# Patient Record
Sex: FEMALE | Race: WHITE | Employment: OTHER | URBAN - METROPOLITAN AREA
[De-identification: names, ages, dates, MRNs, and addresses within clinical notes are randomized per-mention and may not be internally consistent; named-entity substitution may affect disease eponyms.]

---

## 2017-01-13 ENCOUNTER — OFFICE VISIT (OUTPATIENT)
Dept: SURGERY | Facility: CLINIC | Age: 82
End: 2017-01-13

## 2017-01-13 VITALS
WEIGHT: 120.63 LBS | SYSTOLIC BLOOD PRESSURE: 137 MMHG | RESPIRATION RATE: 16 BRPM | BODY MASS INDEX: 20 KG/M2 | HEART RATE: 65 BPM | TEMPERATURE: 97 F | DIASTOLIC BLOOD PRESSURE: 73 MMHG

## 2017-01-13 DIAGNOSIS — Z12.31 ENCOUNTER FOR SCREENING MAMMOGRAM FOR MALIGNANT NEOPLASM OF BREAST: ICD-10-CM

## 2017-01-13 DIAGNOSIS — Z98.82 H/O BREAST AUGMENTATION: Primary | ICD-10-CM

## 2017-01-13 PROCEDURE — 99214 OFFICE O/P EST MOD 30 MIN: CPT | Performed by: SURGERY

## 2017-01-13 NOTE — PROGRESS NOTES
Patient presents with:   Follow - Up: 1 yr f/u s/p removal of implants 3/2016    Verified and updated allergy and medication list.     LMP:  (natural menopause, age unk)    Education Record    Learner:  Patient    Disease / Diagnosis: s/p bilateral breast i

## 2017-01-17 NOTE — PROGRESS NOTES
Breast Surgery Follow-Up    History of Present Illness:   Ms. Latoya Brian is a 80year old woman who presents for follow-up of Left breast discomfort. She denies any palpable masses, nipple discharge, skin changes or axillary adenopathy.  She has a h/o benign    OTHER SURGICAL HISTORY  2006    Comment Upovzymegp-ajbwryrvmw-jolhllswpxeakh    OTHER SURGICAL HISTORY  2007    Comment Removed cholostomy    OTHER SURGICAL HISTORY  2010    Comment intestinal    OTHER SURGICAL HISTORY  1980    Comment removal of to AA BID x 2 weeks then PRN flares. Disp: 453.5 g Rfl: 1   SYMBICORT 160-4.5 MCG/ACT Inhalation Aerosol INHALE 2 PUFFS INTO THE LUNGS 2 (TWO) TIMES DAILY. Disp: 1 Inhaler Rfl: 5   CARTIA  MG Oral Capsule SR 24 Hr TAKE 1 CAPSULE BY MOUTH DAILY.  Disp: of Systems:  General:   The patient denies, fever, chills, night sweats, fatigue, generalized weakness, change in appetite or weight loss.     HEENT:    The patient denies eye irritation, cataracts, redness, glaucoma, yellowing of the eyes, change in vision seizure/epilepsy, speech problems, coordination problems, trembling/tremors, fainting/black outs, dizziness, memory problems, loss of sensation/numbness, problems walking, weakness, tingling or burning in hands/feet.  There is no history of abusive relation supraclavicular, axillary and cervical regions are free of significant lymphadenopathy. Back: There is no vertebral column tenderness. Skin: The skin appears normal. There are no suspicious appearing rashes or lesions. Extremities:  The extremities

## 2017-01-27 ENCOUNTER — OFFICE VISIT (OUTPATIENT)
Dept: INTERNAL MEDICINE CLINIC | Facility: CLINIC | Age: 82
End: 2017-01-27

## 2017-01-27 VITALS
SYSTOLIC BLOOD PRESSURE: 128 MMHG | RESPIRATION RATE: 16 BRPM | HEART RATE: 68 BPM | WEIGHT: 123 LBS | DIASTOLIC BLOOD PRESSURE: 52 MMHG | BODY MASS INDEX: 20 KG/M2

## 2017-01-27 DIAGNOSIS — R00.2 PALPITATIONS: ICD-10-CM

## 2017-01-27 DIAGNOSIS — F41.9 ANXIETY: ICD-10-CM

## 2017-01-27 DIAGNOSIS — R07.89 CHEST DISCOMFORT: Primary | ICD-10-CM

## 2017-01-27 PROCEDURE — 99213 OFFICE O/P EST LOW 20 MIN: CPT | Performed by: NURSE PRACTITIONER

## 2017-01-27 PROCEDURE — 93000 ELECTROCARDIOGRAM COMPLETE: CPT | Performed by: NURSE PRACTITIONER

## 2017-01-27 RX ORDER — ALPRAZOLAM 0.25 MG/1
0.25 TABLET ORAL NIGHTLY PRN
Qty: 10 TABLET | Refills: 0 | Status: SHIPPED | OUTPATIENT
Start: 2017-01-27 | End: 2017-02-07

## 2017-01-27 NOTE — PATIENT INSTRUCTIONS
This medication may make you drowsy and make it more likely you will fall. Use caution when taking this medication not to drive or be too active afterward. If you use all of this prescription in 4 week please notify the office.  We will need to disc

## 2017-01-27 NOTE — PROGRESS NOTES
Patient presents with:  Blood Pressure: f/up bp also complaints of palpitations      HPI:  Presents for 2 day history of palpitations with chest discomfort. Stated it is not \"really palpitations\". Has difficult time describing it.  Stated she has been wor obstructive pulmonary disease (HCC)     Small bowel obstruction (HCC)     PAF (paroxysmal atrial fibrillation) ([facility] [address]     Essential hypertension with goal blood pressure less than 130/80     Hypokalemia     Dyslipidemia     Hyponatremia     Abdominal pain, 1 tablet by mouth daily. Disp:  Rfl:        Physical Exam  /52 mmHg  Pulse 68  Resp 16  Wt 123 lb  Constitutional:  No distress. HEENT:  Normocephalic and atraumatic. Cardiovascular: Normal rate, regular rhythm. No murmur. No S3/S4, rub, gallop. please notify the office. We will need to discuss starting a daily medication to help you. All questions were answered and the patient understands the plan.

## 2017-02-07 ENCOUNTER — OFFICE VISIT (OUTPATIENT)
Dept: INTERNAL MEDICINE CLINIC | Facility: CLINIC | Age: 82
End: 2017-02-07

## 2017-02-07 ENCOUNTER — OFFICE VISIT (OUTPATIENT)
Dept: SURGERY | Facility: CLINIC | Age: 82
End: 2017-02-07

## 2017-02-07 VITALS
SYSTOLIC BLOOD PRESSURE: 120 MMHG | HEIGHT: 65 IN | WEIGHT: 120 LBS | TEMPERATURE: 98 F | BODY MASS INDEX: 19.99 KG/M2 | HEART RATE: 84 BPM | DIASTOLIC BLOOD PRESSURE: 56 MMHG

## 2017-02-07 VITALS
TEMPERATURE: 98 F | RESPIRATION RATE: 16 BRPM | DIASTOLIC BLOOD PRESSURE: 68 MMHG | WEIGHT: 120 LBS | HEART RATE: 88 BPM | HEIGHT: 65 IN | BODY MASS INDEX: 19.99 KG/M2 | SYSTOLIC BLOOD PRESSURE: 147 MMHG

## 2017-02-07 DIAGNOSIS — R10.32 LLQ PAIN: Primary | ICD-10-CM

## 2017-02-07 DIAGNOSIS — I10 HTN (HYPERTENSION), BENIGN: ICD-10-CM

## 2017-02-07 DIAGNOSIS — R10.9 ABDOMINAL PAIN, ACUTE: Primary | ICD-10-CM

## 2017-02-07 DIAGNOSIS — I48.0 PAF (PAROXYSMAL ATRIAL FIBRILLATION) (HCC): ICD-10-CM

## 2017-02-07 PROCEDURE — 99213 OFFICE O/P EST LOW 20 MIN: CPT | Performed by: SURGERY

## 2017-02-07 PROCEDURE — 99213 OFFICE O/P EST LOW 20 MIN: CPT | Performed by: NURSE PRACTITIONER

## 2017-02-07 NOTE — H&P
New Patient Visit Note       Active Problems      1. Abdominal pain, acute    2. HTN (hypertension), benign    3.  PAF (paroxysmal atrial fibrillation) [facility])        Chief Complaint   Abdominal Pain    History of Present Illness     Pt seen at the request of Xoyndnpeuq-kflnudodbo-rgumzjltdpnfut    OTHER SURGICAL HISTORY  2007    Comment Removed cholostomy    OTHER SURGICAL HISTORY  2010    Comment intestinal    OTHER SURGICAL HISTORY  1980    Comment removal of mass near thyroid    OTHER SURGICAL HISTORY  12/1 Disp: 90 capsule Rfl: 1   ALENDRONATE SODIUM 70 MG Oral Tab TAKE ONE TABLET BY MOUTH ONCE A WEEK Disp: 12 tablet Rfl: 2   aspirin 81 MG Oral Tab Take 81 mg by mouth daily. Disp:  Rfl:    SIMVASTATIN 10 MG Oral Tab TAKE 1 TABLET BY MOUTH ONCE DAILY.  Disp: 9 Pulse 88  Temp(Src) 97.8 °F (36.6 °C) (Oral)  Resp 16  Ht 65\"  Wt 120 lb  BMI 19.97 kg/m2  Physical Exam   Constitutional: She appears well-developed and well-nourished. No distress. HENT:   Head: Normocephalic. Neck: Normal range of motion.  No trache

## 2017-02-07 NOTE — PROGRESS NOTES
Terry Wilkerson is a 80year old female. Patient presents with:  Abdominal Pain: all day Sunday during the day and a couple of times yesterday. She had diarrhea and vomiting on Sunday night       HPI:   Presents for eval of abd pain.   Juan night with c Rfl:    HYDROCHLOROTHIAZIDE 12.5 MG Oral Cap TAKE 1 CAPSULE BY MOUTH EVERY MORNING. Disp: 90 capsule Rfl: 1   ALENDRONATE SODIUM 70 MG Oral Tab TAKE ONE TABLET BY MOUTH ONCE A WEEK Disp: 12 tablet Rfl: 2   aspirin 81 MG Oral Tab Take 81 mg by mouth daily. [Other] [OTHER] Sister      x2   • stroke syndrome [Other] [OTHER]       family hx   • suicide completion [Other] [OTHER] Father         Allergies    No Known Allergies          REVIEW OF SYSTEMS:   GENERAL HEALTH: as above.    RESPIRATORY: denies shortness

## 2017-02-10 ENCOUNTER — OFFICE VISIT (OUTPATIENT)
Dept: SURGERY | Facility: CLINIC | Age: 82
End: 2017-02-10

## 2017-02-10 VITALS
DIASTOLIC BLOOD PRESSURE: 66 MMHG | BODY MASS INDEX: 19.99 KG/M2 | HEIGHT: 65 IN | SYSTOLIC BLOOD PRESSURE: 109 MMHG | WEIGHT: 120 LBS | HEART RATE: 70 BPM | RESPIRATION RATE: 16 BRPM

## 2017-02-10 DIAGNOSIS — R10.9 ABDOMINAL PAIN, ACUTE: Primary | ICD-10-CM

## 2017-02-10 PROCEDURE — 99212 OFFICE O/P EST SF 10 MIN: CPT | Performed by: SURGERY

## 2017-02-10 NOTE — PROGRESS NOTES
Follow Up Visit Note       Active Problems      1. Abdominal pain, acute          Chief Complaint   Follow - Up    History of Present Illness    Feels good. No pain. Eating ok. Normal BMs. Allergies  [place] is allergic to no known allergies.     Past Comment hole in intestine    OTHER SURGICAL HISTORY Bilateral 3/16/2016    Comment Bilateral capsulectomy and removal of silicone implants    BREAST SURGERY Bilateral 3/2016    Comment implant removal       The family history and social history have bee acetaminophen (TYLENOL) 325 MG Oral Tab Take 650 mg by mouth every 6 (six) hours as needed for Pain or Fever. Disp:  Rfl:    Cholecalciferol (VITAMIN D) 1000 UNITS Oral Cap Take 1 tablet by mouth daily.    Disp:  Rfl:    CALCIUM + D OR Take 1 tablet by mo Referrals   None    Follow Up  No Follow-up on file.     Aj Dawson MD

## 2017-02-13 RX ORDER — DILTIAZEM HYDROCHLORIDE 300 MG/1
CAPSULE, EXTENDED RELEASE ORAL
Qty: 90 CAPSULE | Refills: 2 | Status: SHIPPED | OUTPATIENT
Start: 2017-02-13 | End: 2017-12-11

## 2017-03-06 ENCOUNTER — OFFICE VISIT (OUTPATIENT)
Dept: INTERNAL MEDICINE CLINIC | Facility: CLINIC | Age: 82
End: 2017-03-06

## 2017-03-06 VITALS
HEIGHT: 65 IN | WEIGHT: 121.63 LBS | HEART RATE: 68 BPM | SYSTOLIC BLOOD PRESSURE: 124 MMHG | TEMPERATURE: 98 F | RESPIRATION RATE: 16 BRPM | BODY MASS INDEX: 20.26 KG/M2 | DIASTOLIC BLOOD PRESSURE: 62 MMHG

## 2017-03-06 DIAGNOSIS — I48.0 PAF (PAROXYSMAL ATRIAL FIBRILLATION) (HCC): ICD-10-CM

## 2017-03-06 DIAGNOSIS — E78.00 PURE HYPERCHOLESTEROLEMIA: ICD-10-CM

## 2017-03-06 DIAGNOSIS — M19.012 PRIMARY OSTEOARTHRITIS OF LEFT SHOULDER: ICD-10-CM

## 2017-03-06 DIAGNOSIS — M85.80 OSTEOPENIA DETERMINED BY X-RAY: ICD-10-CM

## 2017-03-06 DIAGNOSIS — Z00.00 WELLNESS EXAMINATION: ICD-10-CM

## 2017-03-06 DIAGNOSIS — Z23 NEED FOR PNEUMOCOCCAL VACCINATION: Primary | ICD-10-CM

## 2017-03-06 DIAGNOSIS — J44.9 CHRONIC OBSTRUCTIVE PULMONARY DISEASE, UNSPECIFIED COPD TYPE (HCC): ICD-10-CM

## 2017-03-06 DIAGNOSIS — I10 ESSENTIAL HYPERTENSION: ICD-10-CM

## 2017-03-06 DIAGNOSIS — Z87.440 HISTORY OF RECURRENT UTI (URINARY TRACT INFECTION): ICD-10-CM

## 2017-03-06 PROCEDURE — G0439 PPPS, SUBSEQ VISIT: HCPCS | Performed by: INTERNAL MEDICINE

## 2017-03-06 PROCEDURE — G0009 ADMIN PNEUMOCOCCAL VACCINE: HCPCS | Performed by: INTERNAL MEDICINE

## 2017-03-06 PROCEDURE — 90732 PPSV23 VACC 2 YRS+ SUBQ/IM: CPT | Performed by: INTERNAL MEDICINE

## 2017-03-06 NOTE — PROGRESS NOTES
Morena Faria is a 80year old female who presents for a Medicare Annual Wellness visit.   OA shoulders- s/p injections recently and doing well  COPD - no acute issues, seeing Dr. Alvaro Rendon, not smoking now  HTN/ P a fib- compliant with medications, no Prescriptions:  CARTIA  MG Oral Capsule SR 24 Hr TAKE 1 CAPSULE BY MOUTH DAILY. Disp: 90 capsule Rfl: 2   umeclidinium-vilanterol (ANORO ELLIPTA) 62.5-25 MCG/INH Inhalation Aerosol Powder, Breath Activated Inhale 1 puff into the lungs daily.  Disp:  R AST 20 10/20/2016   AST 23 06/22/2016       Lab Results  Component Value Date   ALT 22 11/17/2016   ALT 24 10/20/2016   ALT 25 06/22/2016       Lab Results  Component Value Date   TSH 0.967 09/13/2016   TSH 0.233* 06/22/2016   TSH 0.438 09/29/2015 any tripping hazards?: 0-No    Are you on multiple medications?: 1-Yes    Does pain affect your day to day activities?: 0-No     Have you had any memory issues?: 0-No    Fall/Risk Scorin    Scoring Interpretation: 0 - 3 No Risk     Depression Screening flowsheet data found. Glaucoma Screening      Ophthalmology Visit Annually yes    Bone Density Screening      Dexascan Every two years Last Dexa Scan:   XR DEXA BONE DENSITOMETRY (CPT=77080) 03/07/2016    No flowsheet data found.     Pap and Pelvic data found. Creat/alb ratio  Annually      LDL  Annually LDL CHOLESTEROL (mg/dL)   Date Value   11/17/2016 45     LDL CHOLESTROL (mg/dL)   Date Value   03/20/2014 54    No flowsheet data found. Dilated Eye exam  Annually No flowsheet data found.   No • Lumbago    • Acquired cyst of kidney    • Dizziness and giddiness    • Unspecified essential hypertension    • Other and unspecified hyperlipidemia    • COPD (chronic obstructive pulmonary disease) (HCC)    • Pneumonia, organism unspecified    • Catara Quit date: 01/01/1969    Smokeless Status: Never Used                        Alcohol Use: No            : yes , grown kids, stays active        REVIEW OF SYSTEMS:   GENERAL: feels well otherwise  SKIN: fragile skin  EYES: denies blurred vision or do B01.60/R42)    Wellness examination- she had flu shot from [facility] on cscope, check mammogram in June, continue healthy diet    Essential hypertension - controlled.  CPM  Pure hypercholesterolemia- on statin, check lipids in 1-2 months  -     Lipid Panel [E

## 2017-03-08 ENCOUNTER — LAB ENCOUNTER (OUTPATIENT)
Dept: LAB | Age: 82
End: 2017-03-08
Attending: INTERNAL MEDICINE
Payer: MEDICARE

## 2017-03-08 DIAGNOSIS — I48.0 PAF (PAROXYSMAL ATRIAL FIBRILLATION) (HCC): ICD-10-CM

## 2017-03-08 DIAGNOSIS — I10 ESSENTIAL HYPERTENSION: ICD-10-CM

## 2017-03-08 DIAGNOSIS — E78.00 PURE HYPERCHOLESTEROLEMIA: ICD-10-CM

## 2017-03-08 DIAGNOSIS — M85.80 OSTEOPENIA DETERMINED BY X-RAY: ICD-10-CM

## 2017-03-08 LAB
25-HYDROXYVITAMIN D (TOTAL): 35.4 NG/ML (ref 30–100)
ALBUMIN SERPL-MCNC: 4.2 G/DL (ref 3.5–4.8)
ALP LIVER SERPL-CCNC: 79 U/L (ref 55–142)
ALT SERPL-CCNC: 19 U/L (ref 14–54)
AST SERPL-CCNC: 10 U/L (ref 15–41)
BASOPHILS # BLD AUTO: 0.01 X10(3) UL (ref 0–0.1)
BASOPHILS NFR BLD AUTO: 0.1 %
BILIRUB SERPL-MCNC: 0.9 MG/DL (ref 0.1–2)
BUN BLD-MCNC: 26 MG/DL (ref 8–20)
CALCIUM BLD-MCNC: 10 MG/DL (ref 8.3–10.3)
CHLORIDE: 106 MMOL/L (ref 101–111)
CHOLEST SMN-MCNC: 183 MG/DL (ref ?–200)
CO2: 32 MMOL/L (ref 22–32)
CREAT BLD-MCNC: 0.98 MG/DL (ref 0.55–1.02)
EOSINOPHIL # BLD AUTO: 0.19 X10(3) UL (ref 0–0.3)
EOSINOPHIL NFR BLD AUTO: 2.6 %
ERYTHROCYTE [DISTWIDTH] IN BLOOD BY AUTOMATED COUNT: 12.8 % (ref 11.5–16)
GLUCOSE BLD-MCNC: 96 MG/DL (ref 70–99)
HCT VFR BLD AUTO: 44 % (ref 34–50)
HDLC SERPL-MCNC: 112 MG/DL (ref 45–?)
HDLC SERPL: 1.63 {RATIO} (ref ?–4.44)
HGB BLD-MCNC: 15.3 G/DL (ref 12–16)
IMMATURE GRANULOCYTE COUNT: 0.04 X10(3) UL (ref 0–1)
IMMATURE GRANULOCYTE RATIO %: 0.6 %
LDLC SERPL CALC-MCNC: 57 MG/DL (ref ?–130)
LYMPHOCYTES # BLD AUTO: 1.03 X10(3) UL (ref 0.9–4)
LYMPHOCYTES NFR BLD AUTO: 14.3 %
M PROTEIN MFR SERPL ELPH: 7.2 G/DL (ref 6.1–8.3)
MCH RBC QN AUTO: 32.5 PG (ref 27–33.2)
MCHC RBC AUTO-ENTMCNC: 34.8 G/DL (ref 31–37)
MCV RBC AUTO: 93.4 FL (ref 81–100)
MONOCYTES # BLD AUTO: 0.64 X10(3) UL (ref 0.1–0.6)
MONOCYTES NFR BLD AUTO: 8.9 %
NEUTROPHIL ABS PRELIM: 5.28 X10 (3) UL (ref 1.3–6.7)
NEUTROPHILS # BLD AUTO: 5.28 X10(3) UL (ref 1.3–6.7)
NEUTROPHILS NFR BLD AUTO: 73.5 %
NONHDLC SERPL-MCNC: 71 MG/DL (ref ?–130)
PLATELET # BLD AUTO: 338 10(3)UL (ref 150–450)
POTASSIUM SERPL-SCNC: 4.9 MMOL/L (ref 3.6–5.1)
RBC # BLD AUTO: 4.71 X10(6)UL (ref 3.8–5.1)
RED CELL DISTRIBUTION WIDTH-SD: 43.8 FL (ref 35.1–46.3)
SODIUM SERPL-SCNC: 142 MMOL/L (ref 136–144)
TRIGLYCERIDES: 70 MG/DL (ref ?–150)
TSI SER-ACNC: 0.57 MIU/ML (ref 0.35–5.5)
VLDL: 14 MG/DL (ref 5–40)
WBC # BLD AUTO: 7.2 X10(3) UL (ref 4–13)

## 2017-03-08 PROCEDURE — 36415 COLL VENOUS BLD VENIPUNCTURE: CPT

## 2017-03-08 PROCEDURE — 85025 COMPLETE CBC W/AUTO DIFF WBC: CPT

## 2017-03-08 PROCEDURE — 80053 COMPREHEN METABOLIC PANEL: CPT

## 2017-03-08 PROCEDURE — 80061 LIPID PANEL: CPT

## 2017-03-08 PROCEDURE — 82306 VITAMIN D 25 HYDROXY: CPT

## 2017-03-08 PROCEDURE — 84443 ASSAY THYROID STIM HORMONE: CPT

## 2017-03-09 ENCOUNTER — TELEPHONE (OUTPATIENT)
Dept: INTERNAL MEDICINE CLINIC | Facility: CLINIC | Age: 82
End: 2017-03-09

## 2017-03-09 NOTE — TELEPHONE ENCOUNTER
LMTCB.  Regarding sx- if any CP, SOB, dizziness will need to go to ER for further eval. Otherwise will F/U in AM.

## 2017-03-09 NOTE — TELEPHONE ENCOUNTER
Patient was seen by TB on Monday. Patient states she mentioned briefly that the area around the heart she had kind of a twing/pain. No EKG was done at that time. Patient doesn't know what to do, whether she should come in again? ?  Please call

## 2017-03-10 NOTE — TELEPHONE ENCOUNTER
2nd attempt-LM for pt at [phone] (H)vm to call back, reiterated she will need to go to the ER with any CP, SOB, dizziness.

## 2017-03-14 ENCOUNTER — TELEPHONE (OUTPATIENT)
Dept: INTERNAL MEDICINE CLINIC | Facility: CLINIC | Age: 82
End: 2017-03-14

## 2017-03-14 NOTE — TELEPHONE ENCOUNTER
Patient came in with  to see SD, handed her a note indicating she is aware she needs to schedule an appt to be seen in the office for her symptoms and will call for an appt. SD and TN aware.

## 2017-03-14 NOTE — TELEPHONE ENCOUNTER
Patient came in with  to see SD, handed her a note indicating she is aware she needs to schedule an appt to be seen in the office for her symptoms and will call for an appt.  SD and TN aware

## 2017-03-20 ENCOUNTER — OFFICE VISIT (OUTPATIENT)
Dept: NEUROLOGY | Facility: CLINIC | Age: 82
End: 2017-03-20

## 2017-03-20 VITALS
BODY MASS INDEX: 21.17 KG/M2 | DIASTOLIC BLOOD PRESSURE: 60 MMHG | RESPIRATION RATE: 16 BRPM | HEIGHT: 64 IN | SYSTOLIC BLOOD PRESSURE: 140 MMHG | HEART RATE: 78 BPM | WEIGHT: 124 LBS

## 2017-03-20 DIAGNOSIS — G31.84 MILD COGNITIVE IMPAIRMENT: ICD-10-CM

## 2017-03-20 DIAGNOSIS — R41.3 MEMORY DEFICIT: Primary | ICD-10-CM

## 2017-03-20 PROBLEM — R35.0 URINARY FREQUENCY: Status: ACTIVE | Noted: 2017-03-20

## 2017-03-20 PROBLEM — R25.1 TREMOR OF BOTH HANDS: Status: ACTIVE | Noted: 2017-03-20

## 2017-03-20 PROCEDURE — 99204 OFFICE O/P NEW MOD 45 MIN: CPT | Performed by: OTHER

## 2017-03-20 RX ORDER — UMECLIDINIUM 62.5 UG/1
AEROSOL, POWDER ORAL
Refills: 6 | Status: ON HOLD | COMMUNITY
Start: 2017-02-13 | End: 2017-06-10

## 2017-03-20 RX ORDER — MELATONIN
325
COMMUNITY
End: 2019-11-19 | Stop reason: ALTCHOICE

## 2017-03-20 RX ORDER — DONEPEZIL HYDROCHLORIDE 5 MG/1
5 TABLET, FILM COATED ORAL NIGHTLY
Qty: 30 TABLET | Refills: 11 | Status: SHIPPED | OUTPATIENT
Start: 2017-03-20 | End: 2018-01-09

## 2017-03-20 NOTE — PATIENT INSTRUCTIONS
Refill policies:    • Allow 2 business days for refills; controlled substances may take longer.   • Contact your pharmacy at least 5 days prior to running out of medication and have them send an electronic request or submit request through the “request re your physician has recommended that you have a procedure or additional testing performed. [facility]) will contact your insurance carrier to obtain pre-certification or prior authorization.     Unfortunately, TYLER has seen an increas select Option #1 to schedule your test.  Location:  [facility]    [email or web address]  ([address]) [address] [facility], [address] [facility]

## 2017-03-20 NOTE — PROGRESS NOTES
[place] [address]   Neurology; INITIAL CLINIC VISIT  3/20/2017, 3:00 PM     Daniel Hernandez Patient Status:  No patient class for patient encounter    7/3/1934 MRN [id number]   Location ED [facility], [facility] Pqdqvjitkc-[id number]    OTHER SURGICAL HISTORY  2007    Comment Removed cholostomy    OTHER SURGICAL HISTORY  2010    Comment intestinal    OTHER SURGICAL HISTORY  1980    Comment removal of mass near thyroid    OTHER SURGICAL HISTORY  12/1 daily. Disp:  Rfl:    SIMVASTATIN 10 MG Oral Tab TAKE 1 TABLET BY MOUTH ONCE DAILY. Disp: 90 tablet Rfl: 1   CRANBERRY OR Take 1 tablet by mouth daily. Disp:  Rfl:    Vitamin C (VITAMIN C) 500 MG/5ML Oral Syrup Take 500 mg by mouth daily.  Disp:  Rfl: Examination:  Mental status: he is awake, alert, oriented to time, name and place, Mentally appropriate  for age;  Language and speech: language is intact in expression and comprehension, no dysarthria, voice is normal in volume, follow commends well;  Mem head, EEG to rule out other mimic condition,   May start Aricept 5 mg qhs, if work up is normal,   Action tremor, , reassured her she has no PD, just watch for it,     Call her with test    Return in about 1 year (around 3/20/2018).     We discussed in dept

## 2017-03-23 NOTE — TELEPHONE ENCOUNTER
[phone] (H)  S/w pt, she said she has been feeling fine, no sx of dizziness or SOB. Gets occasional twinge at site of mastectomy, s/w Dr. Eliot Taylor, and he said that is all right.   Pt is aware that if she develops any sudden sx, she would go to ER, and

## 2017-03-24 ENCOUNTER — APPOINTMENT (OUTPATIENT)
Dept: LAB | Facility: HOSPITAL | Age: 82
End: 2017-03-24
Attending: Other
Payer: MEDICARE

## 2017-03-24 ENCOUNTER — HOSPITAL ENCOUNTER (OUTPATIENT)
Dept: CT IMAGING | Facility: HOSPITAL | Age: 82
Discharge: HOME | End: 2017-03-24
Attending: Other
Payer: MEDICARE

## 2017-03-24 DIAGNOSIS — R41.3 MEMORY DEFICIT: ICD-10-CM

## 2017-03-24 PROCEDURE — 70470 CT HEAD/BRAIN W/O & W/DYE: CPT

## 2017-03-28 ENCOUNTER — APPOINTMENT (OUTPATIENT)
Dept: LAB | Age: 82
End: 2017-03-28
Attending: Other
Payer: MEDICARE

## 2017-03-28 ENCOUNTER — TELEPHONE (OUTPATIENT)
Dept: NEUROLOGY | Facility: CLINIC | Age: 82
End: 2017-03-28

## 2017-03-28 DIAGNOSIS — R41.3 MEMORY DEFICIT: ICD-10-CM

## 2017-03-28 PROCEDURE — 84207 ASSAY OF VITAMIN B-6: CPT

## 2017-03-28 PROCEDURE — 86038 ANTINUCLEAR ANTIBODIES: CPT

## 2017-03-28 PROCEDURE — 85652 RBC SED RATE AUTOMATED: CPT

## 2017-03-28 PROCEDURE — 84446 ASSAY OF VITAMIN E: CPT

## 2017-03-28 PROCEDURE — 82607 VITAMIN B-12: CPT

## 2017-03-28 PROCEDURE — 86140 C-REACTIVE PROTEIN: CPT

## 2017-03-28 PROCEDURE — 36415 COLL VENOUS BLD VENIPUNCTURE: CPT

## 2017-03-28 NOTE — TELEPHONE ENCOUNTER
Relayed test results to patient. Patient verbalized understanding and has no questions or concerns at this time.

## 2017-03-31 ENCOUNTER — TELEPHONE (OUTPATIENT)
Dept: NEUROLOGY | Facility: CLINIC | Age: 82
End: 2017-03-31

## 2017-03-31 RX ORDER — SIMVASTATIN 10 MG
TABLET ORAL
Qty: 90 TABLET | Refills: 1 | Status: SHIPPED | OUTPATIENT
Start: 2017-03-31 | End: 2017-07-18

## 2017-04-04 ENCOUNTER — TELEPHONE (OUTPATIENT)
Dept: NEUROLOGY | Facility: CLINIC | Age: 82
End: 2017-04-04

## 2017-04-14 ENCOUNTER — HOSPITAL ENCOUNTER (OUTPATIENT)
Dept: GENERAL RADIOLOGY | Age: 82
Discharge: HOME | End: 2017-04-14
Attending: NURSE PRACTITIONER
Payer: MEDICARE

## 2017-04-14 ENCOUNTER — OFFICE VISIT (OUTPATIENT)
Dept: INTERNAL MEDICINE CLINIC | Facility: CLINIC | Age: 82
End: 2017-04-14

## 2017-04-14 VITALS
WEIGHT: 123 LBS | HEART RATE: 60 BPM | DIASTOLIC BLOOD PRESSURE: 48 MMHG | HEIGHT: 64 IN | BODY MASS INDEX: 21 KG/M2 | TEMPERATURE: 98 F | SYSTOLIC BLOOD PRESSURE: 100 MMHG | RESPIRATION RATE: 16 BRPM

## 2017-04-14 DIAGNOSIS — R07.89 OTHER CHEST PAIN: ICD-10-CM
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
## 2017-04-14 PROBLEM — J44.9 COPD (CHRONIC OBSTRUCTIVE PULMONARY DISEASE) (HCC): Status: ACTIVE | Noted: 2017-04-14

## 2017-04-14 PROCEDURE — 71101 X-RAY EXAM UNILAT RIBS/CHEST: CPT

## 2017-04-14 PROCEDURE — 99213 OFFICE O/P EST LOW 20 MIN: CPT | Performed by: NURSE PRACTITIONER

## 2017-04-14 NOTE — PROGRESS NOTES
Sonny Butler is a 80year old female.   Patient presents with:  Fall (musculoskeletal, neurologic): fell on the steps yesterday/ landed on her back/ was a carpeted step/ did not go to the ER/ hurts to breathe in/ right hip hurts       HPI:   Presents fo Powder, Breath Activated Inhale 1 puff into the lungs daily. Disp: 3 each Rfl: 3   SIMVASTATIN 10 MG Oral Tab TAKE 1 TABLET BY MOUTH ONCE DAILY.  Disp: 90 tablet Rfl: 1   INCRUSE ELLIPTA 62.5 MCG/INH Inhalation Aerosol Powder, Breath Activated  Disp:  Rfl: Alcohol Use: No              Family History   Problem Relation Age of Onset   • Psychiatric Father    • Other[Other] [OTHER] Mother      Cerebral hemorrhage   • Other[Other] [OTHER] Son      asthma  2 sons   • alzheimer's disease [Other] Teresa Dys

## 2017-04-19 ENCOUNTER — NURSE ONLY (OUTPATIENT)
Dept: NEUROLOGY | Facility: CLINIC | Age: 82
End: 2017-04-19

## 2017-04-19 DIAGNOSIS — R41.3 MEMORY DEFICIT: ICD-10-CM

## 2017-04-19 PROCEDURE — 95819 EEG AWAKE AND ASLEEP: CPT | Performed by: OTHER

## 2017-04-19 NOTE — PROCEDURES
Electroencephalography      Reason for the test:  MEMORY LOSS  HX:  83 Y/O FEMALE, RUGHT HANDED, PRESENT TO TYLER FOR EVALUATION

## 2017-04-20 ENCOUNTER — TELEPHONE (OUTPATIENT)
Dept: SURGERY | Facility: CLINIC | Age: 82
End: 2017-04-20

## 2017-05-24 ENCOUNTER — OFFICE VISIT (OUTPATIENT)
Dept: INTERNAL MEDICINE CLINIC | Facility: CLINIC | Age: 82
End: 2017-05-24

## 2017-05-24 VITALS
SYSTOLIC BLOOD PRESSURE: 132 MMHG | BODY MASS INDEX: 21.35 KG/M2 | OXYGEN SATURATION: 97 % | HEART RATE: 78 BPM | DIASTOLIC BLOOD PRESSURE: 80 MMHG | TEMPERATURE: 99 F | WEIGHT: 123.5 LBS | HEIGHT: 63.75 IN | RESPIRATION RATE: 20 BRPM

## 2017-05-24 DIAGNOSIS — M62.9 MUSCULOSKELETAL DISORDER INVOLVING UPPER TRAPEZIUS MUSCLE: Primary | ICD-10-CM

## 2017-05-24 PROCEDURE — 99213 OFFICE O/P EST LOW 20 MIN: CPT | Performed by: NURSE PRACTITIONER

## 2017-05-24 RX ORDER — METHYLPREDNISOLONE 4 MG/1
TABLET ORAL
Qty: 1 KIT | Refills: 0 | Status: SHIPPED | OUTPATIENT
Start: 2017-05-24 | End: 2017-05-26

## 2017-05-24 NOTE — PROGRESS NOTES
Linda Deandre is a 80year old female. Patient presents with:  Back Pain: sx for 2 weeks       HPI:   Presents with pain right scapula. Does not recall injury but she has been helping her  who is becoming more disabled. Point tenderness.   No n Inhale 1 puff into the lungs daily. Disp: 3 each Rfl: 3   SIMVASTATIN 10 MG Oral Tab TAKE 1 TABLET BY MOUTH ONCE DAILY.  Disp: 90 tablet Rfl: 1   INCRUSE ELLIPTA 62.5 MCG/INH Inhalation Aerosol Powder, Breath Activated  Disp:  Rfl: 6   ferrous sulfate 325 ( No              Family History   Problem Relation Age of Onset   • Psychiatric Father    • Other[Other] [OTHER] Mother      Cerebral hemorrhage   • Other[Other] [OTHER] Son      asthma  2 sons   • alzheimer's disease [Other] [OTHER] Sister      x2   • stro

## 2017-05-26 RX ORDER — METHYLPREDNISOLONE 4 MG/1
TABLET ORAL
Qty: 1 KIT | Refills: 0 | Status: SHIPPED | OUTPATIENT
Start: 2017-05-26 | End: 2017-06-07 | Stop reason: ALTCHOICE

## 2017-06-05 ENCOUNTER — TELEPHONE (OUTPATIENT)
Dept: INTERNAL MEDICINE CLINIC | Facility: CLINIC | Age: 82
End: 2017-06-05

## 2017-06-05 DIAGNOSIS — M54.2 NECK PAIN: ICD-10-CM

## 2017-06-05 DIAGNOSIS — M54.5 LOW BACK PAIN, UNSPECIFIED BACK PAIN LATERALITY, UNSPECIFIED CHRONICITY, WITH SCIATICA PRESENCE UNSPECIFIED: ICD-10-CM

## 2017-06-05 DIAGNOSIS — M54.6 ACUTE MIDLINE THORACIC BACK PAIN: Primary | ICD-10-CM

## 2017-06-06 ENCOUNTER — TELEPHONE (OUTPATIENT)
Dept: INTERNAL MEDICINE CLINIC | Facility: CLINIC | Age: 82
End: 2017-06-06

## 2017-06-06 ENCOUNTER — HOSPITAL ENCOUNTER (OUTPATIENT)
Dept: GENERAL RADIOLOGY | Age: 82
Discharge: HOME | End: 2017-06-06
Attending: NURSE PRACTITIONER
Payer: MEDICARE

## 2017-06-06 ENCOUNTER — APPOINTMENT (OUTPATIENT)
Dept: GENERAL RADIOLOGY | Facility: HOSPITAL | Age: 82
End: 2017-06-06
Attending: NURSE PRACTITIONER
Payer: MEDICARE

## 2017-06-06 DIAGNOSIS — M54.6 ACUTE MIDLINE THORACIC BACK PAIN: ICD-10-CM

## 2017-06-06 DIAGNOSIS — M54.5 LOW BACK PAIN, UNSPECIFIED BACK PAIN LATERALITY, UNSPECIFIED CHRONICITY, WITH SCIATICA PRESENCE UNSPECIFIED: ICD-10-CM

## 2017-06-06 DIAGNOSIS — M54.2 NECK PAIN: ICD-10-CM

## 2017-06-06 PROCEDURE — 72110 X-RAY EXAM L-2 SPINE 4/>VWS: CPT | Performed by: NURSE PRACTITIONER

## 2017-06-06 PROCEDURE — 72072 X-RAY EXAM THORAC SPINE 3VWS: CPT | Performed by: NURSE PRACTITIONER

## 2017-06-06 NOTE — TELEPHONE ENCOUNTER
Awaiting xray results from today. Pt did not mention unberable pain when she called for x-ray orders. Per LOV notes pt was to RTC if unsolved. Please advise how to proceed? Ov for evaluation?       LOV 5/24/17- Pt c/o pain : Musculoskeletal disorder involvi

## 2017-06-06 NOTE — TELEPHONE ENCOUNTER
Pt states that she constantly helps her  up for sitting and laying position. Pt c/o mid and lower back pain for 2 weeks. Pain does not radiate anywhere. Pt has been taking ibuprofen (2 tab 3x a day- Pt does not know the strength) with no relief.    P

## 2017-06-06 NOTE — TELEPHONE ENCOUNTER
.Called pt to advise info per SD. Pt verbalized understanding, agreed with POC - scheduled to see SD 6/7/17. Pt had no further questions.

## 2017-06-06 NOTE — TELEPHONE ENCOUNTER
Pt was notified to call CS for appt. Pt verbalized understanding, agreed with POC and had no further questions.

## 2017-06-06 NOTE — TELEPHONE ENCOUNTER
Maximino Cash at [facility] states that Pt denies cervical pain and states that she has thoracic and lumbar.  Ok per SD to change  Orders were changed as requested

## 2017-06-06 NOTE — TELEPHONE ENCOUNTER
Her xrays are unremarkable for acute findings   Seem to be disproportionate to the pain she is having. She needs to be further evaluated. Please triage for acute findings. Needs 30 min ov.

## 2017-06-06 NOTE — TELEPHONE ENCOUNTER
Pt's son came in the office and is requesting for us to call the pt back at her home number [phone].  Please advise

## 2017-06-07 ENCOUNTER — OFFICE VISIT (OUTPATIENT)
Dept: INTERNAL MEDICINE CLINIC | Facility: CLINIC | Age: 82
End: 2017-06-07

## 2017-06-07 ENCOUNTER — TELEPHONE (OUTPATIENT)
Dept: INTERNAL MEDICINE CLINIC | Facility: CLINIC | Age: 82
End: 2017-06-07

## 2017-06-07 VITALS
TEMPERATURE: 98 F | HEIGHT: 63 IN | HEART RATE: 72 BPM | DIASTOLIC BLOOD PRESSURE: 56 MMHG | SYSTOLIC BLOOD PRESSURE: 124 MMHG | WEIGHT: 121 LBS | BODY MASS INDEX: 21.44 KG/M2

## 2017-06-07 DIAGNOSIS — T14.8XXA MUSCLE STRAIN: Primary | ICD-10-CM

## 2017-06-07 DIAGNOSIS — M54.6 ACUTE RIGHT-SIDED THORACIC BACK PAIN: ICD-10-CM

## 2017-06-07 PROCEDURE — 99213 OFFICE O/P EST LOW 20 MIN: CPT | Performed by: NURSE PRACTITIONER

## 2017-06-07 RX ORDER — TRAMADOL HYDROCHLORIDE 50 MG/1
50 TABLET ORAL EVERY 6 HOURS PRN
Qty: 30 TABLET | Refills: 0 | Status: SHIPPED | OUTPATIENT
Start: 2017-06-07 | End: 2017-06-19

## 2017-06-07 RX ORDER — TRAMADOL HYDROCHLORIDE 50 MG/1
50 TABLET ORAL EVERY 6 HOURS PRN
Qty: 30 TABLET | Refills: 0 | OUTPATIENT
Start: 2017-06-07 | End: 2017-06-07

## 2017-06-07 RX ORDER — PREDNISONE 20 MG/1
TABLET ORAL
Qty: 15 TABLET | Refills: 0 | Status: SHIPPED | OUTPATIENT
Start: 2017-06-07 | End: 2017-06-29 | Stop reason: ALTCHOICE

## 2017-06-07 NOTE — PROGRESS NOTES
Curry Castro is a 80year old female. Patient presents with:  Low Back Pain: mid to low back pain for a couple of weeks       HPI:   Presents with back pain. She was here a week or so with upper back pain. Treated with medrol dose pack.   Resolved fo MCG/ACT Inhalation Aero Soln Inhale 2 puffs into the lungs every 6 (six) hours as needed for Wheezing or Shortness of Breath.  Disp: 1 Inhaler Rfl: 11   umeclidinium-vilanterol (ANORO ELLIPTA) 62.5-25 MCG/INH Inhalation Aerosol Powder, Breath Activated [facility] disease) ([facility]    • Pneumonia, organism unspecified(486)    • Cataract       Social History:    Smoking Status: Former Smoker                   Packs/Day: 0.50  Years: 13        Quit date: 01/01/1969    Smokeless Status: Never Used                        A days.           Imaging & Consults:  None    No Follow-up on file. There are no Patient Instructions on file for this visit.

## 2017-06-08 NOTE — TELEPHONE ENCOUNTER
Call received for refill of tramadol for back pain.  Script will be faxed over to patient's pharmacy, discussed with SD.

## 2017-06-10 ENCOUNTER — HOSPITAL ENCOUNTER (INPATIENT)
Facility: HOSPITAL | Age: 82
LOS: 1 days | Discharge: HOME | DRG: 390 | End: 2017-06-11
Admitting: HOSPITALIST
Payer: MEDICARE

## 2017-06-10 ENCOUNTER — APPOINTMENT (OUTPATIENT)
Dept: CT IMAGING | Facility: HOSPITAL | Age: 82
DRG: 390 | End: 2017-06-10
Payer: MEDICARE

## 2017-06-10 DIAGNOSIS — K56.609 SMALL BOWEL OBSTRUCTION (HCC): Primary | ICD-10-CM

## 2017-06-10 PROCEDURE — 99223 1ST HOSP IP/OBS HIGH 75: CPT | Performed by: SURGERY

## 2017-06-10 PROCEDURE — 74177 CT ABD & PELVIS W/CONTRAST: CPT

## 2017-06-10 PROCEDURE — 0D9670Z DRAINAGE OF STOMACH WITH DRAINAGE DEVICE, VIA NATURAL OR ARTIFICIAL OPENING: ICD-10-PCS

## 2017-06-10 PROCEDURE — 99223 1ST HOSP IP/OBS HIGH 75: CPT | Performed by: INTERNAL MEDICINE

## 2017-06-10 RX ORDER — ONDANSETRON 2 MG/ML
4 INJECTION INTRAMUSCULAR; INTRAVENOUS EVERY 6 HOURS PRN
Status: DISCONTINUED | OUTPATIENT
Start: 2017-06-10 | End: 2017-06-11

## 2017-06-10 RX ORDER — LABETALOL HYDROCHLORIDE 5 MG/ML
10 INJECTION, SOLUTION INTRAVENOUS EVERY 4 HOURS PRN
Status: DISCONTINUED | OUTPATIENT
Start: 2017-06-10 | End: 2017-06-11

## 2017-06-10 RX ORDER — GARLIC EXTRACT 500 MG
1 CAPSULE ORAL DAILY
COMMUNITY
End: 2022-01-01

## 2017-06-10 RX ORDER — HYDROMORPHONE HYDROCHLORIDE 1 MG/ML
0.5 INJECTION, SOLUTION INTRAMUSCULAR; INTRAVENOUS; SUBCUTANEOUS ONCE
Status: COMPLETED | OUTPATIENT
Start: 2017-06-10 | End: 2017-06-10

## 2017-06-10 RX ORDER — ENOXAPARIN SODIUM 100 MG/ML
40 INJECTION SUBCUTANEOUS DAILY
Status: DISCONTINUED | OUTPATIENT
Start: 2017-06-10 | End: 2017-06-11

## 2017-06-10 RX ORDER — HYDROMORPHONE HYDROCHLORIDE 1 MG/ML
0.5 INJECTION, SOLUTION INTRAMUSCULAR; INTRAVENOUS; SUBCUTANEOUS EVERY 30 MIN PRN
Status: CANCELLED | OUTPATIENT
Start: 2017-06-10 | End: 2017-06-10

## 2017-06-10 RX ORDER — SODIUM CHLORIDE 9 MG/ML
INJECTION, SOLUTION INTRAVENOUS CONTINUOUS
Status: CANCELLED | OUTPATIENT
Start: 2017-06-10 | End: 2017-06-10

## 2017-06-10 RX ORDER — HYDROMORPHONE HYDROCHLORIDE 1 MG/ML
0.2 INJECTION, SOLUTION INTRAMUSCULAR; INTRAVENOUS; SUBCUTANEOUS EVERY 2 HOUR PRN
Status: DISCONTINUED | OUTPATIENT
Start: 2017-06-10 | End: 2017-06-10

## 2017-06-10 RX ORDER — SODIUM CHLORIDE 9 MG/ML
INJECTION, SOLUTION INTRAVENOUS CONTINUOUS
Status: DISCONTINUED | OUTPATIENT
Start: 2017-06-10 | End: 2017-06-10

## 2017-06-10 RX ORDER — METOCLOPRAMIDE HYDROCHLORIDE 5 MG/ML
10 INJECTION INTRAMUSCULAR; INTRAVENOUS EVERY 8 HOURS PRN
Status: DISCONTINUED | OUTPATIENT
Start: 2017-06-10 | End: 2017-06-11

## 2017-06-10 RX ORDER — MORPHINE SULFATE 2 MG/ML
INJECTION, SOLUTION INTRAMUSCULAR; INTRAVENOUS
Status: DISPENSED
Start: 2017-06-10 | End: 2017-06-10

## 2017-06-10 RX ORDER — ONDANSETRON 2 MG/ML
4 INJECTION INTRAMUSCULAR; INTRAVENOUS EVERY 4 HOURS PRN
Status: CANCELLED | OUTPATIENT
Start: 2017-06-10

## 2017-06-10 RX ORDER — ONDANSETRON 2 MG/ML
4 INJECTION INTRAMUSCULAR; INTRAVENOUS ONCE
Status: COMPLETED | OUTPATIENT
Start: 2017-06-10 | End: 2017-06-10

## 2017-06-10 RX ORDER — MORPHINE SULFATE 2 MG/ML
2 INJECTION, SOLUTION INTRAMUSCULAR; INTRAVENOUS EVERY 4 HOURS PRN
Status: DISCONTINUED | OUTPATIENT
Start: 2017-06-10 | End: 2017-06-11

## 2017-06-10 RX ORDER — DEXTROSE AND SODIUM CHLORIDE 5; .9 G/100ML; G/100ML
INJECTION, SOLUTION INTRAVENOUS CONTINUOUS
Status: DISCONTINUED | OUTPATIENT
Start: 2017-06-10 | End: 2017-06-11

## 2017-06-10 RX ORDER — HYDROMORPHONE HYDROCHLORIDE 1 MG/ML
0.8 INJECTION, SOLUTION INTRAMUSCULAR; INTRAVENOUS; SUBCUTANEOUS EVERY 2 HOUR PRN
Status: DISCONTINUED | OUTPATIENT
Start: 2017-06-10 | End: 2017-06-10

## 2017-06-10 RX ORDER — HYDROMORPHONE HYDROCHLORIDE 1 MG/ML
0.4 INJECTION, SOLUTION INTRAMUSCULAR; INTRAVENOUS; SUBCUTANEOUS EVERY 2 HOUR PRN
Status: DISCONTINUED | OUTPATIENT
Start: 2017-06-10 | End: 2017-06-10

## 2017-06-10 NOTE — PLAN OF CARE
Patient c/o moderate-severe pain on right lower quadrant of abdomen. Patient's abdomen soft, non-distended, tender, bowel sounds hypoactive.  NGT draining moderate amount of bilious draiange

## 2017-06-10 NOTE — ED PROVIDER NOTES
Patient Seen in: [facility] Emergency Department    History   Patient presents with:  Abdomen/Flank Pain (GI/)    Stated Complaint: abdominal pain    HPI    Patient is an 44-year-old presented to the ER with complaint that they have developed abdo Comment Right breast lump- benign    OTHER SURGICAL HISTORY  2006    Comment Qywomzavnt-wrmhokvike-gswbspktkvvlwj    OTHER SURGICAL HISTORY  2007    Comment Removed cholostomy    OTHER SURGICAL HISTORY  2010    Comment intestinal    OTHER SURGICAL HISTORY tablet by mouth daily. Vitamin C (VITAMIN C) 500 MG/5ML Oral Syrup,  Take 500 mg by mouth daily. Cholecalciferol (VITAMIN D) 1000 UNITS Oral Cap,  Take 1 tablet by mouth daily. CALCIUM + D OR,  Take 1 tablet by mouth daily.    VITAMIN B COMPLEX OR Speaking in full sentences. Heart: Regular rate and rhythm     Abdomen: Soft, softly distended, quiet bowel sounds, left lower quadrant tenderness, mild to moderate. No mass palpable. No other focal abdominal tenderness throughout.     Back: No costove up to 3.8 cm in diameter. There is an abrupt transition to completely decompressed distal small bowel in the anterior pelvis deep to a prior surgical site. Findings are consistent with distal small bowel obstruction, possibly related to adhesion.  Decompres

## 2017-06-10 NOTE — CONSULTS
[facility]  Report of Consultation    Evasharan uGthrieconstanza Patient Status:  Inpatient    7/3/1934 MRN [id number]   [facility] 4NW-A Attending Gill Mott [facility] Day # 0 PCP Tiana Medina MD     Reason for Consultation:  [place] giddiness    • Unspecified essential hypertension    • Other and unspecified hyperlipidemia    • COPD (chronic obstructive pulmonary disease) (HCC)    • Pneumonia, organism unspecified(486)    • Cataract    • High blood pressure    • Hearing impairment suicide completion [Other] [OTHER] Father       reports that she quit smoking about 48 years ago. She has never used smokeless tobacco. She reports that she does not drink alcohol or use illicit drugs.     Allergies:    No Known Allergies          Medicatio disturbance  Psychiatric:  Negative for inappropriate interaction and psychiatric symptoms  Respiratory:  Negative for cough, dyspnea and wheezing    Physical Exam:  Blood pressure 154/67, pulse 69, temperature 98.1 °F (36.7 °C), temperature source Oral, r reduction techniques were used.  Dose information is    transmitted to the ACR ([facility]) [address]) which includes the Dose Index Registry.      PATIENT STATED HISTORY:(As transcribed by Technologist)  Gene 6:23        Approved by: Alea Wellington MD          Impression:  Patient Active Problem List:     Spinal stenosis, lumbar region, without neurogenic claudication     Bursitis, hip     HTN (hypertension)     Pure hypercholesterolemia     History of recu cicatrix her NG tube is draining gastric contents plan is continue NG tube decompression assess output and lower GI activity with regard to nasogastric management

## 2017-06-10 NOTE — ED INITIAL ASSESSMENT (HPI)
Pt reports abdominal pain since last night. History of diverticulitis and SOB. Denies nausea or vomiting. Reports small BM today. No BM yesterday. No meds PTA.

## 2017-06-10 NOTE — PROGRESS NOTES
NURSING ADMISSION NOTE      Patient admitted via Cart  Oriented to room. Safety precautions initiated. Bed in low position. Call light in reach. Pateint c/o moderate pain on right lower quadrant of abdomen.   Patient has NGT attached to low intermit

## 2017-06-10 NOTE — H&P
[facility] HOSPITAL[facility]  History and Physical     Avery Manuel Patient Status:  Inpatient    7/3/1934 MRN [id number]   [facility] 4NW-A Attending Pinky Dense [address] Day # 0 PCP Joya Higgins MD     Chief Complaint: Abd pain Comment intestinal    OTHER SURGICAL HISTORY  1980    Comment removal of mass near thyroid    OTHER SURGICAL HISTORY  12/14/12    Comment Dr. Jarvis leblanc Jun[address] 1 SITE RIGHT Right 2006    Comment benign.     OTHER Bilatera tablet (5 mg total) by mouth nightly. Disp: 30 tablet Rfl: 11   CARTIA  MG Oral Capsule SR 24 Hr TAKE 1 CAPSULE BY MOUTH DAILY. Disp: 90 capsule Rfl: 2   HYDROCHLOROTHIAZIDE 12.5 MG Oral Cap TAKE 1 CAPSULE BY MOUTH EVERY MORNING.  Disp: 90 capsule Rfl CNII-XII grossly intact. Musculoskeletal: Moves all extremities. Extremities: No edema or cyanosis. Integument: No rashes or lesions. Psychiatric: Appropriate mood and affect.       Diagnostic Data:      Labs:  Recent Labs   Lab  06/10/17   0317   WBC

## 2017-06-11 ENCOUNTER — APPOINTMENT (OUTPATIENT)
Dept: GENERAL RADIOLOGY | Facility: HOSPITAL | Age: 82
DRG: 390 | End: 2017-06-11
Attending: INTERNAL MEDICINE
Payer: MEDICARE

## 2017-06-11 VITALS
DIASTOLIC BLOOD PRESSURE: 54 MMHG | HEIGHT: 65 IN | HEART RATE: 70 BPM | RESPIRATION RATE: 18 BRPM | BODY MASS INDEX: 21.22 KG/M2 | OXYGEN SATURATION: 99 % | SYSTOLIC BLOOD PRESSURE: 142 MMHG | WEIGHT: 127.38 LBS | TEMPERATURE: 98 F

## 2017-06-11 PROCEDURE — 74020 XR ABDOMEN, OBSTRUCTIVE SERIES (CPT=74020): CPT | Performed by: INTERNAL MEDICINE

## 2017-06-11 PROCEDURE — 99232 SBSQ HOSP IP/OBS MODERATE 35: CPT | Performed by: SURGERY

## 2017-06-11 PROCEDURE — 99239 HOSP IP/OBS DSCHRG MGMT >30: CPT | Performed by: INTERNAL MEDICINE

## 2017-06-11 NOTE — PROGRESS NOTES
[facility]  Progress Note     Rigoberto Feliz Patient Status:  Inpatient    7/3/1934 MRN [id number]   [facility] 4NW-A Attending Anuj Constantino MD   Hosp Day # 1 PCP Shoa Salazar MD     Chief Complaint: SBO    S: Patient with BM, per protocol  3. Leukocytosis - reactive. 4. PAF  1. Tele monitor    2. Hold home med  3. IV BB PRN  5. Essential HTN- BB IV PRN while NPO  6. HL- hold oral med  7.  Back pain, IV pain med, once home she can resume steroid pack      Quality:  · DVT Prophyl

## 2017-06-11 NOTE — PROGRESS NOTES
[facility]  Progress Note    Linda Deandre Patient Status:  Inpatient    7/3/1934 MRN [id number]   [facility] 4NW-A Attending Gamaliel Laughlin MD   Hosp Day # 1 PCP Eamon Cantu MD     Subjective:  No new complaints. No pain.  Awake History of colon resection, for diverticulitis, 2010, with colostomy     Chronic pain following surgery or procedure     Capsular contracture of breast implant     Iron deficiency anemia     Chronic obstructive pulmonary disease ([facility] [address]     Small bowel obst

## 2017-06-11 NOTE — PLAN OF CARE
A&Ox4, VSS. NGT to LIS, green output. NGT clamped per orders at 1220, patient tolerated full liquids well- no complaints of N/V/D. Spoke with Dr. Gwendolyn Oswald, received OK to DC this afternoon. NGT removed, patient tolerated removal well.   Patient denies any abdo

## 2017-06-11 NOTE — PLAN OF CARE
NURSING DISCHARGE NOTE    Discharged Home via Wheelchair. Accompanied by Family member  Belongings Taken by patient/family. AVS and education provided to patient with family at the bedside.  Discharge plan of care along with follow up needs discusse

## 2017-06-11 NOTE — DISCHARGE SUMMARY
[facility] [place] HOSPITAL[facility]  DISCHARGE SUMMARY     Daniel Hernandez Patient Status:  Inpatient    7/3/1934 MRN [id number]   [facility] 4NW-A Attending Ni Turcios MD   Hosp Day # 1 PCP Trini Edwards MD     Date of Admission: 6/10/2017  Date of D Medications      CONTINUE taking these medications       Instructions Prescription details    Acidophilus/Pectin Caps   Commonly known as:  PROBIOTIC        Take 1 capsule by mouth daily.     Refills:  0       Albuterol Sulfate  (90 Base) MCG/ACT Aer Quantity:  30 tablet   Refills:  0       umeclidinium-vilanterol 62.5-25 MCG/INH Aepb   Commonly known as:  ANORO ELLIPTA        Inhale 1 puff into the lungs daily.     Quantity:  3 each   Refills:  3       VITAMIN B COMPLEX OR        Take 1 tablet by mouth

## 2017-06-13 ENCOUNTER — PATIENT OUTREACH (OUTPATIENT)
Dept: CASE MANAGEMENT | Age: 82
End: 2017-06-13

## 2017-06-13 DIAGNOSIS — Z02.9 ENCOUNTERS FOR ADMINISTRATIVE PURPOSE: Primary | ICD-10-CM

## 2017-06-14 ENCOUNTER — TELEPHONE (OUTPATIENT)
Dept: INTERNAL MEDICINE CLINIC | Facility: CLINIC | Age: 82
End: 2017-06-14

## 2017-06-14 NOTE — PROGRESS NOTES
Initial Post Discharge Follow Up   Discharge Date: 6/11/17  Contact Date: 6/13/2017    Consent Verification:  Assessment Completed With: Patient  HIPAA Verified?   Yes    Discharge Dx:  SBO    General:   • How have you been since your discharge from the (ANORO ELLIPTA) 62.5-25 MCG/INH Inhalation Aerosol Powder, Breath Activated Inhale 1 puff into the lungs daily. Disp: 3 each Rfl: 3   SIMVASTATIN 10 MG Oral Tab TAKE 1 TABLET BY MOUTH ONCE DAILY.  Disp: 90 tablet Rfl: 1   ferrous sulfate 325 (65 FE) MG Oral Yunior Redmond TCMCVSURGERY  Pneumonia: . TCMPNEUMONIATEMP  Ortho/Spine:  TCMORTHOSPINETEMP  Re-admit:  . TCMREADMITTEMP      Needs post D/C:   Now that you are home, are there any needs or concerns you need addressed before your next visit with your PCP?  (HARIKA, meds, di this time. Have you made all of your follow up appointments? no --pt refusing to schedule HFU appt with PCP at this time. Is there any reason as to why you cannot make your appointments?    No     NCM Reviewed upcoming Specialist Appt with justice

## 2017-06-14 NOTE — TELEPHONE ENCOUNTER
Spoke to pt for TCM today. Pt does not have HFU appt scheduled at this time as her  is currently admitted. TCM/HFU appt recommended by 6/18/17 as pt is a high risk for readmission. Please advise.      TRIAGE: Please f/u with pt and try to get her

## 2017-06-19 ENCOUNTER — OFFICE VISIT (OUTPATIENT)
Dept: INTERNAL MEDICINE CLINIC | Facility: CLINIC | Age: 82
End: 2017-06-19

## 2017-06-19 VITALS
WEIGHT: 120 LBS | RESPIRATION RATE: 16 BRPM | BODY MASS INDEX: 20 KG/M2 | HEART RATE: 76 BPM | SYSTOLIC BLOOD PRESSURE: 140 MMHG | DIASTOLIC BLOOD PRESSURE: 62 MMHG | TEMPERATURE: 98 F

## 2017-06-19 DIAGNOSIS — K56.609 SBO (SMALL BOWEL OBSTRUCTION) (HCC): ICD-10-CM

## 2017-06-19 DIAGNOSIS — M54.6 ACUTE BILATERAL THORACIC BACK PAIN: Primary | ICD-10-CM

## 2017-06-19 PROCEDURE — 99213 OFFICE O/P EST LOW 20 MIN: CPT | Performed by: NURSE PRACTITIONER

## 2017-06-19 RX ORDER — TRAMADOL HYDROCHLORIDE 50 MG/1
50 TABLET ORAL EVERY 6 HOURS PRN
Qty: 30 TABLET | Refills: 0 | Status: ON HOLD | OUTPATIENT
Start: 2017-06-19 | End: 2017-07-11

## 2017-06-19 NOTE — PROGRESS NOTES
Patient presents with:  Back Pain      HPI:  Presents with continued complaints of thoracic back pain. Has been taking prednisone and tramadol as ordered with little to no relief. Stated pain is midline without radiation.  Is becoming overwhelmed with the p Degenerative arthritis of cervical spine     Hx of small bowel obstruction     Disorders of bursae and tendons in shoulder region, unspecified     Atrial fibrillation ([facility] [address]     History of colon resection, for diverticulitis, 2010, with colostomy     Chroni tablet Rfl: 11   CARTIA  MG Oral Capsule SR 24 Hr TAKE 1 CAPSULE BY MOUTH DAILY. Disp: 90 capsule Rfl: 2   HYDROCHLOROTHIAZIDE 12.5 MG Oral Cap TAKE 1 CAPSULE BY MOUTH EVERY MORNING.  Disp: 90 capsule Rfl: 1   ALENDRONATE SODIUM 70 MG Oral Tab TAKE ON steroid injection may be best course of action but will await input from neuro-spine team.     Sbo (small bowel obstruction) (hcc)- Resolved at time of this visit. Exam limited due to patient unwillingness to discuss and her pain level.      No orders of th

## 2017-06-19 NOTE — TELEPHONE ENCOUNTER
Pt calling back, however she is refusing to schedule hospital follow up from 6/10/17 admission for SBO. Pt has been evaluated for back pain by SD in the beginning of the month and XR reveals degenerative changes.  She was put on a medrol dose pack with no r

## 2017-06-20 ENCOUNTER — OFFICE VISIT (OUTPATIENT)
Dept: SURGERY | Facility: CLINIC | Age: 82
End: 2017-06-20

## 2017-06-20 VITALS — HEART RATE: 88 BPM | DIASTOLIC BLOOD PRESSURE: 60 MMHG | RESPIRATION RATE: 19 BRPM | SYSTOLIC BLOOD PRESSURE: 140 MMHG

## 2017-06-20 DIAGNOSIS — M80.00XA AGE-RELATED OSTEOPOROSIS WITH CURRENT PATHOLOGICAL FRACTURE, INITIAL ENCOUNTER: ICD-10-CM

## 2017-06-20 DIAGNOSIS — M51.37 DDD (DEGENERATIVE DISC DISEASE), LUMBOSACRAL: ICD-10-CM

## 2017-06-20 DIAGNOSIS — S22.000A THORACIC COMPRESSION FRACTURE, CLOSED, INITIAL ENCOUNTER (HCC): Primary | ICD-10-CM

## 2017-06-20 PROCEDURE — 99204 OFFICE O/P NEW MOD 45 MIN: CPT | Performed by: NURSE PRACTITIONER

## 2017-06-20 RX ORDER — METHOCARBAMOL 500 MG/1
500 TABLET, FILM COATED ORAL 3 TIMES DAILY
Qty: 60 TABLET | Refills: 0 | Status: SHIPPED | OUTPATIENT
Start: 2017-06-20 | End: 2017-06-21

## 2017-06-20 NOTE — H&P
[address]  7/3/1934  * No surgery found *    Patient presents with:  Low Back Pain: NP referred by SWETA AT [facility] for back pain         HPI:   [facility] (lymphatic) cyst    • Increased urinary frequency    • Vertigo    • Diverticulitis of colon (without mention of hemorrhage)(562.11)    • Lumbago    • Acquired cyst of kidney    • Dizziness and giddiness    • Unspecified essential hypertension    • Other an mouth every 6 (six) hours as needed for Pain. Disp: 30 tablet Rfl: 0   Acidophilus/Pectin Oral Cap Take 1 capsule by mouth daily. Disp:  Rfl:    predniSONE 20 MG Oral Tab 3 tab x 2 days then 2 tab x 3 days then 1 tab x 3 days.  Disp: 15 tablet Rfl: 0   umec family hx   • suicide completion [Other] [OTHER] Father        Smoking Status: Former Smoker                   Packs/Day: 0.50  Years: 13        Quit date: 01/01/1969    Smokeless Status: Never Used                        Alcohol Use: No              Denie taper dose of steroids so do not recommend repeating this. 3. Imaging: MRI thoracic and lumbar spine. Due to claustrophobia and severe pain she will need anesthesia  4. Will await imaging.   Consider kyphoplasty versus TLSO brace        Imaging and anato

## 2017-06-20 NOTE — PATIENT INSTRUCTIONS
Refill policies:    • Allow 2-3 business days for refills; controlled substances may take longer.   • Contact your pharmacy at least 5 days prior to running out of medication and have them send an electronic request or submit request through the [facility] have a procedure or additional testing performed. [facility]) will contact your insurance carrier to obtain pre-certification or prior authorization.     Unfortunately, TYLER has seen an increase in denial of payment even though the p

## 2017-06-20 NOTE — PROGRESS NOTES
Location of Pain: upper to lower back, right hip radiating to front    Date Pain Began:    3 weeks       Work Related:   No        Receiving Work Comp/Disability:   No    Numeric Rating Scale:  Pain at Present:  10

## 2017-06-21 ENCOUNTER — TELEPHONE (OUTPATIENT)
Dept: SURGERY | Facility: CLINIC | Age: 82
End: 2017-06-21

## 2017-06-21 RX ORDER — SODIUM CHLORIDE, SODIUM LACTATE, POTASSIUM CHLORIDE, CALCIUM CHLORIDE 600; 310; 30; 20 MG/100ML; MG/100ML; MG/100ML; MG/100ML
INJECTION, SOLUTION INTRAVENOUS CONTINUOUS
Status: CANCELLED | OUTPATIENT
Start: 2017-06-21

## 2017-06-21 RX ORDER — TIZANIDINE 2 MG/1
2 TABLET ORAL EVERY 8 HOURS PRN
Qty: 60 TABLET | Refills: 0 | Status: ON HOLD | OUTPATIENT
Start: 2017-06-21 | End: 2017-07-11

## 2017-06-21 NOTE — TELEPHONE ENCOUNTER
Spoke with [facility] at patient's pharmacy who stated patient's insurance is not covering methocarbamol. They are requesting a prior authorization be started or alternative medications be prescribed such as Meloxicam or Tizanidine.  Will relay above information

## 2017-06-22 ENCOUNTER — OFFICE VISIT (OUTPATIENT)
Dept: SURGERY | Facility: CLINIC | Age: 82
End: 2017-06-22

## 2017-06-22 ENCOUNTER — TELEPHONE (OUTPATIENT)
Dept: SURGERY | Facility: CLINIC | Age: 82
End: 2017-06-22

## 2017-06-22 VITALS
RESPIRATION RATE: 16 BRPM | HEIGHT: 65 IN | SYSTOLIC BLOOD PRESSURE: 150 MMHG | DIASTOLIC BLOOD PRESSURE: 74 MMHG | HEART RATE: 76 BPM | BODY MASS INDEX: 19.16 KG/M2 | WEIGHT: 115 LBS

## 2017-06-22 DIAGNOSIS — Z87.81 HISTORY OF COMPRESSION FRACTURE OF SPINE: ICD-10-CM

## 2017-06-22 DIAGNOSIS — R52 SEVERE PAIN: ICD-10-CM

## 2017-06-22 DIAGNOSIS — S22.000S THORACIC COMPRESSION FRACTURE, SEQUELA: Primary | ICD-10-CM

## 2017-06-22 DIAGNOSIS — M54.14 THORACIC RADICULOPATHY DUE TO TRAUMA: ICD-10-CM

## 2017-06-22 PROCEDURE — 99213 OFFICE O/P EST LOW 20 MIN: CPT | Performed by: NURSE PRACTITIONER

## 2017-06-22 RX ORDER — BUPRENORPHINE 5 UG/H
1 PATCH TRANSDERMAL
Qty: 4 PATCH | Refills: 0 | OUTPATIENT
Start: 2017-06-22 | End: 2017-06-29

## 2017-06-22 NOTE — TELEPHONE ENCOUNTER
Spoke with radiology-Marlyn who stated patient needs clearance to receive anesthesia. Informed Marlny that patient just saw her PCP on 6/19/17. Addi Rodriguez will make a note to use that H&P for upcoming MRI's. No further questions.

## 2017-06-22 NOTE — TELEPHONE ENCOUNTER
Spoke to pt who states she is in extreme pain and is wanting imaging sooner, informed her the imaging she is scheduled for needs special preperation since she needs anesthesia.    Pt states she is unable to get in and out of bed, pt states she is took 50 mg

## 2017-06-22 NOTE — PROGRESS NOTES
Neurosurgery Cervical  Follow up      Eva White PCP:  Tiana Medina MD    7/3/1934 MRN [id number]         Patient presents with:   Follow - Up: back pain    REASON FOR VISIT: severe pain    HISTORY OF PRESENT Rodrigue Vera is a 80 ye needed xrays. Pain is \"grabbing\" and worse with movement. She is having difficulty w/ ADLs. Pain is keeping her up at night. States she cannotmove in bed or get into and out of bed or chair. Denies arm or leg pain. Denies weakness.  She has lost weight ov patient option to be admitted to the hospital for pain management until we can determine the source of her pain. Based on her presentation she may have compression fracture versus herniated thoracic disc.   Advised her that she can start the [facility] patch

## 2017-06-22 NOTE — PATIENT INSTRUCTIONS
Refill policies:    • Allow 2-3 business days for refills; controlled substances may take longer.   • Contact your pharmacy at least 5 days prior to running out of medication and have them send an electronic request or submit request through the [facility] have a procedure or additional testing performed. [facility]) will contact your insurance carrier to obtain pre-certification or prior authorization.     Unfortunately, TYLER has seen an increase in denial of payment even though the p

## 2017-06-23 ENCOUNTER — TELEPHONE (OUTPATIENT)
Dept: SURGERY | Facility: CLINIC | Age: 82
End: 2017-06-23

## 2017-06-23 NOTE — TELEPHONE ENCOUNTER
Spoke with patient via phone regarding her pain control reports that she is \"fine\". States she has not gotten the "[employer] patch. Reviewed our records and it looks like it was called into her pharmacy yesterday.   She will call her pharmacy to see if the

## 2017-06-24 ENCOUNTER — APPOINTMENT (OUTPATIENT)
Dept: LAB | Facility: HOSPITAL | Age: 82
End: 2017-06-24
Payer: MEDICARE

## 2017-06-24 DIAGNOSIS — S22.000A THORACIC COMPRESSION FRACTURE (HCC): ICD-10-CM

## 2017-06-24 PROCEDURE — 93005 ELECTROCARDIOGRAM TRACING: CPT

## 2017-06-24 PROCEDURE — 93010 ELECTROCARDIOGRAM REPORT: CPT | Performed by: INTERNAL MEDICINE

## 2017-06-26 ENCOUNTER — ANESTHESIA EVENT (OUTPATIENT)
Dept: MRI IMAGING | Facility: HOSPITAL | Age: 82
End: 2017-06-26
Payer: MEDICARE

## 2017-06-27 ENCOUNTER — TELEPHONE (OUTPATIENT)
Dept: SURGERY | Facility: CLINIC | Age: 82
End: 2017-06-27

## 2017-06-27 NOTE — OR PREOP
Chart reviewed by , anesthesia for abnormal EKG. Order received to obtain medical clearance. I faxed to and spoke with   Margie Rendon in [facility] office regarding need for medical clearance. I faxed FYI to Elissa Bentley. Fax confirmations received.

## 2017-06-27 NOTE — OR PREOP
Yahaira Ramos RN from [facility] office called informing me that PCP is unable to provide medical clearance as required by anesthesia before MRI tomorrow. Yahaira Ramos states patient can be seen in office 7-3-17 for clearance.  I informed Candice Bar, in [facility]

## 2017-06-27 NOTE — TELEPHONE ENCOUNTER
No further clearance needed for MRI tomorrow. Patient's daughter was called and informed. No further questions at this time.

## 2017-06-27 NOTE — TELEPHONE ENCOUNTER
Spoke with [facility] PAT who stated they are waiting to hear back from the anesthesiologist regarding PCP visit 6/19/17. As soon as they get an update they will call our office back. Spoke with patient's daughter and informed her of the above information.  Yash

## 2017-06-27 NOTE — TELEPHONE ENCOUNTER
Spoke with [facility] PAT who stated patient needs appointment with PCP for clearance for MRI with anesthesia scheduled for tomorrow.  PCP office told [facility] PAT that they could hold an appointment on Monday 7/3/17 at 1:45 pm. Patient would need to r/s MRI's until af

## 2017-06-28 ENCOUNTER — HOSPITAL ENCOUNTER (OUTPATIENT)
Dept: MRI IMAGING | Facility: HOSPITAL | Age: 82
Discharge: HOME | End: 2017-06-28
Attending: NURSE PRACTITIONER
Payer: MEDICARE

## 2017-06-28 ENCOUNTER — TELEPHONE (OUTPATIENT)
Dept: SURGERY | Facility: CLINIC | Age: 82
End: 2017-06-28

## 2017-06-28 ENCOUNTER — APPOINTMENT (OUTPATIENT)
Dept: LAB | Facility: HOSPITAL | Age: 82
End: 2017-06-28
Attending: NURSE PRACTITIONER
Payer: MEDICARE

## 2017-06-28 ENCOUNTER — ANESTHESIA (OUTPATIENT)
Dept: MRI IMAGING | Facility: HOSPITAL | Age: 82
End: 2017-06-28
Payer: MEDICARE

## 2017-06-28 VITALS
DIASTOLIC BLOOD PRESSURE: 71 MMHG | TEMPERATURE: 98 F | HEART RATE: 79 BPM | RESPIRATION RATE: 16 BRPM | BODY MASS INDEX: 19.16 KG/M2 | OXYGEN SATURATION: 99 % | WEIGHT: 115 LBS | HEIGHT: 65 IN | SYSTOLIC BLOOD PRESSURE: 151 MMHG

## 2017-06-28 VITALS
HEART RATE: 76 BPM | RESPIRATION RATE: 20 BRPM | OXYGEN SATURATION: 98 % | TEMPERATURE: 98 F | DIASTOLIC BLOOD PRESSURE: 61 MMHG | SYSTOLIC BLOOD PRESSURE: 137 MMHG

## 2017-06-28 DIAGNOSIS — S22.000A THORACIC COMPRESSION FRACTURE, CLOSED, INITIAL ENCOUNTER (HCC): ICD-10-CM

## 2017-06-28 DIAGNOSIS — M80.00XA AGE-RELATED OSTEOPOROSIS WITH CURRENT PATHOLOGICAL FRACTURE, INITIAL ENCOUNTER: ICD-10-CM

## 2017-06-28 DIAGNOSIS — S22.000A THORACIC COMPRESSION FRACTURE (HCC): Primary | ICD-10-CM

## 2017-06-28 PROCEDURE — 72148 MRI LUMBAR SPINE W/O DYE: CPT | Performed by: NURSE PRACTITIONER

## 2017-06-28 PROCEDURE — 72146 MRI CHEST SPINE W/O DYE: CPT | Performed by: NURSE PRACTITIONER

## 2017-06-28 RX ORDER — ONDANSETRON 2 MG/ML
4 INJECTION INTRAMUSCULAR; INTRAVENOUS AS NEEDED
Status: ACTIVE | OUTPATIENT
Start: 2017-06-28 | End: 2017-06-28

## 2017-06-28 RX ORDER — SODIUM CHLORIDE, SODIUM LACTATE, POTASSIUM CHLORIDE, CALCIUM CHLORIDE 600; 310; 30; 20 MG/100ML; MG/100ML; MG/100ML; MG/100ML
INJECTION, SOLUTION INTRAVENOUS CONTINUOUS
Status: DISCONTINUED | OUTPATIENT
Start: 2017-06-28 | End: 2017-07-02

## 2017-06-28 RX ORDER — ACETAMINOPHEN 500 MG
1000 TABLET ORAL ONCE AS NEEDED
Status: ACTIVE | OUTPATIENT
Start: 2017-06-28 | End: 2017-06-28

## 2017-06-28 RX ORDER — NALOXONE HYDROCHLORIDE 0.4 MG/ML
80 INJECTION, SOLUTION INTRAMUSCULAR; INTRAVENOUS; SUBCUTANEOUS AS NEEDED
Status: ACTIVE | OUTPATIENT
Start: 2017-06-28 | End: 2017-06-28

## 2017-06-28 NOTE — ANESTHESIA POSTPROCEDURE EVALUATION
[id number] [facility] Patient Status:  Outpatient   Age/Gender 80year old female MRN [id number]   [facility] MRI Attending WAYNE Oakes   Hosp Day # 0 PCP Cadence Jay MD       Anesthesia Post-op Note    * No procedur

## 2017-06-28 NOTE — TELEPHONE ENCOUNTER
Patient returning Tru's call. Informed her that she is currently in clinic seeing patients, not sure what APN needed to discuss with her about her MRI results. Will have APN call her back.

## 2017-06-28 NOTE — TELEPHONE ENCOUNTER
MICHELL TCB at both numbers listed. Advised her that was calling weight I was calling regarding her MRI lumbar and thoracic spine which was done today.

## 2017-06-28 NOTE — ANESTHESIA PREPROCEDURE EVALUATION
PRE-OP EVALUATION    Patient Name: Zana Whitlock    Pre-op Diagnosis: * No surgery found *    * No surgery found *    * Surgery not found *    Pre-op vitals reviewed. Body mass index is 19.14 kg/m². Current medications reviewed.   [facility] SBOs and abdominal operations                               Cardiovascular      ECG reviewed.   Exercise tolerance: poor           (+) hypertension   (+) hyperlipidemia               (+) dysrhythmias and atrial fibrillation                  Endo/Other  Comm Smokeless tobacco: Never Used    Alcohol use Yes  0.6 oz/week    1 Glasses of wine per week            Drug use: No     Available pre-op labs reviewed.     Lab Results  Component Value Date   WBC 15.8 (H) 06/10/2017   RBC 4.18 06/10/2017   HGB 12.9 06/10/20

## 2017-06-29 ENCOUNTER — TELEPHONE (OUTPATIENT)
Dept: SURGERY | Facility: CLINIC | Age: 82
End: 2017-06-29

## 2017-06-29 ENCOUNTER — OFFICE VISIT (OUTPATIENT)
Dept: SURGERY | Facility: CLINIC | Age: 82
End: 2017-06-29

## 2017-06-29 VITALS
SYSTOLIC BLOOD PRESSURE: 148 MMHG | RESPIRATION RATE: 18 BRPM | HEART RATE: 84 BPM | WEIGHT: 117.5 LBS | BODY MASS INDEX: 20.06 KG/M2 | DIASTOLIC BLOOD PRESSURE: 62 MMHG | HEIGHT: 64 IN

## 2017-06-29 DIAGNOSIS — S22.000G THORACIC COMPRESSION FRACTURE, WITH DELAYED HEALING, SUBSEQUENT ENCOUNTER: Primary | ICD-10-CM

## 2017-06-29 DIAGNOSIS — M54.14 THORACIC RADICULOPATHY DUE TO TRAUMA: ICD-10-CM

## 2017-06-29 DIAGNOSIS — R52 SEVERE PAIN: ICD-10-CM

## 2017-06-29 PROCEDURE — 99213 OFFICE O/P EST LOW 20 MIN: CPT | Performed by: NURSE PRACTITIONER

## 2017-06-29 RX ORDER — TRAMADOL HYDROCHLORIDE 50 MG/1
50 TABLET ORAL EVERY 6 HOURS PRN
Qty: 60 TABLET | Refills: 0 | OUTPATIENT
Start: 2017-06-29 | End: 2017-07-10

## 2017-06-29 NOTE — PROGRESS NOTES
Neurosurgery Cervical  Follow up      Neda Barker PCP:  Lb Lord MD    7/3/1934 MRN [id number]         No chief complaint on file.     REASON FOR VISIT: severe pain    HISTORY OF PRESENT ILLNESS:Sara Silva is a 80year oldfemale here f diverticulitis, COPD, OA referred by PCP for c/o thoracic pain. Pain started 3 weeks ago after she slipped on steps and fell onto her right thoracic spine. Developed pain \"across and high\".  She was prescribed prednisone taper dose by her PCP and this debby fracture of T9 with approximately 50% height loss. Mild acute/subacute compression fracture of T10 with 33% height loss. No significant retropulsion is seen.  Chronic moderate compression fracture of T3.  2. Moderate degenerative changes in the thoracic spi

## 2017-06-29 NOTE — TELEPHONE ENCOUNTER
Returning patients call,   Pt wanting to know if [facility] is the closest location to [facility], informed her it was the closest location to [place] and provided names of other towns. Nothing further.

## 2017-06-29 NOTE — PATIENT INSTRUCTIONS
Kyphoplasty    Kyphoplasty is a procedure that can help relieve the pain of vertebral compression fracture.  (This is a collapse of bone in your spine most commonly caused by osteoporosis.) It does this by strengthening your spine (vertebrae) with special · New pain, weakness, tingling, or numbness in your legs  · New or unrelieved back pain   Date Last Reviewed: 7/1/2016  © 7752-1435 [address], [address] [place] [address]. All rights reserved.  This information is not intended · Don’t drive HIK 8 BSJD after surgery. And never drive while taking opioid pain medicine. · Ask your healthcare provider when you can begin lifting objects again. Ask him or her about any weight limits for lifting.   Follow-up  Make a follow-up appointmen

## 2017-07-05 NOTE — TELEPHONE ENCOUNTER
Spoke to daughter Karen Spangler who stated her mother can not be fitted for TLSO brace with  until next week, she would like to see if a different company can possibly schedule pt for sooner fitting.   Informed daughter [facility] offi

## 2017-07-07 ENCOUNTER — TELEPHONE (OUTPATIENT)
Dept: SURGERY | Facility: CLINIC | Age: 82
End: 2017-07-07

## 2017-07-07 NOTE — TELEPHONE ENCOUNTER
Per daughter, Paulette Eliz, she wants to know the what procedure was discussed at patient's last visit. Informed her that Kyphoplasty was discussed. Discussed procedure in detail with daughter. They are scheduled for fitting of TLSO brace next week.  Due to her mo

## 2017-07-07 NOTE — TELEPHONE ENCOUNTER
Spoke with  and was told it will take up to 2 weeks to receive custom TLSO. Called [facility] and was told it takes 1-2 days for TLSO.      Daughter Rosario Simpson was called, [phone], given information and told to go promptly to [facility] with address g

## 2017-07-10 ENCOUNTER — TELEPHONE (OUTPATIENT)
Dept: SURGERY | Facility: CLINIC | Age: 82
End: 2017-07-10

## 2017-07-10 DIAGNOSIS — M54.14 THORACIC RADICULOPATHY DUE TO TRAUMA: ICD-10-CM

## 2017-07-10 DIAGNOSIS — Z79.01 LONG TERM CURRENT USE OF ANTICOAGULANT: ICD-10-CM

## 2017-07-10 DIAGNOSIS — S22.000G THORACIC COMPRESSION FRACTURE, WITH DELAYED HEALING, SUBSEQUENT ENCOUNTER: Primary | ICD-10-CM

## 2017-07-10 NOTE — TELEPHONE ENCOUNTER
Patient states she would like to schedule surgery ASAP, states the last time she was seen she was told he could do surgery in 1 day. I informed her that first available date is 7/25/17. She states she cannot wait that long, she is having too much pain.  I a

## 2017-07-10 NOTE — TELEPHONE ENCOUNTER
Informed patient that her procedure cannot be done in the surgery suites, she can be done in the Interventional suites around 3:00pm tomorrow. Patient would like to proceed.     Instructions given to patient, NPO, hold ASA, arrive at hospital at 1:00pm for

## 2017-07-11 ENCOUNTER — ANESTHESIA EVENT (OUTPATIENT)
Dept: INTERVENTIONAL RADIOLOGY/VASCULAR | Facility: HOSPITAL | Age: 82
End: 2017-07-11

## 2017-07-11 ENCOUNTER — HOSPITAL ENCOUNTER (OUTPATIENT)
Dept: INTERVENTIONAL RADIOLOGY/VASCULAR | Facility: HOSPITAL | Age: 82
Discharge: HOME | End: 2017-07-11
Attending: NEUROLOGICAL SURGERY | Admitting: NEUROLOGICAL SURGERY
Payer: MEDICARE

## 2017-07-11 ENCOUNTER — ANESTHESIA (OUTPATIENT)
Dept: INTERVENTIONAL RADIOLOGY/VASCULAR | Facility: HOSPITAL | Age: 82
End: 2017-07-11

## 2017-07-11 VITALS
TEMPERATURE: 99 F | DIASTOLIC BLOOD PRESSURE: 101 MMHG | RESPIRATION RATE: 23 BRPM | HEIGHT: 64 IN | BODY MASS INDEX: 19.63 KG/M2 | WEIGHT: 115 LBS | SYSTOLIC BLOOD PRESSURE: 145 MMHG | OXYGEN SATURATION: 91 % | HEART RATE: 100 BPM

## 2017-07-11 DIAGNOSIS — S22.000A COMPRESSION FRACTURE OF BODY OF THORACIC VERTEBRA (HCC): ICD-10-CM

## 2017-07-11 LAB
APTT PPP: 33.1 SECONDS (ref 25–34)
BUN BLD-MCNC: 12 MG/DL (ref 8–20)
CALCIUM BLD-MCNC: 9.4 MG/DL (ref 8.3–10.3)
CHLORIDE: 102 MMOL/L (ref 101–111)
CO2: 29 MMOL/L (ref 22–32)
CREAT BLD-MCNC: 0.8 MG/DL (ref 0.55–1.02)
ERYTHROCYTE [DISTWIDTH] IN BLOOD BY AUTOMATED COUNT: 12.4 % (ref 11.5–16)
GLUCOSE BLD-MCNC: 102 MG/DL (ref 70–99)
HCT VFR BLD AUTO: 37.2 % (ref 34–50)
HGB BLD-MCNC: 12.8 G/DL (ref 12–16)
MCH RBC QN AUTO: 29.2 PG (ref 27–33.2)
MCHC RBC AUTO-ENTMCNC: 34.4 G/DL (ref 31–37)
MCV RBC AUTO: 84.9 FL (ref 81–100)
PLATELET # BLD AUTO: 404 10(3)UL (ref 150–450)
POTASSIUM SERPL-SCNC: 3.2 MMOL/L (ref 3.6–5.1)
RBC # BLD AUTO: 4.38 X10(6)UL (ref 3.8–5.1)
RED CELL DISTRIBUTION WIDTH-SD: 38.5 FL (ref 35.1–46.3)
SODIUM SERPL-SCNC: 140 MMOL/L (ref 136–144)
WBC # BLD AUTO: 5.5 X10(3) UL (ref 4–13)

## 2017-07-11 PROCEDURE — 88342 IMHCHEM/IMCYTCHM 1ST ANTB: CPT | Performed by: NEUROLOGICAL SURGERY

## 2017-07-11 PROCEDURE — 85027 COMPLETE CBC AUTOMATED: CPT | Performed by: NEUROLOGICAL SURGERY

## 2017-07-11 PROCEDURE — 88341 IMHCHEM/IMCYTCHM EA ADD ANTB: CPT | Performed by: NEUROLOGICAL SURGERY

## 2017-07-11 PROCEDURE — 0PB43ZX EXCISION OF THORACIC VERTEBRA, PERCUTANEOUS APPROACH, DIAGNOSTIC: ICD-10-PCS | Performed by: NEUROLOGICAL SURGERY

## 2017-07-11 PROCEDURE — 22515 PERQ VERTEBRAL AUGMENTATION: CPT

## 2017-07-11 PROCEDURE — 85730 THROMBOPLASTIN TIME PARTIAL: CPT | Performed by: NEUROLOGICAL SURGERY

## 2017-07-11 PROCEDURE — 22513 PERQ VERTEBRAL AUGMENTATION: CPT

## 2017-07-11 PROCEDURE — 88305 TISSUE EXAM BY PATHOLOGIST: CPT | Performed by: NEUROLOGICAL SURGERY

## 2017-07-11 PROCEDURE — 0PS43ZZ REPOSITION THORACIC VERTEBRA, PERCUTANEOUS APPROACH: ICD-10-PCS | Performed by: NEUROLOGICAL SURGERY

## 2017-07-11 PROCEDURE — 36415 COLL VENOUS BLD VENIPUNCTURE: CPT

## 2017-07-11 PROCEDURE — 80048 BASIC METABOLIC PNL TOTAL CA: CPT | Performed by: NEUROLOGICAL SURGERY

## 2017-07-11 PROCEDURE — 88313 SPECIAL STAINS GROUP 2: CPT | Performed by: NEUROLOGICAL SURGERY

## 2017-07-11 PROCEDURE — 88311 DECALCIFY TISSUE: CPT | Performed by: NEUROLOGICAL SURGERY

## 2017-07-11 PROCEDURE — 0PU43JZ SUPPLEMENT THORACIC VERTEBRA WITH SYNTHETIC SUBSTITUTE, PERCUTANEOUS APPROACH: ICD-10-PCS | Performed by: NEUROLOGICAL SURGERY

## 2017-07-11 RX ORDER — ONDANSETRON 2 MG/ML
4 INJECTION INTRAMUSCULAR; INTRAVENOUS AS NEEDED
Status: DISCONTINUED | OUTPATIENT
Start: 2017-07-11 | End: 2017-07-11

## 2017-07-11 RX ORDER — METOCLOPRAMIDE HYDROCHLORIDE 5 MG/ML
10 INJECTION INTRAMUSCULAR; INTRAVENOUS AS NEEDED
Status: DISCONTINUED | OUTPATIENT
Start: 2017-07-11 | End: 2017-07-11

## 2017-07-11 RX ORDER — SODIUM CHLORIDE 9 MG/ML
INJECTION, SOLUTION INTRAVENOUS CONTINUOUS
Status: DISCONTINUED | OUTPATIENT
Start: 2017-07-11 | End: 2017-07-11

## 2017-07-11 RX ORDER — HYDROCODONE BITARTRATE AND ACETAMINOPHEN 5; 325 MG/1; MG/1
2 TABLET ORAL AS NEEDED
Status: DISCONTINUED | OUTPATIENT
Start: 2017-07-11 | End: 2017-07-11

## 2017-07-11 RX ORDER — HYDROMORPHONE HYDROCHLORIDE 1 MG/ML
0.4 INJECTION, SOLUTION INTRAMUSCULAR; INTRAVENOUS; SUBCUTANEOUS EVERY 5 MIN PRN
Status: DISCONTINUED | OUTPATIENT
Start: 2017-07-11 | End: 2017-07-11

## 2017-07-11 RX ORDER — NALOXONE HYDROCHLORIDE 0.4 MG/ML
80 INJECTION, SOLUTION INTRAMUSCULAR; INTRAVENOUS; SUBCUTANEOUS AS NEEDED
Status: DISCONTINUED | OUTPATIENT
Start: 2017-07-11 | End: 2017-07-11

## 2017-07-11 RX ORDER — LIDOCAINE HYDROCHLORIDE 10 MG/ML
INJECTION, SOLUTION INFILTRATION; PERINEURAL
Status: COMPLETED
Start: 2017-07-11 | End: 2017-07-11

## 2017-07-11 RX ORDER — ORPHENADRINE CITRATE 100 MG/1
100 TABLET, EXTENDED RELEASE ORAL 2 TIMES DAILY
Qty: 30 TABLET | Refills: 0 | Status: SHIPPED | OUTPATIENT
Start: 2017-07-11 | End: 2017-09-18 | Stop reason: ALTCHOICE

## 2017-07-11 RX ORDER — HYDROCODONE BITARTRATE AND ACETAMINOPHEN 5; 325 MG/1; MG/1
1 TABLET ORAL AS NEEDED
Status: DISCONTINUED | OUTPATIENT
Start: 2017-07-11 | End: 2017-07-11

## 2017-07-11 RX ORDER — TRAMADOL HYDROCHLORIDE 50 MG/1
50 TABLET ORAL EVERY 6 HOURS PRN
Qty: 30 TABLET | Refills: 0 | Status: ON HOLD | OUTPATIENT
Start: 2017-07-11 | End: 2017-09-02

## 2017-07-11 RX ORDER — DEXAMETHASONE SODIUM PHOSPHATE 4 MG/ML
4 VIAL (ML) INJECTION AS NEEDED
Status: DISCONTINUED | OUTPATIENT
Start: 2017-07-11 | End: 2017-07-11

## 2017-07-11 RX ORDER — ACETAMINOPHEN 500 MG
1000 TABLET ORAL ONCE AS NEEDED
Status: DISCONTINUED | OUTPATIENT
Start: 2017-07-11 | End: 2017-07-11

## 2017-07-11 RX ORDER — SODIUM CHLORIDE, SODIUM LACTATE, POTASSIUM CHLORIDE, CALCIUM CHLORIDE 600; 310; 30; 20 MG/100ML; MG/100ML; MG/100ML; MG/100ML
INJECTION, SOLUTION INTRAVENOUS CONTINUOUS
Status: DISCONTINUED | OUTPATIENT
Start: 2017-07-11 | End: 2017-07-11

## 2017-07-11 NOTE — INTERVAL H&P NOTE
Pre-op Diagnosis: * No pre-op diagnosis entered *    The above referenced H&P was reviewed by Ivonne Reagan. Hugo Calix on 7/11/2017, the patient was examined and no significant changes have occurred in the patient's condition since the H&P was performed.   I di

## 2017-07-11 NOTE — H&P (VIEW-ONLY)
[address]  7/3/1934  * No surgery found *    Patient presents with:  Low Back Pain: NP referred by SWETA AT [facility] for back pain         HPI:   [facility] (lymphatic) cyst    • Increased urinary frequency    • Vertigo    • Diverticulitis of colon (without mention of hemorrhage)(562.11)    • Lumbago    • Acquired cyst of kidney    • Dizziness and giddiness    • Unspecified essential hypertension    • Other an mouth every 6 (six) hours as needed for Pain. Disp: 30 tablet Rfl: 0   Acidophilus/Pectin Oral Cap Take 1 capsule by mouth daily. Disp:  Rfl:    predniSONE 20 MG Oral Tab 3 tab x 2 days then 2 tab x 3 days then 1 tab x 3 days.  Disp: 15 tablet Rfl: 0   umec family hx   • suicide completion [Other] [OTHER] Father        Smoking Status: Former Smoker                   Packs/Day: 0.50  Years: 13        Quit date: 01/01/1969    Smokeless Status: Never Used                        Alcohol Use: No              Denie taper dose of steroids so do not recommend repeating this. 3. Imaging: MRI thoracic and lumbar spine. Due to claustrophobia and severe pain she will need anesthesia  4. Will await imaging.   Consider kyphoplasty versus TLSO brace        Imaging and anato

## 2017-07-11 NOTE — ANESTHESIA PREPROCEDURE EVALUATION
PRE-OP EVALUATION    Patient Name: Curry Castro    Pre-op Diagnosis: * No pre-op diagnosis entered *    * No procedures listed *    * No surgeons found in log *    Pre-op vitals reviewed.   Temp: 98.7 °F (37.1 °C)  Pulse: 79  Resp: 18  BP: 136/67  SpO2: Evaluation    Patient summary reviewed. Anesthetic Complications  (-) history of anesthetic complications         GI/Hepatic/Renal  Comment: Hx of SBO with bowel resection and ostomy                               Cardiovascular      ECG reviewed. Packs/day  For 15.00 Years     Quit date: 1/1/1969    Smokeless tobacco: Never Used    Alcohol use Yes  0.6 oz/week    1 Glasses of wine per week            Drug use: No     Available pre-op labs reviewed.     Lab Results  Component Value Date   WBC 5.5 07/

## 2017-07-11 NOTE — ANESTHESIA POSTPROCEDURE EVALUATION
[id number] [facility] Patient Status:  Outpatient in a Bed   Age/Gender 80year old female MRN [id number]   Location [address] [facility] Attending Mario Leach DO   Hosp Day # 0 PCP Aric Lee MD       Anesthesia Pos

## 2017-07-11 NOTE — BRIEF OP NOTE
Pre-Operative Diagnosis: T9 and T10 compression fracture     Post-Operative Diagnosis: T9 and T10 compression fracture     Procedure Performed:   T9 and T10 kyphoplasty and T9 &T10 biopsy    * No surgeons found in log *    Assistant(s):  none     Surgic

## 2017-07-12 ENCOUNTER — TELEPHONE (OUTPATIENT)
Dept: SURGERY | Facility: CLINIC | Age: 82
End: 2017-07-12

## 2017-07-12 NOTE — TELEPHONE ENCOUNTER
MyChart refill request    LOV: 6/19/17- with JV   Last labs: hospital encounter 7/11/17   Last rx: 12/28/16 - 90 capsules with 1 refill

## 2017-07-12 NOTE — TELEPHONE ENCOUNTER
Spoke with pharmacist who stated patient's insurance is either requiring a prior authorization for Orphenadrine or patient be prescribed alternative medication like Meloxicam or Tizanidine.  Will relay information to provider to see what he would like to do

## 2017-07-12 NOTE — TELEPHONE ENCOUNTER
From: Alvina Drummond 61: 7/12/2017 11:31 AM CDT  Subject: Medication Renewal Request    [address] would like a refill of the following medications:  HYDROCHLOROTHIAZIDE 12.5 MG Oral Cap WAYNE Steward]    Preferred pharmacy: [facility]

## 2017-07-12 NOTE — PROGRESS NOTES
Ready for discharge. Up to bathroom and ambulates in fernandez. Minimal pain. Bandaids dry intact to back. Home care instructions reviewed with son and pt. Discharged per wheelchair to care of son. Yunior Redmond

## 2017-07-13 RX ORDER — HYDROCHLOROTHIAZIDE 12.5 MG/1
12.5 CAPSULE, GELATIN COATED ORAL EVERY MORNING
Qty: 90 CAPSULE | Refills: 1 | Status: ON HOLD
Start: 2017-07-13 | End: 2017-09-03

## 2017-07-14 ENCOUNTER — TELEPHONE (OUTPATIENT)
Dept: SURGERY | Facility: CLINIC | Age: 82
End: 2017-07-14

## 2017-07-14 NOTE — TELEPHONE ENCOUNTER
Spoke with patient who stated she was still having pain. Was doing ok the day after the kyphoplasty, but has been having pain the past couple days. Pain is worse while walking 8-9/10. Pain while laying down is 3/10. Patient states pain is \"annoying. \" P

## 2017-07-15 ENCOUNTER — APPOINTMENT (OUTPATIENT)
Dept: GENERAL RADIOLOGY | Facility: HOSPITAL | Age: 82
End: 2017-07-15
Attending: EMERGENCY MEDICINE
Payer: MEDICARE

## 2017-07-15 ENCOUNTER — APPOINTMENT (OUTPATIENT)
Dept: MRI IMAGING | Facility: HOSPITAL | Age: 82
End: 2017-07-15
Attending: EMERGENCY MEDICINE
Payer: MEDICARE

## 2017-07-15 ENCOUNTER — HOSPITAL ENCOUNTER (OUTPATIENT)
Facility: HOSPITAL | Age: 82
Setting detail: OBSERVATION
Discharge: HOME | End: 2017-07-16
Attending: EMERGENCY MEDICINE | Admitting: INTERNAL MEDICINE
Payer: MEDICARE

## 2017-07-15 DIAGNOSIS — R53.1 WEAKNESS GENERALIZED: Primary | ICD-10-CM

## 2017-07-15 DIAGNOSIS — R19.7 DIARRHEA, UNSPECIFIED TYPE: ICD-10-CM

## 2017-07-15 DIAGNOSIS — E87.6 HYPOKALEMIA: ICD-10-CM

## 2017-07-15 DIAGNOSIS — J44.9 CHRONIC OBSTRUCTIVE PULMONARY DISEASE, UNSPECIFIED COPD TYPE (HCC): ICD-10-CM

## 2017-07-15 DIAGNOSIS — M79.606 LEG PAIN, DIFFUSE, UNSPECIFIED LATERALITY: ICD-10-CM

## 2017-07-15 DIAGNOSIS — D75.839 THROMBOCYTOSIS: ICD-10-CM

## 2017-07-15 PROBLEM — N30.00 ACUTE CYSTITIS WITHOUT HEMATURIA: Status: ACTIVE | Noted: 2017-07-15

## 2017-07-15 LAB
ALBUMIN SERPL-MCNC: 3 G/DL (ref 3.5–4.8)
ALP LIVER SERPL-CCNC: 111 U/L (ref 55–142)
ALT SERPL-CCNC: 25 U/L (ref 14–54)
AST SERPL-CCNC: 27 U/L (ref 15–41)
BASOPHILS # BLD AUTO: 0.04 X10(3) UL (ref 0–0.1)
BASOPHILS NFR BLD AUTO: 0.6 %
BILIRUB SERPL-MCNC: 0.8 MG/DL (ref 0.1–2)
BILIRUB UR QL STRIP.AUTO: NEGATIVE
BUN BLD-MCNC: 9 MG/DL (ref 8–20)
CALCIUM BLD-MCNC: 8.7 MG/DL (ref 8.3–10.3)
CHLORIDE: 104 MMOL/L (ref 101–111)
CO2: 25 MMOL/L (ref 22–32)
COLOR UR AUTO: YELLOW
CREAT BLD-MCNC: 0.63 MG/DL (ref 0.55–1.02)
EOSINOPHIL # BLD AUTO: 0.12 X10(3) UL (ref 0–0.3)
EOSINOPHIL NFR BLD AUTO: 1.8 %
ERYTHROCYTE [DISTWIDTH] IN BLOOD BY AUTOMATED COUNT: 12.6 % (ref 11.5–16)
GLUCOSE BLD-MCNC: 109 MG/DL (ref 70–99)
GLUCOSE UR STRIP.AUTO-MCNC: NEGATIVE MG/DL
HCT VFR BLD AUTO: 36.3 % (ref 34–50)
HGB BLD-MCNC: 12.5 G/DL (ref 12–16)
HYALINE CASTS #/AREA URNS AUTO: PRESENT /LPF
IMMATURE GRANULOCYTE COUNT: 0.01 X10(3) UL (ref 0–1)
IMMATURE GRANULOCYTE RATIO %: 0.1 %
KETONES UR STRIP.AUTO-MCNC: 20 MG/DL
LYMPHOCYTES # BLD AUTO: 0.88 X10(3) UL (ref 0.9–4)
LYMPHOCYTES NFR BLD AUTO: 13 %
M PROTEIN MFR SERPL ELPH: 6.3 G/DL (ref 6.1–8.3)
MCH RBC QN AUTO: 29.2 PG (ref 27–33.2)
MCHC RBC AUTO-ENTMCNC: 34.4 G/DL (ref 31–37)
MCV RBC AUTO: 84.8 FL (ref 81–100)
MONOCYTES # BLD AUTO: 0.56 X10(3) UL (ref 0.1–0.6)
MONOCYTES NFR BLD AUTO: 8.3 %
NEUTROPHIL ABS PRELIM: 5.14 X10 (3) UL (ref 1.3–6.7)
NEUTROPHILS # BLD AUTO: 5.14 X10(3) UL (ref 1.3–6.7)
NEUTROPHILS NFR BLD AUTO: 76.2 %
NITRITE UR QL STRIP.AUTO: NEGATIVE
PH UR STRIP.AUTO: 6 [PH] (ref 4.5–8)
PLATELET # BLD AUTO: 464 10(3)UL (ref 150–450)
POTASSIUM SERPL-SCNC: 3.3 MMOL/L (ref 3.6–5.1)
PROT UR STRIP.AUTO-MCNC: NEGATIVE MG/DL
RBC # BLD AUTO: 4.28 X10(6)UL (ref 3.8–5.1)
RBC UR QL AUTO: NEGATIVE
RED CELL DISTRIBUTION WIDTH-SD: 37.8 FL (ref 35.1–46.3)
SED RATE-ML: 28 MM/HR (ref 0–25)
SODIUM SERPL-SCNC: 139 MMOL/L (ref 136–144)
SP GR UR STRIP.AUTO: 1.02 (ref 1–1.03)
TROPONIN: <0.046 NG/ML (ref ?–0.05)
UROBILINOGEN UR STRIP.AUTO-MCNC: 2 MG/DL
WBC # BLD AUTO: 6.8 X10(3) UL (ref 4–13)
WBC #/AREA URNS AUTO: >50 /HPF

## 2017-07-15 PROCEDURE — 99220 INITIAL OBSERVATION CARE,LEVL III: CPT | Performed by: INTERNAL MEDICINE

## 2017-07-15 PROCEDURE — 80053 COMPREHEN METABOLIC PANEL: CPT | Performed by: EMERGENCY MEDICINE

## 2017-07-15 PROCEDURE — 72157 MRI CHEST SPINE W/O & W/DYE: CPT | Performed by: EMERGENCY MEDICINE

## 2017-07-15 PROCEDURE — 84484 ASSAY OF TROPONIN QUANT: CPT | Performed by: EMERGENCY MEDICINE

## 2017-07-15 PROCEDURE — 81001 URINALYSIS AUTO W/SCOPE: CPT | Performed by: EMERGENCY MEDICINE

## 2017-07-15 PROCEDURE — 87086 URINE CULTURE/COLONY COUNT: CPT | Performed by: EMERGENCY MEDICINE

## 2017-07-15 PROCEDURE — 85025 COMPLETE CBC W/AUTO DIFF WBC: CPT | Performed by: EMERGENCY MEDICINE

## 2017-07-15 PROCEDURE — 87186 SC STD MICRODIL/AGAR DIL: CPT | Performed by: EMERGENCY MEDICINE

## 2017-07-15 PROCEDURE — 87077 CULTURE AEROBIC IDENTIFY: CPT | Performed by: EMERGENCY MEDICINE

## 2017-07-15 PROCEDURE — 71010 XR CHEST AP PORTABLE  (CPT=71010): CPT | Performed by: EMERGENCY MEDICINE

## 2017-07-15 PROCEDURE — 72158 MRI LUMBAR SPINE W/O & W/DYE: CPT | Performed by: EMERGENCY MEDICINE

## 2017-07-15 RX ORDER — HYDROCHLOROTHIAZIDE 12.5 MG/1
12.5 CAPSULE, GELATIN COATED ORAL EVERY MORNING
Status: DISCONTINUED | OUTPATIENT
Start: 2017-07-16 | End: 2017-07-16

## 2017-07-15 RX ORDER — DEXTROSE, SODIUM CHLORIDE, AND POTASSIUM CHLORIDE 5; .45; .15 G/100ML; G/100ML; G/100ML
INJECTION INTRAVENOUS CONTINUOUS
Status: DISCONTINUED | OUTPATIENT
Start: 2017-07-15 | End: 2017-07-16

## 2017-07-15 RX ORDER — SODIUM CHLORIDE 9 MG/ML
INJECTION, SOLUTION INTRAVENOUS ONCE
Status: COMPLETED | OUTPATIENT
Start: 2017-07-15 | End: 2017-07-15

## 2017-07-15 RX ORDER — SODIUM CHLORIDE 9 MG/ML
INJECTION, SOLUTION INTRAVENOUS CONTINUOUS
Status: ACTIVE | OUTPATIENT
Start: 2017-07-15 | End: 2017-07-15

## 2017-07-15 RX ORDER — MELATONIN
325
Status: DISCONTINUED | OUTPATIENT
Start: 2017-07-16 | End: 2017-07-16

## 2017-07-15 RX ORDER — HYDROMORPHONE HYDROCHLORIDE 1 MG/ML
0.5 INJECTION, SOLUTION INTRAMUSCULAR; INTRAVENOUS; SUBCUTANEOUS EVERY 30 MIN PRN
Status: ACTIVE | OUTPATIENT
Start: 2017-07-15 | End: 2017-07-15

## 2017-07-15 RX ORDER — ONDANSETRON 2 MG/ML
4 INJECTION INTRAMUSCULAR; INTRAVENOUS EVERY 4 HOURS PRN
Status: DISCONTINUED | OUTPATIENT
Start: 2017-07-15 | End: 2017-07-15

## 2017-07-15 RX ORDER — ATORVASTATIN CALCIUM 10 MG/1
10 TABLET, FILM COATED ORAL NIGHTLY
Status: DISCONTINUED | OUTPATIENT
Start: 2017-07-15 | End: 2017-07-16

## 2017-07-15 RX ORDER — DILTIAZEM HYDROCHLORIDE 300 MG/1
300 CAPSULE, COATED, EXTENDED RELEASE ORAL DAILY
Status: DISCONTINUED | OUTPATIENT
Start: 2017-07-16 | End: 2017-07-16

## 2017-07-15 RX ORDER — TRAMADOL HYDROCHLORIDE 50 MG/1
50 TABLET ORAL EVERY 6 HOURS PRN
Status: DISCONTINUED | OUTPATIENT
Start: 2017-07-15 | End: 2017-07-16

## 2017-07-15 RX ORDER — GABAPENTIN 300 MG/1
300 CAPSULE ORAL
Status: DISCONTINUED | OUTPATIENT
Start: 2017-07-15 | End: 2017-07-16

## 2017-07-15 RX ORDER — ACETAMINOPHEN 325 MG/1
650 TABLET ORAL EVERY 6 HOURS PRN
Status: DISCONTINUED | OUTPATIENT
Start: 2017-07-15 | End: 2017-07-16

## 2017-07-15 RX ORDER — HEPARIN SODIUM 5000 [USP'U]/ML
5000 INJECTION, SOLUTION INTRAVENOUS; SUBCUTANEOUS EVERY 12 HOURS SCHEDULED
Status: DISCONTINUED | OUTPATIENT
Start: 2017-07-15 | End: 2017-07-16

## 2017-07-15 RX ORDER — LORAZEPAM 2 MG/ML
1 INJECTION INTRAMUSCULAR
Status: DISCONTINUED | OUTPATIENT
Start: 2017-07-15 | End: 2017-07-16

## 2017-07-15 RX ORDER — ASPIRIN 81 MG/1
81 TABLET ORAL DAILY
Status: DISCONTINUED | OUTPATIENT
Start: 2017-07-16 | End: 2017-07-16

## 2017-07-15 RX ORDER — DONEPEZIL HYDROCHLORIDE 10 MG/1
5 TABLET, FILM COATED ORAL NIGHTLY
Status: DISCONTINUED | OUTPATIENT
Start: 2017-07-15 | End: 2017-07-16

## 2017-07-15 RX ORDER — POTASSIUM CHLORIDE 750 MG/1
10 TABLET, EXTENDED RELEASE ORAL ONCE
Status: COMPLETED | OUTPATIENT
Start: 2017-07-15 | End: 2017-07-15

## 2017-07-15 RX ORDER — HYDROMORPHONE HYDROCHLORIDE 1 MG/ML
0.5 INJECTION, SOLUTION INTRAMUSCULAR; INTRAVENOUS; SUBCUTANEOUS EVERY 30 MIN PRN
Status: DISCONTINUED | OUTPATIENT
Start: 2017-07-15 | End: 2017-07-16

## 2017-07-15 RX ORDER — ONDANSETRON 2 MG/ML
4 INJECTION INTRAMUSCULAR; INTRAVENOUS EVERY 6 HOURS PRN
Status: DISCONTINUED | OUTPATIENT
Start: 2017-07-15 | End: 2017-07-16

## 2017-07-15 NOTE — ED PROVIDER NOTES
No evidence of spinal abscess or new compression or acute abnormality to explain any worsening symptoms per MRI. Please see radiology report.   Patient be admitted as per disposition

## 2017-07-15 NOTE — ED INITIAL ASSESSMENT (HPI)
Pt had back procedure done 5 days ago. Pt now c/o nausea and diarrhea prior to procedure. Pt c/o bilateral leg ache. Pt denies injury.

## 2017-07-15 NOTE — ED PROVIDER NOTES
Patient Seen in: [facility] Emergency Department    History   Patient presents with:  Fatigue (constitutional, neurologic)    Stated Complaint: weakness, leg pain     HPI    Patient has a remarkable recent medical history of kyphoplasty of the thora Right      Comment: benign.   No date: OTHER Bilateral      Comment: bilateral breast implants  1949: OTHER SURGICAL HISTORY      Comment: Cyst in vagina  1974: OTHER SURGICAL HISTORY      Comment: Breast implants  2006: OTHER SURGICAL HISTORY      Comment: tablet by mouth daily. Vitamin C (VITAMIN C) 500 MG/5ML Oral Syrup,  Take 500 mg by mouth daily. Cholecalciferol (VITAMIN D) 1000 UNITS Oral Cap,  Take 1 tablet by mouth daily. CALCIUM + D OR,  Take 1 tablet by mouth daily.    VITAMIN B COMPLEX OR and lips are dry  Back: Remarkable kyphosis noted. There are healing puncture wounds noted without extraordinary surrounding erythema and no point midline tenderness or overlying erythema noted. Lungs: Diminished to auscultation bilaterally.   No rhonchi W/SHIGATOXIN     EKG    Rate, intervals and axes as noted on EKG Report.   Rate: 75 bpm  Rhythm: Sinus Rhythm  Reading: Sinus rhythm with PACs and no extraordinary change from previous of last month           ================================================

## 2017-07-15 NOTE — H&P
DILCIA HOSPITALIST                                                               History & Physical         Michael Joseph Patient Status:  Emergency    7/3/1934 MRN [id number]   Location [address] [facility] Attending No att. providers RESECTION      Comment: small bowel resection  3/2016: BREAST SURGERY Bilateral      Comment: implant removal  1993, 1998: CHOLECYSTECTOMY      Comment: x2  No date: COLECTOMY  2004, 5/8/2012: COLONOSCOPY      Comment: x2, in 2012 w/ forceps biopsy  No estela Home Medications:    (Not in a hospital admission)    Review of Systems:  A comprehensive 14 point review of systems was completed. Pertinent positives and negatives noted in the the HPI.     Physical Exam:     Vital signs: Blood pressure (!) 174/68, p INDICATIONS:  The patient reports increasing pain and heaviness to both lower extremities since kyphoplasty 5 days ago. TECHNIQUE:  The thoracic and lumbar spine were imaged.   A comprehensive examination was performed utilizing a variety of imaging p changes. No complicating factor is defined. There is no epidural or paraspinal fluid collection. The canal appears patent. 2. Stable chronic T3 compression deformity. No new compression fracture in the thoracic spine.  Stable alignment and positioning noti positive, follow culture,  empiric IV Rocephin. PT OT  10.  Possible UTI, UA positive, follow culture, IV Rocephin for now      Quality:  · DVT Prophylaxis: SCD  · CODE status: Full  · Barajas: no    Plan of care discussed with patient and patient's family a

## 2017-07-16 VITALS
RESPIRATION RATE: 18 BRPM | SYSTOLIC BLOOD PRESSURE: 146 MMHG | OXYGEN SATURATION: 99 % | WEIGHT: 113 LBS | BODY MASS INDEX: 19.29 KG/M2 | HEART RATE: 82 BPM | DIASTOLIC BLOOD PRESSURE: 69 MMHG | HEIGHT: 64 IN | TEMPERATURE: 98 F

## 2017-07-16 LAB
BASOPHILS # BLD AUTO: 0.05 X10(3) UL (ref 0–0.1)
BASOPHILS NFR BLD AUTO: 0.9 %
BUN BLD-MCNC: 6 MG/DL (ref 8–20)
CALCIUM BLD-MCNC: 8.9 MG/DL (ref 8.3–10.3)
CHLORIDE: 106 MMOL/L (ref 101–111)
CO2: 26 MMOL/L (ref 22–32)
CREAT BLD-MCNC: 0.54 MG/DL (ref 0.55–1.02)
EOSINOPHIL # BLD AUTO: 0.28 X10(3) UL (ref 0–0.3)
EOSINOPHIL NFR BLD AUTO: 4.8 %
ERYTHROCYTE [DISTWIDTH] IN BLOOD BY AUTOMATED COUNT: 12.8 % (ref 11.5–16)
GLUCOSE BLD-MCNC: 98 MG/DL (ref 70–99)
HCT VFR BLD AUTO: 34.1 % (ref 34–50)
HGB BLD-MCNC: 11.4 G/DL (ref 12–16)
IMMATURE GRANULOCYTE COUNT: 0.04 X10(3) UL (ref 0–1)
IMMATURE GRANULOCYTE RATIO %: 0.7 %
LYMPHOCYTES # BLD AUTO: 1.14 X10(3) UL (ref 0.9–4)
LYMPHOCYTES NFR BLD AUTO: 19.6 %
MCH RBC QN AUTO: 29.2 PG (ref 27–33.2)
MCHC RBC AUTO-ENTMCNC: 33.4 G/DL (ref 31–37)
MCV RBC AUTO: 87.2 FL (ref 81–100)
MONOCYTES # BLD AUTO: 0.66 X10(3) UL (ref 0.1–0.6)
MONOCYTES NFR BLD AUTO: 11.3 %
NEUTROPHIL ABS PRELIM: 3.66 X10 (3) UL (ref 1.3–6.7)
NEUTROPHILS # BLD AUTO: 3.66 X10(3) UL (ref 1.3–6.7)
NEUTROPHILS NFR BLD AUTO: 62.7 %
PLATELET # BLD AUTO: 408 10(3)UL (ref 150–450)
POTASSIUM SERPL-SCNC: 3 MMOL/L (ref 3.6–5.1)
POTASSIUM SERPL-SCNC: 3.6 MMOL/L (ref 3.6–5.1)
RBC # BLD AUTO: 3.91 X10(6)UL (ref 3.8–5.1)
RED CELL DISTRIBUTION WIDTH-SD: 40.9 FL (ref 35.1–46.3)
SODIUM SERPL-SCNC: 140 MMOL/L (ref 136–144)
WBC # BLD AUTO: 5.8 X10(3) UL (ref 4–13)

## 2017-07-16 PROCEDURE — 84132 ASSAY OF SERUM POTASSIUM: CPT | Performed by: INTERNAL MEDICINE

## 2017-07-16 PROCEDURE — 36415 COLL VENOUS BLD VENIPUNCTURE: CPT | Performed by: INTERNAL MEDICINE

## 2017-07-16 PROCEDURE — 99217 OBSERVATION CARE DISCHARGE: CPT | Performed by: INTERNAL MEDICINE

## 2017-07-16 RX ORDER — CIPROFLOXACIN 250 MG/1
250 TABLET, FILM COATED ORAL 2 TIMES DAILY
Qty: 6 TABLET | Refills: 0 | Status: SHIPPED | OUTPATIENT
Start: 2017-07-16 | End: 2017-07-19

## 2017-07-16 RX ORDER — POTASSIUM CHLORIDE 20 MEQ/1
40 TABLET, EXTENDED RELEASE ORAL EVERY 4 HOURS
Status: DISCONTINUED | OUTPATIENT
Start: 2017-07-16 | End: 2017-07-16

## 2017-07-16 RX ORDER — GABAPENTIN 300 MG/1
300 CAPSULE ORAL 2 TIMES DAILY
Qty: 60 CAPSULE | Refills: 0 | Status: SHIPPED | OUTPATIENT
Start: 2017-07-16 | End: 2017-09-08

## 2017-07-16 NOTE — CM/SW NOTE
Order for home health care received. Home care liaison to assess patient for home healthcare.  sw to continue to follow    [address] [facility]

## 2017-07-16 NOTE — PROGRESS NOTES
07/16/17 1249   Clinical Encounter Type   Visited With Patient   Routine Visit Introduction   Continue Visiting No   Mu-ism Encounters   Spiritual Requests During Visit / Hospitalization Julieta6 Lauren Farah

## 2017-07-16 NOTE — CONSULTS
[id number] Madalyn Lee Neurosurgery Consult    Jennifer Chelsi Patient Status:  Observation    7/3/1934 MRN [id number]   [facility] 3NE-A Attending Dwayne Tejeda MD   Hosp Day # 0 PCP Michelle Garza MD     REASON FOR CONSULTATION:  Recent K date:  BOWEL RESECTION      Comment: small bowel resection  3/2016: BREAST SURGERY Bilateral      Comment: implant removal  1993, 1998: CHOLECYSTECTOMY      Comment: x2  No date: COLECTOMY  2004, 5/8/2012: COLONOSCOPY      Comment: x2, in 2012 w/ forceps bi mg total) by mouth every morning. Disp: 90 capsule Rfl: 1    TraMADol HCl 50 MG Oral Tab Take 1 tablet (50 mg total) by mouth every 6 (six) hours as needed for Pain.  Disp: 30 tablet Rfl: 0    Orphenadrine Citrate  MG Oral Tablet 12 Hr Take 100 mg by mg 300 mg Oral [email or web address]   aspirin EC tab 81 mg 81 mg Oral Daily   DilTIAZem HCl ER Coated Beads (CARDIZEM CD) 24 hr cap 300 mg 300 mg Oral Daily   Donepezil HCl (ARICEPT) tab 5 mg 5 mg Oral Nightly   ferrous sulfate EC tab 325 mg 325 mg Oral Daily wit Finger-to-nose coordination is intact. Reflexes intact. Gait deferred.        DIAGNOSTIC DATA:   Lab Results  Component Value Date   WBC 5.8 07/16/2017   HGB 11.4 07/16/2017   HCT 34.1 07/16/2017   .0 07/16/2017   CREATSERUM 0.54 07/16/2017   BUN 6 Soheila Aguilar [facility]  Neurological Surgery    Co-Director  [facility]  [address]

## 2017-07-16 NOTE — OCCUPATIONAL THERAPY NOTE
OCCUPATIONAL THERAPY EVALUATION - INPATIENT     Room Number: 8330/8271-Y  Evaluation Date: 7/16/2017  Type of Evaluation: Initial  Presenting Problem:  (generalized weakness)    Physician Order: IP Consult to Occupational Therapy  Reason for Therapy: ADL/I Comment: x2, in 2012 w/ forceps biopsy  No date: COLOSTOMY      Comment: and reversal  2006: PAULINO NEEDLE LOCALIZATION W/ SPECIMEN 1 SITE RIG* Right      Comment: benign.   No date: OTHER Bilateral      Comment: bilateral breast implants  1949: OTHER SURGICAL Restriction: None                PAIN ASSESSMENT             COGNITION  Overall Cognitive Status:  WFL - within functional limits    VISION  Current Vision: wears glasses all the time      Communication: WFL    Behavioral/Emotional/Social: Pleasant and macy EOB (Ind with elevated HOB), sit>stand (Ind), walking in the hallway (CGA with scissoring and unsteady gait using RW, please see PT eval for more details), donning/doffing socks (ind sitting in the chair). Patient End of Session: Up in chair;Needs met; Ca

## 2017-07-16 NOTE — PROGRESS NOTES
NURSING DISCHARGE NOTE    Discharged Home via Wheelchair. Accompanied by Support staff  Belongings Taken by patient/family. DC education/ instructions provided to pt including f/u appts. All questions answered. Pt verbalized understanding.

## 2017-07-16 NOTE — PHYSICAL THERAPY NOTE
PHYSICAL THERAPY EVALUATION - INPATIENT     Room Number: 7158/3530-B  Evaluation Date: 7/16/2017  Type of Evaluation: Initial  Physician Order: PT Eval and Treat    Presenting Problem: Generalize weakness  Reason for Therapy: Mobility Dysfunction and D date: COLOSTOMY      Comment: and reversal  2006: PAULINO NEEDLE LOCALIZATION W/ SPECIMEN 1 SITE RIG* Right      Comment: benign.   No date: OTHER Bilateral      Comment: bilateral breast implants  1949: OTHER SURGICAL HISTORY      Comment: Cyst in vagina  1974 COGNITION  · Overall Cognitive Status:  WFL - within functional limits  · Safety Judgement:  decreased awareness of need for safety  · Awareness of Errors:  decreased awareness of errors   · Awareness of Deficits:  decreased awareness of deficits    RA Device: Rolling walker  Pattern: Scissoring;Comment (slightly ataxic with tendency for LOB to the L; pulling to L)  Stoop/Curb Assistance: Not tested       Skilled Therapy Provided: Arrived with pt supine in bed HOB elevated. RN approved session.  Pt agreea Currently the patients condition is unstable therefore making this evaluation moderate complexity. The AM-PAC ‘6-Clicks’ Inpatient Basic Mobility Short Form was completed and this patient is demonstrating a  29 % degree of impairment in mobility.  Research

## 2017-07-16 NOTE — PLAN OF CARE
ANXIETY    • Will report anxiety at manageable levels Progressing        COPING    • Pt/Family able to verbalize concerns and demonstrate effective coping strategies Progressing        DISCHARGE PLANNING    • Discharge to home or other facility with approp

## 2017-07-16 NOTE — DISCHARGE SUMMARY
[facility]  Discharge Summary    Wei Jara Patient Status:  Observation    7/3/1934 MRN [id number]   [facility] 3NE-A Attending Brad Flores MD   Hosp Day # 0 PCP León Barahona MD     Date of Admission: 7/15/2017    Date of D bit of phlegm.  No high fever or shaking chills. Brief Synopsis: Seen by neurosurgery Dr Julianne Linda for Dr Ramos Horton and started on neurontin.   Per NS, Henrik Espinoza is a(n) 80year old female, with a PMH of COPD and hypotension, who underwent kypho pending at time of d/c. No diarrhea in hospital, stool studies pending at time of discharge.     Please follow up on these with regular PCP Aric Lee MD in office    Discharge Medications:        Discharge Medications      START taking these medicatio Tabs  Commonly known as:  ZOCOR      TAKE 1 TABLET BY MOUTH ONCE DAILY. Quantity:  90 tablet  Refills:  1     TraMADol HCl 50 MG Tabs  Commonly known as:  ULTRAM      Take 1 tablet (50 mg total) by mouth every 6 (six) hours as needed for Pain.    Quantity bilaterally.  No wheezes. No rhonchi. Cardiovascular: S1, S2.  Regular rate and rhythm.  No murmurs. Equal pulses   Abdomen: Soft, nontender, nondistended.  Positive bowel sounds. No rebound tenderness  Neurologic: No focal neurological deficits.   Musculo

## 2017-07-16 NOTE — PROGRESS NOTES
NURSING ADMISSION NOTE      Patient admitted via Cart  Oriented to room. Safety precautions initiated. Bed in low position. Call light in reach. Admission navigator completed. Admission orders received. Daughter and son at bedside.  Resting in bed

## 2017-07-16 NOTE — PLAN OF CARE
A/Ox4. Calm and cooperative. On RA. No tele. C/o mild to moderate back pain and diane leg pain. S/P kyphoplasty 7/11 by Dr. Jeanne Salgado. MRI spine (-). Awaiting neurosurg eval.  PT/OT to eval.  IV Rocephin for +UTI  Electrolyte protocol. K 3.0, being replaced.

## 2017-07-17 ENCOUNTER — TELEPHONE (OUTPATIENT)
Dept: INTERNAL MEDICINE CLINIC | Facility: CLINIC | Age: 82
End: 2017-07-17

## 2017-07-17 LAB
ATRIAL RATE: 75 BPM
P AXIS: 68 DEGREES
P-R INTERVAL: 156 MS
Q-T INTERVAL: 392 MS
QRS DURATION: 76 MS
QTC CALCULATION (BEZET): 437 MS
R AXIS: 51 DEGREES
T AXIS: 56 DEGREES
VENTRICULAR RATE: 75 BPM

## 2017-07-18 ENCOUNTER — PATIENT OUTREACH (OUTPATIENT)
Dept: CASE MANAGEMENT | Age: 82
End: 2017-07-18

## 2017-07-18 DIAGNOSIS — Z02.9 ENCOUNTERS FOR ADMINISTRATIVE PURPOSE: ICD-10-CM

## 2017-07-18 RX ORDER — SIMVASTATIN 10 MG
10 TABLET ORAL
Qty: 90 TABLET | Refills: 1 | Status: SHIPPED
Start: 2017-07-18 | End: 2018-01-31

## 2017-07-18 NOTE — TELEPHONE ENCOUNTER
LFV:6/19/17 with JV  Future Appt: 7/21/17  Last Rx:3/31/17 for 90 days w/1  Last Labs:7/16/16 bmp with hypokalemia, repeat stable  Lipid 3/8/17 wnl  Per protocol to provider due to recent abnormal labs

## 2017-07-18 NOTE — TELEPHONE ENCOUNTER
From: Alvina Drummond 61: 7/18/2017 9:01 AM CDT  Subject: Medication Renewal Request    [address] would like a refill of the following medications:  SIMVASTATIN 10 MG Oral Tab WAYNE Chase]    Preferred pharmacy: [place] [id number]

## 2017-07-19 RX ORDER — DONEPEZIL HYDROCHLORIDE 5 MG/1
5 TABLET, FILM COATED ORAL NIGHTLY
Qty: 30 TABLET | Refills: 11 | OUTPATIENT
Start: 2017-07-19

## 2017-07-19 NOTE — TELEPHONE ENCOUNTER
From: Alvina Drummond 61: 7/18/2017 9:01 AM CDT  Subject: Medication Renewal Request    [address] would like a refill of the following medications:  Donepezil HCl 5 MG Oral Tab Castillo Dwyer MD]    Preferred pharmacy: [address], [facility]

## 2017-07-21 ENCOUNTER — OFFICE VISIT (OUTPATIENT)
Dept: INTERNAL MEDICINE CLINIC | Facility: CLINIC | Age: 82
End: 2017-07-21

## 2017-07-21 ENCOUNTER — LAB ENCOUNTER (OUTPATIENT)
Dept: LAB | Age: 82
End: 2017-07-21
Attending: INTERNAL MEDICINE
Payer: MEDICARE

## 2017-07-21 VITALS
WEIGHT: 114 LBS | DIASTOLIC BLOOD PRESSURE: 60 MMHG | RESPIRATION RATE: 16 BRPM | HEIGHT: 64 IN | BODY MASS INDEX: 19.46 KG/M2 | SYSTOLIC BLOOD PRESSURE: 122 MMHG | TEMPERATURE: 98 F | HEART RATE: 92 BPM

## 2017-07-21 DIAGNOSIS — I10 ESSENTIAL HYPERTENSION: ICD-10-CM

## 2017-07-21 DIAGNOSIS — Z87.440 HISTORY OF UTI: ICD-10-CM

## 2017-07-21 DIAGNOSIS — R29.6 FALLS FREQUENTLY: Primary | ICD-10-CM

## 2017-07-21 DIAGNOSIS — E87.6 HYPOKALEMIA: ICD-10-CM

## 2017-07-21 DIAGNOSIS — R53.1 GENERALIZED WEAKNESS: ICD-10-CM

## 2017-07-21 DIAGNOSIS — Z98.890 STATUS POST KYPHOPLASTY: ICD-10-CM

## 2017-07-21 DIAGNOSIS — M25.512 ACUTE PAIN OF LEFT SHOULDER: ICD-10-CM

## 2017-07-21 LAB
APPEARANCE: CLEAR
BASOPHILS # BLD AUTO: 0.05 X10(3) UL (ref 0–0.1)
BASOPHILS NFR BLD AUTO: 0.7 %
BUN BLD-MCNC: 11 MG/DL (ref 8–20)
CALCIUM BLD-MCNC: 10.7 MG/DL (ref 8.3–10.3)
CHLORIDE: 99 MMOL/L (ref 101–111)
CO2: 30 MMOL/L (ref 22–32)
CREAT BLD-MCNC: 0.89 MG/DL (ref 0.55–1.02)
EOSINOPHIL # BLD AUTO: 0.25 X10(3) UL (ref 0–0.3)
EOSINOPHIL NFR BLD AUTO: 3.5 %
ERYTHROCYTE [DISTWIDTH] IN BLOOD BY AUTOMATED COUNT: 13.2 % (ref 11.5–16)
GLUCOSE (URINE DIPSTICK): 0 MG/DL
GLUCOSE BLD-MCNC: 95 MG/DL (ref 70–99)
HCT VFR BLD AUTO: 39.9 % (ref 34–50)
HGB BLD-MCNC: 12.7 G/DL (ref 12–16)
IMMATURE GRANULOCYTE COUNT: 0.02 X10(3) UL (ref 0–1)
IMMATURE GRANULOCYTE RATIO %: 0.3 %
LYMPHOCYTES # BLD AUTO: 0.83 X10(3) UL (ref 0.9–4)
LYMPHOCYTES NFR BLD AUTO: 11.8 %
MCH RBC QN AUTO: 28.5 PG (ref 27–33.2)
MCHC RBC AUTO-ENTMCNC: 31.8 G/DL (ref 31–37)
MCV RBC AUTO: 89.7 FL (ref 81–100)
MONOCYTES # BLD AUTO: 0.68 X10(3) UL (ref 0.1–0.6)
MONOCYTES NFR BLD AUTO: 9.6 %
MULTISTIX LOT#: ABNORMAL NUMERIC
NEUTROPHIL ABS PRELIM: 5.23 X10 (3) UL (ref 1.3–6.7)
NEUTROPHILS # BLD AUTO: 5.23 X10(3) UL (ref 1.3–6.7)
NEUTROPHILS NFR BLD AUTO: 74.1 %
PH, URINE: 6 (ref 4.5–8)
PLATELET # BLD AUTO: 459 10(3)UL (ref 150–450)
POTASSIUM SERPL-SCNC: 4.6 MMOL/L (ref 3.6–5.1)
RBC # BLD AUTO: 4.45 X10(6)UL (ref 3.8–5.1)
RED CELL DISTRIBUTION WIDTH-SD: 42.6 FL (ref 35.1–46.3)
SODIUM SERPL-SCNC: 137 MMOL/L (ref 136–144)
SPECIFIC GRAVITY: 1.02 (ref 1–1.03)
URINE-COLOR: YELLOW
UROBILINOGEN,SEMI-QN: 2 MG/DL (ref 0–1.9)
WBC # BLD AUTO: 7.1 X10(3) UL (ref 4–13)

## 2017-07-21 PROCEDURE — 99495 TRANSJ CARE MGMT MOD F2F 14D: CPT | Performed by: INTERNAL MEDICINE

## 2017-07-21 PROCEDURE — 85025 COMPLETE CBC W/AUTO DIFF WBC: CPT

## 2017-07-21 PROCEDURE — 81003 URINALYSIS AUTO W/O SCOPE: CPT | Performed by: INTERNAL MEDICINE

## 2017-07-21 PROCEDURE — 80048 BASIC METABOLIC PNL TOTAL CA: CPT

## 2017-07-21 RX ORDER — TIZANIDINE 2 MG/1
2 TABLET ORAL 3 TIMES DAILY PRN
Qty: 60 TABLET | Refills: 0 | Status: ON HOLD | OUTPATIENT
Start: 2017-07-21 | End: 2017-09-28

## 2017-07-21 NOTE — PROGRESS NOTES
HPI:    Ilene Hauser is a 80year old female here today for hospital follow up.    She was discharged from Inpatient hospital, [facility] to Home   Admission Date: 7/15/17   Discharge Date: 7/16/17  Hospital Discharge Diagnosis: back pain with [facility] and manageable with tramadol and gabapentin. Patient fell at home after hospitalization without LOC. She fell on her left side (does not recall details) and now left shoulder hurting too. She has home RN/PT/OT set up.  She is worried about her potassium b daily.     CALCIUM + D OR Take 1 tablet by mouth daily. VITAMIN B COMPLEX OR Take 1 tablet by mouth daily. MULTIVITAMIN OR Take 1 tablet by mouth daily. TiZANidine HCl 2 MG Oral Tab Take 1 tablet (2 mg total) by mouth 3 (three) times daily as needed. never used smokeless tobacco. She reports that she drinks about 0.6 oz of alcohol per week . She reports that she does not use drugs.      ROS:   GENERAL: no fevers  SKIN: denies any unusual skin lesions  EYES: denies blurred vision or double vision  HEENT: of Rocephin given while pt in hospital  -     CBC WITH DIFFERENTIAL WITH PLATELET;  Future  -     URINALYSIS, AUTO, W/O SCOPE    Acute pain of left shoulder s/p fall at home post hospitalization- ice, salonpas patches otc, tramadol prn, check xray if not im

## 2017-07-21 NOTE — TELEPHONE ENCOUNTER
See TE 7/14/17. Already addressed by Dolly Bumpers, PA. Spoke with patient who stated she is doing better and does not think she needs any additional tizanidine. Informed patient there is a refill at her pharmacy if she does end up needing it.  Patient un

## 2017-07-23 ENCOUNTER — PATIENT MESSAGE (OUTPATIENT)
Dept: INTERNAL MEDICINE CLINIC | Facility: CLINIC | Age: 82
End: 2017-07-23

## 2017-07-24 ENCOUNTER — TELEPHONE (OUTPATIENT)
Dept: INTERNAL MEDICINE CLINIC | Facility: CLINIC | Age: 82
End: 2017-07-24

## 2017-07-24 ENCOUNTER — HOSPITAL ENCOUNTER (OUTPATIENT)
Dept: GENERAL RADIOLOGY | Age: 82
Discharge: HOME | End: 2017-07-24
Attending: INTERNAL MEDICINE
Payer: MEDICARE

## 2017-07-24 ENCOUNTER — TELEPHONE (OUTPATIENT)
Dept: SURGERY | Facility: CLINIC | Age: 82
End: 2017-07-24

## 2017-07-24 DIAGNOSIS — R29.6 FALLS FREQUENTLY: ICD-10-CM

## 2017-07-24 DIAGNOSIS — M25.512 ACUTE PAIN OF LEFT SHOULDER: ICD-10-CM

## 2017-07-24 PROCEDURE — 73000 X-RAY EXAM OF COLLAR BONE: CPT | Performed by: INTERNAL MEDICINE

## 2017-07-24 PROCEDURE — 73030 X-RAY EXAM OF SHOULDER: CPT | Performed by: INTERNAL MEDICINE

## 2017-07-24 NOTE — TELEPHONE ENCOUNTER
Patient notified we would need to see her in order to order the appropriate xrays, offered appts but pt declined. Pt states she is not sure if she can get anyone to take her to appt, offered pt UC locations.   Pt states she will have to call us back with d

## 2017-07-24 NOTE — TELEPHONE ENCOUNTER
From: Milagro Khan  To: Meryle Barrs, MD  Sent: 7/23/2017 8:29 PM CDT  Subject: Non-Urgent Medical Question    I was just wonder if you put in an order to have my shoulder x-rayed? If you could let me know, I would appreciate it.      Thank you so muc

## 2017-07-24 NOTE — TELEPHONE ENCOUNTER
Patient s/p kyphoplasty from 7/11/17, patient reported today of  falling about 1 week ago to [facility] nurse. According to [facility] KODI DURAN, patient is not complaining of any increased back pain, rates her pain 2/10 on pain scale.   Patient did contact PCP office with nasra

## 2017-07-24 NOTE — TELEPHONE ENCOUNTER
Margarette Reynolds from [place] on [address] called stating the patient is there now for shoulder XRAY and she is c/o pain also in the L clavicle area towards the sternum as well-please put order in chart for xray clavicle -Yu's number is [phone]

## 2017-07-24 NOTE — PROGRESS NOTES
Multiple attempts to reach pt and messages left with no return call. Pt went in for HFU appt with PCP on 7/21/17. Encounter closing.

## 2017-07-25 RX ORDER — ALENDRONATE SODIUM 70 MG/1
70 TABLET ORAL WEEKLY
Qty: 12 TABLET | Refills: 3 | Status: SHIPPED
Start: 2017-07-25 | End: 2018-06-08

## 2017-07-25 NOTE — TELEPHONE ENCOUNTER
From: Alvina Drummond 61: 7/23/2017 8:51 PM CDT  Subject: Medication Renewal Request    [address] would like a refill of the following medications:  ALENDRONATE SODIUM 70 MG Oral Tab WAYNE Schultz]    Preferred pharmacy: [place] [id number]

## 2017-07-26 ENCOUNTER — TELEPHONE (OUTPATIENT)
Dept: SURGERY | Facility: CLINIC | Age: 82
End: 2017-07-26

## 2017-07-26 ENCOUNTER — OFFICE VISIT (OUTPATIENT)
Dept: SURGERY | Facility: CLINIC | Age: 82
End: 2017-07-26

## 2017-07-26 ENCOUNTER — PATIENT MESSAGE (OUTPATIENT)
Dept: INTERNAL MEDICINE CLINIC | Facility: CLINIC | Age: 82
End: 2017-07-26

## 2017-07-26 VITALS
HEIGHT: 64 IN | DIASTOLIC BLOOD PRESSURE: 50 MMHG | HEART RATE: 72 BPM | BODY MASS INDEX: 19.46 KG/M2 | RESPIRATION RATE: 16 BRPM | SYSTOLIC BLOOD PRESSURE: 106 MMHG | WEIGHT: 114 LBS

## 2017-07-26 DIAGNOSIS — Z98.890 POST-OPERATIVE STATE: Primary | ICD-10-CM

## 2017-07-26 DIAGNOSIS — D75.839 THROMBOCYTOSIS: Primary | ICD-10-CM

## 2017-07-26 DIAGNOSIS — G89.29 CHRONIC MIDLINE THORACIC BACK PAIN: ICD-10-CM

## 2017-07-26 DIAGNOSIS — M54.50 ACUTE MIDLINE LOW BACK PAIN WITHOUT SCIATICA: ICD-10-CM

## 2017-07-26 DIAGNOSIS — M54.6 CHRONIC MIDLINE THORACIC BACK PAIN: ICD-10-CM

## 2017-07-26 DIAGNOSIS — E83.52 SERUM CALCIUM ELEVATED: ICD-10-CM

## 2017-07-26 PROCEDURE — 99024 POSTOP FOLLOW-UP VISIT: CPT | Performed by: NEUROLOGICAL SURGERY

## 2017-07-26 NOTE — TELEPHONE ENCOUNTER
From: Cindy Taveras  To: Chandra Best MD  Sent: 7/26/2017 9:43 AM CDT  Subject: Test Results Question    Good Morning! I am checking to see if you got the results of my neck and collar bone x-rays. Thank you.     [place]

## 2017-07-26 NOTE — PATIENT INSTRUCTIONS
Refill policies:    • Allow 2-3 business days for refills; controlled substances may take longer.   • Contact your pharmacy at least 5 days prior to running out of medication and have them send an electronic request or submit request through the [facility] have a procedure or additional testing performed. [facility]) will contact your insurance carrier to obtain pre-certification or prior authorization.     Unfortunately, [facility] has seen an increase in denial of payment even though the p

## 2017-07-26 NOTE — PROGRESS NOTES
[facility]   Outpatient Neurological Surgery Follow Up    Neda Barker  : 7/3/1934  2017  PCP: Lb Lord MD  Referring Provider: No ref.  provider found    REASON FOR VISIT:Patient presents with:  Post-Op: 17 Gayatri King augmentation changes. No complicating factor is defined. There is no epidural or paraspinal fluid collection. The canal appears patent. 2. Stable chronic T3 compression deformity. No new compression fracture in the thoracic spine.  Stable alignment and pos

## 2017-07-26 NOTE — PROGRESS NOTES
Post op kyphoplasty, continues to have back pain, fells that the pain is no different from before procedure. A lot of pain when walking and standing.

## 2017-07-31 NOTE — OPERATIVE REPORT
[facility]  Operative report    [facility]   CSN [id number] MRN [id number]   Admission Date 7/11/2017 Operation Date 7/11/2017   Attending Physician No att. providers found Operating Physician Roya Ocasio., DO     Pre-Operative D was brought to the interventional radiology suite and placed on pillows on the higher table in a prone position. The patient's back was prepped and draped in a sterile fashion. A formal timeout was performed.     I then used a radiopaque wire to identify in a similar fashion as noted above. A biopsy was also achieved at this level from both sides. Bone cement was instilled within the thoracic 10 vertebral body under direct fluoroscopic guidance.   The stylets were placed within the Jamshidi sheath and twi

## 2017-08-08 ENCOUNTER — TELEPHONE (OUTPATIENT)
Dept: SURGERY | Facility: CLINIC | Age: 82
End: 2017-08-08

## 2017-08-08 ENCOUNTER — PRIOR ORIGINAL RECORDS (OUTPATIENT)
Dept: OTHER | Age: 82
End: 2017-08-08

## 2017-08-08 ENCOUNTER — HOSPITAL ENCOUNTER (OUTPATIENT)
Dept: GENERAL RADIOLOGY | Age: 82
Discharge: HOME | End: 2017-08-08
Attending: NEUROLOGICAL SURGERY
Payer: MEDICARE

## 2017-08-08 DIAGNOSIS — M54.50 LOW BACK PAIN: ICD-10-CM

## 2017-08-08 PROCEDURE — 72100 X-RAY EXAM L-S SPINE 2/3 VWS: CPT | Performed by: NEUROLOGICAL SURGERY

## 2017-08-08 NOTE — TELEPHONE ENCOUNTER
Will send message to [facility] to have new order for MRI placed with anesthesia if appropriate as patient could not tolerate MRI.

## 2017-08-09 ENCOUNTER — TELEPHONE (OUTPATIENT)
Dept: INTERNAL MEDICINE CLINIC | Facility: CLINIC | Age: 82
End: 2017-08-09

## 2017-08-09 NOTE — TELEPHONE ENCOUNTER
Left a detailed VM for [facility] informing ok for early d/c froom PT per TB- advised to call clinic if any concerns or changes.

## 2017-08-10 ENCOUNTER — MYAURORA ACCOUNT LINK (OUTPATIENT)
Dept: OTHER | Age: 82
End: 2017-08-10

## 2017-08-10 ENCOUNTER — PRIOR ORIGINAL RECORDS (OUTPATIENT)
Dept: OTHER | Age: 82
End: 2017-08-10

## 2017-08-10 ENCOUNTER — PATIENT MESSAGE (OUTPATIENT)
Dept: SURGERY | Facility: CLINIC | Age: 82
End: 2017-08-10

## 2017-08-10 NOTE — TELEPHONE ENCOUNTER
Spoke to patient informed her request has been sent to Dr. Marley Yeh, waiting for him to place order and will call her back.  Pt acknowledged and was thankfull

## 2017-08-11 ENCOUNTER — TELEPHONE (OUTPATIENT)
Dept: SURGERY | Facility: CLINIC | Age: 82
End: 2017-08-11

## 2017-08-11 RX ORDER — DIAZEPAM 10 MG/1
5 TABLET ORAL AS NEEDED
Qty: 2 TABLET | Refills: 0 | Status: SHIPPED
Start: 2017-08-11 | End: 2017-10-04

## 2017-08-11 NOTE — TELEPHONE ENCOUNTER
From: Ilene Hauser  To: Maribel Patrick DO  Sent: 8/10/2017 11:54 PM CDT  Subject: Non-Urgent Medical Question    I am supposed to have an MRI. I have a hard time with this type of test and I was told that something would be prescribed for this.  Coul

## 2017-08-11 NOTE — TELEPHONE ENCOUNTER
Informed patient that we do have her scheduled for 8/21/17 but I don't see her scheduled for her MRI. She will get MRI scheduled. I asked her if she wanted IV sedation or if she wanted oral sedation.  She states she would like to have oral sedation, did not

## 2017-08-14 ENCOUNTER — TELEPHONE (OUTPATIENT)
Dept: SURGERY | Facility: CLINIC | Age: 82
End: 2017-08-14

## 2017-08-14 NOTE — TELEPHONE ENCOUNTER
Informed Central scheduling that I spoke with patient on 8/11/2017 and she stated at that time she wanted oral sedation. Valium RX was called into her pharmacy for that reason.  Patient voiced that she did not want the IV sedation again and having to wait t

## 2017-08-16 ENCOUNTER — HOSPITAL ENCOUNTER (OUTPATIENT)
Dept: MAMMOGRAPHY | Age: 82
Discharge: HOME | End: 2017-08-16
Attending: SURGERY
Payer: MEDICARE

## 2017-08-16 DIAGNOSIS — Z12.31 ENCOUNTER FOR SCREENING MAMMOGRAM FOR MALIGNANT NEOPLASM OF BREAST: ICD-10-CM

## 2017-08-16 PROCEDURE — 77067 SCR MAMMO BI INCL CAD: CPT | Performed by: SURGERY

## 2017-08-18 ENCOUNTER — APPOINTMENT (OUTPATIENT)
Dept: CT IMAGING | Facility: HOSPITAL | Age: 82
DRG: 336 | End: 2017-08-18
Attending: EMERGENCY MEDICINE
Payer: MEDICARE

## 2017-08-18 ENCOUNTER — HOSPITAL ENCOUNTER (INPATIENT)
Facility: HOSPITAL | Age: 82
LOS: 16 days | Discharge: HOME HEALTH | DRG: 336 | End: 2017-09-03
Attending: EMERGENCY MEDICINE | Admitting: INTERNAL MEDICINE
Payer: MEDICARE

## 2017-08-18 DIAGNOSIS — K56.609 SBO (SMALL BOWEL OBSTRUCTION) (HCC): Primary | ICD-10-CM

## 2017-08-18 LAB
ALBUMIN SERPL-MCNC: 3.7 G/DL (ref 3.5–4.8)
ALP LIVER SERPL-CCNC: 97 U/L (ref 55–142)
ALT SERPL-CCNC: 26 U/L (ref 14–54)
AST SERPL-CCNC: 25 U/L (ref 15–41)
ATRIAL RATE: 67 BPM
BASOPHILS # BLD AUTO: 0.02 X10(3) UL (ref 0–0.1)
BASOPHILS NFR BLD AUTO: 0.3 %
BILIRUB SERPL-MCNC: 0.5 MG/DL (ref 0.1–2)
BUN BLD-MCNC: 21 MG/DL (ref 8–20)
CALCIUM BLD-MCNC: 9.4 MG/DL (ref 8.3–10.3)
CHLORIDE: 108 MMOL/L (ref 101–111)
CO2: 26 MMOL/L (ref 22–32)
CREAT BLD-MCNC: 0.74 MG/DL (ref 0.55–1.02)
EOSINOPHIL # BLD AUTO: 0.18 X10(3) UL (ref 0–0.3)
EOSINOPHIL NFR BLD AUTO: 2.6 %
ERYTHROCYTE [DISTWIDTH] IN BLOOD BY AUTOMATED COUNT: 15 % (ref 11.5–16)
GLUCOSE BLD-MCNC: 124 MG/DL (ref 70–99)
HCT VFR BLD AUTO: 36 % (ref 34–50)
HGB BLD-MCNC: 12.1 G/DL (ref 12–16)
IMMATURE GRANULOCYTE COUNT: 0.01 X10(3) UL (ref 0–1)
IMMATURE GRANULOCYTE RATIO %: 0.1 %
LIPASE: 248 U/L (ref 73–393)
LYMPHOCYTES # BLD AUTO: 0.86 X10(3) UL (ref 0.9–4)
LYMPHOCYTES NFR BLD AUTO: 12.4 %
M PROTEIN MFR SERPL ELPH: 6.7 G/DL (ref 6.1–8.3)
MCH RBC QN AUTO: 29.7 PG (ref 27–33.2)
MCHC RBC AUTO-ENTMCNC: 33.6 G/DL (ref 31–37)
MCV RBC AUTO: 88.2 FL (ref 81–100)
MONOCYTES # BLD AUTO: 0.45 X10(3) UL (ref 0.1–0.6)
MONOCYTES NFR BLD AUTO: 6.5 %
NEUTROPHIL ABS PRELIM: 5.44 X10 (3) UL (ref 1.3–6.7)
NEUTROPHILS # BLD AUTO: 5.44 X10(3) UL (ref 1.3–6.7)
NEUTROPHILS NFR BLD AUTO: 78.1 %
P AXIS: 84 DEGREES
P-R INTERVAL: 172 MS
PLATELET # BLD AUTO: 272 10(3)UL (ref 150–450)
POTASSIUM SERPL-SCNC: 3.4 MMOL/L (ref 3.6–5.1)
Q-T INTERVAL: 420 MS
QRS DURATION: 80 MS
QTC CALCULATION (BEZET): 443 MS
R AXIS: 50 DEGREES
RBC # BLD AUTO: 4.08 X10(6)UL (ref 3.8–5.1)
RED CELL DISTRIBUTION WIDTH-SD: 48.7 FL (ref 35.1–46.3)
SODIUM SERPL-SCNC: 141 MMOL/L (ref 136–144)
T AXIS: 51 DEGREES
VENTRICULAR RATE: 67 BPM
WBC # BLD AUTO: 7 X10(3) UL (ref 4–13)

## 2017-08-18 PROCEDURE — 74176 CT ABD & PELVIS W/O CONTRAST: CPT | Performed by: EMERGENCY MEDICINE

## 2017-08-18 PROCEDURE — 99223 1ST HOSP IP/OBS HIGH 75: CPT | Performed by: INTERNAL MEDICINE

## 2017-08-18 PROCEDURE — 99223 1ST HOSP IP/OBS HIGH 75: CPT | Performed by: SURGERY

## 2017-08-18 RX ORDER — MORPHINE SULFATE 4 MG/ML
4 INJECTION, SOLUTION INTRAMUSCULAR; INTRAVENOUS EVERY 2 HOUR PRN
Status: DISCONTINUED | OUTPATIENT
Start: 2017-08-18 | End: 2017-08-20

## 2017-08-18 RX ORDER — HYDROMORPHONE HYDROCHLORIDE 1 MG/ML
0.5 INJECTION, SOLUTION INTRAMUSCULAR; INTRAVENOUS; SUBCUTANEOUS EVERY 30 MIN PRN
Status: DISPENSED | OUTPATIENT
Start: 2017-08-18 | End: 2017-08-18

## 2017-08-18 RX ORDER — ONDANSETRON 2 MG/ML
4 INJECTION INTRAMUSCULAR; INTRAVENOUS EVERY 4 HOURS PRN
Status: DISCONTINUED | OUTPATIENT
Start: 2017-08-18 | End: 2017-08-20

## 2017-08-18 RX ORDER — ONDANSETRON 2 MG/ML
4 INJECTION INTRAMUSCULAR; INTRAVENOUS ONCE
Status: COMPLETED | OUTPATIENT
Start: 2017-08-18 | End: 2017-08-18

## 2017-08-18 RX ORDER — POTASSIUM CHLORIDE 29.8 MG/ML
40 INJECTION INTRAVENOUS ONCE
Status: DISCONTINUED | OUTPATIENT
Start: 2017-08-18 | End: 2017-08-18

## 2017-08-18 RX ORDER — ONDANSETRON 2 MG/ML
4 INJECTION INTRAMUSCULAR; INTRAVENOUS EVERY 6 HOURS PRN
Status: DISCONTINUED | OUTPATIENT
Start: 2017-08-18 | End: 2017-08-20

## 2017-08-18 RX ORDER — MORPHINE SULFATE 4 MG/ML
2 INJECTION, SOLUTION INTRAMUSCULAR; INTRAVENOUS EVERY 2 HOUR PRN
Status: DISCONTINUED | OUTPATIENT
Start: 2017-08-18 | End: 2017-08-20

## 2017-08-18 RX ORDER — DILTIAZEM HYDROCHLORIDE 5 MG/ML
5 INJECTION INTRAVENOUS EVERY 6 HOURS PRN
Status: DISCONTINUED | OUTPATIENT
Start: 2017-08-18 | End: 2017-08-21

## 2017-08-18 RX ORDER — MORPHINE SULFATE 4 MG/ML
1 INJECTION, SOLUTION INTRAMUSCULAR; INTRAVENOUS EVERY 2 HOUR PRN
Status: DISCONTINUED | OUTPATIENT
Start: 2017-08-18 | End: 2017-08-20

## 2017-08-18 RX ORDER — SODIUM CHLORIDE 9 MG/ML
INJECTION, SOLUTION INTRAVENOUS ONCE
Status: COMPLETED | OUTPATIENT
Start: 2017-08-18 | End: 2017-08-18

## 2017-08-18 RX ORDER — MORPHINE SULFATE 4 MG/ML
2 INJECTION, SOLUTION INTRAMUSCULAR; INTRAVENOUS ONCE
Status: COMPLETED | OUTPATIENT
Start: 2017-08-18 | End: 2017-08-18

## 2017-08-18 RX ORDER — SODIUM CHLORIDE 9 MG/ML
INJECTION, SOLUTION INTRAVENOUS CONTINUOUS
Status: DISCONTINUED | OUTPATIENT
Start: 2017-08-18 | End: 2017-08-20

## 2017-08-18 RX ORDER — ENOXAPARIN SODIUM 100 MG/ML
40 INJECTION SUBCUTANEOUS DAILY
Status: DISCONTINUED | OUTPATIENT
Start: 2017-08-18 | End: 2017-08-20

## 2017-08-18 NOTE — CM/SW NOTE
08/18/17 1600   CM/SW Referral Data   Referral Source Family; Physician;Social Work (self-referral)   Reason for Referral Discharge planning   Informant Patient   Pertinent Medical Hx   Primary Care Physician Name [facility]   Social History   Domestic/P

## 2017-08-18 NOTE — ED PROVIDER NOTES
Patient Seen in: [facility] Emergency Department    History   Patient presents with:  Abdomen/Flank Pain (GI/)    Stated Complaint: ABD PAIN    HPI    60-year-old female, history of previous bowel obstructions, she status post bowel resection, her Dpzvwfybmd-nnkrldxspu-tgnwljjalvizlc  2007: OTHER SURGICAL HISTORY      Comment: Removed cholostomy  2010: OTHER SURGICAL HISTORY      Comment: intestinal  1980: OTHER SURGICAL HISTORY      Comment: removal of mass near thyroid  12/14/12: OTHER SURGICAL HI C) 500 MG/5ML Oral Syrup,  Take 500 mg by mouth daily. Cholecalciferol (VITAMIN D) 1000 UNITS Oral Cap,  Take 1 tablet by mouth daily. CALCIUM + D OR,  Take 1 tablet by mouth daily. VITAMIN B COMPLEX OR,  Take 1 tablet by mouth daily.    Malia Resendiz Normal rate and regular rhythm. Pulmonary/Chest: Effort normal and breath sounds normal. No stridor. Abdominal: Soft, not distended. Scant if any bowel sounds. Musculoskeletal: Exhibits no edema or tenderness.    Neurological: Pt is alert and oriente admitted to the [facility]. Discussed with Dr. Francesca Dumont, will see the patient. Management medically stable. N.p.o., IVF, Zofran as needed. Morphine as needed.     Disposition and Plan     Clinical Impression:  SBO (small bowel obstruction) ([address]

## 2017-08-18 NOTE — PLAN OF CARE
Patient AOX4. Pain 6/10, denies nausea. POC discussed, all questions and concerns addressed. All safety measures in place. Will continue to monitor.

## 2017-08-18 NOTE — CONSULTS
[facility]  Report of Consultation    Davidhermelinda Salvadorl Patient Status:  Emergency    7/3/1934 MRN [id number]   Location [address] [facility] Attending Gianna Gutiérrez MD   Hosp Day # 0 PCP Katalina Kwok MD     Reason for Consultation: blood cell count. The patient recently underwent a two-level kyphoplasty for compression fracture on July 11, 2017 at [facility] with Dr. Yvonne Akbar. She was admitted from July 15 through the 16th for postoperative pain.   Prior to this the patient was BREAST SURGERY Bilateral      Comment: implant removal  1993, 1998: CHOLECYSTECTOMY      Comment: x2  No date: COLECTOMY  2004, 5/8/2012: COLONOSCOPY      Comment: x2, in 2012 w/ forceps biopsy  No date: COLOSTOMY      Comment: and reversal  2006: PAULINO NEED medications for this encounter. Review of Systems:    Allergic/Immuno:  Review of patient's allergies indicates no known allergies.   Cardiovascular:  Negative for cool extremity and irregular heartbeat/palpitations  Constitutional:  Negative for decrea hernia. No rebound or guarding. No signs of peritonitis. No ascites. Extremities:  No lower extremity edema noted. Without clubbing or cyanosis. Skin: Normal texture and turgor.       Laboratory Data:  Recent Labs   Lab  08/18/17   0544   RBC  4 changes and scattered air-fluid levels in the left upper quadrant and left mid abdomen concerning for enteritis and/or partial small bowel obstruction. There are   decompressed small bowel loops in the right side of the pelvis and midabdomen.  Postsurgical Essential hypertension with goal blood pressure less than 130/80     Hypokalemia     Dyslipidemia     Hyponatremia     Abdominal pain, acute     SBO (small bowel obstruction) (HCC)     HTN (hypertension), benign     Memory deficit     Mild cognitive impair understanding of this but is hopeful to avoid any surgery during this admission  6. GI and DVT prophylaxis  7.  Dr. Brian Chavarria to follow    Time spent on counseling/coordination of care:  1 Hour    Total time spent with patient:  1 Hour 30 Minutes    Areatha Heimlich deteriorates then surgical intervention may be required. ·   · The patient was provided ample opportunity to ask questions. · All of the patient's questions were answered in detail. · The patient voiced understanding of the care plan.

## 2017-08-18 NOTE — ED INITIAL ASSESSMENT (HPI)
PT WITH CO ABD PAIN THAT STARTED AT 2100. NAUSEA ON ARRIVAL TO ED.  PT GOT 26MCG OF FENTANYL ENROUTE

## 2017-08-18 NOTE — H&P
[facility]                                                               History & Physical         Chantell Cm Patient Status:  Inpatient    7/3/1934 MRN [id number]   [facility] 3NW-A Attending Valdo Tomlinson MD   [address] [facility] Day # 0 Comment: Cyst in vagina  1974: OTHER SURGICAL HISTORY      Comment: Breast implants  2006: OTHER SURGICAL HISTORY      Comment: Right breast lump- benign  2006: OTHER SURGICAL HISTORY      Comment: Yhsjpwpebo-fbrhdjsmst-hxbspnfjkhbzhx  2007: OTHER SURGIC needed. Disp: 60 tablet Rfl: 0 Unknown   simvastatin 10 MG Oral Tab Take 1 tablet (10 mg total) by mouth once daily.  Disp: 90 tablet Rfl: 1 8/17/2017 at Unknown time   hydrochlorothiazide 12.5 MG Oral Cap Take 1 capsule (12.5 mg total) by mouth every morni mouth 2 (two) times daily. Disp: 60 capsule Rfl: 0 Unknown at Unknown time       Review of Systems:  A comprehensive 14 point review of systems was completed. Pertinent positives and negatives noted in the the HPI.     Physical Exam:     Vital signs: Blood techniques were used. Dose information is transmitted to the [facility]) NRDR ([address] [place] [address]) which includes the Dose Index Registry.      PATIENT STATED HISTORY: (As transcribed by Technologist)  Patient complai colon.     Stable mild prominence of the biliary tree post cholecystectomy        Dictated by: Elias Cruz MD on 8/18/2017 at 8:33     ASSESSMENT / PLAN:     1.  Partial small bowel obstruction–conservative management with n.p.o., IV fluids, IV pain medi

## 2017-08-18 NOTE — PLAN OF CARE
GASTROINTESTINAL - ADULT    • Minimal or absence of nausea and vomiting Progressing    • Maintains or returns to baseline bowel function Progressing        METABOLIC/FLUID AND ELECTROLYTES - ADULT    • Glucose maintained within prescribed range Progressing

## 2017-08-19 LAB
BASOPHILS # BLD AUTO: 0.01 X10(3) UL (ref 0–0.1)
BASOPHILS NFR BLD AUTO: 0.2 %
BUN BLD-MCNC: 13 MG/DL (ref 8–20)
CALCIUM BLD-MCNC: 9.1 MG/DL (ref 8.3–10.3)
CHLORIDE: 110 MMOL/L (ref 101–111)
CO2: 24 MMOL/L (ref 22–32)
CREAT BLD-MCNC: 0.53 MG/DL (ref 0.55–1.02)
EOSINOPHIL # BLD AUTO: 0.15 X10(3) UL (ref 0–0.3)
EOSINOPHIL NFR BLD AUTO: 3.1 %
ERYTHROCYTE [DISTWIDTH] IN BLOOD BY AUTOMATED COUNT: 15.2 % (ref 11.5–16)
GLUCOSE BLD-MCNC: 101 MG/DL (ref 70–99)
HAV IGM SER QL: 1.8 MG/DL (ref 1.7–3)
HCT VFR BLD AUTO: 35.2 % (ref 34–50)
HGB BLD-MCNC: 11.3 G/DL (ref 12–16)
IMMATURE GRANULOCYTE COUNT: 0.02 X10(3) UL (ref 0–1)
IMMATURE GRANULOCYTE RATIO %: 0.4 %
LYMPHOCYTES # BLD AUTO: 0.7 X10(3) UL (ref 0.9–4)
LYMPHOCYTES NFR BLD AUTO: 14.3 %
MCH RBC QN AUTO: 29.6 PG (ref 27–33.2)
MCHC RBC AUTO-ENTMCNC: 32.1 G/DL (ref 31–37)
MCV RBC AUTO: 92.1 FL (ref 81–100)
MONOCYTES # BLD AUTO: 0.38 X10(3) UL (ref 0.1–0.6)
MONOCYTES NFR BLD AUTO: 7.8 %
NEUTROPHIL ABS PRELIM: 3.63 X10 (3) UL (ref 1.3–6.7)
NEUTROPHILS # BLD AUTO: 3.63 X10(3) UL (ref 1.3–6.7)
NEUTROPHILS NFR BLD AUTO: 74.2 %
PLATELET # BLD AUTO: 218 10(3)UL (ref 150–450)
POTASSIUM SERPL-SCNC: 3.8 MMOL/L (ref 3.6–5.1)
POTASSIUM SERPL-SCNC: 3.8 MMOL/L (ref 3.6–5.1)
RBC # BLD AUTO: 3.82 X10(6)UL (ref 3.8–5.1)
RED CELL DISTRIBUTION WIDTH-SD: 51.3 FL (ref 35.1–46.3)
SODIUM SERPL-SCNC: 140 MMOL/L (ref 136–144)
WBC # BLD AUTO: 4.9 X10(3) UL (ref 4–13)

## 2017-08-19 PROCEDURE — 99232 SBSQ HOSP IP/OBS MODERATE 35: CPT | Performed by: INTERNAL MEDICINE

## 2017-08-19 PROCEDURE — 99232 SBSQ HOSP IP/OBS MODERATE 35: CPT | Performed by: SURGERY

## 2017-08-19 RX ORDER — POTASSIUM CHLORIDE 14.9 MG/ML
20 INJECTION INTRAVENOUS ONCE
Status: COMPLETED | OUTPATIENT
Start: 2017-08-19 | End: 2017-08-19

## 2017-08-19 NOTE — PROGRESS NOTES
[facility]  Progress Note    Zhane Byrnes Patient Status:  Inpatient    7/3/1934 MRN [id number]   [facility] 3NW-A Attending Nelson Yi MD   Hosp Day # 1 PCP Amanda Kaur MD     CC:  Abdominal pain    SUBJECTIVE:Abdominal debby 08/19/2017    08/19/2017   CA 9.1 08/19/2017   MG 1.8 08/19/2017       Imaging:  Imaging reviewed in Epic.     Meds:     Current Facility-Administered Medications:  potassium chloride IVPB premix 20 mEq 20 mEq Intravenous Once   ondansetron HCl (ZOFR expected to be discharge to: home      Questions/concerns and Plan of care were discussed with patient           Joao Paredes MD  8/19/2017  9:22 AM

## 2017-08-19 NOTE — PROGRESS NOTES
[facility]  Progress Note    Adrian Anderson Patient Status:  Inpatient    7/3/1934 MRN [id number]   [facility] 3NW-A Attending Kathleen Sofia MD   Hosp Day # 1 PCP Aric Lee MD     Subjective:   The patient feels comfortable with MG 1.8 08/19/2017       Assessment/Plan:  Patient Active Problem List:     Spinal stenosis, lumbar region, without neurogenic claudication     Bursitis, hip     HTN (hypertension)     Pure hypercholesterolemia     History of recurrent UTI (urinary tract patient voiced understanding of the care plan. Iris Davis MD [facility] General Surgery  8/19/2017  10:56 AM

## 2017-08-20 ENCOUNTER — SURGERY (OUTPATIENT)
Age: 82
End: 2017-08-20

## 2017-08-20 ENCOUNTER — ANESTHESIA EVENT (OUTPATIENT)
Dept: SURGERY | Facility: HOSPITAL | Age: 82
End: 2017-08-20

## 2017-08-20 ENCOUNTER — ANESTHESIA (OUTPATIENT)
Dept: SURGERY | Facility: HOSPITAL | Age: 82
End: 2017-08-20

## 2017-08-20 LAB
HAV IGM SER QL: 1.8 MG/DL (ref 1.7–3)
POTASSIUM SERPL-SCNC: 3.7 MMOL/L (ref 3.6–5.1)

## 2017-08-20 PROCEDURE — 3E0M05Z INTRODUCTION OF ADHESION BARRIER INTO PERITONEAL CAVITY, OPEN APPROACH: ICD-10-PCS | Performed by: SURGERY

## 2017-08-20 PROCEDURE — 0DNW0ZZ RELEASE PERITONEUM, OPEN APPROACH: ICD-10-PCS | Performed by: SURGERY

## 2017-08-20 PROCEDURE — 99232 SBSQ HOSP IP/OBS MODERATE 35: CPT | Performed by: INTERNAL MEDICINE

## 2017-08-20 PROCEDURE — 99232 SBSQ HOSP IP/OBS MODERATE 35: CPT | Performed by: SURGERY

## 2017-08-20 DEVICE — SEPRAFILM ADHESION BARRIER (MEMBRANE) IS A STERILE, BIORESORBABLE, TRANSLUCENT ADHESION BARRIER COMPOSED OF TWO ANIONIC POLYSACCHARIDES, SODIUM HYALURONATE (HA) AND CARBOXYMETHYLCELLULOSE (CMC).
Type: IMPLANTABLE DEVICE | Site: ABDOMEN | Status: FUNCTIONAL
Brand: SEPRAFILM

## 2017-08-20 RX ORDER — HYDROCODONE BITARTRATE AND ACETAMINOPHEN 5; 325 MG/1; MG/1
1 TABLET ORAL AS NEEDED
Status: DISCONTINUED | OUTPATIENT
Start: 2017-08-20 | End: 2017-08-20 | Stop reason: HOSPADM

## 2017-08-20 RX ORDER — HYDROMORPHONE HYDROCHLORIDE 1 MG/ML
0.4 INJECTION, SOLUTION INTRAMUSCULAR; INTRAVENOUS; SUBCUTANEOUS EVERY 30 MIN PRN
Status: DISCONTINUED | OUTPATIENT
Start: 2017-08-20 | End: 2017-09-03

## 2017-08-20 RX ORDER — OXYCODONE HYDROCHLORIDE AND ACETAMINOPHEN 5; 325 MG/1; MG/1
1 TABLET ORAL EVERY 4 HOURS PRN
Status: DISCONTINUED | OUTPATIENT
Start: 2017-08-20 | End: 2017-09-03

## 2017-08-20 RX ORDER — DIPHENHYDRAMINE HYDROCHLORIDE 50 MG/ML
12.5 INJECTION INTRAMUSCULAR; INTRAVENOUS AS NEEDED
Status: DISCONTINUED | OUTPATIENT
Start: 2017-08-20 | End: 2017-08-20 | Stop reason: HOSPADM

## 2017-08-20 RX ORDER — ACETAMINOPHEN 10 MG/ML
INJECTION, SOLUTION INTRAVENOUS
Status: DISCONTINUED | OUTPATIENT
Start: 2017-08-20 | End: 2017-08-20 | Stop reason: HOSPADM

## 2017-08-20 RX ORDER — HEPARIN SODIUM 5000 [USP'U]/ML
5000 INJECTION, SOLUTION INTRAVENOUS; SUBCUTANEOUS EVERY 12 HOURS SCHEDULED
Status: DISCONTINUED | OUTPATIENT
Start: 2017-08-21 | End: 2017-09-02

## 2017-08-20 RX ORDER — POTASSIUM CHLORIDE 14.9 MG/ML
20 INJECTION INTRAVENOUS ONCE
Status: COMPLETED | OUTPATIENT
Start: 2017-08-20 | End: 2017-08-20

## 2017-08-20 RX ORDER — HYDROCODONE BITARTRATE AND ACETAMINOPHEN 5; 325 MG/1; MG/1
2 TABLET ORAL AS NEEDED
Status: DISCONTINUED | OUTPATIENT
Start: 2017-08-20 | End: 2017-08-20 | Stop reason: HOSPADM

## 2017-08-20 RX ORDER — ONDANSETRON 2 MG/ML
4 INJECTION INTRAMUSCULAR; INTRAVENOUS EVERY 6 HOURS PRN
Status: DISCONTINUED | OUTPATIENT
Start: 2017-08-20 | End: 2017-09-03

## 2017-08-20 RX ORDER — ONDANSETRON 2 MG/ML
4 INJECTION INTRAMUSCULAR; INTRAVENOUS EVERY 6 HOURS PRN
Status: DISCONTINUED | OUTPATIENT
Start: 2017-08-20 | End: 2017-08-20

## 2017-08-20 RX ORDER — KETOROLAC TROMETHAMINE 30 MG/ML
30 INJECTION, SOLUTION INTRAMUSCULAR; INTRAVENOUS EVERY 6 HOURS PRN
Status: DISCONTINUED | OUTPATIENT
Start: 2017-08-20 | End: 2017-08-22

## 2017-08-20 RX ORDER — OXYCODONE HYDROCHLORIDE AND ACETAMINOPHEN 5; 325 MG/1; MG/1
2 TABLET ORAL EVERY 4 HOURS PRN
Status: DISCONTINUED | OUTPATIENT
Start: 2017-08-20 | End: 2017-09-03

## 2017-08-20 RX ORDER — HYDROMORPHONE HYDROCHLORIDE 1 MG/ML
INJECTION, SOLUTION INTRAMUSCULAR; INTRAVENOUS; SUBCUTANEOUS
Status: COMPLETED
Start: 2017-08-20 | End: 2017-08-20

## 2017-08-20 RX ORDER — MIDAZOLAM HYDROCHLORIDE 1 MG/ML
1 INJECTION INTRAMUSCULAR; INTRAVENOUS EVERY 5 MIN PRN
Status: DISCONTINUED | OUTPATIENT
Start: 2017-08-20 | End: 2017-08-20 | Stop reason: HOSPADM

## 2017-08-20 RX ORDER — MAGNESIUM OXIDE 400 MG (241.3 MG MAGNESIUM) TABLET
400 TABLET ONCE
Status: DISCONTINUED | OUTPATIENT
Start: 2017-08-20 | End: 2017-08-20

## 2017-08-20 RX ORDER — ONDANSETRON 2 MG/ML
4 INJECTION INTRAMUSCULAR; INTRAVENOUS AS NEEDED
Status: DISCONTINUED | OUTPATIENT
Start: 2017-08-20 | End: 2017-08-20 | Stop reason: HOSPADM

## 2017-08-20 RX ORDER — NALOXONE HYDROCHLORIDE 0.4 MG/ML
0.08 INJECTION, SOLUTION INTRAMUSCULAR; INTRAVENOUS; SUBCUTANEOUS
Status: DISCONTINUED | OUTPATIENT
Start: 2017-08-20 | End: 2017-09-03

## 2017-08-20 RX ORDER — DIPHENHYDRAMINE HYDROCHLORIDE 50 MG/ML
12.5 INJECTION INTRAMUSCULAR; INTRAVENOUS EVERY 4 HOURS PRN
Status: DISCONTINUED | OUTPATIENT
Start: 2017-08-20 | End: 2017-09-03

## 2017-08-20 RX ORDER — CEFOXITIN 2 G/1
INJECTION, POWDER, FOR SOLUTION INTRAVENOUS
Status: DISCONTINUED | OUTPATIENT
Start: 2017-08-20 | End: 2017-08-20 | Stop reason: HOSPADM

## 2017-08-20 RX ORDER — ZOLPIDEM TARTRATE 5 MG/1
5 TABLET ORAL NIGHTLY PRN
Status: DISCONTINUED | OUTPATIENT
Start: 2017-08-20 | End: 2017-09-03

## 2017-08-20 RX ORDER — SODIUM CHLORIDE, SODIUM LACTATE, POTASSIUM CHLORIDE, CALCIUM CHLORIDE 600; 310; 30; 20 MG/100ML; MG/100ML; MG/100ML; MG/100ML
INJECTION, SOLUTION INTRAVENOUS CONTINUOUS
Status: DISCONTINUED | OUTPATIENT
Start: 2017-08-20 | End: 2017-08-20

## 2017-08-20 RX ORDER — NALOXONE HYDROCHLORIDE 0.4 MG/ML
80 INJECTION, SOLUTION INTRAMUSCULAR; INTRAVENOUS; SUBCUTANEOUS AS NEEDED
Status: DISCONTINUED | OUTPATIENT
Start: 2017-08-20 | End: 2017-08-20 | Stop reason: HOSPADM

## 2017-08-20 RX ORDER — NALBUPHINE HCL 10 MG/ML
2.5 AMPUL (ML) INJECTION EVERY 4 HOURS PRN
Status: DISCONTINUED | OUTPATIENT
Start: 2017-08-20 | End: 2017-09-03

## 2017-08-20 RX ORDER — MEPERIDINE HYDROCHLORIDE 25 MG/ML
12.5 INJECTION INTRAMUSCULAR; INTRAVENOUS; SUBCUTANEOUS AS NEEDED
Status: DISCONTINUED | OUTPATIENT
Start: 2017-08-20 | End: 2017-08-20 | Stop reason: HOSPADM

## 2017-08-20 RX ORDER — HYDROMORPHONE HYDROCHLORIDE 1 MG/ML
0.4 INJECTION, SOLUTION INTRAMUSCULAR; INTRAVENOUS; SUBCUTANEOUS EVERY 5 MIN PRN
Status: DISCONTINUED | OUTPATIENT
Start: 2017-08-20 | End: 2017-08-20 | Stop reason: HOSPADM

## 2017-08-20 RX ORDER — SODIUM CHLORIDE 9 MG/ML
INJECTION, SOLUTION INTRAVENOUS CONTINUOUS
Status: DISCONTINUED | OUTPATIENT
Start: 2017-08-20 | End: 2017-08-26

## 2017-08-20 RX ORDER — HEPARIN SODIUM 5000 [USP'U]/ML
5000 INJECTION, SOLUTION INTRAVENOUS; SUBCUTANEOUS ONCE
Status: CANCELLED | OUTPATIENT
Start: 2017-08-20 | End: 2017-08-20

## 2017-08-20 RX ORDER — POTASSIUM CHLORIDE 20 MEQ/1
40 TABLET, EXTENDED RELEASE ORAL ONCE
Status: DISCONTINUED | OUTPATIENT
Start: 2017-08-20 | End: 2017-08-20

## 2017-08-20 NOTE — PROGRESS NOTES
08/19/17 0723   Clinical Encounter Type   Visited With Patient   Routine Visit Introduction   Continue Visiting No   Patient's Supportive Strategies/Resources  provided spiritual support. Patient express gratitude for health.    Mosque Encount

## 2017-08-20 NOTE — ANESTHESIA PREPROCEDURE EVALUATION
PRE-OP EVALUATION    Patient Name: Avery Jackson    Pre-op Diagnosis: Small bowel obstruction ([address] [K56.69]    Procedure(s):  Exploratory laparotomy, possible bowel resection    Surgeon(s) and Role:     * Katty Davis MD - Primary    Pre-op vital total) by mouth as needed (please take 1 dose the night before MRI and another 45 minutes before scan). Disp: 2 tablet Rfl: 0   Alendronate Sodium 70 MG Oral Tab Take 1 tablet (70 mg total) by mouth once a week.  Disp: 12 tablet Rfl: 3   TiZANidine HCl 2 MG Evaluation    Patient summary reviewed. Anesthetic Complications  (-) history of anesthetic complications         GI/Hepatic/Renal    Negative GI/hepatic/renal ROS. Cardiovascular    Negative cardiovascular ROS.   ECG reviewed and removal of silicone               implants  No date: PART REMOVAL COLON W END COLOSTOMY  1947: TONSILLECTOMY  1966: TUBAL LIGATION     Smoking status: Former Smoker  0.50 Packs/day  For 15.00 Years     Quit date: 1/1/1969    Smokeless tobacco: Never Us

## 2017-08-20 NOTE — PLAN OF CARE
PAIN - ADULT    • Verbalizes/displays adequate comfort level or patient's stated pain goal Progressing          GASTROINTESTINAL - ADULT    • Minimal or absence of nausea and vomiting Progressing        VSS,afebrile.   Remains NPO with NGT; high output note

## 2017-08-20 NOTE — ANESTHESIA POSTPROCEDURE EVALUATION
[id number] [facility] Patient Status:  Inpatient   Age/Gender 80year old female MRN [id number]   [facility] SURGERY Attending Parveen Storey MD   [address] Day # 2 PCP Bob Cortez MD       Anesthesia Post-op Note    Procedure(s):

## 2017-08-20 NOTE — PROGRESS NOTES
[facility]  Progress Note    Daniel Hernandez Patient Status:  Inpatient    7/3/1934 MRN [id number]   [facility] 3NW-A Attending Edinson Crow MD   Hosp Day # 2 PCP Trini Edwards MD     Subjective:  Patient has a small hard bowel mov Cervicalgia     Degenerative arthritis of cervical spine     Hx of small bowel obstruction     Disorders of bursae and tendons in shoulder region, unspecified     Atrial fibrillation ([address]     History of colon resection, for diverticulitis, 2010, with colo explained to the patient.   The risks explained include, but are not limited to, bleeding, infection, recurrence, pain, wound complications, missed lesions, incorrect diagnosis, injury to adjacent structures, and the need for further therapeutic, diagnostic

## 2017-08-20 NOTE — PROGRESS NOTES
[facility]  Progress Note    Moo Fisher Patient Status:  Inpatient    7/3/1934 MRN [id number]   [facility] 3NW-A Attending Sharmin Jang MD   Hosp Day # 2 PCP Fabien Thibodeaux MD     CC:  Abdominal pain    SUBJECTIVE:Abdominal debby 2 g 2 g Intravenous Once   ondansetron HCl (ZOFRAN) injection 4 mg 4 mg Intravenous Q4H PRN   0.9%  NaCl infusion  Intravenous Continuous   Enoxaparin Sodium (LOVENOX) 40 MG/0.4ML injection 40 mg 40 mg Subcutaneous Daily   morphINE sulfate (PF) 4 MG/ML inj

## 2017-08-20 NOTE — BRIEF OP NOTE
Pre-Operative Diagnosis: Small bowel obstruction (HCC) [K56.69]     Post-Operative Diagnosis: Small bowel obstruction , Extensive Intra- Abdominal Adhesions     Procedure Performed:   Procedure(s):  Exploratory Laparotomy, Extensive Lysis of Adhesions,

## 2017-08-21 LAB
ALBUMIN SERPL-MCNC: 2.8 G/DL (ref 3.5–4.8)
ATRIAL RATE: 85 BPM
BUN BLD-MCNC: 20 MG/DL (ref 8–20)
CALCIUM BLD-MCNC: 7.7 MG/DL (ref 8.3–10.3)
CHLORIDE: 110 MMOL/L (ref 101–111)
CO2: 18 MMOL/L (ref 22–32)
CREAT BLD-MCNC: 1.09 MG/DL (ref 0.55–1.02)
ERYTHROCYTE [DISTWIDTH] IN BLOOD BY AUTOMATED COUNT: 14.8 % (ref 11.5–16)
GLUCOSE BLD-MCNC: 132 MG/DL (ref 70–99)
HAV IGM SER QL: 1.7 MG/DL (ref 1.7–3)
HCT VFR BLD AUTO: 33.3 % (ref 34–50)
HGB BLD-MCNC: 10.6 G/DL (ref 12–16)
MCH RBC QN AUTO: 29.7 PG (ref 27–33.2)
MCHC RBC AUTO-ENTMCNC: 31.8 G/DL (ref 31–37)
MCV RBC AUTO: 93.3 FL (ref 81–100)
P AXIS: 56 DEGREES
P-R INTERVAL: 148 MS
PHOSPHATE SERPL-MCNC: 4.3 MG/DL (ref 2.5–4.9)
PLATELET # BLD AUTO: 260 10(3)UL (ref 150–450)
POTASSIUM SERPL-SCNC: 5.1 MMOL/L (ref 3.6–5.1)
Q-T INTERVAL: 384 MS
QRS DURATION: 80 MS
QTC CALCULATION (BEZET): 456 MS
R AXIS: 40 DEGREES
RBC # BLD AUTO: 3.57 X10(6)UL (ref 3.8–5.1)
RED CELL DISTRIBUTION WIDTH-SD: 51.4 FL (ref 35.1–46.3)
SODIUM SERPL-SCNC: 141 MMOL/L (ref 136–144)
T AXIS: 53 DEGREES
VENTRICULAR RATE: 85 BPM
WBC # BLD AUTO: 9.4 X10(3) UL (ref 4–13)

## 2017-08-21 PROCEDURE — 99232 SBSQ HOSP IP/OBS MODERATE 35: CPT | Performed by: INTERNAL MEDICINE

## 2017-08-21 RX ORDER — DILTIAZEM HYDROCHLORIDE 5 MG/ML
5 INJECTION INTRAVENOUS
Status: DISCONTINUED | OUTPATIENT
Start: 2017-08-21 | End: 2017-09-03

## 2017-08-21 RX ORDER — DILTIAZEM HYDROCHLORIDE 5 MG/ML
10 INJECTION INTRAVENOUS EVERY 2 HOUR PRN
Status: DISCONTINUED | OUTPATIENT
Start: 2017-08-21 | End: 2017-09-03

## 2017-08-21 NOTE — CM/SW NOTE
MSW updated by [facility] that patient is current with [facility].

## 2017-08-21 NOTE — PROGRESS NOTES
[facility]  Progress Note    Beni Painter Patient Status:  Inpatient    7/3/1934 MRN [id number]   [facility] 3NW-A Attending Bradly Taylor MD   Hosp Day # 3 PCP Ita Blancas MD     CC:  Abdominal pain    SUBJECTIVE: s/p Explorat 08/21/2017    08/21/2017   CO2 18.0 08/21/2017    08/21/2017   CA 7.7 08/21/2017   MG 1.7 08/21/2017   PHOS 4.3 08/21/2017         Meds:     Current Facility-Administered Medications:  0.9%  NaCl infusion  Intravenous Continuous   Zolpidem Tar more than 110 after pain control. Sees [facility] dr Del Austin as OP. replace electrolytes prn  3. Hyperlipidemia-we will hold statin while n.p.o.  4. COPD-continue home inhalers  5.  Chronic back pain with previous history of kyphoplasty for vertebral compression frac

## 2017-08-21 NOTE — OPERATIVE REPORT
[facility]    PATIENT'S NAME: Kelly Guerra   ATTENDING PHYSICIAN: Arden Dutton M.D. OPERATING PHYSICIAN: David Grewal M.D.    PATIENT ACCOUNT#:   [de-identified]    LOCATION:  PACU [facility] PACU 1 [facility]  MEDICAL RECORD #:   [id number]       DATE OF BIRTH: patient was admitted and treated initially with bowel rest; however, she has now not passed any flatus or had significant return of bowel function.   She is complaining of significant recurrent discomfort in the left upper quadrant, and there is tenderness pelvis, requiring topical hemostasis, which was placed. Two sheets of Seprafilm were divided into multiple pieces and spread throughout the abdomen at the completion of the operation.      PROCEDURE:  The patient was brought to the operating room, and afte multiple times by hand to assure that there was no serosal injury or enterotomy creation. None were identified. The large intestine and sigmoid colon were especially examined due to the significant adhesions especially within the sigmoid colon.   No evide condition. She tolerated the procedure without apparent complication. Needle, sponge, and instrument counts were correct at the end of the procedure, and procedural findings were discussed with the patient's family immediately upon conclusion of surgery.

## 2017-08-21 NOTE — HISTORICAL OFFICE NOTE
GLENFARIDA MORALES  : 1934  ACCOUNT:  [id number]  [id number]  PCP: Dr. Sophie Gardiner     TODAY'S DATE: 08/10/2017  DICTATED BY:  WAYNE Obrien ]    CHIEF COMPLAINT: [L/Sided Breast Discomfort.]    HPI:    [On 08/10/2017, liang Greco 80 ye SMOKING: Former tobacco use. used to smoke but quit and 40 years ago. CAFFEINE: occasional. ALCOHOL: drinks socially. EXERCISE: no regular exercise. DIET: no special diet. MARITAL STATUS: . EDUCATION: high school graduate.      ALLERGIES: Morphine Garcia fibrillation  6. Tachycardia  7. Ventricular premature depolarization    PLAN:  [  1. Echocardiogram.   2. Notify office of any change or worsening in symptoms.    3. Mammogram.  4. Blood work in November followed by office visit with Dr. Rocio Fonseca. ]    [facility] exertional dyspnea or angina. She had a normal nuclear stress test in 2014. RISK FACTORS:  CAD - Hypertension  CAD Equivalent - Carotid Artery Disease    REVIEW OF SYSTEMS:  CONS: no fever, chills, or recent weight changes.  EYES: denies significant v lifts and thrills or rub. AUSC:  regular rhythm, normal S1, S2 without S3; no pathologic murmurs. CAROTIDS: carotid pulses normal. ABD AORTA: abdominal aorta not enlarged. FEM: femoral pulses intact. PEDAL: pedal pulses intact. EXT: no peripheral edema.

## 2017-08-21 NOTE — CONSULTS
[facility] Pharmacy Note:  Pain Consult    Paris Santamaria is a 80year old female started on Dilaudid PCA by Dr. Popeye Anne. Pharmacy was consulted to review medication profile and to discontinue previously ordered narcotics and sedatives.     Medication profil

## 2017-08-21 NOTE — HOME CARE LIAISON
PTNT IS CURRENT WITH [facility]. . WILL NEED A FANTA ORDER ON OR BEFORE D/C    THANKS  Andi Guerin

## 2017-08-21 NOTE — CONSULTS
[facility]  Cardiology Consultation    Zoe Apple Patient Status:  Inpatient    7/3/1934 MRN [id number]   [facility] 3NW-A Attending Milvia Baldwin MD   Hosp Day # 3 PCP Lord Mela MD     Reason for Consultation:  SVT vs PAF LOCALIZATION W/ SPECIMEN 1 SITE RIG* Right      Comment: benign.   No date: OTHER Bilateral      Comment: bilateral breast implants  07/11/2017: OTHER      Comment: Kyphoplasty, Dr. Andrew Olivares: OTHER SURGICAL HISTORY      Comment: Cyst in vagina  1974: OTH Nightly PRN  •  ondansetron HCl (ZOFRAN) injection 4 mg, 4 mg, Intravenous, Q6H PRN  •  phenol (CHLORASEPTIC) 1.4 % oral liquid spray 1 spray, 1 spray, Mouth/Throat, PRN  •  CEPACOL (CEPACOL (SUGAR FREE)) 1 lozenge, 1 lozenge, Buccal, PRN  •  ketorolac tro distress. HEENT: Normocephalic, anicteric sclera, neck supple.  + NG tube. Neck: No JVD, carotids 2+, no bruits. Cardiac: Regular rate and rhythm. S1, S2 normal. No murmur, pericardial rub, S3.  Lungs: Clear without wheezes, rales, rhonchi or dullness. pulmonary systolic pressure. 6.  Compared to previous study from November 11, 2013 on side by side comparison, grossly no significant changes.    d                     Sarah Kim MD 06/30/2015 2:46 PM     Impression:  · Small bowel obstruction - s/p ex-lap

## 2017-08-21 NOTE — PROGRESS NOTES
[facility]  Progress Note    Christie Pica Patient Status:  Inpatient    7/3/1934 MRN [id number]   [facility] 3NW-A Attending Parveen Storey MD   Hosp Day # 3 PCP Bob Cortez MD     Subjective:  Doing well.  Describes \"soreness\" recurrent UTI (urinary tract infection)     Osteoarthritis of shoulder     Osteopenia     Cervicalgia     Degenerative arthritis of cervical spine     Hx of small bowel obstruction     Disorders of bursae and tendons in shoulder region, unspecified     Atr AM    Addendum:    I have examined the patient agree with the above assessment and plan. I explained the surgical findings and procedure to the patient, her , and her son. All questions were answered.     Patient is doing well postop day #1 followi

## 2017-08-22 LAB — HAV IGM SER QL: 2.6 MG/DL (ref 1.7–3)

## 2017-08-22 PROCEDURE — 99232 SBSQ HOSP IP/OBS MODERATE 35: CPT | Performed by: INTERNAL MEDICINE

## 2017-08-22 RX ORDER — KETOROLAC TROMETHAMINE 15 MG/ML
15 INJECTION, SOLUTION INTRAMUSCULAR; INTRAVENOUS EVERY 6 HOURS PRN
Status: ACTIVE | OUTPATIENT
Start: 2017-08-22 | End: 2017-08-22

## 2017-08-22 RX ORDER — HYDRALAZINE HYDROCHLORIDE 20 MG/ML
10 INJECTION INTRAMUSCULAR; INTRAVENOUS EVERY 6 HOURS PRN
Status: DISCONTINUED | OUTPATIENT
Start: 2017-08-22 | End: 2017-09-03

## 2017-08-22 NOTE — PROGRESS NOTES
[facility]  Progress Note    Pete Kraus Patient Status:  Inpatient    7/3/1934 MRN [id number]   [facility] 3NW-A Attending Barbi Ramesh MD   Hosp Day # 4 PCP Fe Vernon MD     CC:  Abdominal pain    SUBJECTIVE:   C/o expect HCl (CARDIZEM) injection 5 mg 5 mg Intravenous Q1H PRN   DilTIAZem HCl (CARDIZEM) injection 10 mg 10 mg Intravenous Q2H PRN   0.9%  NaCl infusion  Intravenous Continuous   Zolpidem Tartrate (AMBIEN) tab 5 mg 5 mg Oral Nightly PRN   ondansetron HCl (ZOFRAN) home inhalers  5. Chronic back pain with previous history of kyphoplasty for vertebral compression fracture last month-kyphoplasty T9 and T10 kyphoplasty and T9 &T10 biopsydone on 7.11.2017 by Dr Evita Bains  6. Dementia–continue Aricept  7. Osteoporosis  8.  Hyp

## 2017-08-22 NOTE — PROGRESS NOTES
[facility] Pharmacy Note:  Age Based Dose Adjustment    Caitlibertad Rivera has been prescribed ketorolac (TORADOL) 30 mg IV every 6 hours prn. Patient is >71 years old therefore the dose has been adjusted to 15 mg IV every 6 hours prn.       Thank you,  Radha Max

## 2017-08-22 NOTE — PLAN OF CARE
GASTROINTESTINAL - ADULT    • Minimal or absence of nausea and vomiting Progressing    • Maintains or returns to baseline bowel function Progressing        METABOLIC/FLUID AND ELECTROLYTES - ADULT    • Glucose maintained within prescribed range Progressing See orders. Will implement. Will continue to monitor. 4839: Bolus started. Will continue to monitor.

## 2017-08-22 NOTE — PROGRESS NOTES
[facility]  Progress Note    Henrik Espinoza Patient Status:  Inpatient    7/3/1934 MRN [id number]   [facility] 3NW-A Attending Cruz Jeffries MD   Hosp Day # 4 PCP Katalina Kwok MD     Subjective:  Pt sitting up in chair, reports ab diverticulitis, 2010, with colostomy     Chronic pain following surgery or procedure     Capsular contracture of breast implant     Iron deficiency anemia     Chronic obstructive pulmonary disease ([facility] [address]     Small bowel obstruction (HCC)     PAF (paroxysmal adhesiolysis. Bowel function slow to return. Continue bowel rest, n.p.o. status with sips of ice water and ice chips. Increase activity, ambulate, GI and DVT prophylaxis. Arnav Davis MD FACS  [facility]

## 2017-08-22 NOTE — PROGRESS NOTES
PATIENT HAS IV FLUIDS INFUSING, ON ROOM AIR, DILAUDID PCA IN USE, ON TELE RUNNING SR AT REST/ST WITH ACTIVITY, CARDIZEM PRN FOR TACHYCARDIA, NG TO LIS, UNABLE TO PASS FLATUS, GILES IN PLACE DUE TO LOW URINE OUTPUT-HAS SLIGHTLY INCREASED SINCE RECEIVING A 1

## 2017-08-22 NOTE — PROGRESS NOTES
[facility]  Cardiology Progress Note    Brianna Wiggins Patient Status:  Inpatient    7/3/1934 MRN [id number]   [facility] 3NW-A Attending Quin Thao MD   Hosp Day # 4 PCP Faizan Sellers MD       Subjective: No chest pain, dyspnea results for input(s): TROP in the last 72 hours.     Allergies:   No Known Allergies    Medications:    Current Facility-Administered Medications:  DilTIAZem HCl (CARDIZEM) injection 5 mg 5 mg Intravenous Q1H PRN   DilTIAZem HCl (CARDIZEM) injection 10 mg 1 HCTZ once able to take orals and BP tolerates     Sarah Kim MD  8/22/2017  10:27 AM

## 2017-08-23 LAB
BASOPHILS # BLD AUTO: 0.01 X10(3) UL (ref 0–0.1)
BASOPHILS NFR BLD AUTO: 0.1 %
BUN BLD-MCNC: 16 MG/DL (ref 8–20)
CALCIUM BLD-MCNC: 9.2 MG/DL (ref 8.3–10.3)
CHLORIDE: 113 MMOL/L (ref 101–111)
CO2: 17 MMOL/L (ref 22–32)
CREAT BLD-MCNC: 0.6 MG/DL (ref 0.55–1.02)
EOSINOPHIL # BLD AUTO: 0.03 X10(3) UL (ref 0–0.3)
EOSINOPHIL NFR BLD AUTO: 0.3 %
ERYTHROCYTE [DISTWIDTH] IN BLOOD BY AUTOMATED COUNT: 15.5 % (ref 11.5–16)
GLUCOSE BLD-MCNC: 89 MG/DL (ref 70–99)
HAV IGM SER QL: 2.1 MG/DL (ref 1.7–3)
HCT VFR BLD AUTO: 29.9 % (ref 34–50)
HGB BLD-MCNC: 9.5 G/DL (ref 12–16)
IMMATURE GRANULOCYTE COUNT: 0.03 X10(3) UL (ref 0–1)
IMMATURE GRANULOCYTE RATIO %: 0.3 %
LYMPHOCYTES # BLD AUTO: 0.82 X10(3) UL (ref 0.9–4)
LYMPHOCYTES NFR BLD AUTO: 8.1 %
MCH RBC QN AUTO: 29.7 PG (ref 27–33.2)
MCHC RBC AUTO-ENTMCNC: 31.8 G/DL (ref 31–37)
MCV RBC AUTO: 93.4 FL (ref 81–100)
MONOCYTES # BLD AUTO: 0.54 X10(3) UL (ref 0.1–0.6)
MONOCYTES NFR BLD AUTO: 5.3 %
NEUTROPHIL ABS PRELIM: 8.72 X10 (3) UL (ref 1.3–6.7)
NEUTROPHILS # BLD AUTO: 8.72 X10(3) UL (ref 1.3–6.7)
NEUTROPHILS NFR BLD AUTO: 85.9 %
PLATELET # BLD AUTO: 300 10(3)UL (ref 150–450)
POTASSIUM SERPL-SCNC: 3.7 MMOL/L (ref 3.6–5.1)
RBC # BLD AUTO: 3.2 X10(6)UL (ref 3.8–5.1)
RED CELL DISTRIBUTION WIDTH-SD: 53.5 FL (ref 35.1–46.3)
SODIUM SERPL-SCNC: 144 MMOL/L (ref 136–144)
WBC # BLD AUTO: 10.2 X10(3) UL (ref 4–13)

## 2017-08-23 PROCEDURE — 99232 SBSQ HOSP IP/OBS MODERATE 35: CPT | Performed by: INTERNAL MEDICINE

## 2017-08-23 RX ORDER — POTASSIUM CHLORIDE 14.9 MG/ML
20 INJECTION INTRAVENOUS ONCE
Status: COMPLETED | OUTPATIENT
Start: 2017-08-23 | End: 2017-08-23

## 2017-08-23 RX ORDER — LORAZEPAM 2 MG/ML
0.25 INJECTION INTRAMUSCULAR EVERY 6 HOURS PRN
Status: DISCONTINUED | OUTPATIENT
Start: 2017-08-23 | End: 2017-09-03

## 2017-08-23 NOTE — PROGRESS NOTES
[facility]  Progress Note    Nigel Maldonado Patient Status:  Inpatient    7/3/1934 MRN [id number]   [facility] 3NW-A Attending Billy Valles MD   Hosp Day # 5 PCP Cadence Jay MD     CC:  Abdominal pain    SUBJECTIVE:   C/o expect injection 10 mg 10 mg Intravenous Q6H PRN   DilTIAZem HCl (CARDIZEM) injection 5 mg 5 mg Intravenous Q1H PRN   DilTIAZem HCl (CARDIZEM) injection 10 mg 10 mg Intravenous Q2H PRN   0.9%  NaCl infusion  Intravenous Continuous   Zolpidem Tartrate (AMBIEN) tab pain with previous history of kyphoplasty for vertebral compression fracture last month-kyphoplasty T9 and T10 kyphoplasty and T9 &T10 biopsydone on 7.11.2017 by Dr Carina Tapia  6. Dementia–continue Aricept  7. Osteoporosis  8.  Hypokalemia, mild hypomagnesemia-

## 2017-08-23 NOTE — PROGRESS NOTES
[facility]  Cardiology Progress Note    Latoya Brian Patient Status:  Inpatient    7/3/1934 MRN [id number]   [facility] 3NW-A Attending Paris Moore MD   Hosp Day # 5 PCP Krystyna Vallecillo MD       Subjective: No chest pain, dyspnea Facility-Administered Medications:  hydrALAzine HCl (APRESOLINE) injection 10 mg 10 mg Intravenous Q6H PRN   DilTIAZem HCl (CARDIZEM) injection 5 mg 5 mg Intravenous Q1H PRN   DilTIAZem HCl (CARDIZEM) injection 10 mg 10 mg Intravenous Q2H PRN   0.9%  NaCl

## 2017-08-23 NOTE — DIETARY NOTE
[address] ASSESSMENT    Pt is at moderate nutrition risk. Pt does not meet malnutrition criteria.     NUTRITION DIAGNOSIS/PROBLEM:    Inadequate oral intake related to inability to consume sufficient PO as evidenced by NPO status MELIN  Pager [phone]

## 2017-08-23 NOTE — PROGRESS NOTES
[facility]  Progress Note    Latoya Brian Patient Status:  Inpatient    7/3/1934 MRN [id number]   [facility] 3NW-A Attending Paris Moore MD   Hosp Day # 5 PCP Krystyna Vallecillo MD     Subjective:  Pt sitting up in chair with multip Degenerative arthritis of cervical spine     Hx of small bowel obstruction     Disorders of bursae and tendons in shoulder region, unspecified     Atrial fibrillation ([facility] [address]     History of colon resection, for diverticulitis, 2010, with colostomy     Chroni options. Judene Kayser, PA-C  8/23/2017  11:50 AM    ADDENDUM:    Pt seen. I agree wiht the above assessment and plan.

## 2017-08-24 ENCOUNTER — APPOINTMENT (OUTPATIENT)
Dept: GENERAL RADIOLOGY | Facility: HOSPITAL | Age: 82
DRG: 336 | End: 2017-08-24
Attending: HOSPITALIST
Payer: MEDICARE

## 2017-08-24 LAB
BASOPHILS # BLD AUTO: 0.02 X10(3) UL (ref 0–0.1)
BASOPHILS NFR BLD AUTO: 0.2 %
BUN BLD-MCNC: 14 MG/DL (ref 8–20)
CALCIUM BLD-MCNC: 9 MG/DL (ref 8.3–10.3)
CHLORIDE: 112 MMOL/L (ref 101–111)
CO2: 18 MMOL/L (ref 22–32)
CREAT BLD-MCNC: 0.45 MG/DL (ref 0.55–1.02)
EOSINOPHIL # BLD AUTO: 0.05 X10(3) UL (ref 0–0.3)
EOSINOPHIL NFR BLD AUTO: 0.6 %
ERYTHROCYTE [DISTWIDTH] IN BLOOD BY AUTOMATED COUNT: 15.5 % (ref 11.5–16)
GLUCOSE BLD-MCNC: 90 MG/DL (ref 70–99)
HAV IGM SER QL: 1.8 MG/DL (ref 1.7–3)
HCT VFR BLD AUTO: 28.9 % (ref 34–50)
HGB BLD-MCNC: 9.4 G/DL (ref 12–16)
IMMATURE GRANULOCYTE COUNT: 0.03 X10(3) UL (ref 0–1)
IMMATURE GRANULOCYTE RATIO %: 0.4 %
LYMPHOCYTES # BLD AUTO: 0.48 X10(3) UL (ref 0.9–4)
LYMPHOCYTES NFR BLD AUTO: 5.8 %
MCH RBC QN AUTO: 29.7 PG (ref 27–33.2)
MCHC RBC AUTO-ENTMCNC: 32.5 G/DL (ref 31–37)
MCV RBC AUTO: 91.5 FL (ref 81–100)
MONOCYTES # BLD AUTO: 0.46 X10(3) UL (ref 0.1–0.6)
MONOCYTES NFR BLD AUTO: 5.6 %
NEUTROPHIL ABS PRELIM: 7.17 X10 (3) UL (ref 1.3–6.7)
NEUTROPHILS # BLD AUTO: 7.17 X10(3) UL (ref 1.3–6.7)
NEUTROPHILS NFR BLD AUTO: 87.4 %
PLATELET # BLD AUTO: 275 10(3)UL (ref 150–450)
POTASSIUM SERPL-SCNC: 3.5 MMOL/L (ref 3.6–5.1)
POTASSIUM SERPL-SCNC: 3.5 MMOL/L (ref 3.6–5.1)
RBC # BLD AUTO: 3.16 X10(6)UL (ref 3.8–5.1)
RED CELL DISTRIBUTION WIDTH-SD: 51.8 FL (ref 35.1–46.3)
SODIUM SERPL-SCNC: 143 MMOL/L (ref 136–144)
WBC # BLD AUTO: 8.2 X10(3) UL (ref 4–13)

## 2017-08-24 PROCEDURE — 99232 SBSQ HOSP IP/OBS MODERATE 35: CPT | Performed by: INTERNAL MEDICINE

## 2017-08-24 PROCEDURE — 71010 XR CHEST AP PORTABLE  (CPT=71010): CPT | Performed by: HOSPITALIST

## 2017-08-24 RX ORDER — BISACODYL 10 MG
10 SUPPOSITORY, RECTAL RECTAL ONCE
Status: COMPLETED | OUTPATIENT
Start: 2017-08-24 | End: 2017-08-24

## 2017-08-24 RX ORDER — FUROSEMIDE 10 MG/ML
20 INJECTION INTRAMUSCULAR; INTRAVENOUS ONCE
Status: COMPLETED | OUTPATIENT
Start: 2017-08-24 | End: 2017-08-24

## 2017-08-24 RX ORDER — BISACODYL 10 MG
10 SUPPOSITORY, RECTAL RECTAL
Status: DISCONTINUED | OUTPATIENT
Start: 2017-08-24 | End: 2017-08-24

## 2017-08-24 NOTE — PROGRESS NOTES
2005: Patient noted to have adequate urine output. Dr. Elena Cline paged regarding discontinuing castañeda. Will await return call. Will continue to monitor. 2007: Spoke with Dr. Elena Cline, [facility] to discontinue castañeda. See orders.  Will implement

## 2017-08-24 NOTE — PROGRESS NOTES
[facility]  Progress Note    Latoya Brian Patient Status:  Inpatient    7/3/1934 MRN [id number]   [facility] 3NW-A Attending Paris Moore MD   Hosp Day # 6 PCP Krystyna Vallecillo MD     CC:  Abdominal pain    SUBJECTIVE:   C/o expect 143 08/24/2017   K 3.5 08/24/2017   K 3.5 08/24/2017    08/24/2017   CO2 18.0 08/24/2017   GLU 90 08/24/2017   CA 9.0 08/24/2017   MG 1.8 08/24/2017         Meds:     Current Facility-Administered Medications:  LORazepam (ATIVAN) injection 0.25 mg 0. medication for now as patient n.p.o. Will give IV Cardizem as needed systolic blood pressure more than 155 after pain control on pulse more than 110 after pain control. Sees [facility] dr Felecia Williamson as OP. replace electrolytes prn  3.  Hyperlipidemia-we will hold stati

## 2017-08-24 NOTE — PLAN OF CARE
GASTROINTESTINAL - ADULT    • Minimal or absence of nausea and vomiting Progressing    • Maintains or returns to baseline bowel function Progressing        METABOLIC/FLUID AND ELECTROLYTES - ADULT    • Glucose maintained within prescribed range Progressing orders. Will continue to monitor. 7609: SVT for 5 seconds, heart rate elevated to 170's. Medication not given per parameters. Patient asymptomatic. Will continue to monitor.

## 2017-08-24 NOTE — PROGRESS NOTES
[facility]  Progress Note    Henrik Espinoza Patient Status:  Inpatient    7/3/1934 MRN [id number]   [facility] 3NW-A Attending Cruz Jeffries MD   Hosp Day # 6 PCP Katalina Kwok MD     Subjective:  Patient states pain is improved, p intake/output data recorded.     Assessment  Patient Active Problem List:     Spinal stenosis, lumbar region, without neurogenic claudication     Bursitis, hip     HTN (hypertension)     Pure hypercholesterolemia     History of recurrent UTI (urinary tract primary/cardiology - appreciate assistance   5. Cont current pain management  6. Ambulate, DVT prophylaxis  7. Benadryl prn itching    My total face time with this patient was 24 minutes.   Greater than half of our visit was spent in counseling the patient

## 2017-08-24 NOTE — PROGRESS NOTES
[facility]  Cardiology Progress Note    Latoya Brian Patient Status:  Inpatient    7/3/1934 MRN [id number]   [facility] 3NW-A Attending Paris Moore MD   [facility] Day # 6 PCP Krystyna Vallecillo MD       Subjective: No chest pain, dyspnea results for input(s): ALT, AST, ALB, AMYLASE, LIPASE, LDH in the last 72 hours. Invalid input(s): ALPHOS, TBIL, DBIL, TPROT    No results for input(s): TROP in the last 72 hours.     Allergies:   No Known Allergies    Medications:    Current Facility-Adm carotid artery stenosis  · Chronic obstructive pulmonary disease     Recommendations:  -tele monitoring  -IV diltiazem if HR persists in SVT or PAF  -resume aspirin when OK by surgical service  -resume statin once able to take orals  -IV hydralazine for el

## 2017-08-25 LAB
ALBUMIN SERPL-MCNC: 2.7 G/DL (ref 3.5–4.8)
ALP LIVER SERPL-CCNC: 68 U/L (ref 55–142)
ALT SERPL-CCNC: 20 U/L (ref 14–54)
AST SERPL-CCNC: 19 U/L (ref 15–41)
BILIRUB SERPL-MCNC: 1.2 MG/DL (ref 0.1–2)
BUN BLD-MCNC: 13 MG/DL (ref 8–20)
CALCIUM BLD-MCNC: 9.4 MG/DL (ref 8.3–10.3)
CHLORIDE: 120 MMOL/L (ref 101–111)
CO2: 13 MMOL/L (ref 22–32)
CREAT BLD-MCNC: 0.39 MG/DL (ref 0.55–1.02)
GLUCOSE BLD-MCNC: 90 MG/DL (ref 70–99)
GLUCOSE BLD-MCNC: 92 MG/DL (ref 65–99)
GLUCOSE BLD-MCNC: 96 MG/DL (ref 65–99)
HAV IGM SER QL: 2.1 MG/DL (ref 1.7–3)
M PROTEIN MFR SERPL ELPH: 5.3 G/DL (ref 6.1–8.3)
PHOSPHATE SERPL-MCNC: 2.9 MG/DL (ref 2.5–4.9)
POTASSIUM SERPL-SCNC: 3.8 MMOL/L (ref 3.6–5.1)
POTASSIUM SERPL-SCNC: 3.9 MMOL/L (ref 3.6–5.1)
SODIUM SERPL-SCNC: 147 MMOL/L (ref 136–144)

## 2017-08-25 PROCEDURE — 3E0436Z INTRODUCTION OF NUTRITIONAL SUBSTANCE INTO CENTRAL VEIN, PERCUTANEOUS APPROACH: ICD-10-PCS | Performed by: SURGERY

## 2017-08-25 PROCEDURE — 99232 SBSQ HOSP IP/OBS MODERATE 35: CPT | Performed by: HOSPITALIST

## 2017-08-25 PROCEDURE — 02HV33Z INSERTION OF INFUSION DEVICE INTO SUPERIOR VENA CAVA, PERCUTANEOUS APPROACH: ICD-10-PCS | Performed by: SURGERY

## 2017-08-25 RX ORDER — SODIUM CHLORIDE 0.9 % (FLUSH) 0.9 %
10 SYRINGE (ML) INJECTION AS NEEDED
Status: DISCONTINUED | OUTPATIENT
Start: 2017-08-25 | End: 2017-09-03

## 2017-08-25 RX ORDER — METOCLOPRAMIDE 10 MG/1
10 TABLET ORAL EVERY 6 HOURS PRN
Status: DISCONTINUED | OUTPATIENT
Start: 2017-08-25 | End: 2017-09-03

## 2017-08-25 RX ORDER — POTASSIUM CHLORIDE 14.9 MG/ML
20 INJECTION INTRAVENOUS ONCE
Status: COMPLETED | OUTPATIENT
Start: 2017-08-25 | End: 2017-08-25

## 2017-08-25 RX ORDER — METOCLOPRAMIDE HYDROCHLORIDE 5 MG/ML
10 INJECTION INTRAMUSCULAR; INTRAVENOUS EVERY 6 HOURS PRN
Status: DISCONTINUED | OUTPATIENT
Start: 2017-08-25 | End: 2017-09-03

## 2017-08-25 NOTE — DIETARY NOTE
Parenteral Nutrition Short  Consult for TPN, PICC to be placed today, recommend goal TPN to provide; 1440 NPC, 1700 Total kcals, 930 Dextrose Kcals, 65 grams protein and 510 lipid kcals (30% kcal from fat).  Day 1 TPN ordered at reduce rate: 40 grams protei

## 2017-08-25 NOTE — PROGRESS NOTES
[facility] [address] [facility]  Progress Note    Neeta Roles Patient Status:  Inpatient    7/3/1934 MRN [id number]   [facility] 3NW-A Attending Ebonie Castellanos MD   Hosp Day # 7 PCP Meta Opitz, MD 08/25/2017   CO2 13.0 08/25/2017   GLU 90 08/25/2017   CA 9.4 08/25/2017   ALB 2.7 08/25/2017   ALKPHO 68 08/25/2017   BILT 1.2 08/25/2017   TP 5.3 08/25/2017   AST 19 08/25/2017   ALT 20 08/25/2017   MG 2.1 08/25/2017   PHOS 2.9 08/25/2017       Medicatio (hypertension)     Pure hypercholesterolemia     History of recurrent UTI (urinary tract infection)     Osteoarthritis of shoulder     Osteopenia     Cervicalgia     Degenerative arthritis of cervical spine     Hx of small bowel obstruction     Disorders o

## 2017-08-25 NOTE — PROGRESS NOTES
[facility]  Progress Note    Edward End Patient Status:  Inpatient    7/3/1934 MRN [id number]   [facility] 3NW-A Attending Theresa Mcginnis MD   Hosp Day # 7 PCP Josselin Singletary MD     Subjective:  Patient complaining of nausea this fibrillation ([facility] [address]     History of colon resection, for diverticulitis, 2010, with colostomy     Chronic pain following surgery or procedure     Capsular contracture of breast implant     Iron deficiency anemia     Chronic obstructive pulmonary disease ([facility] [address] assessment and plan. I have I have edited the above to reflect my evaluation, opinion, and physical exam findings. The patient states she is comfortable with adequate pain control.   The patient had a small bowel movement and has passed only a very smal

## 2017-08-25 NOTE — PLAN OF CARE
GASTROINTESTINAL - ADULT    • Minimal or absence of nausea and vomiting Not Progressing    • Maintains or returns to baseline bowel function Not Progressing          Patient has not passed gas or had a BM today. NG TO LO. +gum & hard candy.  Reglan or Zof

## 2017-08-26 LAB
ALBUMIN SERPL-MCNC: 2.3 G/DL (ref 3.5–4.8)
ALP LIVER SERPL-CCNC: 59 U/L (ref 55–142)
ALT SERPL-CCNC: 17 U/L (ref 14–54)
AST SERPL-CCNC: 13 U/L (ref 15–41)
BILIRUB SERPL-MCNC: 0.7 MG/DL (ref 0.1–2)
BUN BLD-MCNC: 11 MG/DL (ref 8–20)
CALCIUM BLD-MCNC: 9.4 MG/DL (ref 8.3–10.3)
CHLORIDE: 117 MMOL/L (ref 101–111)
CO2: 24 MMOL/L (ref 22–32)
CREAT BLD-MCNC: 0.37 MG/DL (ref 0.55–1.02)
GLUCOSE BLD-MCNC: 116 MG/DL (ref 65–99)
GLUCOSE BLD-MCNC: 118 MG/DL (ref 65–99)
GLUCOSE BLD-MCNC: 126 MG/DL (ref 65–99)
GLUCOSE BLD-MCNC: 128 MG/DL (ref 70–99)
GLUCOSE BLD-MCNC: 135 MG/DL (ref 65–99)
GLUCOSE BLD-MCNC: 137 MG/DL (ref 65–99)
HAV IGM SER QL: 1.9 MG/DL (ref 1.7–3)
M PROTEIN MFR SERPL ELPH: 5 G/DL (ref 6.1–8.3)
PHOSPHATE SERPL-MCNC: 2.9 MG/DL (ref 2.5–4.9)
POTASSIUM SERPL-SCNC: 3.6 MMOL/L (ref 3.6–5.1)
POTASSIUM SERPL-SCNC: 3.6 MMOL/L (ref 3.6–5.1)
SODIUM SERPL-SCNC: 148 MMOL/L (ref 136–144)
TRIGLYCERIDES: 92 MG/DL (ref ?–150)

## 2017-08-26 PROCEDURE — 99232 SBSQ HOSP IP/OBS MODERATE 35: CPT | Performed by: HOSPITALIST

## 2017-08-26 RX ORDER — SODIUM CHLORIDE 9 MG/ML
INJECTION, SOLUTION INTRAVENOUS CONTINUOUS
Status: DISCONTINUED | OUTPATIENT
Start: 2017-08-26 | End: 2017-08-29

## 2017-08-26 RX ORDER — POTASSIUM CHLORIDE 29.8 MG/ML
40 INJECTION INTRAVENOUS ONCE
Status: COMPLETED | OUTPATIENT
Start: 2017-08-26 | End: 2017-08-26

## 2017-08-26 NOTE — PROGRESS NOTES
[facility]  Progress Note    Megan Valles Patient Status:  Inpatient    7/3/1934 MRN [id number]   [facility] 3NW-A Attending Henrique Taylor MD   Hosp Day # 8 PCP John Thomas MD     Subjective: The patient is comfortable.   The stenosis, lumbar region, without neurogenic claudication     Bursitis, hip     HTN (hypertension)     Pure hypercholesterolemia     History of recurrent UTI (urinary tract infection)     Osteoarthritis of shoulder     Osteopenia     Cervicalgia     Degener detail. · The patient voiced understanding of the care plan. Paul Gunter.  MD Susan FACS  [facility]  8/26/2017  11:11 AM

## 2017-08-26 NOTE — DIETARY NOTE
Parenteral Nutrition Short  Day 2 TPN ordered at: 55 grams protein, 700 dextrose kcal, and 400 lipid kcal to provide 75% of est needs in 1800 ml.  Recommend goal TPN to provide; 1440 NPC, 1700 Total kcals, 930 Dextrose Kcals, 65 grams protein and 510 lipid

## 2017-08-26 NOTE — PROGRESS NOTES
[facility]  Progress Note    Zoe Apple Patient Status:  Inpatient    7/3/1934 MRN [id number]   [facility] 3NW-A Attending Milvia Baldwin MD   Hosp Day # 8 PCP Lord Mela MD     CC:  Abdominal pain    SUBJECTIVE:  No new comp 08/26/2017   PGLU 135 08/26/2017         Meds:     Current Facility-Administered Medications:  potassium chloride IVPB premix 40 mEq 40 mEq Intravenous Once   adult 3 in 1 tpn  Intravenous Continuous TPN   0.9%  NaCl infusion  Intravenous Continuous   Meto 1. Small bowel obstruction–s/p Exploratory Laparotomy, Extensive Lysis of Adhesions by surgery dr Minnie Sy 8/20/2017 ; n.p.o., TPN, IV pain medications as needed, IV Zofran as needed nausea vomiting, surgery on consult, NG tube to LIS, ok to chew gum

## 2017-08-27 LAB
ALBUMIN SERPL-MCNC: 2.2 G/DL (ref 3.5–4.8)
ALP LIVER SERPL-CCNC: 58 U/L (ref 55–142)
ALT SERPL-CCNC: 15 U/L (ref 14–54)
AST SERPL-CCNC: 10 U/L (ref 15–41)
BILIRUB SERPL-MCNC: 0.6 MG/DL (ref 0.1–2)
BUN BLD-MCNC: 11 MG/DL (ref 8–20)
CALCIUM BLD-MCNC: 9 MG/DL (ref 8.3–10.3)
CHLORIDE: 109 MMOL/L (ref 101–111)
CO2: 26 MMOL/L (ref 22–32)
CREAT BLD-MCNC: 0.4 MG/DL (ref 0.55–1.02)
GLUCOSE BLD-MCNC: 116 MG/DL (ref 70–99)
GLUCOSE BLD-MCNC: 120 MG/DL (ref 65–99)
GLUCOSE BLD-MCNC: 126 MG/DL (ref 65–99)
GLUCOSE BLD-MCNC: 127 MG/DL (ref 65–99)
GLUCOSE BLD-MCNC: 129 MG/DL (ref 65–99)
HAV IGM SER QL: 2.1 MG/DL (ref 1.7–3)
M PROTEIN MFR SERPL ELPH: 4.8 G/DL (ref 6.1–8.3)
PHOSPHATE SERPL-MCNC: 3.5 MG/DL (ref 2.5–4.9)
POTASSIUM SERPL-SCNC: 3.5 MMOL/L (ref 3.6–5.1)
SODIUM SERPL-SCNC: 141 MMOL/L (ref 136–144)

## 2017-08-27 PROCEDURE — 99232 SBSQ HOSP IP/OBS MODERATE 35: CPT | Performed by: HOSPITALIST

## 2017-08-27 RX ORDER — DONEPEZIL HYDROCHLORIDE 5 MG/1
5 TABLET, FILM COATED ORAL NIGHTLY
Status: DISCONTINUED | OUTPATIENT
Start: 2017-08-27 | End: 2017-09-03

## 2017-08-27 NOTE — PLAN OF CARE
GASTROINTESTINAL - ADULT    • Minimal or absence of nausea and vomiting Progressing    • Maintains or returns to baseline bowel function Progressing          METABOLIC/FLUID AND ELECTROLYTES - ADULT    • Glucose maintained within prescribed range Progressi

## 2017-08-27 NOTE — DIETARY NOTE
Parenteral Nutrition Short  TPN ordered at goal-- to provide; 1440 NPC, 1700 Total kcals, 930 Dextrose Kcals, 65 grams protein and 510 lipid kcals (30% kcal from fat). RD to follow.   Linnea Rivers RD, LDN  Pager [phone]

## 2017-08-27 NOTE — PROGRESS NOTES
[facility]  Progress Note    Michael Joseph Patient Status:  Inpatient    7/3/1934 MRN [id number]   [facility] 3NW-A Attending Kenzie Escobar MD   [facility] Day # 9 PCP Darrel Guerra MD     Subjective:  Feels good. Mild pain.   No nause unspecified     Atrial fibrillation ([facility] [address]     History of colon resection, for diverticulitis, 2010, with colostomy     Chronic pain following surgery or procedure     Capsular contracture of breast implant     Iron deficiency anemia     Chronic obstructive

## 2017-08-27 NOTE — PROGRESS NOTES
PATIENT HAS TPN INFUSING THROUGH PICC LINE, ON ROOM AIR, DILAUDID PCA STOPPED FOR TIME BEING AND ATTEMPTING TO TRANSITION TO ORAL PERCOCET, ADVANCED TO A FULL LIQUID DIET AND IS TOLERATING WELL, ON TELE RUNNING SR/ST WITH OCCASIONAL BURSTS OF SVT, CARDIZEM

## 2017-08-27 NOTE — PROGRESS NOTES
[facility]  Progress Note    Ilene Hauser Patient Status:  Inpatient    7/3/1934 MRN [id number]   [facility] 3NW-A Attending Gregoria Maher MD   [facility] Day # 5 PCP Nai Amado MD     CC:  Abdominal pain    SUBJECTIVE:  Tolerating 08/27/2017         Meds:     Current Facility-Administered Medications:  adult 3 in 1 tpn  Intravenous Continuous TPN   adult 3 in 1 tpn  Intravenous Continuous TPN   0.9%  NaCl infusion  Intravenous Continuous   Metoclopramide HCl (REGLAN) tab 10 mg 10 mg Adhesions by surgery dr Kat Part 8/20/2017 ; expected post op ileus  1. Tolerating full liquid diet  2. TPN, IV  2. Hyperlipidemia  3. Essential hypertension  4. COPD-continue home inhalers  5.  Chronic back pain with previous history of kyphoplasty for alexys

## 2017-08-28 LAB
ALBUMIN SERPL-MCNC: 2.1 G/DL (ref 3.5–4.8)
ALP LIVER SERPL-CCNC: 60 U/L (ref 55–142)
ALT SERPL-CCNC: 16 U/L (ref 14–54)
AST SERPL-CCNC: 10 U/L (ref 15–41)
BILIRUB SERPL-MCNC: 0.5 MG/DL (ref 0.1–2)
BUN BLD-MCNC: 16 MG/DL (ref 8–20)
CALCIUM BLD-MCNC: 8.4 MG/DL (ref 8.3–10.3)
CHLORIDE: 107 MMOL/L (ref 101–111)
CO2: 28 MMOL/L (ref 22–32)
CREAT BLD-MCNC: 0.55 MG/DL (ref 0.55–1.02)
GLUCOSE BLD-MCNC: 115 MG/DL (ref 65–99)
GLUCOSE BLD-MCNC: 125 MG/DL (ref 70–99)
GLUCOSE BLD-MCNC: 132 MG/DL (ref 65–99)
GLUCOSE BLD-MCNC: 137 MG/DL (ref 65–99)
GLUCOSE BLD-MCNC: 142 MG/DL (ref 65–99)
GLUCOSE BLD-MCNC: 144 MG/DL (ref 65–99)
HAV IGM SER QL: 2.5 MG/DL (ref 1.7–3)
M PROTEIN MFR SERPL ELPH: 4.6 G/DL (ref 6.1–8.3)
PHOSPHATE SERPL-MCNC: 4.4 MG/DL (ref 2.5–4.9)
POTASSIUM SERPL-SCNC: 4.3 MMOL/L (ref 3.6–5.1)
SODIUM SERPL-SCNC: 139 MMOL/L (ref 136–144)

## 2017-08-28 PROCEDURE — 99232 SBSQ HOSP IP/OBS MODERATE 35: CPT | Performed by: HOSPITALIST

## 2017-08-28 NOTE — PROGRESS NOTES
[facility]  Progress Note    Milagro Khan Patient Status:  Inpatient    7/3/1934 MRN [id number]   [facility] 3NW-A Attending Astrid Pompa MD   Hosp Day # 10 PCP Megan Hernandez MD     Subjective:  Pt resting in bed, continues to Degenerative arthritis of cervical spine     Hx of small bowel obstruction     Disorders of bursae and tendons in shoulder region, unspecified     Atrial fibrillation ([facility] [address]     History of colon resection, for diverticulitis, 2010, with colostomy     Chron than from her bowel obstructions. The patient has diminished amount of flatus. No other concerns now. On exam the patient is comfortable in no acute distress. The abdomen is soft with mild distention. Minimal incisional tenderness.   No rebound guard

## 2017-08-28 NOTE — PLAN OF CARE
GASTROINTESTINAL - ADULT    • Minimal or absence of nausea and vomiting Not Progressing    • Maintains or returns to baseline bowel function Not Progressing          METABOLIC/FLUID AND ELECTROLYTES - ADULT    • Glucose maintained within prescribed range P

## 2017-08-28 NOTE — PROGRESS NOTES
[facility]  Progress Note    Daniel Hernandez Patient Status:  Inpatient    7/3/1934 MRN [id number]   [facility] 3NW-A Attending Edinson Crow MD   Hosp Day # 10 PCP Trini Edwards MD     CC:  Abdominal pain    SUBJECTIVE:  Diet downg 4.4 08/28/2017   PGLU 137 08/28/2017         Meds:     Current Facility-Administered Medications:  adult 3 in 1 tpn  Intravenous Continuous TPN   diltiazem (CARDIZEM CD) 24 hr cap 300 mg 300 mg Oral Daily   Donepezil HCl (ARICEPT) tab 5 mg 5 mg Oral Nightl PLAN:     1. Small bowel obstruction–s/p Exploratory Laparotomy, Extensive Lysis of Adhesions by surgery dr Kaylin Welsh 8/20/2017 ; expected post op ileus  1. Diet per Sx  2. TPN  2. Hyperlipidemia  3. Essential hypertension  4.  COPD-continue home inhalers  5

## 2017-08-28 NOTE — PROGRESS NOTES
Patient reporting nausea and abdominal cramping after full liquid dinner tonight. No vomiting. IV zofran given for nausea. Patient first believed it may be gas pains. Patient reported less abdominal pain when walking in the halls.  IV dilaudid pca restarted

## 2017-08-28 NOTE — PROGRESS NOTES
[facility]  Cardiology Progress Note    Wei Stringer Patient Status:  Inpatient    7/3/1934 MRN [id number]   [facility] 3NW-A Attending Brad Flores MD   Hosp Day # 10 PCP León Barahona MD       Subjective: No chest pain, dyspne 141  139   K  3.6  3.6  3.5*  4.3   CL  117*  109  107   CO2  24.0  26.0  28.0   BUN  11  11  16   CREATSERUM  0.37*  0.40*  0.55   CA  9.4  9.0  8.4   MG  1.9  2.1  2.5   PHOS  2.9  3.5  4.4   GLU  128*  116*  125*       Recent Labs   Lab  08/26/17   051 Intravenous Q4H PRN   Heparin Sodium (Porcine) 5000 UNIT/ML injection 5,000 Units 5,000 Units Subcutaneous 2 times per day   oxyCODONE-acetaminophen (PERCOCET) 5-325 MG per tab 1 tablet 1 tablet Oral Q4H PRN   Or      oxyCODONE-acetaminophen (PERCOCET) 5-3

## 2017-08-28 NOTE — DIETARY NOTE
[address]    Pt is at moderate nutrition risk. Pt does not meet malnutrition criteria.     NUTRITION DIAGNOSIS/PROBLEM:    Inadequate oral intake related to inability to consume sufficient PO as evidenced by NPO status PRESCRIPTION: Per IBW  Calories: 6606-4749 calories/day (28-32 calories per kg)  Protein: 54-70 grams protein/day (1.0-1.3 grams protein per kg)  Fluid: ~1 ml/kcal or per MD discretion    MONITOR AND EVALUATE/NUTRITION GOALS:    1. PO intake to meet at [place] [address]

## 2017-08-29 LAB
ALBUMIN SERPL-MCNC: 2.2 G/DL (ref 3.5–4.8)
ALP LIVER SERPL-CCNC: 68 U/L (ref 55–142)
ALT SERPL-CCNC: 20 U/L (ref 14–54)
AST SERPL-CCNC: 11 U/L (ref 15–41)
BILIRUB SERPL-MCNC: 0.6 MG/DL (ref 0.1–2)
BUN BLD-MCNC: 17 MG/DL (ref 8–20)
CALCIUM BLD-MCNC: 8 MG/DL (ref 8.3–10.3)
CHLORIDE: 104 MMOL/L (ref 101–111)
CO2: 28 MMOL/L (ref 22–32)
CREAT BLD-MCNC: 0.54 MG/DL (ref 0.55–1.02)
GLUCOSE BLD-MCNC: 104 MG/DL (ref 70–99)
GLUCOSE BLD-MCNC: 120 MG/DL (ref 65–99)
GLUCOSE BLD-MCNC: 132 MG/DL (ref 65–99)
GLUCOSE BLD-MCNC: 146 MG/DL (ref 65–99)
HAV IGM SER QL: 2.3 MG/DL (ref 1.7–3)
M PROTEIN MFR SERPL ELPH: 4.8 G/DL (ref 6.1–8.3)
PHOSPHATE SERPL-MCNC: 2.8 MG/DL (ref 2.5–4.9)
POTASSIUM SERPL-SCNC: 4.6 MMOL/L (ref 3.6–5.1)
SODIUM SERPL-SCNC: 135 MMOL/L (ref 136–144)

## 2017-08-29 PROCEDURE — 99232 SBSQ HOSP IP/OBS MODERATE 35: CPT | Performed by: HOSPITALIST

## 2017-08-29 RX ORDER — KETOROLAC TROMETHAMINE 15 MG/ML
15 INJECTION, SOLUTION INTRAMUSCULAR; INTRAVENOUS EVERY 6 HOURS PRN
Status: DISPENSED | OUTPATIENT
Start: 2017-08-29 | End: 2017-08-31

## 2017-08-29 NOTE — PROGRESS NOTES
[facility]  Cardiology Progress Note    Subjective:  No chest pain or shortness of breath. Feels ok. Multiple family members at bedside. Tells me her PCA pump is going to be stopped. Reviewed need for frequent ambulation.     Objective:  /64 MD

## 2017-08-29 NOTE — PLAN OF CARE
PT A/O, 97% ON RA, SR ON TELE, REFUSING SCDS, VOIDING IN BATHROOM, INCISION TO ABDOMEN NASIR, D/I, USED PCA ONCE IN LAST 8 HOURS, C/O OF NAUSEA, GIVEN REGLAN/ZOFRAN, STATES SHE PASSED LITTLE FLATUS THIS AM. ACCUCHECKS, BLOOD DRAWN FROM PICC LINE.   Jamee Rivas

## 2017-08-29 NOTE — DIETARY NOTE
Parenteral Nutrition Short  TPN continues at goal-- will provide; 1440 NPC, 1700 Total kcals, 930 Dextrose Kcals, 65 grams protein and 510 lipid kcals (30% kcal from fat). RD to follow.   Jm Lr RD, LDN  Pager [phone]

## 2017-08-29 NOTE — PROGRESS NOTES
[facility]  Progress Note    Megan Valles Patient Status:  Inpatient    7/3/1934 MRN [id number]   [facility] 3NW-A Attending Henrique Taylor MD   [facility] Day # 11 PCP John Thomas MD     Subjective:  Events reviewed.     Patient with hypercholesterolemia     History of recurrent UTI (urinary tract infection)     Osteoarthritis of shoulder     Osteopenia     Cervicalgia     Degenerative arthritis of cervical spine     Hx of small bowel obstruction     Disorders of bursae and tendons in General Surgery  8/29/2017  8:44 AM

## 2017-08-29 NOTE — PROGRESS NOTES
[facility]  Progress Note    Megan Valles Patient Status:  Inpatient    7/3/1934 MRN [id number]   [facility] 3NW-A Attending Norma March MD   [address] Day # 6 PCP John Thomas MD     CC:  Abdominal pain    SUBJECTIVE: passing gas Medications:  Ketorolac Tromethamine (TORADOL) injection 15 mg 15 mg Intravenous Q6H PRN   adult 3 in 1 tpn  Intravenous Continuous TPN   diltiazem (CARDIZEM CD) 24 hr cap 300 mg 300 mg Oral Daily   Donepezil HCl (ARICEPT) tab 5 mg 5 mg Oral Nightly   0.9% 1. Small bowel obstruction–s/p Exploratory Laparotomy, Extensive Lysis of Adhesions by surgery dr Essie Branch 8/20/2017 ; expected post op ileus  1. Diet per Sx- being slowly advanced  2. TPN  2. Hyperlipidemia  3. Essential hypertension  4.  COPD-continue

## 2017-08-29 NOTE — PLAN OF CARE
Minimal or absence of nausea and vomiting Progressing      Electrolytes maintained within normal limits Progressing      Verbalizes/displays adequate comfort level or patient's stated pain goal Progressing      Incision(s), wounds(s) or drain site(s) heali

## 2017-08-30 ENCOUNTER — APPOINTMENT (OUTPATIENT)
Dept: GENERAL RADIOLOGY | Facility: HOSPITAL | Age: 82
DRG: 336 | End: 2017-08-30
Attending: PHYSICIAN ASSISTANT
Payer: MEDICARE

## 2017-08-30 LAB
ALBUMIN SERPL-MCNC: 2.7 G/DL (ref 3.5–4.8)
ALP LIVER SERPL-CCNC: 86 U/L (ref 55–142)
ALT SERPL-CCNC: 21 U/L (ref 14–54)
AST SERPL-CCNC: 16 U/L (ref 15–41)
BILIRUB SERPL-MCNC: 0.7 MG/DL (ref 0.1–2)
BUN BLD-MCNC: 18 MG/DL (ref 8–20)
CALCIUM BLD-MCNC: 8.8 MG/DL (ref 8.3–10.3)
CHLORIDE: 103 MMOL/L (ref 101–111)
CO2: 27 MMOL/L (ref 22–32)
CREAT BLD-MCNC: 0.65 MG/DL (ref 0.55–1.02)
GLUCOSE BLD-MCNC: 106 MG/DL (ref 70–99)
GLUCOSE BLD-MCNC: 109 MG/DL (ref 65–99)
GLUCOSE BLD-MCNC: 118 MG/DL (ref 65–99)
GLUCOSE BLD-MCNC: 120 MG/DL (ref 65–99)
GLUCOSE BLD-MCNC: 141 MG/DL (ref 65–99)
HAV IGM SER QL: 2.6 MG/DL (ref 1.7–3)
M PROTEIN MFR SERPL ELPH: 5.7 G/DL (ref 6.1–8.3)
PHOSPHATE SERPL-MCNC: 2.7 MG/DL (ref 2.5–4.9)
POTASSIUM SERPL-SCNC: 4.2 MMOL/L (ref 3.6–5.1)
SODIUM SERPL-SCNC: 137 MMOL/L (ref 136–144)

## 2017-08-30 PROCEDURE — 02HV33Z INSERTION OF INFUSION DEVICE INTO SUPERIOR VENA CAVA, PERCUTANEOUS APPROACH: ICD-10-PCS | Performed by: SURGERY

## 2017-08-30 PROCEDURE — 99232 SBSQ HOSP IP/OBS MODERATE 35: CPT | Performed by: HOSPITALIST

## 2017-08-30 PROCEDURE — 71010 XR CHEST AP PORTABLE  (CPT=71010): CPT | Performed by: PHYSICIAN ASSISTANT

## 2017-08-30 RX ORDER — DEXTROSE MONOHYDRATE 100 MG/ML
INJECTION, SOLUTION INTRAVENOUS CONTINUOUS
Status: DISCONTINUED | OUTPATIENT
Start: 2017-08-30 | End: 2017-09-02

## 2017-08-30 RX ORDER — PROCHLORPERAZINE MALEATE 10 MG
5 TABLET ORAL EVERY 6 HOURS PRN
Status: DISCONTINUED | OUTPATIENT
Start: 2017-08-30 | End: 2017-08-30

## 2017-08-30 NOTE — CM/SW NOTE
MSW spoke to patient in room, she declines need to look into [facility] at this time. She still is planning to return home with [facility] at d/c.

## 2017-08-30 NOTE — PLAN OF CARE
Minimal or absence of nausea and vomiting Progressing      Maintains or returns to baseline bowel function Progressing      Verbalizes/displays adequate comfort level or patient's stated pain goal Progressing      Incision(s), wounds(s) or drain site(s) he

## 2017-08-30 NOTE — PROGRESS NOTES
[facility]  Progress Note     Sonny Butler Patient Status:  Inpatient    7/3/1934 MRN [id number]   [facility] 3NW-A Attending Linda Plaza MD   Hosp Day # 15 PCP Jennifer Zhang MD     Chief Complaint: nausea vomiting    S: Patie times per day   • Heparin Sodium (Porcine)  5,000 Units Subcutaneous 2 times per day   • umeclidinium-vilanterol  1 puff Inhalation Daily       ASSESSMENT / PLAN:     1.  Small bowel obstruction–s/p Exploratory Laparotomy, Extensive Lysis of Adhesions by celestin

## 2017-08-30 NOTE — PROGRESS NOTES
[address] Cardiology Progress Note    Curry Castro Patient Status:  Inpatient    7/3/1934 MRN [id number]   [facility] 3NW-A Attending Santino Cat MD   [facility] Day # 15 PCP Margaret Nichole MD     Subjective:  She is not non-tender, BS+. Extremities:   No LE edema. 2+ peripheral pulses. Neuro:   Alert and oriented x 3; moves all extremities equally   Skin:   Pink, warm and dry. Assessment:  1.  Small bowel obstruction - s/p ex-lap, extensive lysis of adhesions 8/20/

## 2017-08-30 NOTE — PLAN OF CARE
PAIN - ADULT    • Verbalizes/displays adequate comfort level or patient's stated pain goal Progressing          GASTROINTESTINAL - ADULT    • Minimal or absence of nausea and vomiting Progressing        VSS,afebrile. Pt restless & tearful this evening.  Re

## 2017-08-30 NOTE — PROGRESS NOTES
[facility]  Progress Note    Jennifer Gagnon Patient Status:  Inpatient    7/3/1934 MRN [id number]   [facility] 3NW-A Attending Sinan Evans MD   [facility] Day # 15 PCP Caesar Nguyen MD     Subjective:  Pt removed PICC line overnight, stenosis, lumbar region, without neurogenic claudication     Bursitis, hip     HTN (hypertension)     Pure hypercholesterolemia     History of recurrent UTI (urinary tract infection)     Osteoarthritis of shoulder     Osteopenia     Cervicalgia     [facility]  8/30/2017  8:59 AM        ADDENDUM:    Events noted. I agree with the above assessment and plan. Pt with recurrent emesis. NGT to LIS today  Resume TPN after PICC replaced  SBFT today. Otherwise, plan as above.

## 2017-08-30 NOTE — PLAN OF CARE
SAFETY ADULT - FALL    • Free from fall injury Not Progressing          NEUROLOGICAL - ADULT    • Achieves stable or improved neurological status Not Progressing        Pt confused and undressed at bedside. R. Arm PICC line pulled out & TPN stopped.  No ac

## 2017-08-30 NOTE — DIETARY NOTE
Parenteral Nutrition Short  TPN continues at goal-- will provide; 1440 NPC, 1700 Total kcals, 930 Dextrose Kcals, 65 grams protein and 510 lipid kcals (30% kcal from fat). RD to follow.   Linnea Rivers RD, LDN  Pager [phone]

## 2017-08-31 ENCOUNTER — APPOINTMENT (OUTPATIENT)
Dept: GENERAL RADIOLOGY | Facility: HOSPITAL | Age: 82
DRG: 336 | End: 2017-08-31
Attending: PHYSICIAN ASSISTANT
Payer: MEDICARE

## 2017-08-31 ENCOUNTER — PRIOR ORIGINAL RECORDS (OUTPATIENT)
Dept: OTHER | Age: 82
End: 2017-08-31

## 2017-08-31 LAB
ALBUMIN SERPL-MCNC: 2.4 G/DL (ref 3.5–4.8)
ALP LIVER SERPL-CCNC: 84 U/L (ref 55–142)
ALT SERPL-CCNC: 20 U/L (ref 14–54)
AST SERPL-CCNC: 17 U/L (ref 15–41)
BILIRUB SERPL-MCNC: 0.5 MG/DL (ref 0.1–2)
BUN BLD-MCNC: 10 MG/DL (ref 8–20)
CALCIUM BLD-MCNC: 8.5 MG/DL (ref 8.3–10.3)
CHLORIDE: 105 MMOL/L (ref 101–111)
CO2: 27 MMOL/L (ref 22–32)
CREAT BLD-MCNC: 0.58 MG/DL (ref 0.55–1.02)
GLUCOSE BLD-MCNC: 113 MG/DL (ref 70–99)
GLUCOSE BLD-MCNC: 128 MG/DL (ref 65–99)
GLUCOSE BLD-MCNC: 129 MG/DL (ref 65–99)
GLUCOSE BLD-MCNC: 135 MG/DL (ref 65–99)
GLUCOSE BLD-MCNC: 136 MG/DL (ref 65–99)
HAV IGM SER QL: 2.4 MG/DL (ref 1.7–3)
M PROTEIN MFR SERPL ELPH: 5.3 G/DL (ref 6.1–8.3)
PHOSPHATE SERPL-MCNC: 3.2 MG/DL (ref 2.5–4.9)
POTASSIUM SERPL-SCNC: 4 MMOL/L (ref 3.6–5.1)
SODIUM SERPL-SCNC: 139 MMOL/L (ref 136–144)

## 2017-08-31 PROCEDURE — 99232 SBSQ HOSP IP/OBS MODERATE 35: CPT | Performed by: HOSPITALIST

## 2017-08-31 PROCEDURE — 74250 X-RAY XM SM INT 1CNTRST STD: CPT | Performed by: PHYSICIAN ASSISTANT

## 2017-08-31 RX ORDER — KETOROLAC TROMETHAMINE 15 MG/ML
15 INJECTION, SOLUTION INTRAMUSCULAR; INTRAVENOUS EVERY 6 HOURS PRN
Status: DISPENSED | OUTPATIENT
Start: 2017-08-31 | End: 2017-09-02

## 2017-08-31 NOTE — PROGRESS NOTES
Pt currently off the floor for small bowel follow through imaging. Plan for today is SBFT via NGT. After test complete continue NGT to LIS. Continue TPN via PICC. Reglan/Compazine prn nausea. Further recommendations once SBFT completed.      Tapan Stevens

## 2017-08-31 NOTE — PROGRESS NOTES
RN assumed care of pt at 1430. Pt is resting in bed with visitors at the bedside. Pt is alert and orient x3. Abdomen is soft, non-distended. Midline incision noted with staples. NG remains clamped at this time. Pt denies any nausea at this time.  Pt denies

## 2017-08-31 NOTE — PROGRESS NOTES
[facility]  Progress Note    Corby Boast Patient Status:  Inpatient    7/3/1934 MRN [id number]   [facility] 3NW-A Attending Irais Leyva MD   [facility] Day # 15 PCP Minh Leonardo MD     Subjective:  Patient impatient with care, ret shoulder     Osteopenia     Cervicalgia     Degenerative arthritis of cervical spine     Hx of small bowel obstruction     Disorders of bursae and tendons in shoulder region, unspecified     Atrial fibrillation ([facility] [address]     History of colon resection, for div FINDINGS:  Initial abdominal radiograph demonstrates nasogastric tube tip in the stomach, midline skin staples and right upper quadrant surgical clips. Contrast reached the colon within 60 minutes.  The caliber, distribution and mucosal pattern of the clear liquid diet. · Antiemetics as needed. · Increase activity and ambulate. · Continue TPN.  · GI and DVT prophylaxis. ·   · The patient was provided ample opportunity to ask questions. · All of the patient's questions were answered in detail.   · Th

## 2017-08-31 NOTE — PLAN OF CARE
GASTROINTESTINAL - ADULT    • Minimal or absence of nausea and vomiting Progressing        PAIN - ADULT    • Verbalizes/displays adequate comfort level or patient's stated pain goal Progressing        SKIN/TISSUE INTEGRITY - ADULT    • Incision(s), wounds(

## 2017-08-31 NOTE — PROGRESS NOTES
[address] [facility] Cardiology Progress Note    [facility] Murtaza Patient Status:  Inpatient    7/3/1934 MRN [id number]   [facility] 3NW-A Attending Lowell Cunningham MD   [facility] Day # 15 PCP Nai Amado MD     Subjective:  She denies Non-distended, soft, non-tender, BS+. Extremities:   No LE edema. 2+ peripheral pulses. Neuro:   Alert and oriented x 3; moves all extremities equally   Skin:   Pink, warm and dry. Assessment:  1.  Small bowel obstruction - s/p ex-lap, extensive pranav

## 2017-08-31 NOTE — PROGRESS NOTES
[facility]  Progress Note     Wei Stringer Patient Status:  Inpatient    7/3/1934 MRN [id number]   [facility] 3NW-A Attending Dina Rodriguez MD   [address] Day # 15 PCP León Barahona MD     Chief Complaint: abd pain    S: Patient feel 5,000 Units Subcutaneous 2 times per day   • umeclidinium-vilanterol  1 puff Inhalation Daily       ASSESSMENT / PLAN:     1. Small bowel obstruction–s/p Exploratory Laparotomy, Extensive Lysis of Adhesions by surgery dr Candie Nevarez 8/20/2017   2.  expected po

## 2017-08-31 NOTE — PROGRESS NOTES
Patient insistent on jello. Requesting this RN to johnny SIMMONS.  Paged Edmar RODRIGUEZ and ok to clamp NG tube, patient may have jello. NG tube clamped.   Patient instructed to notify this RN if she develops nausea, increased pain or abd distension

## 2017-08-31 NOTE — PROGRESS NOTES
[address] Cardiology Progress Note    Linda Deandre Patient Status:  Inpatient    7/3/1934 MRN [id number]   [facility] 3NW-A Attending Maryellen Gomez MD   [facility] Day # 15 PCP Eamon Cantu MD     Subjective:  She is not Non-distended, soft, non-tender, BS+. Extremities:   No LE edema. 2+ peripheral pulses. Neuro:   Alert and oriented x 3; moves all extremities equally   Skin:   Pink, warm and dry. Assessment:  1.  Small bowel obstruction - s/p ex-lap, extensive pranav

## 2017-08-31 NOTE — DIETARY NOTE
Nutrition Short Note-TPN    TPN to be initiated tonight ordered. TPN continues at goal-- will provide; 1440 NPC, 1700 Total kcals, 930 Dextrose Kcals, 65 grams protein and 510 lipid kcals (30% kcal from fat). Electrolytes adjusted based on am labs.  All dis

## 2017-09-01 LAB
ALBUMIN SERPL-MCNC: 2.1 G/DL (ref 3.5–4.8)
ALP LIVER SERPL-CCNC: 73 U/L (ref 55–142)
ALT SERPL-CCNC: 15 U/L (ref 14–54)
AST SERPL-CCNC: 9 U/L (ref 15–41)
BILIRUB SERPL-MCNC: 0.4 MG/DL (ref 0.1–2)
BUN BLD-MCNC: 14 MG/DL (ref 8–20)
CALCIUM BLD-MCNC: 7.6 MG/DL (ref 8.3–10.3)
CHLORIDE: 105 MMOL/L (ref 101–111)
CO2: 26 MMOL/L (ref 22–32)
CREAT BLD-MCNC: 0.59 MG/DL (ref 0.55–1.02)
ERYTHROCYTE [DISTWIDTH] IN BLOOD BY AUTOMATED COUNT: 15.9 % (ref 11.5–16)
GLUCOSE BLD-MCNC: 106 MG/DL (ref 70–99)
GLUCOSE BLD-MCNC: 112 MG/DL (ref 65–99)
GLUCOSE BLD-MCNC: 116 MG/DL (ref 65–99)
GLUCOSE BLD-MCNC: 119 MG/DL (ref 65–99)
GLUCOSE BLD-MCNC: 124 MG/DL (ref 65–99)
HAV IGM SER QL: 2.6 MG/DL (ref 1.7–3)
HCT VFR BLD AUTO: 26.3 % (ref 34–50)
HGB BLD-MCNC: 8.6 G/DL (ref 12–16)
M PROTEIN MFR SERPL ELPH: 4.7 G/DL (ref 6.1–8.3)
MCH RBC QN AUTO: 30 PG (ref 27–33.2)
MCHC RBC AUTO-ENTMCNC: 32.7 G/DL (ref 31–37)
MCV RBC AUTO: 91.6 FL (ref 81–100)
PHOSPHATE SERPL-MCNC: 3.8 MG/DL (ref 2.5–4.9)
PLATELET # BLD AUTO: 324 10(3)UL (ref 150–450)
POTASSIUM SERPL-SCNC: 4.1 MMOL/L (ref 3.6–5.1)
RBC # BLD AUTO: 2.87 X10(6)UL (ref 3.8–5.1)
RED CELL DISTRIBUTION WIDTH-SD: 52.9 FL (ref 35.1–46.3)
SODIUM SERPL-SCNC: 138 MMOL/L (ref 136–144)
TRIGLYCERIDES: 61 MG/DL (ref ?–150)
WBC # BLD AUTO: 8.1 X10(3) UL (ref 4–13)

## 2017-09-01 PROCEDURE — 99232 SBSQ HOSP IP/OBS MODERATE 35: CPT | Performed by: HOSPITALIST

## 2017-09-01 NOTE — DIETARY NOTE
Nutrition Short Note-TPN     TPN for tonight ordered. TPN continues at goal-- will provide; 1440 NPC, 1700 Total kcals, 930 Dextrose Kcals, 65 grams protein and 510 lipid kcals (30% kcal from fat). Electrolytes adjusted based on am labs.  All discussed with

## 2017-09-01 NOTE — PROGRESS NOTES
[facility] Cardiology Progress Note    Subjective:  Had cramping and nausea last evening, but has been good this am, NG is currently clamped.      Objective:  /62 (BP Location: Left arm)   Pulse 97   Temp 97.8 °F (36.6 °C) (Oral)   Resp 18   Ht 162.6 cm

## 2017-09-01 NOTE — PROGRESS NOTES
NG residual was checked at this time and was 0 mL. Patient was asymptomatic, denies any pain or nausea and no distention noted or reported to writer. NG d/c'd at this time per order. Patient tolerated well. Will continue to monitor.  To begin clear liquids

## 2017-09-01 NOTE — PROGRESS NOTES
[facility]  Progress Note     Briannaadriana Wiggins Patient Status:  Inpatient    7/3/1934 MRN [id number]   [facility] 3NW-A Attending Cici Stanford MD   [address] Day # 15 PCP Faizan Sellers MD     Chief Complaint: abd pain    S: Patient Reymundo Diazble 5,000 Units Subcutaneous 2 times per day   • umeclidinium-vilanterol  1 puff Inhalation Daily       ASSESSMENT / PLAN:     1. Small bowel obstruction–s/p Exploratory Laparotomy, Extensive Lysis of Adhesions by surgery dr Sebastian Cortes 8/20/2017   2.  expected po

## 2017-09-01 NOTE — DIETARY NOTE
[facility]     NUTRITION FOLLOW UP ASSESSMENT     Pt is at moderate nutrition risk.  Pt does not meet malnutrition criteria.     NUTRITION DIAGNOSIS/PROBLEM:     Inadequate oral intake related to inability to consume sufficient PO as evidenced by brandy 21.99     2.  Fluid Accumulation:      NUTRITION PRESCRIPTION: Per IBW  Calories: 0075-9625 calories/day (28-32 calories per kg)  Protein: 54-70 grams protein/day (1.0-1.3 grams protein per kg)  Fluid: ~1 ml/kcal or per MD discretion     MONITOR AND EVALUAT

## 2017-09-01 NOTE — PROGRESS NOTES
Every other abdominal staple removed per order. Steri strips applied. No active bleeding noted, no drainage noted. Midline incision remains pink. Patient tolerated well. Will continue to monitor.

## 2017-09-01 NOTE — PROGRESS NOTES
[facility]  Progress Note    [facility] Patient Status:  Inpatient    7/3/1934 MRN [id number]   [facility] 3NW-A Attending Marcelino Boston MD   [facility] Day # 15 PCP Drake Staton MD     Subjective:  Patient in somewhat better spiri recurrent UTI (urinary tract infection)     Osteoarthritis of shoulder     Osteopenia     Cervicalgia     Degenerative arthritis of cervical spine     Hx of small bowel obstruction     Disorders of bursae and tendons in shoulder region, unspecified     Atr improve following exploratory laparotomy and extensive adhesiolysis. Delayed return of bowel function now improving. Small bowel follow-through yesterday within normal limits. Patient feels thirsty and has an appetite. The patient has passed flatus.

## 2017-09-02 ENCOUNTER — PRIOR ORIGINAL RECORDS (OUTPATIENT)
Dept: OTHER | Age: 82
End: 2017-09-02

## 2017-09-02 LAB
GLUCOSE BLD-MCNC: 109 MG/DL (ref 65–99)
GLUCOSE BLD-MCNC: 111 MG/DL (ref 65–99)
GLUCOSE BLD-MCNC: 114 MG/DL (ref 65–99)
GLUCOSE BLD-MCNC: 118 MG/DL (ref 65–99)
HAV IGM SER QL: 2.4 MG/DL (ref 1.7–3)
PHOSPHATE SERPL-MCNC: 3.5 MG/DL (ref 2.5–4.9)

## 2017-09-02 PROCEDURE — 99232 SBSQ HOSP IP/OBS MODERATE 35: CPT | Performed by: HOSPITALIST

## 2017-09-02 RX ORDER — DOCUSATE SODIUM 100 MG/1
100 CAPSULE, LIQUID FILLED ORAL 3 TIMES DAILY
Qty: 90 CAPSULE | Refills: 0 | Status: SHIPPED | OUTPATIENT
Start: 2017-09-02 | End: 2017-10-10

## 2017-09-02 RX ORDER — CALCIUM CARBONATE 200(500)MG
1000 TABLET,CHEWABLE ORAL 3 TIMES DAILY PRN
Status: DISCONTINUED | OUTPATIENT
Start: 2017-09-02 | End: 2017-09-03

## 2017-09-02 RX ORDER — HEPARIN SODIUM 5000 [USP'U]/ML
5000 INJECTION, SOLUTION INTRAVENOUS; SUBCUTANEOUS EVERY 8 HOURS SCHEDULED
Status: DISCONTINUED | OUTPATIENT
Start: 2017-09-02 | End: 2017-09-03

## 2017-09-02 RX ORDER — TRAMADOL HYDROCHLORIDE 50 MG/1
50 TABLET ORAL EVERY 6 HOURS PRN
Qty: 30 TABLET | Refills: 0 | Status: ON HOLD | OUTPATIENT
Start: 2017-09-02 | End: 2017-09-28

## 2017-09-02 NOTE — DIETARY NOTE
Dietitian Short Note    RD spoke with MICHAEL Alford regarding TPN today. Diet has been advanced to Low Fiber and plan is to not reorder TPN today.  RD available PRN if plan changes and will follow up with full note per protocol    Newton Sanchez RD, LDN,

## 2017-09-02 NOTE — PROGRESS NOTES
[facility]  Progress Note     Neda Barker Patient Status:  Inpatient    7/3/1934 MRN [id number]   [facility] 3NW-A Attending Marisol Meyer MD   [address] Day # 13 PCP Lb Lord MD     Chief Complaint: abd pain    S: Patient f surgery  3. Wean TPN  3. PAF/PSVT  1. Cont. cardizem  4. dementia    Plan of care: as above. Advance diet.  Dc maryann    Quality:  · DVT Prophylaxis: heparin  · CODE status: full  · [place]: no  · Central line: no    Estimated date of discharge: TBD  Discharge i

## 2017-09-02 NOTE — PROGRESS NOTES
[facility]  Progress Note    Christie Pica Patient Status:  Inpatient    7/3/1934 MRN [id number]   [facility] 3NW-A Attending Tammy Stanton MD   Hosp Day # 13 PCP Bob Cortez MD     Subjective:  Patient in much better spirits t colon resection, for diverticulitis, 2010, with colostomy     Chronic pain following surgery or procedure     Capsular contracture of breast implant     Iron deficiency anemia     Chronic obstructive pulmonary disease ([address]     Small bowel obstruction ([address] abdomen is soft, nontender nondistended without rebound guarding or rigidity. The surgical incision is clean, dry, intact, and without drainage or evidence of infection.     Assessment and plan:    Postoperative day #12 status post exploratory laparotomy a

## 2017-09-03 VITALS
WEIGHT: 116.63 LBS | TEMPERATURE: 98 F | DIASTOLIC BLOOD PRESSURE: 58 MMHG | RESPIRATION RATE: 18 BRPM | BODY MASS INDEX: 19.91 KG/M2 | HEART RATE: 86 BPM | SYSTOLIC BLOOD PRESSURE: 140 MMHG | OXYGEN SATURATION: 95 % | HEIGHT: 64 IN

## 2017-09-03 LAB
GLUCOSE BLD-MCNC: 114 MG/DL (ref 65–99)
GLUCOSE BLD-MCNC: 86 MG/DL (ref 65–99)
GLUCOSE BLD-MCNC: 93 MG/DL (ref 65–99)

## 2017-09-03 PROCEDURE — 99239 HOSP IP/OBS DSCHRG MGMT >30: CPT | Performed by: HOSPITALIST

## 2017-09-03 NOTE — PROGRESS NOTES
· [facility] Cardiology Progress Note     Subjective:  Feels good - ready for discharge    Objective:  140/58  Afebrile  SR PACS  I/O incomplete    No labs    Cardiac:S1 S2 regular  Lungs: clear  Abdomen:soft  Extremities:no edema  Skin:warm/dry    Asses

## 2017-09-03 NOTE — PROGRESS NOTES
[facility]  Progress Note    Eva White Patient Status:  Inpatient    7/3/1934 MRN [id number]   [facility] 3NW-A Attending Tutu Hoover MD   [facility] Day # 12 PCP Tiana Medina MD     Subjective:  Pt feels very well, ready for DC

## 2017-09-03 NOTE — PLAN OF CARE
GASTROINTESTINAL - ADULT    • Minimal or absence of nausea and vomiting Adequate for Discharge    • Maintains or returns to baseline bowel function Adequate for Discharge        METABOLIC/FLUID AND ELECTROLYTES - ADULT    • Glucose maintained within prescr

## 2017-09-05 ENCOUNTER — PATIENT OUTREACH (OUTPATIENT)
Dept: CASE MANAGEMENT | Age: 82
End: 2017-09-05

## 2017-09-05 NOTE — DISCHARGE SUMMARY
[facility]ITAL[facility]  DISCHARGE SUMMARY     [facility] Patient Status:  Inpatient    7/3/1934 MRN [id number]   [facility] 3NW-A Attending No att. providers found   Hosp Day # 12 PCP Drake Staton MD     Date of Admission: 2017  Da to follow-up with her primary care doctor and general surgery.     Procedures during hospitalization:   • Exploratory laparotomy and lysis of adhesions    Consultants:  • General surgery  • Cardiology      Discharge Medication List:     Discharge Medication (two) times daily. Quantity:  30 tablet  Refills:  0     simvastatin 10 MG Tabs  Commonly known as:  ZOCOR      Take 1 tablet (10 mg total) by mouth once daily.    Quantity:  90 tablet  Refills:  1     TiZANidine HCl 2 MG Tabs  Commonly known as:  Sonia Raya [facility] [address]  [phone]      see Dr. Meryle Murders or a Nurse Practitioner in his office. Will need followup on blood pressures      Vital signs:       Physical Exam:    General: No acute distress.    Respiratory: Clear to auscultation bilatera

## 2017-09-07 ENCOUNTER — TELEPHONE (OUTPATIENT)
Dept: INTERNAL MEDICINE CLINIC | Facility: CLINIC | Age: 82
End: 2017-09-07

## 2017-09-07 NOTE — TELEPHONE ENCOUNTER
S/w Elva at [facility]. Advised home health orders should come from surgery. She is thankful.

## 2017-09-07 NOTE — TELEPHONE ENCOUNTER
Patient was d/c from hospital on Sunday and next week will do re-certification. They will watch the surgical site and pain management. She will also be doing physical therapy.

## 2017-09-11 RX ORDER — GABAPENTIN 300 MG/1
300 CAPSULE ORAL 2 TIMES DAILY
Qty: 60 CAPSULE | Refills: 0 | Status: SHIPPED
Start: 2017-09-11 | End: 2017-10-10

## 2017-09-11 NOTE — TELEPHONE ENCOUNTER
From: Alvina Drummond 61: 9/8/2017 9:35 PM CDT  Subject: Medication Renewal Request    [address] would like a refill of the following medications:  gabapentin 300 MG Oral Cap [Paola Ceja APN]    Preferred pharmacy: [id number]

## 2017-09-11 NOTE — TELEPHONE ENCOUNTER
Medication: gabape tin--last ordered by Soha Nance; [facility] patient (seen for memory deficit); last seen by Dr. Trudi Stanley for Kyphoplasty 7/11/17  Has requested Gabapentin via [facility]      Date of last refill: 07/16/17  [place]

## 2017-09-12 ENCOUNTER — TELEPHONE (OUTPATIENT)
Dept: INTERNAL MEDICINE CLINIC | Facility: CLINIC | Age: 82
End: 2017-09-12

## 2017-09-12 NOTE — TELEPHONE ENCOUNTER
Pt is having back pain calling to see if its ok to address the back pain with home health PT services.  Please advise

## 2017-09-13 NOTE — TELEPHONE ENCOUNTER
S/w Luda Liao, PT with [facility]. Gave verbal for PT for lower back pain.

## 2017-09-14 ENCOUNTER — OFFICE VISIT (OUTPATIENT)
Dept: SURGERY | Facility: CLINIC | Age: 82
End: 2017-09-14

## 2017-09-14 ENCOUNTER — TELEPHONE (OUTPATIENT)
Dept: SURGERY | Facility: CLINIC | Age: 82
End: 2017-09-14

## 2017-09-14 VITALS — HEIGHT: 64 IN | BODY MASS INDEX: 17.93 KG/M2 | WEIGHT: 105 LBS | TEMPERATURE: 99 F

## 2017-09-14 DIAGNOSIS — K56.609 SMALL BOWEL OBSTRUCTION (HCC): Primary | ICD-10-CM

## 2017-09-14 DIAGNOSIS — Z90.49 HISTORY OF COLON RESECTION: ICD-10-CM

## 2017-09-14 PROCEDURE — 99024 POSTOP FOLLOW-UP VISIT: CPT | Performed by: PHYSICIAN ASSISTANT

## 2017-09-14 NOTE — PROGRESS NOTES
Post Operative Visit Note       Active Problems  1. Small bowel obstruction ([address]    2.  History of colon resection, for diverticulitis, 2010, with colostomy         Chief Complaint   Patient presents with:  Post-Op: SBO s/p ex lap and adhesion lysis on 8/2 hypertension    • Vertigo      Past Surgical History:  No date: BOWEL RESECTION      Comment: small bowel resection  3/2016: BREAST SURGERY Bilateral      Comment: implant removal  1993, 1998: CHOLECYSTECTOMY      Comment: x2  No date: COLECTOMY  2004, 5/8 Years of education:                 Number of children:               Social History Main Topics    Smoking status: Former Smoker                                                                Packs/day: 0.50      Years: 15.00          Quit d HCl 5 MG Oral Tab Take 1 tablet (5 mg total) by mouth nightly. Disp: 30 tablet Rfl: 11   CARTIA  MG Oral Capsule SR 24 Hr TAKE 1 CAPSULE BY MOUTH DAILY. Disp: 90 capsule Rfl: 2   aspirin 81 MG Oral Tab Take 81 mg by mouth daily.  Disp:  Rfl:    Harris Torres Conjunctivae and EOM are normal. No scleral icterus. Neck: Normal range of motion. Neck supple. Pulmonary/Chest: Effort normal and breath sounds normal. No respiratory distress. Abdominal: Soft.  Bowel sounds are normal. She exhibits no distension and will work to increase her oral intake. The patient continues to work with home health care and home therapy. It is essential that the patient continue with her home physical therapy in order to preserve and increase her strength.     At this time I am n

## 2017-09-15 ENCOUNTER — PRIOR ORIGINAL RECORDS (OUTPATIENT)
Dept: OTHER | Age: 82
End: 2017-09-15

## 2017-09-15 ENCOUNTER — TELEPHONE (OUTPATIENT)
Dept: INTERNAL MEDICINE CLINIC | Facility: CLINIC | Age: 82
End: 2017-09-15

## 2017-09-15 NOTE — TELEPHONE ENCOUNTER
Pt has reached goals with  PT-will d/c w/transition to outpatient-need verbal approval-will do home program then look into out pt therapy later.

## 2017-09-18 ENCOUNTER — OFFICE VISIT (OUTPATIENT)
Dept: SURGERY | Facility: CLINIC | Age: 82
End: 2017-09-18

## 2017-09-18 VITALS
BODY MASS INDEX: 18.44 KG/M2 | HEIGHT: 64 IN | WEIGHT: 108 LBS | DIASTOLIC BLOOD PRESSURE: 58 MMHG | SYSTOLIC BLOOD PRESSURE: 120 MMHG | HEART RATE: 80 BPM

## 2017-09-18 DIAGNOSIS — G89.29 CHRONIC MIDLINE THORACIC BACK PAIN: ICD-10-CM

## 2017-09-18 DIAGNOSIS — M54.6 CHRONIC MIDLINE THORACIC BACK PAIN: ICD-10-CM

## 2017-09-18 DIAGNOSIS — R51.9 NEW ONSET OF HEADACHES: ICD-10-CM

## 2017-09-18 DIAGNOSIS — Z98.890 S/P KYPHOPLASTY: ICD-10-CM

## 2017-09-18 DIAGNOSIS — G31.84 MILD COGNITIVE IMPAIRMENT: ICD-10-CM

## 2017-09-18 DIAGNOSIS — Z98.890 POST-OPERATIVE STATE: Primary | ICD-10-CM

## 2017-09-18 PROCEDURE — 99024 POSTOP FOLLOW-UP VISIT: CPT | Performed by: PHYSICIAN ASSISTANT

## 2017-09-18 NOTE — PATIENT INSTRUCTIONS
Refill policies:    • Allow 2-3 business days for refills; controlled substances may take longer.   • Contact your pharmacy at least 5 days prior to running out of medication and have them send an electronic request or submit request through the [facility] have a procedure or additional testing performed. [facility]) will contact your insurance carrier to obtain pre-certification or prior authorization.     Unfortunately, [facility] has seen an increase in denial of payment even though the p

## 2017-09-18 NOTE — PROGRESS NOTES
Pt states she has some pain if she stands to long and will have to go sit. Pt. Had physical therapy and that has to some degree helped. When she is up and walking rates pain at a 5, but after a while. Sitting pain is not bad.  Uses a piece of foam rubber to

## 2017-09-18 NOTE — PROGRESS NOTES
Neurosurgery   Lumbar Follow up      [address] is a 80year old female with a recent history of a bowel resection (8/20/17). Patient is s/p T9 and T10 kyphoplasty and T9 and T10 biopsy (7/11/17, with Dr. Yvonne Akbar).  Presents f prescribed.    3. Imaging:     - CT brain wo contrast    - Evaluate new onset headaches, mild cognitive impairment and s/p fall from prior to Kyphoplasty procedure.   - CT thoracic spine wo contrast    - Evaluate patient's continued back pain    - If CT tho

## 2017-09-19 ENCOUNTER — HOSPITAL ENCOUNTER (OUTPATIENT)
Age: 82
Discharge: HOME | End: 2017-09-19
Payer: MEDICARE

## 2017-09-19 VITALS
HEART RATE: 72 BPM | WEIGHT: 108 LBS | DIASTOLIC BLOOD PRESSURE: 66 MMHG | BODY MASS INDEX: 18.44 KG/M2 | SYSTOLIC BLOOD PRESSURE: 144 MMHG | OXYGEN SATURATION: 100 % | RESPIRATION RATE: 16 BRPM | TEMPERATURE: 98 F | HEIGHT: 64 IN

## 2017-09-19 DIAGNOSIS — S81.811A LACERATION OF RIGHT LOWER LEG, INITIAL ENCOUNTER: Primary | ICD-10-CM

## 2017-09-19 PROCEDURE — 99213 OFFICE O/P EST LOW 20 MIN: CPT

## 2017-09-19 PROCEDURE — 12002 RPR S/N/AX/GEN/TRNK2.6-7.5CM: CPT

## 2017-09-19 NOTE — ED PROVIDER NOTES
Patient Seen in: [facility]    History   Patient presents with:  Laceration    Stated Complaint: laceration right leg    HPI    81 yo female here with c/o a laceration to the RLL which occurred yesterday after she caught it on a Comment: Hepdbaakfe-jyijvvvmbf-hkgstnheqgctyw  2007: OTHER SURGICAL HISTORY      Comment: Removed cholostomy  2010: OTHER SURGICAL HISTORY      Comment: intestinal  1980: OTHER SURGICAL HISTORY      Comment: removal of mass near thyroid  12/14/12: Blayne Ortiz O2 Device: None (Room air)    Current:/66   Pulse 72   Temp 97.8 °F (36.6 °C) (Temporal)   Resp 16   Ht 162.6 cm (5' 4\")   Wt 49 kg   SpO2 100%   BMI 18.54 kg/m²         Physical Exam   Constitutional: She is oriented to person, place, and time.  She I discussed the plan of care with the patient, who expresses understanding. All questions and concerns are addressed to the patients satisfaction prior to discharge today.          Disposition and Plan     Clinical Impression:  Laceration of right lower leg

## 2017-09-21 ENCOUNTER — HOSPITAL ENCOUNTER (OUTPATIENT)
Dept: CT IMAGING | Age: 82
Discharge: HOME | End: 2017-09-21
Attending: PHYSICIAN ASSISTANT
Payer: MEDICARE

## 2017-09-21 ENCOUNTER — HOSPITAL ENCOUNTER (OUTPATIENT)
Dept: CT IMAGING | Age: 82
End: 2017-09-21
Attending: PHYSICIAN ASSISTANT
Payer: MEDICARE

## 2017-09-21 DIAGNOSIS — R51.9 NEW ONSET OF HEADACHES: ICD-10-CM

## 2017-09-21 DIAGNOSIS — G89.29 CHRONIC MIDLINE THORACIC BACK PAIN: ICD-10-CM

## 2017-09-21 DIAGNOSIS — M54.6 CHRONIC MIDLINE THORACIC BACK PAIN: ICD-10-CM

## 2017-09-21 PROCEDURE — 70450 CT HEAD/BRAIN W/O DYE: CPT | Performed by: PHYSICIAN ASSISTANT

## 2017-09-21 PROCEDURE — 72128 CT CHEST SPINE W/O DYE: CPT | Performed by: PHYSICIAN ASSISTANT

## 2017-09-22 ENCOUNTER — PRIOR ORIGINAL RECORDS (OUTPATIENT)
Dept: OTHER | Age: 82
End: 2017-09-22

## 2017-09-22 ENCOUNTER — MYAURORA ACCOUNT LINK (OUTPATIENT)
Dept: OTHER | Age: 82
End: 2017-09-22

## 2017-09-26 ENCOUNTER — OFFICE VISIT (OUTPATIENT)
Dept: INTERNAL MEDICINE CLINIC | Facility: CLINIC | Age: 82
End: 2017-09-26

## 2017-09-26 VITALS
DIASTOLIC BLOOD PRESSURE: 60 MMHG | WEIGHT: 114 LBS | HEIGHT: 64 IN | HEART RATE: 80 BPM | SYSTOLIC BLOOD PRESSURE: 124 MMHG | RESPIRATION RATE: 16 BRPM | BODY MASS INDEX: 19.46 KG/M2 | TEMPERATURE: 98 F

## 2017-09-26 DIAGNOSIS — S81.811A LACERATION OF RIGHT LOWER LEG, INITIAL ENCOUNTER: ICD-10-CM

## 2017-09-26 DIAGNOSIS — L98.9 SKIN LESION OF LEFT ARM: ICD-10-CM

## 2017-09-26 DIAGNOSIS — I10 ESSENTIAL HYPERTENSION: ICD-10-CM

## 2017-09-26 DIAGNOSIS — G89.18 POST-OP PAIN: ICD-10-CM

## 2017-09-26 DIAGNOSIS — Z23 NEED FOR INFLUENZA VACCINATION: ICD-10-CM

## 2017-09-26 DIAGNOSIS — Z87.19 HX SBO: Primary | ICD-10-CM

## 2017-09-26 PROCEDURE — 99214 OFFICE O/P EST MOD 30 MIN: CPT | Performed by: INTERNAL MEDICINE

## 2017-09-26 PROCEDURE — G0008 ADMIN INFLUENZA VIRUS VAC: HCPCS | Performed by: INTERNAL MEDICINE

## 2017-09-26 PROCEDURE — 90653 IIV ADJUVANT VACCINE IM: CPT | Performed by: INTERNAL MEDICINE

## 2017-09-26 PROCEDURE — 90714 TD VACC NO PRESV 7 YRS+ IM: CPT | Performed by: INTERNAL MEDICINE

## 2017-09-26 PROCEDURE — 90472 IMMUNIZATION ADMIN EACH ADD: CPT | Performed by: INTERNAL MEDICINE

## 2017-09-26 NOTE — PROGRESS NOTES
Zoe Apple is a 80year old female. Patient presents with:  Hospital F/U      HPI:     Patient here for f/u-  Admitted 8/18/17 to 9/3/17 for SBO, s/p ex lap and adhesion lysis on 8/20.  She feels well except local pain close to the umbilicus, apparen Diarrhea     Leg pain, diffuse     Chronic obstructive pulmonary disease, unspecified COPD type (HCC)     Thrombocytosis (HCC)     Acute cystitis without hematuria      Current Outpatient Prescriptions:  mupirocin 2 % External Ointment Apply 1 Application (VITAMIN D) 1000 UNITS Oral Cap Take 1 tablet by mouth daily. Disp:  Rfl:    CALCIUM + D OR Take 1 tablet by mouth daily. Disp:  Rfl:    VITAMIN B COMPLEX OR Take 1 tablet by mouth daily. Disp:  Rfl:    MULTIVITAMIN OR Take 1 tablet by mouth daily.  Disp: GENERAL: well developed, well nourished,in no apparent distress  SKIN: 7 cm laceration right lower leg, 10 of 15 sutures removed today, top part of laceration with scabs that are dry and stitches embedded in the scab- soaked the area but no improvement w

## 2017-09-28 ENCOUNTER — HOSPITAL ENCOUNTER (INPATIENT)
Facility: HOSPITAL | Age: 82
LOS: 2 days | Discharge: HOME | DRG: 389 | End: 2017-09-30
Attending: EMERGENCY MEDICINE | Admitting: SURGERY
Payer: MEDICARE

## 2017-09-28 ENCOUNTER — APPOINTMENT (OUTPATIENT)
Dept: CT IMAGING | Facility: HOSPITAL | Age: 82
DRG: 389 | End: 2017-09-28
Attending: EMERGENCY MEDICINE
Payer: MEDICARE

## 2017-09-28 DIAGNOSIS — K56.609 SBO (SMALL BOWEL OBSTRUCTION) (HCC): Primary | ICD-10-CM

## 2017-09-28 PROCEDURE — 99223 1ST HOSP IP/OBS HIGH 75: CPT | Performed by: HOSPITALIST

## 2017-09-28 PROCEDURE — 0D9670Z DRAINAGE OF STOMACH WITH DRAINAGE DEVICE, VIA NATURAL OR ARTIFICIAL OPENING: ICD-10-PCS | Performed by: EMERGENCY MEDICINE

## 2017-09-28 PROCEDURE — 74177 CT ABD & PELVIS W/CONTRAST: CPT | Performed by: EMERGENCY MEDICINE

## 2017-09-28 PROCEDURE — 99024 POSTOP FOLLOW-UP VISIT: CPT | Performed by: SURGERY

## 2017-09-28 RX ORDER — DOCUSATE SODIUM 100 MG/1
100 CAPSULE, LIQUID FILLED ORAL 2 TIMES DAILY
Status: DISCONTINUED | OUTPATIENT
Start: 2017-09-28 | End: 2017-09-30

## 2017-09-28 RX ORDER — ONDANSETRON 2 MG/ML
4 INJECTION INTRAMUSCULAR; INTRAVENOUS EVERY 6 HOURS PRN
Status: DISCONTINUED | OUTPATIENT
Start: 2017-09-28 | End: 2017-09-30

## 2017-09-28 RX ORDER — HYDROMORPHONE HYDROCHLORIDE 1 MG/ML
0.4 INJECTION, SOLUTION INTRAMUSCULAR; INTRAVENOUS; SUBCUTANEOUS EVERY 2 HOUR PRN
Status: DISCONTINUED | OUTPATIENT
Start: 2017-09-28 | End: 2017-09-30

## 2017-09-28 RX ORDER — POLYETHYLENE GLYCOL 3350 17 G/17G
17 POWDER, FOR SOLUTION ORAL DAILY PRN
Status: DISCONTINUED | OUTPATIENT
Start: 2017-09-28 | End: 2017-09-30

## 2017-09-28 RX ORDER — ASPIRIN 81 MG/1
81 TABLET, CHEWABLE ORAL DAILY
Status: DISCONTINUED | OUTPATIENT
Start: 2017-09-28 | End: 2017-09-30

## 2017-09-28 RX ORDER — DONEPEZIL HYDROCHLORIDE 5 MG/1
5 TABLET, FILM COATED ORAL NIGHTLY
Status: DISCONTINUED | OUTPATIENT
Start: 2017-09-28 | End: 2017-09-30

## 2017-09-28 RX ORDER — HYDROCODONE BITARTRATE AND ACETAMINOPHEN 5; 325 MG/1; MG/1
1 TABLET ORAL EVERY 4 HOURS PRN
Status: DISCONTINUED | OUTPATIENT
Start: 2017-09-28 | End: 2017-09-30

## 2017-09-28 RX ORDER — ONDANSETRON 2 MG/ML
4 INJECTION INTRAMUSCULAR; INTRAVENOUS ONCE
Status: COMPLETED | OUTPATIENT
Start: 2017-09-28 | End: 2017-09-28

## 2017-09-28 RX ORDER — SODIUM PHOSPHATE, DIBASIC AND SODIUM PHOSPHATE, MONOBASIC 7; 19 G/133ML; G/133ML
1 ENEMA RECTAL ONCE AS NEEDED
Status: DISCONTINUED | OUTPATIENT
Start: 2017-09-28 | End: 2017-09-30

## 2017-09-28 RX ORDER — HYDROMORPHONE HYDROCHLORIDE 1 MG/ML
0.8 INJECTION, SOLUTION INTRAMUSCULAR; INTRAVENOUS; SUBCUTANEOUS EVERY 2 HOUR PRN
Status: DISCONTINUED | OUTPATIENT
Start: 2017-09-28 | End: 2017-09-30

## 2017-09-28 RX ORDER — SODIUM CHLORIDE 9 MG/ML
125 INJECTION, SOLUTION INTRAVENOUS CONTINUOUS
Status: DISCONTINUED | OUTPATIENT
Start: 2017-09-28 | End: 2017-09-30

## 2017-09-28 RX ORDER — BISACODYL 10 MG
10 SUPPOSITORY, RECTAL RECTAL
Status: DISCONTINUED | OUTPATIENT
Start: 2017-09-28 | End: 2017-09-30

## 2017-09-28 RX ORDER — HYDROMORPHONE HYDROCHLORIDE 1 MG/ML
0.2 INJECTION, SOLUTION INTRAMUSCULAR; INTRAVENOUS; SUBCUTANEOUS EVERY 2 HOUR PRN
Status: DISCONTINUED | OUTPATIENT
Start: 2017-09-28 | End: 2017-09-30

## 2017-09-28 RX ORDER — HEPARIN SODIUM 5000 [USP'U]/ML
5000 INJECTION, SOLUTION INTRAVENOUS; SUBCUTANEOUS EVERY 8 HOURS SCHEDULED
Status: DISCONTINUED | OUTPATIENT
Start: 2017-09-28 | End: 2017-09-30

## 2017-09-28 RX ORDER — ACETAMINOPHEN 325 MG/1
650 TABLET ORAL EVERY 4 HOURS PRN
Status: DISCONTINUED | OUTPATIENT
Start: 2017-09-28 | End: 2017-09-30

## 2017-09-28 RX ORDER — TRAZODONE HYDROCHLORIDE 50 MG/1
50 TABLET ORAL NIGHTLY PRN
Status: DISCONTINUED | OUTPATIENT
Start: 2017-09-28 | End: 2017-09-30

## 2017-09-28 RX ORDER — PRAVASTATIN SODIUM 20 MG
20 TABLET ORAL NIGHTLY
Status: DISCONTINUED | OUTPATIENT
Start: 2017-09-28 | End: 2017-09-30

## 2017-09-28 RX ORDER — SODIUM CHLORIDE 9 MG/ML
INJECTION, SOLUTION INTRAVENOUS CONTINUOUS
Status: DISCONTINUED | OUTPATIENT
Start: 2017-09-28 | End: 2017-09-30

## 2017-09-28 RX ORDER — DILTIAZEM HYDROCHLORIDE 300 MG/1
300 CAPSULE, COATED, EXTENDED RELEASE ORAL
Status: DISCONTINUED | OUTPATIENT
Start: 2017-09-28 | End: 2017-09-30

## 2017-09-28 RX ORDER — MORPHINE SULFATE 4 MG/ML
4 INJECTION, SOLUTION INTRAMUSCULAR; INTRAVENOUS EVERY 30 MIN PRN
Status: DISCONTINUED | OUTPATIENT
Start: 2017-09-28 | End: 2017-09-30

## 2017-09-28 RX ORDER — HYDROCODONE BITARTRATE AND ACETAMINOPHEN 5; 325 MG/1; MG/1
2 TABLET ORAL EVERY 4 HOURS PRN
Status: DISCONTINUED | OUTPATIENT
Start: 2017-09-28 | End: 2017-09-30

## 2017-09-28 RX ORDER — GABAPENTIN 300 MG/1
300 CAPSULE ORAL 2 TIMES DAILY
Status: DISCONTINUED | OUTPATIENT
Start: 2017-09-28 | End: 2017-09-30

## 2017-09-28 NOTE — PLAN OF CARE
A&Ox4, denies any pain. Abdomen tender. NPO. Positive bowel sounds, passing flatus. NG to LIS- green/brown output. NG clamped at ~1315, output 150cc. No complaints of nausea/vomiting this shift. C.Diff negative. Contact iso discontinued. Patient updated.

## 2017-09-28 NOTE — CONSULTS
Jennifer Gagnon is a 80year old female  Patient presents with:  Abdomen/Flank Pain (GI/)      REFERRED BY    Patient presents with abdominal pain in the right side of the abdomen onset this past evening.   Patient awoke with abdominal pain throughout th Comment: Kyphoplasty, Dr. Barajas Gess: OTHER SURGICAL HISTORY      Comment: Cyst in vagina  1974: OTHER SURGICAL HISTORY      Comment: Breast implants  2006: OTHER SURGICAL HISTORY      Comment: Right breast lump- benign  2006: OTHER SURGICAL HISTORY 325 mg by mouth daily with breakfast. Disp:  Rfl:    Donepezil HCl 5 MG Oral Tab Take 1 tablet (5 mg total) by mouth nightly. Disp: 30 tablet Rfl: 11   CARTIA  MG Oral Capsule SR 24 Hr TAKE 1 CAPSULE BY MOUTH DAILY.  Disp: 90 capsule Rfl: 2   aspirin extremities  HEMATOLOGY: denies hx anemia; denies bruising or excessive bleeding  Endocrine: no weight gain or loss no hot or cold intolerance    EXAM     Blood pressure 139/61, pulse 80, temperature 97.6 °F (36.4 °C), temperature source Oral, resp.  rate 1 SURGERY  ADMIT TO INPATIENT  INCENTIVE SPIROMETRY RT  NPO  ENTERIC/CONTACT PLUS ISOLATION  STRAIGHT CATH

## 2017-09-28 NOTE — H&P
[facility]  History and Physical     Nigel Maldonado Patient Status:  Emergency    7/3/1934 MRN [id number]   Location [address] [facility] Attending Oscar Peters MD   Hosp Day # 0 PCP Cadence Jay MD     Chief Complaint: a SPECIMEN 1 SITE RIG* Right      Comment: benign.   No date: OTHER Bilateral      Comment: bilateral breast implants  07/11/2017: OTHER      Comment: Kyphoplasty, Dr. Shae Martínez: OTHER SURGICAL HISTORY      Comment: Cyst in vagina  1974: OTHER SURGICAL HIST capsule (300 mg total) by mouth 2 (two) times daily. Disp: 60 capsule Rfl: 0   TraMADol HCl 50 MG Oral Tab Take 1 tablet (50 mg total) by mouth every 6 (six) hours as needed for Pain.  Disp: 30 tablet Rfl: 0   docusate sodium (COLACE) 100 MG Oral Cap Take 1 completed. Pertinent positives and negatives noted in the HPI.     Physical Exam:    /55   Pulse 71   Temp 97.9 °F (36.6 °C) (Oral)   Resp 18   Ht 5' 4\" (1.626 m)   Wt 106 lb (48.1 kg)   SpO2 99%   BMI 18.19 kg/m²   General: Thin elderly woman, al

## 2017-09-28 NOTE — ED PROVIDER NOTES
Patient Seen in: [facility] Emergency Department    History   Patient presents with:  Abdomen/Flank Pain (GI/)    Stated Complaint: arrived via EMS for c/o abd pain with n/v    HPI    Right-sided abdominal pain started night sharp constant feel a Comment: Afvkohrwgq-uuqxlhwrps-pdpyveeuztrxqs  2007: OTHER SURGICAL HISTORY      Comment: Removed cholostomy  2010: OTHER SURGICAL HISTORY      Comment: intestinal  1980: OTHER SURGICAL HISTORY      Comment: removal of mass near thyroid  12/14/12: Anil Rios Current:/55   Pulse 71   Temp 97.9 °F (36.6 °C) (Oral)   Resp 18   Ht 162.6 cm (5' 4\")   Wt 48.1 kg   SpO2 99%   BMI 18.19 kg/m²         Physical Exam   Constitutional: She is oriented to person, place, and time.  She appears well-developed and well- CBC W/ DIFFERENTIAL[[id number]]          Abnormal            Final result                 Please view results for these tests on the individual orders.    URINALYSIS WITH CULTURE REFLEX   LACTIC ACID, PLASMA   RAINBOW DRAW BLUE   RAINBOW DRAW LAVENDER   RAIN IV established. Patient given IV fluids and pain control. CT scan results are discussed with general surgery. Patient had NG tube ordered to be placed in the emergency department.   Case discussed with hospitalist.  Patient be admitted for further workup

## 2017-09-28 NOTE — ED INITIAL ASSESSMENT (HPI)
Arrived via EMS for c/o abd pain, onset tonoc with nausea, no vomiting. Also reports diarrhea. Pt denies blood in stools. She denies urinary sxs. States abd surgery a month ago for bowel obstruction.  Pt given Fentanyl 25 mcg IV and Zofran per EMS with some

## 2017-09-28 NOTE — PROGRESS NOTES
NURSING ADMISSION NOTE      Patient admitted via Cart  Oriented to room. Safety precautions initiated. Bed in low position. Call light in reach. Pt A&O times 4. From home with daughter. VSS and afebrile.   Arrived from ED with NG in right nostri

## 2017-09-29 ENCOUNTER — TELEPHONE (OUTPATIENT)
Dept: INTERNAL MEDICINE CLINIC | Facility: CLINIC | Age: 82
End: 2017-09-29

## 2017-09-29 LAB
ALBUMIN: 2.1 G/DL
ALBUMIN: 2.4 G/DL
ALKALINE PHOSPHATATE(ALK PHOS): 73 IU/L
ALKALINE PHOSPHATATE(ALK PHOS): 84 IU/L
BILIRUBIN TOTAL: 0.4 MG/DL
BILIRUBIN TOTAL: 0.5 MG/DL
BUN: 10 MG/DL
BUN: 14 MG/DL
CALCIUM: 7.6 MG/DL
CALCIUM: 8.5 MG/DL
CHLORIDE: 105 MEQ/L
CHLORIDE: 105 MEQ/L
CREATININE, SERUM: 0.58 MG/DL
CREATININE, SERUM: 0.59 MG/DL
GLUCOSE: 106 MG/DL
GLUCOSE: 113 MG/DL
HEMATOCRIT: 26.3 %
HEMOGLOBIN: 8.6 G/DL
MAGNESIUM: 2.4 MG/DL
PLATELETS: 324 K/UL
POTASSIUM, SERUM: 4 MEQ/L
POTASSIUM, SERUM: 4.1 MEQ/L
PROTEIN, TOTAL: 4.7 G/DL
PROTEIN, TOTAL: 5.3 G/DL
RED BLOOD COUNT: 2.87 X 10-6/U
SGOT (AST): 17 IU/L
SGOT (AST): 9 IU/L
SGPT (ALT): 15 IU/L
SGPT (ALT): 20 IU/L
SODIUM: 138 MEQ/L
SODIUM: 139 MEQ/L
TRIGLYCERIDES: 61 MG/DL
WHITE BLOOD COUNT: 8.1 X 10-3/U

## 2017-09-29 PROCEDURE — 99232 SBSQ HOSP IP/OBS MODERATE 35: CPT | Performed by: HOSPITALIST

## 2017-09-29 NOTE — PROGRESS NOTES
Alert,oriented. NGT intact and clamped. Patient complains of throat pain. Chloraseptic spray ordered,Dressing to right shin changed this shift.

## 2017-09-29 NOTE — PROGRESS NOTES
[facility]  Progress Note    Adrian Anderson Patient Status:  Inpatient    7/3/1934 MRN [id number]   [facility] 4NW-A Attending Beverley Desai, DO   Hosp Day # 1 PCP Aric Lee MD     Subjective: The patient feels improved today. MG 2.0 09/29/2017       Assessment/Plan:  Patient Active Problem List:     Spinal stenosis, lumbar region, without neurogenic claudication     Bursitis, hip     HTN (hypertension)     Pure hypercholesterolemia     History of recurrent UTI (urinary tract volume of clear liquids as tolerated. · The patient was provided ample opportunity to ask questions. · All of the patient's questions were answered in detail. · The patient voiced understanding of the care plan. Shereen Davis MD FACS  [facility]

## 2017-09-29 NOTE — PROGRESS NOTES
[facility]  Progress Note     Curry Castro Patient Status:  Inpatient    7/3/1934 MRN [id number]   [facility] 4NW-A Attending Gema Moore,    Hosp Day # 1 PCP Margaret Nichole MD     Chief Complaint: SBO    S: Patient seen an Oral Nightly   • gabapentin  300 mg Oral BID   • Pravastatin Sodium  20 mg Oral Nightly   • umeclidinium-vilanterol  1 puff Inhalation Daily   • Heparin Sodium (Porcine)  5,000 Units Subcutaneous Q8H [address]   • docusate sodium  100 mg Oral BID   • bacitracin

## 2017-09-29 NOTE — PLAN OF CARE
Tolerating sips of clears, no complaints of pain/bloating/nausea/vomiting. NGT removed 1500. Patient updated on plan to stay on clears for the rest of the day, continue to monitor. Tolerating clear liquid diet, no complaints of pain or N/V/D.    Rian Chapa

## 2017-09-30 VITALS
OXYGEN SATURATION: 99 % | TEMPERATURE: 98 F | SYSTOLIC BLOOD PRESSURE: 153 MMHG | HEART RATE: 73 BPM | HEIGHT: 64 IN | DIASTOLIC BLOOD PRESSURE: 60 MMHG | RESPIRATION RATE: 18 BRPM | BODY MASS INDEX: 18.1 KG/M2 | WEIGHT: 106 LBS

## 2017-09-30 PROCEDURE — 99239 HOSP IP/OBS DSCHRG MGMT >30: CPT | Performed by: HOSPITALIST

## 2017-09-30 NOTE — PROGRESS NOTES
[facility]  Progress Note    [facility] Patient Status:  Inpatient    7/3/1934 MRN [id number]   [facility] 4NW-A Attending Stacey Avila, DO   Hosp Day # 2 PCP Drake Staton MD     Subjective:   The patient feels much better tod fibrillation ([facility] [address]     History of colon resection, for diverticulitis, 2010, with colostomy     Chronic pain following surgery or procedure     Capsular contracture of breast implant     Iron deficiency anemia     Chronic obstructive pulmonary disease ([facility] [address]

## 2017-09-30 NOTE — PLAN OF CARE
GASTROINTESTINAL - ADULT    • Minimal or absence of nausea and vomiting Progressing        PAIN - ADULT    • Verbalizes/displays adequate comfort level or patient's stated pain goal Progressing        SAFETY ADULT - FALL    • Free from fall injury Progress

## 2017-09-30 NOTE — PROGRESS NOTES
Dr Giancarlo Turner NOTIFIED AND UPDATED , Amy Zhou , DISCHARGE SUMMARY COMPLETED AND DISCUSSED , DISCHARGED FOR HOME PER WHEELCHAIR

## 2017-09-30 NOTE — PROGRESS NOTES
[facility]  Progress Note     Katina Kwong Patient Status:  Inpatient    7/3/1934 MRN [id number]   [facility] 4NW-A Attending Salena More, DO   Hosp Day # 2 PCP Clelia Soulier, MD     Chief Complaint: SBO    S: Patient seen an Oral Daily   • Donepezil HCl  5 mg Oral Nightly   • gabapentin  300 mg Oral BID   • Pravastatin Sodium  20 mg Oral Nightly   • umeclidinium-vilanterol  1 puff Inhalation Daily   • Heparin Sodium (Porcine)  5,000 Units Subcutaneous Q8H [address]   • docusate sodi

## 2017-09-30 NOTE — PLAN OF CARE
GASTROINTESTINAL - ADULT    • Minimal or absence of nausea and vomiting Progressing    • Maintains adequate nutritional intake (undernourished) Progressing        PAIN - ADULT    • Verbalizes/displays adequate comfort level or patient's stated pain goal Pr

## 2017-10-02 ENCOUNTER — TELEPHONE (OUTPATIENT)
Dept: INTERNAL MEDICINE CLINIC | Facility: CLINIC | Age: 82
End: 2017-10-02

## 2017-10-02 ENCOUNTER — PATIENT OUTREACH (OUTPATIENT)
Dept: CASE MANAGEMENT | Age: 82
End: 2017-10-02

## 2017-10-02 ENCOUNTER — TELEPHONE (OUTPATIENT)
Dept: CASE MANAGEMENT | Age: 82
End: 2017-10-02

## 2017-10-02 DIAGNOSIS — Z02.9 ENCOUNTERS FOR ADMINISTRATIVE PURPOSE: ICD-10-CM

## 2017-10-02 NOTE — PROGRESS NOTES
Initial Post Discharge Follow Up   Discharge Date: 9/30/17  Contact Date: 10/2/2017    Consent Verification:  Assessment Completed With: Patient  HIPAA Verified?   Yes    Discharge Dx:   Small Bowel Obstruction     General:   • How have you been since yo Disp:  Rfl:    umeclidinium-vilanterol (ANORO ELLIPTA) 62.5-25 MCG/INH Inhalation Aerosol Powder, Breath Activated Inhale 1 puff into the lungs daily.  Disp: 3 each Rfl: 3   ferrous sulfate 325 (65 FE) MG Oral Tab EC Take 325 mg by mouth daily with breakfas stated her post op appt has been made and pt scheduled her HFU with PCP's office. Pt is seeing Emily Love on 10.9. 17. Pt requested a morning appt. TE sent to PCP's office regarding [place].  Pt is aware to keep her scheduled appointments and confirms she has no LEVEL GROWTH HORMONE HDL INHIBIN-A (DIMER) LDL MELANOCYTE STIM.  HORMONE, BETA NMR LIPOPROFILE TEST PANCREASTATIN PANCREATIC POLYPEPTIDE PHOSPHOLIPIDS, SERUM PROINSULIN TRIGLYCEROIDS VITAMIN A (RETINOL), SER/MARC VITAMIN C LEVEL VITAMIN E, SERUM OR PLASMA VI

## 2017-10-02 NOTE — TELEPHONE ENCOUNTER
Pt calling to set up HFU. Was released 9- for blockage in intestine, Pt was in a couple of days.  Pt feeling OK

## 2017-10-02 NOTE — TELEPHONE ENCOUNTER
Called pt for TCM. Pt scheduled her HFU with Stanton Gaines on 10.9. 17. Attempted to schedule her appointment sooner however pt requests conflicted with MD schedules. Please contact pt to see if HFU can be rescheduled to a sooner date if needed. Thank you.

## 2017-10-05 ENCOUNTER — MYAURORA ACCOUNT LINK (OUTPATIENT)
Dept: OTHER | Age: 82
End: 2017-10-05

## 2017-10-05 ENCOUNTER — HOSPITAL ENCOUNTER (OUTPATIENT)
Dept: CV DIAGNOSTICS | Age: 82
Discharge: HOME | End: 2017-10-05
Attending: INTERNAL MEDICINE
Payer: MEDICARE

## 2017-10-05 DIAGNOSIS — I48.91 ATRIAL FIBRILLATION, UNSPECIFIED TYPE (HCC): ICD-10-CM

## 2017-10-05 DIAGNOSIS — R06.00 DYSPNEA, UNSPECIFIED TYPE: ICD-10-CM

## 2017-10-05 DIAGNOSIS — R07.9 CHEST PAIN, UNSPECIFIED TYPE: ICD-10-CM

## 2017-10-05 NOTE — DISCHARGE SUMMARY
[facility]  DISCHARGE SUMMARY     Jennifer Gagnon Patient Status:  Inpatient    7/3/1934 MRN [id number]   [facility] 4NW-A Attending No att. providers found   Hosp Day # 2 PCP Michelle Garza MD     Date of Admission: 2017  Bennie started on clear liquid diet and was advanced as tolerated. Patient tolerating full liquids patient was restarted on her rate controlling heart medications and blood pressure medications.   Patient started having small bowel movements with no more nausea a Take 1 tablet by mouth daily. Refills:  0     simvastatin 10 MG Tabs  Commonly known as:  ZOCOR      Take 1 tablet (10 mg total) by mouth once daily.    Quantity:  90 tablet  Refills:  1     umeclidinium-vilanterol 62.5-25 MCG/INH Aepb  Commonly known as:

## 2017-10-09 ENCOUNTER — OFFICE VISIT (OUTPATIENT)
Dept: INTERNAL MEDICINE CLINIC | Facility: CLINIC | Age: 82
End: 2017-10-09

## 2017-10-09 VITALS
DIASTOLIC BLOOD PRESSURE: 66 MMHG | HEART RATE: 76 BPM | WEIGHT: 115.63 LBS | BODY MASS INDEX: 19.74 KG/M2 | HEIGHT: 64 IN | SYSTOLIC BLOOD PRESSURE: 128 MMHG | RESPIRATION RATE: 18 BRPM

## 2017-10-09 DIAGNOSIS — R60.0 PEDAL EDEMA: ICD-10-CM

## 2017-10-09 DIAGNOSIS — J44.9 CHRONIC OBSTRUCTIVE PULMONARY DISEASE, UNSPECIFIED COPD TYPE (HCC): ICD-10-CM

## 2017-10-09 DIAGNOSIS — I10 ESSENTIAL HYPERTENSION WITH GOAL BLOOD PRESSURE LESS THAN 130/80: ICD-10-CM

## 2017-10-09 DIAGNOSIS — E78.00 PURE HYPERCHOLESTEROLEMIA: ICD-10-CM

## 2017-10-09 DIAGNOSIS — K56.609 SBO (SMALL BOWEL OBSTRUCTION) (HCC): Primary | ICD-10-CM

## 2017-10-09 DIAGNOSIS — S81.812A LACERATION OF LEFT LOWER LEG, INITIAL ENCOUNTER: ICD-10-CM

## 2017-10-09 DIAGNOSIS — I48.0 PAROXYSMAL ATRIAL FIBRILLATION (HCC): ICD-10-CM

## 2017-10-09 DIAGNOSIS — D64.9 ANEMIA, UNSPECIFIED TYPE: ICD-10-CM

## 2017-10-09 PROBLEM — N30.00 ACUTE CYSTITIS WITHOUT HEMATURIA: Status: RESOLVED | Noted: 2017-07-15 | Resolved: 2017-10-09

## 2017-10-09 PROBLEM — R35.0 URINARY FREQUENCY: Status: RESOLVED | Noted: 2017-03-20 | Resolved: 2017-10-09

## 2017-10-09 PROCEDURE — 99495 TRANSJ CARE MGMT MOD F2F 14D: CPT | Performed by: NURSE PRACTITIONER

## 2017-10-09 NOTE — PATIENT INSTRUCTIONS
Apply Bacitracin twice daily to right lower leg, then cover with clean gauze. Wear knee high compression stockings daily. Remove at night time. See me in 7-10 days.

## 2017-10-09 NOTE — PROGRESS NOTES
HPI:    Cindy Taveras is a 80year old female here today for hospital follow up.    She was discharged from Inpatient hospital, [facility] to Home   Admission Date: 9/28/17   Discharge Date: 9/30/17  Hospital Discharge Diagnosis: Small bowel obstr lightheadedness. Also c/o approx 3 week history of laceration to RLE that occurred when she walked into her recliner. Stated it has been healing well. Denies fever/chills, redness swelling to laceration. Denies bleeding or drainage to area.  Has not bee intestine; Dizziness and giddiness; Hearing impairment; High blood pressure; High cholesterol; HYPERLIPIDEMIA; HYPERTENSION; Increased urinary frequency; Lower back pain; Lumbago; Osteoarthritis; Other and unspecified hyperlipidemia;  Pneumonia, organism un anxiety      PHYSICAL EXAM:   No LMP recorded. Patient is postmenopausal.  Estimated body mass index is 19.84 kg/m² as calculated from the following:    Height as of this encounter: 64\".     Weight as of this encounter: 115 lb 9.6 oz.   /66   Pulse 7 over from IV hydration in hospital. Elevated legs when at rest. Wear OTC compression stockings, knee high. See me in 7-10 days for recheck.   -     COMP METABOLIC PANEL (14);  Future    Laceration of left lower leg, initial encounter- Apply bacitracin BID a

## 2017-10-10 ENCOUNTER — TELEPHONE (OUTPATIENT)
Dept: SURGERY | Facility: CLINIC | Age: 82
End: 2017-10-10

## 2017-10-10 RX ORDER — GABAPENTIN 300 MG/1
CAPSULE ORAL
Qty: 60 CAPSULE | Refills: 0 | Status: SHIPPED | OUTPATIENT
Start: 2017-10-10 | End: 2017-11-11

## 2017-10-10 NOTE — TELEPHONE ENCOUNTER
Patient called to inform Gabapentin 300 mg BID had been refilled. Patient called as well to discuss a follow up appointment for imaging review. Nursing staff asked to please call the patient later today to discuss the above.

## 2017-10-10 NOTE — TELEPHONE ENCOUNTER
Patient medication, Gabapentin 300mg cap BID approved for the patient. Patient will be called to make sure follow up is scheduled.

## 2017-10-10 NOTE — TELEPHONE ENCOUNTER
Medication: Gabapentin    Date of last refill: 9/11/17  [place]    Last office visit: 9/18/17  Due back to clinic per last office note:  2 weeks  Date next office visit scheduled:  None scheduled    Last OV note jayden

## 2017-10-10 NOTE — TELEPHONE ENCOUNTER
Patient called to inform Gabapentin was refilled and to discuss a follow up appointment. Nursing staff message to please call the patient later today to discuss follow up.

## 2017-10-11 ENCOUNTER — PRIOR ORIGINAL RECORDS (OUTPATIENT)
Dept: OTHER | Age: 82
End: 2017-10-11

## 2017-10-12 ENCOUNTER — OFFICE VISIT (OUTPATIENT)
Dept: SURGERY | Facility: CLINIC | Age: 82
End: 2017-10-12

## 2017-10-12 VITALS
SYSTOLIC BLOOD PRESSURE: 160 MMHG | TEMPERATURE: 98 F | WEIGHT: 110 LBS | HEART RATE: 73 BPM | DIASTOLIC BLOOD PRESSURE: 67 MMHG | BODY MASS INDEX: 18.78 KG/M2 | HEIGHT: 64 IN

## 2017-10-12 DIAGNOSIS — K56.609 SBO (SMALL BOWEL OBSTRUCTION) (HCC): Primary | ICD-10-CM

## 2017-10-12 PROCEDURE — 99024 POSTOP FOLLOW-UP VISIT: CPT | Performed by: SURGERY

## 2017-10-12 NOTE — PROGRESS NOTES
Post Operative Visit Note       Active Problems  1. SBO (small bowel obstruction)         Chief Complaint   Patient presents with: Bowel Obstruction: 10/1 D/C from [facility] following SBO. 8/20/2017 Exploratory laparotomy. lysis of adhesions         History o COLONOSCOPY      Comment: x2, in 2012 w/ forceps biopsy  No date: COLOSTOMY      Comment: and reversal  08/20/2017: LYSIS OF ADHESIONS      Comment: expl lap  2006: PAULINO NEEDLE LOCALIZATION W/ SPECIMEN 1 SITE RIG* Right      Comment: benign.   No date: OTHER 1/1/1969    Smokeless tobacco: Never Used                        Alcohol use: Yes           0.6 oz/week       Glasses of wine: 1 per week    Drug use: No            Other Topics            Concern  Caffeine Concern        Yes    Comment:decaf Tea 1 cup a d diaphoresis, fatigue, fever and unexpected weight change. HENT: Negative for hearing loss, nosebleeds, sore throat and trouble swallowing. Respiratory: Negative for apnea, cough, shortness of breath and wheezing.     Cardiovascular: Negative for chest and to the patient's satisfaction. The patient voiced understanding of the postoperative care plan. ·   ·        No orders of the defined types were placed in this encounter.       Imaging & Referrals   None    Follow Up  Return in about 4 weeks (around 1

## 2017-10-13 ENCOUNTER — PRIOR ORIGINAL RECORDS (OUTPATIENT)
Dept: OTHER | Age: 82
End: 2017-10-13

## 2017-10-17 ENCOUNTER — OFFICE VISIT (OUTPATIENT)
Dept: INTERNAL MEDICINE CLINIC | Facility: CLINIC | Age: 82
End: 2017-10-17

## 2017-10-17 VITALS
SYSTOLIC BLOOD PRESSURE: 122 MMHG | WEIGHT: 115 LBS | TEMPERATURE: 98 F | BODY MASS INDEX: 19.63 KG/M2 | HEIGHT: 64 IN | DIASTOLIC BLOOD PRESSURE: 54 MMHG | HEART RATE: 84 BPM

## 2017-10-17 DIAGNOSIS — D50.9 IRON DEFICIENCY ANEMIA, UNSPECIFIED IRON DEFICIENCY ANEMIA TYPE: Primary | ICD-10-CM

## 2017-10-17 DIAGNOSIS — S81.811D LACERATION OF RIGHT LOWER EXTREMITY, SUBSEQUENT ENCOUNTER: ICD-10-CM

## 2017-10-17 DIAGNOSIS — T81.89XA INCISIONAL IRRITATION, INITIAL ENCOUNTER: ICD-10-CM

## 2017-10-17 DIAGNOSIS — R60.0 PEDAL EDEMA: ICD-10-CM

## 2017-10-17 PROCEDURE — 99214 OFFICE O/P EST MOD 30 MIN: CPT | Performed by: NURSE PRACTITIONER

## 2017-10-17 NOTE — PROGRESS NOTES
Patient presents with: Follow - Up      HPI:  Presents for follow up, at last visit (for hospital follow up for SBO) was noted with hgb lower than baseline anemia, pedal edema and a laceration of her LLE. She was instructed to apply bacitracin to LLE BID. hypercholesterolemia     History of recurrent UTI (urinary tract infection)     Osteoarthritis of shoulder     Osteopenia     Cervicalgia     Degenerative arthritis of cervical spine     Hx of small bowel obstruction     Disorders of bursae and tendons in 81 mg by mouth daily. Disp:  Rfl:    CRANBERRY OR Take 1 tablet by mouth daily. Disp:  Rfl:    Vitamin C (VITAMIN C) 500 MG/5ML Oral Syrup Take 500 mg by mouth daily.  Disp:  Rfl:    Cholecalciferol (VITAMIN D) 1000 UNITS Oral Cap Take 1 tablet by mouth d during the day. Incisional irritation, initial encounter- Referred back to Dr. Otis Patino for eval. Will hold off on antibiotics for now, await surgery input. No orders of the defined types were placed in this encounter.       Meds & Refills for this Vi

## 2017-10-18 ENCOUNTER — APPOINTMENT (OUTPATIENT)
Dept: LAB | Age: 82
End: 2017-10-18
Attending: NURSE PRACTITIONER
Payer: MEDICARE

## 2017-10-18 ENCOUNTER — LAB ENCOUNTER (OUTPATIENT)
Dept: LAB | Age: 82
End: 2017-10-18
Attending: INTERNAL MEDICINE
Payer: MEDICARE

## 2017-10-18 ENCOUNTER — PRIOR ORIGINAL RECORDS (OUTPATIENT)
Dept: OTHER | Age: 82
End: 2017-10-18

## 2017-10-18 ENCOUNTER — OFFICE VISIT (OUTPATIENT)
Dept: SURGERY | Facility: CLINIC | Age: 82
End: 2017-10-18

## 2017-10-18 VITALS
SYSTOLIC BLOOD PRESSURE: 150 MMHG | WEIGHT: 115 LBS | BODY MASS INDEX: 19.63 KG/M2 | HEIGHT: 64 IN | TEMPERATURE: 98 F | DIASTOLIC BLOOD PRESSURE: 75 MMHG | HEART RATE: 83 BPM

## 2017-10-18 DIAGNOSIS — E78.00 PURE HYPERCHOLESTEROLEMIA: Primary | ICD-10-CM

## 2017-10-18 DIAGNOSIS — D64.9 ANEMIA, UNSPECIFIED TYPE: ICD-10-CM

## 2017-10-18 DIAGNOSIS — R60.0 PEDAL EDEMA: ICD-10-CM

## 2017-10-18 DIAGNOSIS — I10 ESSENTIAL HYPERTENSION, MALIGNANT: ICD-10-CM

## 2017-10-18 DIAGNOSIS — E83.52 SERUM CALCIUM ELEVATED: ICD-10-CM

## 2017-10-18 DIAGNOSIS — K56.609 SBO (SMALL BOWEL OBSTRUCTION) (HCC): Primary | ICD-10-CM

## 2017-10-18 DIAGNOSIS — K56.609 SBO (SMALL BOWEL OBSTRUCTION) (HCC): ICD-10-CM

## 2017-10-18 DIAGNOSIS — D75.839 THROMBOCYTOSIS: ICD-10-CM

## 2017-10-18 PROCEDURE — 99024 POSTOP FOLLOW-UP VISIT: CPT | Performed by: SURGERY

## 2017-10-18 PROCEDURE — 36415 COLL VENOUS BLD VENIPUNCTURE: CPT

## 2017-10-18 PROCEDURE — 80053 COMPREHEN METABOLIC PANEL: CPT

## 2017-10-18 PROCEDURE — 85025 COMPLETE CBC W/AUTO DIFF WBC: CPT

## 2017-10-18 PROCEDURE — 80061 LIPID PANEL: CPT

## 2017-10-18 NOTE — PROGRESS NOTES
Follow Up Visit Note       Active Problems      1.  SBO (small bowel obstruction)          Chief Complaint   Patient presents with:  Wound: PO 8/20   Exploratory laparotomy, lysis of adhesions wound healing concerns, 9/28 sx, referred by WAYNE Church forceps biopsy  No date: COLOSTOMY      Comment: and reversal  08/20/2017: LYSIS OF ADHESIONS      Comment: expl lap  2006: PAULINO NEEDLE LOCALIZATION W/ SPECIMEN 1 SITE RIG* Right      Comment: benign.   No date: OTHER Bilateral      Comment: bilateral breast Alcohol use:  No              Drug use: No            Other Topics            Concern  Caffeine Concern        Yes    Comment:decaf Tea 1 cup a day  Exercise                No    Comment:daily         Current Outpatient Prescriptions: nosebleeds, sore throat and trouble swallowing. Respiratory: Negative for apnea, cough, shortness of breath and wheezing. Cardiovascular: Negative for chest pain, palpitations and leg swelling.    Gastrointestinal: Negative for abdominal distention, a Musculoskeletal: Normal range of motion. Lymphadenopathy:        Head (right side): No submental, no submandibular, no preauricular, no posterior auricular and no occipital adenopathy present.         Head (left side): No submental, no submandibular patient during today's visit. ·   · Wound care instructions were discussed during today's visit. ·   · The patient will return to my attention on an as needed basis.   ·   · The patient is encouraged to continue seeing the primary care physician for ongoi

## 2017-10-20 ENCOUNTER — PRIOR ORIGINAL RECORDS (OUTPATIENT)
Dept: OTHER | Age: 82
End: 2017-10-20

## 2017-10-20 LAB
ALBUMIN: 3.9 G/DL
ALKALINE PHOSPHATATE(ALK PHOS): 88 IU/L
ALT (SGPT): 20 U/L
AST (SGOT): 13 U/L
BILIRUBIN TOTAL: 0.5 MG/DL
BUN: 14 MG/DL
CALCIUM: 9.7 MG/DL
CHLORIDE: 107 MEQ/L
CHOLESTEROL, TOTAL: 169 MG/DL
CREATININE, SERUM: 0.69 MG/DL
GLUCOSE: 115 MG/DL
GLUCOSE: 115 MG/DL
HDL CHOLESTEROL: 112 MG/DL
LDL CHOLESTEROL: 49 MG/DL
NON-HDL CHOLESTEROL: 57 MG/DL
POTASSIUM, SERUM: 4.3 MEQ/L
PROTEIN, TOTAL: 7.4 G/DL
SGOT (AST): 13 IU/L
SGPT (ALT): 20 IU/L
SODIUM: 141 MEQ/L
TRIGLYCERIDES: 41 MG/DL

## 2017-10-23 ENCOUNTER — OFFICE VISIT (OUTPATIENT)
Dept: INTERNAL MEDICINE CLINIC | Facility: CLINIC | Age: 82
End: 2017-10-23

## 2017-10-23 ENCOUNTER — APPOINTMENT (OUTPATIENT)
Dept: LAB | Age: 82
End: 2017-10-23
Attending: NURSE PRACTITIONER
Payer: MEDICARE

## 2017-10-23 ENCOUNTER — LAB ENCOUNTER (OUTPATIENT)
Dept: LAB | Age: 82
End: 2017-10-23
Attending: NURSE PRACTITIONER
Payer: MEDICARE

## 2017-10-23 VITALS
SYSTOLIC BLOOD PRESSURE: 128 MMHG | HEART RATE: 88 BPM | HEIGHT: 64 IN | DIASTOLIC BLOOD PRESSURE: 68 MMHG | WEIGHT: 108 LBS | BODY MASS INDEX: 18.44 KG/M2 | TEMPERATURE: 98 F

## 2017-10-23 DIAGNOSIS — R19.7 DIARRHEA, UNSPECIFIED TYPE: ICD-10-CM

## 2017-10-23 DIAGNOSIS — R19.7 DIARRHEA, UNSPECIFIED TYPE: Primary | ICD-10-CM

## 2017-10-23 PROCEDURE — 99213 OFFICE O/P EST LOW 20 MIN: CPT | Performed by: NURSE PRACTITIONER

## 2017-10-23 PROCEDURE — 80048 BASIC METABOLIC PNL TOTAL CA: CPT

## 2017-10-23 PROCEDURE — 87493 C DIFF AMPLIFIED PROBE: CPT

## 2017-10-23 PROCEDURE — 87045 FECES CULTURE AEROBIC BACT: CPT

## 2017-10-23 PROCEDURE — 87046 STOOL CULTR AEROBIC BACT EA: CPT

## 2017-10-23 PROCEDURE — 87427 SHIGA-LIKE TOXIN AG IA: CPT

## 2017-10-23 PROCEDURE — 82272 OCCULT BLD FECES 1-3 TESTS: CPT

## 2017-10-23 RX ORDER — DOCUSATE SODIUM 100 MG/1
CAPSULE ORAL
Qty: 90 CAPSULE | Refills: 0 | Status: SHIPPED | OUTPATIENT
Start: 2017-10-23 | End: 2017-10-25

## 2017-10-23 NOTE — PATIENT INSTRUCTIONS
[place] Diet  Your healthcare provider may recommend a bland diet if you have an upset stomach. It consists of foods that are mild and easy to digest. It is better to eat small frequent meals rather than 3 large meals a day.     Beverages  OK: Fruit juices, © 8924-6334 [address] [facility], [facility] [place]. All rights reserved. This information is not intended as a substitute for professional medical care. Always follow your healthcare professional's instructions.

## 2017-10-23 NOTE — PROGRESS NOTES
Patient presents with:  Diarrhea: for 3 days. She had toast and some tea today and she thinks she's ok. Room 7      HPI:  Presents with approx 3 day history of diarrhea described as approx 5-6 loose stools daily.  Denies abd pain/bloating/crampiong, N/V, bl Chronic obstructive pulmonary disease (HCC)     Paroxysmal atrial fibrillation (HCC)     Essential hypertension with goal blood pressure less than 130/80     Dyslipidemia     SBO (small bowel obstruction)     Memory deficit     Mild cognitive impairment /68 (BP Location: Left arm, Patient Position: Sitting, Cuff Size: adult)   Pulse 88   Temp 97.7 °F (36.5 °C) (Oral)   Ht 64\"   Wt 108 lb   BMI 18.54 kg/m²   Constitutional:  No distress. HEENT: Normocephalic and atraumatic.    Cardiovascular: [facility] Desserts  OK: Peanut butter and all others except those to \"avoid\"  Avoid: Chocolate, cocoa, coconut, popcorn, nuts, seeds, jam, marmalade  Fruits  OK: Canned, cooked, frozen or fresh fruits without seeds or tough skin  Avoid: Olives, skin and seeds of f

## 2017-10-24 ENCOUNTER — TELEPHONE (OUTPATIENT)
Dept: INTERNAL MEDICINE CLINIC | Facility: CLINIC | Age: 82
End: 2017-10-24

## 2017-10-24 NOTE — TELEPHONE ENCOUNTER
Pt called back about her test results and I read her what triage wrote that they were stable/no further testing needed and she wants to know what she is to do about the diarrhea?  Call to advise

## 2017-10-25 ENCOUNTER — OFFICE VISIT (OUTPATIENT)
Dept: SURGERY | Facility: CLINIC | Age: 82
End: 2017-10-25

## 2017-10-25 VITALS — HEART RATE: 80 BPM | DIASTOLIC BLOOD PRESSURE: 56 MMHG | SYSTOLIC BLOOD PRESSURE: 120 MMHG

## 2017-10-25 DIAGNOSIS — S22.000D CLOSED COMPRESSION FRACTURE OF THORACIC VERTEBRA WITH ROUTINE HEALING, SUBSEQUENT ENCOUNTER: ICD-10-CM

## 2017-10-25 DIAGNOSIS — Z98.890 POST-OPERATIVE STATE: Primary | ICD-10-CM

## 2017-10-25 PROCEDURE — 99214 OFFICE O/P EST MOD 30 MIN: CPT | Performed by: NEUROLOGICAL SURGERY

## 2017-10-25 RX ORDER — METHYLPREDNISOLONE 4 MG/1
TABLET ORAL
Qty: 1 PACKAGE | Refills: 0 | Status: SHIPPED | OUTPATIENT
Start: 2017-10-25 | End: 2017-11-28

## 2017-10-25 NOTE — PATIENT INSTRUCTIONS
Refill policies:    • Allow 2-3 business days for refills; controlled substances may take longer.   • Contact your pharmacy at least 5 days prior to running out of medication and have them send an electronic request or submit request through the [facility] have a procedure or additional testing performed. [facility]) will contact your insurance carrier to obtain pre-certification or prior authorization.     Unfortunately, [facility] has seen an increase in denial of payment even though the p

## 2017-10-26 NOTE — PROGRESS NOTES
[facility]   Outpatient Neurological Surgery Follow Up    Chauncy Boast  : 7/3/1934  10/25/2017  PCP: Minh Leonardo MD  Referring Provider: No ref. provider found    REASON FOR VISIT:Patient presents with:   Follow - Up: sx  calcifications of the thoracic aorta with ascending thoracic aortic ectasia measuring 36 mm.  5. Nonspecific punctate calcifications within the left lobe of the thyroid, perhaps representing partially calcified thyroid nodules.     ASSESSMENT:  (Z98.890) Po

## 2017-10-30 ENCOUNTER — OFFICE VISIT (OUTPATIENT)
Dept: INTERNAL MEDICINE CLINIC | Facility: CLINIC | Age: 82
End: 2017-10-30

## 2017-10-30 VITALS
BODY MASS INDEX: 18.78 KG/M2 | HEIGHT: 64 IN | HEART RATE: 76 BPM | DIASTOLIC BLOOD PRESSURE: 74 MMHG | RESPIRATION RATE: 17 BRPM | WEIGHT: 110 LBS | OXYGEN SATURATION: 96 % | SYSTOLIC BLOOD PRESSURE: 116 MMHG

## 2017-10-30 DIAGNOSIS — R26.81 UNSTEADINESS: Primary | ICD-10-CM

## 2017-10-30 DIAGNOSIS — R19.7 DIARRHEA, UNSPECIFIED TYPE: ICD-10-CM

## 2017-10-30 PROCEDURE — 99213 OFFICE O/P EST LOW 20 MIN: CPT | Performed by: NURSE PRACTITIONER

## 2017-10-30 NOTE — PROGRESS NOTES
Patient presents with:  Unsteady: off balance for a while. HPI:  Presents several week history of feeling \"off balance\" at times. Thinks it may be related to her foot wear as she recently noticed it is worse in certain \"older\" shoes.  Denies she h small bowel obstruction     Disorders of bursae and tendons in shoulder region, unspecified     History of colon resection, for diverticulitis, 2010, with colostomy     Chronic pain following surgery or procedure     Capsular contracture of breast implant Disp:  Rfl:    Vitamin C (VITAMIN C) 500 MG/5ML Oral Syrup Take 500 mg by mouth daily. Disp:  Rfl:    Cholecalciferol (VITAMIN D) 1000 UNITS Oral Cap Take 1 tablet by mouth daily. Disp:  Rfl:    CALCIUM + D OR Take 1 tablet by mouth daily.  Disp:  Rfl: answered and the patient understands the plan.

## 2017-11-06 ENCOUNTER — PRIOR ORIGINAL RECORDS (OUTPATIENT)
Dept: OTHER | Age: 82
End: 2017-11-06

## 2017-11-06 ENCOUNTER — LABORATORY ENCOUNTER (OUTPATIENT)
Dept: LAB | Age: 82
End: 2017-11-06
Attending: INTERNAL MEDICINE
Payer: MEDICARE

## 2017-11-06 DIAGNOSIS — E78.00 PURE HYPERCHOLESTEROLEMIA: Primary | ICD-10-CM

## 2017-11-06 DIAGNOSIS — I10 ESSENTIAL HYPERTENSION, MALIGNANT: ICD-10-CM

## 2017-11-06 PROCEDURE — 80061 LIPID PANEL: CPT

## 2017-11-06 PROCEDURE — 36415 COLL VENOUS BLD VENIPUNCTURE: CPT

## 2017-11-06 PROCEDURE — 80053 COMPREHEN METABOLIC PANEL: CPT

## 2017-11-07 ENCOUNTER — PRIOR ORIGINAL RECORDS (OUTPATIENT)
Dept: OTHER | Age: 82
End: 2017-11-07

## 2017-11-07 LAB
ALBUMIN: 3.5 G/DL
ALKALINE PHOSPHATATE(ALK PHOS): 87 IU/L
ALT (SGPT): 29 U/L
AST (SGOT): 14 U/L
BILIRUBIN TOTAL: 0.6 MG/DL
BUN: 14 MG/DL
CALCIUM: 9.3 MG/DL
CHLORIDE: 104 MEQ/L
CHOLESTEROL, TOTAL: 162 MG/DL
CREATININE, SERUM: 0.73 MG/DL
GLUCOSE: 93 MG/DL
GLUCOSE: 93 MG/DL
HDL CHOLESTEROL: 108 MG/DL
LDL CHOLESTEROL: 42 MG/DL
NON-HDL CHOLESTEROL: 24 MG/DL
POTASSIUM, SERUM: 4.5 MEQ/L
PROTEIN, TOTAL: 6.5 G/DL
SGOT (AST): 14 IU/L
SGPT (ALT): 29 IU/L
SODIUM: 140 MEQ/L
TRIGLYCERIDES: 59 MG/DL

## 2017-11-13 RX ORDER — GABAPENTIN 300 MG/1
CAPSULE ORAL
Qty: 60 CAPSULE | Refills: 0 | Status: SHIPPED | OUTPATIENT
Start: 2017-11-13 | End: 2018-01-09

## 2017-11-13 NOTE — TELEPHONE ENCOUNTER
Patient medication refill request for Gabapentin, approved. Patient will be advised to continue refills for pain relief through her PCP, as per Dr. Ailyn Zheng last note, patient is cleared from surgery.  Patient will be advised to follow up as needed for any n

## 2017-11-13 NOTE — TELEPHONE ENCOUNTER
Medication: Gabapentin 300mg     Date of last refill: 10/10/17  [place]     Last office visit: 10/25/17  Due back to clinic per last office note:  prn  Date next office visit scheduled:  No appointment     Last OV note r

## 2017-11-16 ENCOUNTER — OFFICE VISIT (OUTPATIENT)
Dept: PHYSICAL THERAPY | Age: 82
End: 2017-11-16
Attending: NURSE PRACTITIONER
Payer: MEDICARE

## 2017-11-16 DIAGNOSIS — R26.81 UNSTEADINESS: ICD-10-CM

## 2017-11-16 PROCEDURE — 97112 NEUROMUSCULAR REEDUCATION: CPT

## 2017-11-16 PROCEDURE — 97161 PT EVAL LOW COMPLEX 20 MIN: CPT

## 2017-11-16 NOTE — PROGRESS NOTES
FALL SCREEN EVALUATION   Referring Physician: Dr. Katelyn Davis  Diagnosis: Unsteadiness     Date of Service: 11/16/2017     PATIENT eJff Her is a 80year old y/o female who presents to therapy today with complaints of unsteadiness x 2 months.  Re ankle: 4+/5  LE Flexibility: gastrocs:  Moderate restrictions               Postural Control:  4-Stage Balance Test:   - Feet together: >30 sec   - Tandem Stance: 17 sec Fall Risk: yes   - SLS: R 4 sec, L 1 sec Fall Risk: yes  [Full tandem stance <10 sec in speed between normal, fast and slow speeds. (2)  Mild Impairment: Is able to change speed but demonstrates mild gait deviations, or no gait deviations but unable to achieve a significant change in velocity, or uses an assistive device.    (1)  Moderate Im Low Complexity, Nreed 1      Total Timed Treatment: 15 min     Total Treatment Time: 45 min     PLAN OF CARE:    Goals: (to be met in 10 visits)  · Pt will demonstrate improved SLS to >8 seconds DANNY to promote safety and decrease risk of falls on uneven celestin care.    X___________________________________________________ Date____________________    Certification From: 32/42/5886  To:2/14/2018

## 2017-11-20 ENCOUNTER — APPOINTMENT (OUTPATIENT)
Dept: PHYSICAL THERAPY | Age: 82
End: 2017-11-20
Attending: NURSE PRACTITIONER
Payer: MEDICARE

## 2017-11-20 PROCEDURE — 97112 NEUROMUSCULAR REEDUCATION: CPT

## 2017-11-20 NOTE — PROGRESS NOTES
Dx: unsteadiness         Authorized # of Visits:           Next MD visit: none scheduled  Fall Risk: standard         Precautions: n/a             Subjective: No new problems. No falls since last visit.     Objective: SLB: L: 5 sec max; R: 6 sec max      A

## 2017-11-27 ENCOUNTER — OFFICE VISIT (OUTPATIENT)
Dept: PHYSICAL THERAPY | Age: 82
End: 2017-11-27
Attending: NURSE PRACTITIONER
Payer: MEDICARE

## 2017-11-27 PROCEDURE — 97112 NEUROMUSCULAR REEDUCATION: CPT

## 2017-11-27 NOTE — PROGRESS NOTES
Dx: unsteadiness         Authorized # of Visits:           Next MD visit: none scheduled  Fall Risk: standard         Precautions: n/a             Subjective: No new problems.   Reports that she has been feeling unsteady for the past 3 days and she has had Dynamic gait:   Gait with head turns/nods, gait with 180 deg turns L/R, lateral walking, tandem walking, backwards walking x10 min  Dynamic gait:   Gait with head turns/nods, x5 min                     Charges: Nreed 3       Total Timed Treatment: 4

## 2017-11-28 ENCOUNTER — OFFICE VISIT (OUTPATIENT)
Dept: INTERNAL MEDICINE CLINIC | Facility: CLINIC | Age: 82
End: 2017-11-28

## 2017-11-28 VITALS
WEIGHT: 109 LBS | TEMPERATURE: 99 F | DIASTOLIC BLOOD PRESSURE: 62 MMHG | HEART RATE: 72 BPM | SYSTOLIC BLOOD PRESSURE: 110 MMHG | RESPIRATION RATE: 16 BRPM | BODY MASS INDEX: 19 KG/M2

## 2017-11-28 DIAGNOSIS — I10 ESSENTIAL HYPERTENSION WITH GOAL BLOOD PRESSURE LESS THAN 130/80: Primary | ICD-10-CM

## 2017-11-28 PROBLEM — R19.7 DIARRHEA: Status: RESOLVED | Noted: 2017-07-15 | Resolved: 2017-11-28

## 2017-11-28 PROCEDURE — 99213 OFFICE O/P EST LOW 20 MIN: CPT | Performed by: NURSE PRACTITIONER

## 2017-11-28 NOTE — PROGRESS NOTES
Patient presents with:  Blood Pressure: per patient was told BP was elevated at Rehab, reading 155/58 pre work out and post work out 155/62 ROOM 7       HPI:  Presents for follow up of BP.  At \"rehab\" she was told her BP reading was 155/58 and then after Memory deficit     Mild cognitive impairment     Tremor of both hands     Leukocytosis     Closed compression fracture of thoracic vertebra (HCC)     Weakness generalized     Leg pain, diffuse     Thrombocytosis ([address]        Current Outpatient Prescripti No murmur. Pulmonary/Chest: No respiratory distress. Effort normal. Breath sounds clear bilaterally.  No wheezes, rhonchi or rales    A/P:    Essential hypertension with goal blood pressure less than 130/80  (primary encounter diagnosis)- BP at goal in o

## 2017-11-30 ENCOUNTER — OFFICE VISIT (OUTPATIENT)
Dept: PHYSICAL THERAPY | Age: 82
End: 2017-11-30
Attending: NURSE PRACTITIONER
Payer: MEDICARE

## 2017-11-30 PROCEDURE — 97112 NEUROMUSCULAR REEDUCATION: CPT

## 2017-11-30 NOTE — PROGRESS NOTES
Dx: unsteadiness         Authorized # of Visits:           Next MD visit: none scheduled  Fall Risk: standard         Precautions: n/a             Subjective: No new problems. Reports that she is feeling better with  balance since last visit.  Saw GP yeste Lateral walking with RTB around ankles 10ft x 3 -- --       Sit<>stand from chair x10 --  Sit<>stand from chair x10       Dynamic gait:   Gait with head turns/nods, gait with 180 deg turns L/R, lateral walking, tandem walking, backwards walking x10 min

## 2017-12-04 ENCOUNTER — OFFICE VISIT (OUTPATIENT)
Dept: PHYSICAL THERAPY | Age: 82
End: 2017-12-04
Attending: NURSE PRACTITIONER
Payer: MEDICARE

## 2017-12-04 PROCEDURE — 97112 NEUROMUSCULAR REEDUCATION: CPT

## 2017-12-04 NOTE — PROGRESS NOTES
Dx: unsteadiness         Authorized # of Visits:           Next MD visit: none scheduled  Fall Risk: standard         Precautions: n/a             Subjective: No new problems. Reports that she is feeling better overall.   Balance feels better since last vi L/R SLB:   L: 5 sec  R: 6 sec   L/R SLB:   L: 5 sec  R: 5 sec  L/R SLB:   L: 8 sec  R: 5 sec  L/R SLB:   L: 10 sec  R: 8 sec      Standing SLR R/L with RTB: abd x10 ea -- -- --      Lateral walking with RTB around ankles 10ft x 3 -- -- --      Sit<>stand

## 2017-12-07 ENCOUNTER — OFFICE VISIT (OUTPATIENT)
Dept: PHYSICAL THERAPY | Age: 82
End: 2017-12-07
Attending: NURSE PRACTITIONER
Payer: MEDICARE

## 2017-12-07 PROCEDURE — 97112 NEUROMUSCULAR REEDUCATION: CPT

## 2017-12-07 NOTE — PROGRESS NOTES
Dx: unsteadiness         Authorized # of Visits:           Next MD visit: none scheduled  Fall Risk: standard         Precautions: n/a             Subjective: No new problems. Reports that she is feeling better overall.   Balance feels better since last vi from floor x 5  Standing on airex reaching for cones from floor x 5      standing high marches x10 ea  standing high marches x10 ea   standing high marches x10 ea  standing high marches x12 ea  standing high marches x12 ea     Squats x10 Deferred   Squats

## 2017-12-11 ENCOUNTER — OFFICE VISIT (OUTPATIENT)
Dept: PHYSICAL THERAPY | Age: 82
End: 2017-12-11
Attending: NURSE PRACTITIONER
Payer: MEDICARE

## 2017-12-11 ENCOUNTER — TELEPHONE (OUTPATIENT)
Dept: INTERNAL MEDICINE CLINIC | Facility: CLINIC | Age: 82
End: 2017-12-11

## 2017-12-11 PROCEDURE — 97112 NEUROMUSCULAR REEDUCATION: CPT

## 2017-12-11 RX ORDER — DILTIAZEM HYDROCHLORIDE 300 MG/1
300 CAPSULE, COATED, EXTENDED RELEASE ORAL
Qty: 90 CAPSULE | Refills: 2 | Status: SHIPPED | OUTPATIENT
Start: 2017-12-11 | End: 2018-09-24

## 2017-12-11 NOTE — TELEPHONE ENCOUNTER
Aaliyah was in with her  to see Wilmar De Luna and talked to Sharda Ventura about needing a refill on her CARTIA  MG Oral Capsule SR 24 Hr she said that she sent it to the [facility] in [place] the one that is listed.   The pharmacy says they did not recei

## 2017-12-11 NOTE — PROGRESS NOTES
Dx: unsteadiness         Authorized # of Visits:           Next MD visit: none scheduled  Fall Risk: standard         Precautions: n/a             Subjective: No new problems. Reports that she feels her balance is a lot better overall.   Reports that she w stance on airex: head turns , head nods x5 ea  bilat stance on airex: head turns , head nods x5 ea  bilat stance on airex: head turns , head nods x5 ea  bilat stance on airex: head turns , head nods x5 ea  bilat stance on airex: head turns , head nods x5 e

## 2017-12-14 ENCOUNTER — OFFICE VISIT (OUTPATIENT)
Dept: PHYSICAL THERAPY | Age: 82
End: 2017-12-14
Attending: NURSE PRACTITIONER
Payer: MEDICARE

## 2017-12-14 ENCOUNTER — TELEPHONE (OUTPATIENT)
Dept: NEUROLOGY | Facility: CLINIC | Age: 82
End: 2017-12-14

## 2017-12-14 PROCEDURE — 97112 NEUROMUSCULAR REEDUCATION: CPT

## 2017-12-14 RX ORDER — GABAPENTIN 300 MG/1
300 CAPSULE ORAL 2 TIMES DAILY
Qty: 60 CAPSULE | Refills: 0 | Status: CANCELLED | OUTPATIENT
Start: 2017-12-14

## 2017-12-14 NOTE — PROGRESS NOTES
Discharge Summary    Pt has attended 8, cancelled 0, and no shown 0 visits in Physical Therapy. Aaliyah reports feeling 90% improvement in balance and function since starting therapy.  She reports ability to perform all daily activities without complaints and for this course of care. Thank you for your referral. If you have any questions, please contact me at Dept: [phone].     Sincerely,  Electronically signed by therapist: Olga Lepe PT       Dx: unsteadiness         Authorized # of Visits: bilat stance on airex: head turns , head nods x5 ea --     Standing on airex reaching for cones from floor x 5  Standing on airex reaching for cones from floor x 5  Standing on airex reaching for cones from floor x 5  Standing on airex reaching for cones f

## 2017-12-14 NOTE — TELEPHONE ENCOUNTER
LMTCB: see PA note from previous refill request  Medication: Gabapentin    Date of last refill: 11/13/2017  [place]    Last office visit: 10/25/2017  Due back to clinic per last office note:  As needed  Date next [facility]

## 2017-12-14 NOTE — TELEPHONE ENCOUNTER
Spoke with patient and relayed message below. Patient understood and stated she was not aware of that. Patient will call her PCP for refill and will also call our office back to get her appointment from today rescheduled as she is still having issues.

## 2017-12-19 ENCOUNTER — OFFICE VISIT (OUTPATIENT)
Dept: SURGERY | Facility: CLINIC | Age: 82
End: 2017-12-19

## 2017-12-19 VITALS
BODY MASS INDEX: 20 KG/M2 | DIASTOLIC BLOOD PRESSURE: 72 MMHG | RESPIRATION RATE: 16 BRPM | TEMPERATURE: 98 F | HEART RATE: 74 BPM | SYSTOLIC BLOOD PRESSURE: 145 MMHG | WEIGHT: 114 LBS

## 2017-12-19 DIAGNOSIS — Z87.19 HX OF SMALL BOWEL OBSTRUCTION: ICD-10-CM

## 2017-12-19 DIAGNOSIS — J44.9 CHRONIC OBSTRUCTIVE PULMONARY DISEASE, UNSPECIFIED COPD TYPE (HCC): ICD-10-CM

## 2017-12-19 DIAGNOSIS — T81.89XA SUTURE GRANULOMA, INITIAL ENCOUNTER: Primary | ICD-10-CM

## 2017-12-19 DIAGNOSIS — Z01.818 PRE-OP TESTING: Primary | ICD-10-CM

## 2017-12-19 PROCEDURE — 99213 OFFICE O/P EST LOW 20 MIN: CPT | Performed by: SURGERY

## 2017-12-19 NOTE — H&P
New Patient Visit Note       Active Problems      1. Suture granuloma, initial encounter    2. Hx of small bowel obstruction    3.  Chronic obstructive pulmonary disease, unspecified COPD type [facility])        Chief Complaint   Patient presents with:  Post-Op: HISTORY      Comment: Cyst in vagina  1974: OTHER SURGICAL HISTORY      Comment: Breast implants  2006: OTHER SURGICAL HISTORY      Comment: Right breast lump- benign  2006: OTHER SURGICAL HISTORY      Comment: Auazmyuzdp-yemdywjxia-ocbthyhzgtnbif  2007:  O (300 mg total) by mouth once daily. Disp: 90 capsule Rfl: 2   GABAPENTIN 300 MG Oral Cap TAKE ONE CAPSULE BY MOUTH TWICE DAILY  Disp: 60 capsule Rfl: 0   Alendronate Sodium 70 MG Oral Tab Take 1 tablet (70 mg total) by mouth once a week.  Disp: 12 tablet Rf urgency. Musculoskeletal: Negative for arthralgias and myalgias. Skin: Negative for color change and rash. Neurological: Negative for tremors, syncope and weakness. Hematological: Negative for adenopathy. Does not bruise/bleed easily.    Psychiatric coordination of care. The diagnosis, prognosis, and general treatment was explained to the patient and the family. No orders of the defined types were placed in this encounter. Imaging & Referrals   None    Follow Up  No Follow-up on file.

## 2018-01-03 ENCOUNTER — CHARTING TRANS (OUTPATIENT)
Dept: OTHER | Age: 83
End: 2018-01-03

## 2018-01-05 ENCOUNTER — LABORATORY ENCOUNTER (OUTPATIENT)
Dept: LAB | Age: 83
End: 2018-01-05
Attending: SURGERY
Payer: MEDICARE

## 2018-01-05 DIAGNOSIS — C81.93: Primary | ICD-10-CM

## 2018-01-05 PROCEDURE — 88305 TISSUE EXAM BY PATHOLOGIST: CPT

## 2018-01-09 ENCOUNTER — OFFICE VISIT (OUTPATIENT)
Dept: SURGERY | Facility: CLINIC | Age: 83
End: 2018-01-09

## 2018-01-09 VITALS — HEART RATE: 76 BPM | SYSTOLIC BLOOD PRESSURE: 110 MMHG | DIASTOLIC BLOOD PRESSURE: 58 MMHG

## 2018-01-09 DIAGNOSIS — M54.6 CHRONIC LEFT-SIDED THORACIC BACK PAIN: Primary | ICD-10-CM

## 2018-01-09 DIAGNOSIS — Z87.81 HX OF COMPRESSION FRACTURE OF SPINE: ICD-10-CM

## 2018-01-09 DIAGNOSIS — G89.29 CHRONIC LEFT-SIDED THORACIC BACK PAIN: Primary | ICD-10-CM

## 2018-01-09 DIAGNOSIS — Z98.890 S/P KYPHOPLASTY: ICD-10-CM

## 2018-01-09 PROCEDURE — 99213 OFFICE O/P EST LOW 20 MIN: CPT | Performed by: PHYSICIAN ASSISTANT

## 2018-01-09 RX ORDER — GABAPENTIN 300 MG/1
300 CAPSULE ORAL 2 TIMES DAILY
Qty: 90 CAPSULE | Refills: 0 | Status: SHIPPED | OUTPATIENT
Start: 2018-01-09 | End: 2018-02-18

## 2018-01-09 NOTE — TELEPHONE ENCOUNTER
Pt was given a year supply of this medication at [facility], but it was sent to [facility] in [facility]. This request is from [facility] in [place]. Likely pt is requesting a transfer.     Contacted pharmacy and they state that they contacted [address] yeste

## 2018-01-09 NOTE — PATIENT INSTRUCTIONS
Refill policies:    • Allow 2-3 business days for refills; controlled substances may take longer.   • Contact your pharmacy at least 5 days prior to running out of medication and have them send an electronic request or submit request through the [facility] have a procedure or additional testing performed. [facility]) will contact your insurance carrier to obtain pre-certification or prior authorization.     Unfortunately, [facility] has seen an increase in denial of payment even though the p

## 2018-01-16 ENCOUNTER — OFFICE VISIT (OUTPATIENT)
Dept: SURGERY | Facility: CLINIC | Age: 83
End: 2018-01-16

## 2018-01-16 DIAGNOSIS — Z02.89 HEALTH EXAMINATION OF DEFINED SUBPOPULATION: Primary | ICD-10-CM

## 2018-01-16 NOTE — PATIENT INSTRUCTIONS
Refill policies:    • Allow 2-3 business days for refills; controlled substances may take longer.   • Contact your pharmacy at least 5 days prior to running out of medication and have them send an electronic request or submit request through the [facility] recommended that you have a procedure or additional testing performed. [facility]) will contact your insurance carrier to obtain pre-certification or prior authorization.     Unfortunately, [facility] has seen an increase in denial of paym

## 2018-01-19 ENCOUNTER — OFFICE VISIT (OUTPATIENT)
Dept: SURGERY | Facility: CLINIC | Age: 83
End: 2018-01-19

## 2018-01-19 VITALS — SYSTOLIC BLOOD PRESSURE: 138 MMHG | DIASTOLIC BLOOD PRESSURE: 60 MMHG | HEART RATE: 68 BPM | RESPIRATION RATE: 14 BRPM

## 2018-01-19 VITALS
HEIGHT: 63 IN | SYSTOLIC BLOOD PRESSURE: 154 MMHG | TEMPERATURE: 98 F | DIASTOLIC BLOOD PRESSURE: 72 MMHG | WEIGHT: 114 LBS | BODY MASS INDEX: 20.2 KG/M2 | HEART RATE: 69 BPM

## 2018-01-19 DIAGNOSIS — T81.89XD SUTURE GRANULOMA, SUBSEQUENT ENCOUNTER: Primary | ICD-10-CM

## 2018-01-19 DIAGNOSIS — M47.814 ARTHROPATHY OF THORACIC FACET JOINT: Primary | ICD-10-CM

## 2018-01-19 PROCEDURE — 99205 OFFICE O/P NEW HI 60 MIN: CPT | Performed by: ANESTHESIOLOGY

## 2018-01-19 PROCEDURE — 99024 POSTOP FOLLOW-UP VISIT: CPT | Performed by: SURGERY

## 2018-01-19 NOTE — PROGRESS NOTES
Post Operative Visit Note       Active Problems  1.  Suture granuloma, subsequent encounter         Chief Complaint   Patient presents with:  Post-Op: 1st post op visit---Excision of suture granuloma with layered closure done 1/5/18         History of Prese HISTORY      Comment: Hpdxlyxcgm-ieudmtimpb-ajmvesuljhuzgt  2007: OTHER SURGICAL HISTORY      Comment: Removed cholostomy  2010: OTHER SURGICAL HISTORY      Comment: intestinal  1980: OTHER SURGICAL HISTORY      Comment: removal of mass near thyroid  12/14 tablet (5 mg total) by mouth nightly. Disp: 90 tablet Rfl: 0   gabapentin 300 MG Oral Cap Take 1 capsule (300 mg total) by mouth 2 (two) times daily.  Disp: 90 capsule Rfl: 0   umeclidinium-vilanterol (ANORO ELLIPTA) 62.5-25 MCG/INH Inhalation Aerosol Powde Genitourinary: Negative for difficulty urinating, dysuria, frequency and urgency. Musculoskeletal: Negative for arthralgias and myalgias. Skin: Negative for color change and rash. Neurological: Negative for tremors, syncope and weakness.    Hematolo

## 2018-01-19 NOTE — PATIENT INSTRUCTIONS
Refill policies:    • Allow 2-3 business days for refills; controlled substances may take longer.   • Contact your pharmacy at least 5 days prior to running out of medication and have them send an electronic request or submit request through the [facility] recommended that you have a procedure or additional testing performed. [facility]) will contact your insurance carrier to obtain pre-certification or prior authorization.     Unfortunately, [facility] has seen an increase in denial of paym days  • Trental 7 days  • Eliquis (Apixaban) 3 days  • Xarelto (Rivaroxaban) 3 days  • Lovenox (Enoxaparin) 24 hours  • Aspirin  • 81mg 24 hours  • Greater than 81 mg (325mg) 7 days  • Coumadin Procedure may be cancelled if INR is elevated.    • Epidural __ on 20 minutes off) after the first 24 hours you can use heat or cold.

## 2018-01-19 NOTE — PROGRESS NOTES
HPI:    Patient ID: Zana Whitlock is a 80year old female.     HPI    Review of Systems         Current Outpatient Prescriptions:  Albuterol Sulfate  (90 Base) MCG/ACT Inhalation Aero Soln Inhale 2 puffs into the lungs every 6 (six) hours as neede No prescriptions requested or ordered in this encounter    Imaging & Referrals:  None       Id#[id number]  Location of Pain: left back to waist    Date Pain Began: 2017          Work Related:   No        Receiving Work Comp/Disability:   No    Numeric Rating Scale

## 2018-01-19 NOTE — H&P
Name: Nikita Palma   : 7/3/1934   DOS: 2018     Chief complaint: Low back pain    History of present illness: Nikita Palma is a 80year old female with a history of thoracic kyphoplasty at the T9, T10 level with Dr. Shayy Herrera in 2017.   Hal Keating Inhalation Aerosol Powder, Breath Activated Inhale 1 puff into the lungs daily. Disp: 3 each Rfl: 1   DilTIAZem HCl ER Coated Beads (CARTIA XT) 300 MG Oral Capsule SR 24 Hr Take 1 capsule (300 mg total) by mouth once daily.  Disp: 90 capsule Rfl: 2   Alendr Comment: Erzqxzpbqk-pbfjkxknip-zthupehwkuklxv  2007: OTHER SURGICAL HISTORY      Comment: Removed cholostomy  2010: OTHER SURGICAL HISTORY      Comment: intestinal  1980: OTHER SURGICAL HISTORY      Comment: removal of mass near thyroid  12/14/12: OTHER LOAIZA Cranial nerves II through XII are grossly intact. Examination of the back: Increased kyphosis in the thoracic curvature. No pain with palpation of spinous processes.   Mild tenderness with palpation of thoracic paravertebral muscles primarily on the

## 2018-01-22 ENCOUNTER — OFFICE VISIT (OUTPATIENT)
Dept: PHYSICAL THERAPY | Age: 83
End: 2018-01-22
Attending: PHYSICIAN ASSISTANT
Payer: MEDICARE

## 2018-01-22 DIAGNOSIS — M54.6 CHRONIC LEFT-SIDED THORACIC BACK PAIN: ICD-10-CM

## 2018-01-22 DIAGNOSIS — Z87.81 HX OF COMPRESSION FRACTURE OF SPINE: ICD-10-CM

## 2018-01-22 DIAGNOSIS — G89.29 CHRONIC LEFT-SIDED THORACIC BACK PAIN: ICD-10-CM

## 2018-01-22 DIAGNOSIS — Z98.890 S/P KYPHOPLASTY: ICD-10-CM

## 2018-01-22 PROCEDURE — 97110 THERAPEUTIC EXERCISES: CPT

## 2018-01-22 PROCEDURE — 97162 PT EVAL MOD COMPLEX 30 MIN: CPT

## 2018-01-22 NOTE — PROGRESS NOTES
SPINE EVALUATION:   Referring Physician: Dr. Emmanuelle Sanders  Diagnosis: thoracic pain     Date of Service: 1/22/2018     PATIENT SUMMARY   Taj Mejia is a 80year old y/o female who presents to therapy today with complaints of thoracic pain.  She has a h/o and positioning noting scoliosis and accentuation of the thoracic kyphosis. 3. No new lumbar compression fracture. Stable degenerative changes without new herniation.     ASSESSMENT  She presents to PT with chief complaints of chronic thoracic pain, which pain with manual spinal tap    Balance: SLS R 10 sec, L 10     Today’s Treatment and Response:  Patient education provided on postural stretching and strengthening  Patient was instructed in and issued a HEP for see handout instructions  Charges: MISBAH HUYNH care.    X___________________________________________________ Date____________________    Certification From: 6/68/8705  To:4/22/2018

## 2018-01-25 ENCOUNTER — OFFICE VISIT (OUTPATIENT)
Dept: PHYSICAL THERAPY | Age: 83
End: 2018-01-25
Attending: PHYSICIAN ASSISTANT
Payer: MEDICARE

## 2018-01-25 ENCOUNTER — TELEPHONE (OUTPATIENT)
Dept: NEUROLOGY | Facility: CLINIC | Age: 83
End: 2018-01-25

## 2018-01-25 PROCEDURE — 97110 THERAPEUTIC EXERCISES: CPT

## 2018-01-25 PROCEDURE — 97140 MANUAL THERAPY 1/> REGIONS: CPT

## 2018-01-25 NOTE — PROGRESS NOTES
Dx: thoracic pain         Authorized # of Visits:  8         Next MD visit: none scheduled  Fall Risk: standard         Precautions: n/a             Subjective: no pain currently in back. Was sore yesterday but was very busy and didn't sit down all day.  To

## 2018-01-29 ENCOUNTER — OFFICE VISIT (OUTPATIENT)
Dept: PHYSICAL THERAPY | Age: 83
End: 2018-01-29
Attending: PHYSICIAN ASSISTANT
Payer: MEDICARE

## 2018-01-29 ENCOUNTER — SURGERY (OUTPATIENT)
Age: 83
End: 2018-01-29

## 2018-01-29 ENCOUNTER — HOSPITAL ENCOUNTER (OUTPATIENT)
Facility: HOSPITAL | Age: 83
Setting detail: HOSPITAL OUTPATIENT SURGERY
Discharge: HOME | End: 2018-01-29
Attending: ANESTHESIOLOGY | Admitting: ANESTHESIOLOGY
Payer: MEDICARE

## 2018-01-29 ENCOUNTER — APPOINTMENT (OUTPATIENT)
Dept: GENERAL RADIOLOGY | Facility: HOSPITAL | Age: 83
End: 2018-01-29
Attending: ANESTHESIOLOGY
Payer: MEDICARE

## 2018-01-29 VITALS
HEART RATE: 66 BPM | TEMPERATURE: 98 F | RESPIRATION RATE: 16 BRPM | OXYGEN SATURATION: 100 % | SYSTOLIC BLOOD PRESSURE: 133 MMHG | DIASTOLIC BLOOD PRESSURE: 59 MMHG

## 2018-01-29 DIAGNOSIS — M47.814 ARTHROPATHY OF THORACIC FACET JOINT: ICD-10-CM

## 2018-01-29 PROCEDURE — 97110 THERAPEUTIC EXERCISES: CPT

## 2018-01-29 PROCEDURE — 97140 MANUAL THERAPY 1/> REGIONS: CPT

## 2018-01-29 PROCEDURE — 3E0U3BZ INTRODUCTION OF ANESTHETIC AGENT INTO JOINTS, PERCUTANEOUS APPROACH: ICD-10-PCS | Performed by: ANESTHESIOLOGY

## 2018-01-29 PROCEDURE — BR151ZZ FLUOROSCOPY OF THORACIC FACET JOINT(S) USING LOW OSMOLAR CONTRAST: ICD-10-PCS | Performed by: ANESTHESIOLOGY

## 2018-01-29 PROCEDURE — 3E0U33Z INTRODUCTION OF ANTI-INFLAMMATORY INTO JOINTS, PERCUTANEOUS APPROACH: ICD-10-PCS | Performed by: ANESTHESIOLOGY

## 2018-01-29 RX ORDER — ONDANSETRON 2 MG/ML
4 INJECTION INTRAMUSCULAR; INTRAVENOUS ONCE AS NEEDED
Status: DISCONTINUED | OUTPATIENT
Start: 2018-01-29 | End: 2018-01-29 | Stop reason: HOSPADM

## 2018-01-29 RX ORDER — LIDOCAINE HYDROCHLORIDE 10 MG/ML
INJECTION, SOLUTION EPIDURAL; INFILTRATION; INTRACAUDAL; PERINEURAL AS NEEDED
Status: DISCONTINUED | OUTPATIENT
Start: 2018-01-29 | End: 2018-01-29 | Stop reason: HOSPADM

## 2018-01-29 RX ORDER — BUPIVACAINE HYDROCHLORIDE 5 MG/ML
INJECTION, SOLUTION EPIDURAL; INTRACAUDAL AS NEEDED
Status: DISCONTINUED | OUTPATIENT
Start: 2018-01-29 | End: 2018-01-29 | Stop reason: HOSPADM

## 2018-01-29 RX ORDER — METHYLPREDNISOLONE ACETATE 40 MG/ML
INJECTION, SUSPENSION INTRA-ARTICULAR; INTRALESIONAL; INTRAMUSCULAR; SOFT TISSUE AS NEEDED
Status: DISCONTINUED | OUTPATIENT
Start: 2018-01-29 | End: 2018-01-29 | Stop reason: HOSPADM

## 2018-01-29 RX ORDER — DIPHENHYDRAMINE HYDROCHLORIDE 50 MG/ML
50 INJECTION INTRAMUSCULAR; INTRAVENOUS ONCE AS NEEDED
Status: DISCONTINUED | OUTPATIENT
Start: 2018-01-29 | End: 2018-01-29 | Stop reason: HOSPADM

## 2018-01-29 NOTE — H&P
History & Physical Examination    Patient Name: Morena Faria  MRN: [id number]  [facility]: [id number]  YOB: 1934    Pre-Operative Diagnosis:  Arthropathy of thoracic facet joint ([facility] [address] [M46.94]    Present Illness: A 80year old female with thoracic MULTIVITAMIN OR Take 1 tablet by mouth daily. Disp:  Rfl:  Taking       No current facility-administered medications for this encounter.      Allergies: No Known Allergies    Past Medical History:   Diagnosis Date   • Back problem    • Cataract    • COPD SURGICAL HISTORY      Comment: intestinal blockage  2015: OTHER SURGICAL HISTORY      Comment: hole in intestine  3/16/2016: OTHER SURGICAL HISTORY Bilateral      Comment: Bilateral capsulectomy and removal of silicone               implants  01/05/2018: O

## 2018-01-29 NOTE — PROGRESS NOTES
Dx: thoracic pain         Authorized # of Visits:  8         Next MD visit: none scheduled  Fall Risk: standard         Precautions: n/a             Subjective: no pain currently in back.  Feeling much better overall, even considered skipping the injections

## 2018-01-29 NOTE — OPERATIVE REPORT
[facility]  Operative Report  2018     Henrik Espinoza Patient Status:  Hospital Outpatient Surgery    7/3/1934 MRN [id number]   [facility] SURGERY Attending Martín Smith MD   Hosp Day # 0 PCP Katalina Kwok MD     Indication:  Al Discharge instructions were given and patient was released to a responsible adult. Complications: None. Follow up:   In clinic as needed    Eduarod Bledsoe MD

## 2018-01-31 RX ORDER — SIMVASTATIN 10 MG
TABLET ORAL
Qty: 90 TABLET | Refills: 1 | Status: SHIPPED | OUTPATIENT
Start: 2018-01-31 | End: 2018-09-05

## 2018-02-01 ENCOUNTER — APPOINTMENT (OUTPATIENT)
Dept: PHYSICAL THERAPY | Age: 83
End: 2018-02-01
Attending: PHYSICIAN ASSISTANT
Payer: MEDICARE

## 2018-02-01 PROCEDURE — 97110 THERAPEUTIC EXERCISES: CPT

## 2018-02-01 PROCEDURE — 97140 MANUAL THERAPY 1/> REGIONS: CPT

## 2018-02-01 NOTE — PROGRESS NOTES
Dx: thoracic pain         Authorized # of Visits:  8         Next MD visit: none scheduled  Fall Risk: standard         Precautions: n/a             Subjective: no pain currently in back. Had injections and they went well. Back is feeling good.    Objective JESSICA

## 2018-02-05 ENCOUNTER — APPOINTMENT (OUTPATIENT)
Dept: PHYSICAL THERAPY | Age: 83
End: 2018-02-05
Attending: PHYSICIAN ASSISTANT
Payer: MEDICARE

## 2018-02-08 ENCOUNTER — APPOINTMENT (OUTPATIENT)
Dept: PHYSICAL THERAPY | Age: 83
End: 2018-02-08
Attending: PHYSICIAN ASSISTANT
Payer: MEDICARE

## 2018-02-12 ENCOUNTER — APPOINTMENT (OUTPATIENT)
Dept: PHYSICAL THERAPY | Age: 83
End: 2018-02-12
Attending: PHYSICIAN ASSISTANT
Payer: MEDICARE

## 2018-02-16 ENCOUNTER — LAB ENCOUNTER (OUTPATIENT)
Dept: LAB | Age: 83
End: 2018-02-16
Attending: NURSE PRACTITIONER
Payer: MEDICARE

## 2018-02-16 ENCOUNTER — OFFICE VISIT (OUTPATIENT)
Dept: INTERNAL MEDICINE CLINIC | Facility: CLINIC | Age: 83
End: 2018-02-16

## 2018-02-16 ENCOUNTER — TELEPHONE (OUTPATIENT)
Dept: INTERNAL MEDICINE CLINIC | Facility: CLINIC | Age: 83
End: 2018-02-16

## 2018-02-16 VITALS
WEIGHT: 112 LBS | DIASTOLIC BLOOD PRESSURE: 70 MMHG | BODY MASS INDEX: 20 KG/M2 | RESPIRATION RATE: 16 BRPM | HEART RATE: 78 BPM | TEMPERATURE: 98 F | SYSTOLIC BLOOD PRESSURE: 122 MMHG

## 2018-02-16 DIAGNOSIS — R26.81 UNSTEADINESS: Primary | ICD-10-CM

## 2018-02-16 DIAGNOSIS — R10.12 LEFT UPPER QUADRANT PAIN: ICD-10-CM

## 2018-02-16 DIAGNOSIS — R10.12 LEFT UPPER QUADRANT PAIN: Primary | ICD-10-CM

## 2018-02-16 LAB
ALBUMIN SERPL-MCNC: 3.9 G/DL (ref 3.5–4.8)
ALP LIVER SERPL-CCNC: 86 U/L (ref 55–142)
ALT SERPL-CCNC: 28 U/L (ref 14–54)
AST SERPL-CCNC: 21 U/L (ref 15–41)
BILIRUB SERPL-MCNC: 0.5 MG/DL (ref 0.1–2)
BUN BLD-MCNC: 18 MG/DL (ref 8–20)
CALCIUM BLD-MCNC: 9.4 MG/DL (ref 8.3–10.3)
CHLORIDE: 110 MMOL/L (ref 101–111)
CO2: 27 MMOL/L (ref 22–32)
CREAT BLD-MCNC: 0.78 MG/DL (ref 0.55–1.02)
GLUCOSE BLD-MCNC: 65 MG/DL (ref 70–99)
M PROTEIN MFR SERPL ELPH: 6.5 G/DL (ref 6.1–8.3)
POTASSIUM SERPL-SCNC: 4.7 MMOL/L (ref 3.6–5.1)
SODIUM SERPL-SCNC: 144 MMOL/L (ref 136–144)

## 2018-02-16 PROCEDURE — 80053 COMPREHEN METABOLIC PANEL: CPT

## 2018-02-16 PROCEDURE — 99213 OFFICE O/P EST LOW 20 MIN: CPT | Performed by: NURSE PRACTITIONER

## 2018-02-16 NOTE — TELEPHONE ENCOUNTER
Patient wants to know if she can get an order for Physical Therapy again. Patient states that when she is walking she feels off balance. Patient was in with her  this morning and forgot to ask JV.    Please advise

## 2018-02-16 NOTE — PROGRESS NOTES
Patient presents with: Other: AB RM,       HPI:  Presents with prolonged history of vague intermittent left upper abd discomfort present only when she is sitting. Denies it has any association with eating.  Denies change in stool habits, loose stools,  Jerrell SBO (small bowel obstruction) (HCC)     Memory deficit     Mild cognitive impairment     Tremor of both hands     Leukocytosis     Closed compression fracture of thoracic vertebra (HCC)     Weakness generalized     Leg pain, diffuse     Thrombocytosis ([address] Pulse 78   Temp 97.8 °F (36.6 °C) (Oral)   Resp 16   Wt 112 lb   BMI 19.84 kg/m²   Constitutional: well developed, well nourished,in no apparent distress  HEENT: Normocephalic and atraumatic. Cardiovascular: Normal rate, regular rhythm. No murmur.    Pul

## 2018-02-19 ENCOUNTER — TELEPHONE (OUTPATIENT)
Dept: SURGERY | Facility: CLINIC | Age: 83
End: 2018-02-19

## 2018-02-19 NOTE — TELEPHONE ENCOUNTER
LM for pt at [phone] () , notified additional visits of PT ordered as requested by Trina Lim and to call back if any questions.

## 2018-02-20 RX ORDER — GABAPENTIN 300 MG/1
300 CAPSULE ORAL 2 TIMES DAILY
Qty: 60 CAPSULE | Refills: 3 | Status: SHIPPED | OUTPATIENT
Start: 2018-02-20 | End: 2018-07-14

## 2018-02-20 NOTE — TELEPHONE ENCOUNTER
Pt rescheduled 3/1 fup appt with Dr Samara Jimenez for 2/27 with Dr Samara Jimenez due to Dr Samara Jiemnez will be out of the office

## 2018-02-22 ENCOUNTER — OFFICE VISIT (OUTPATIENT)
Dept: PHYSICAL THERAPY | Age: 83
End: 2018-02-22
Attending: NURSE PRACTITIONER
Payer: MEDICARE

## 2018-02-22 DIAGNOSIS — R26.81 UNSTEADINESS: ICD-10-CM

## 2018-02-22 PROCEDURE — 97162 PT EVAL MOD COMPLEX 30 MIN: CPT

## 2018-02-22 NOTE — PROGRESS NOTES
FALL SCREEN EVALUATION   Referring Physician: Dr. Epifanio Morel  Diagnosis: balance and falls risk     Date of Service: 2/22/2018     PATIENT SUMMARY   Nikita Palma is a 80year old y/o female who presents to therapy today with complaints of feeling unsteady a 60    LE Strength: WNL, 5/5 hip flexion, knee ext, knee flexion, ankle DF bilaterally               Postural Control:  4-Stage Balance Test:   - Feet together: 30 sec mild sway   - Tandem Stance: 5 sec Fall Risk: yes   - SLS: R 8 sec, L 5 sec Fall Risk: ye walking speed between normal, fast and slow speeds. (2)  Mild Impairment: Is able to change speed but demonstrates mild gait deviations, or no gait deviations but unable to achieve a significant change in velocity, or uses an assistive device.    (1)  Mod see pt instructions: PT Eval Moderate Complexity,       Total Timed Treatment: 40 min     Total Treatment Time: 5  min neuro musc re-ed    PLAN OF CARE:    Goals: (to be met in 8 visits)  · Pt will demonstrate improved SLS to >10 seconds DANNY to promote saf care.    X___________________________________________________ Date____________________    Certification From: 2/80/5611  To:5/23/2018

## 2018-02-26 ENCOUNTER — OFFICE VISIT (OUTPATIENT)
Dept: PHYSICAL THERAPY | Age: 83
End: 2018-02-26
Attending: NURSE PRACTITIONER
Payer: MEDICARE

## 2018-02-26 PROCEDURE — 97110 THERAPEUTIC EXERCISES: CPT

## 2018-02-26 PROCEDURE — 97112 NEUROMUSCULAR REEDUCATION: CPT

## 2018-02-26 NOTE — PROGRESS NOTES
Dx: impaired balance, fall risk         Authorized # of Visits:  8         Next MD visit: none scheduled  Fall Risk: standard         Precautions: n/a           Subjective: she reports she got a new pair of shoes, feels steadier. No falls over the weekend.

## 2018-02-27 ENCOUNTER — OFFICE VISIT (OUTPATIENT)
Dept: SURGERY | Facility: CLINIC | Age: 83
End: 2018-02-27

## 2018-02-27 DIAGNOSIS — M47.894 OTHER OSTEOARTHRITIS OF SPINE, THORACIC REGION: ICD-10-CM

## 2018-02-27 DIAGNOSIS — M47.814 ARTHROPATHY OF THORACIC FACET JOINT: Primary | ICD-10-CM

## 2018-02-27 DIAGNOSIS — Z87.81 HISTORY OF VERTEBRAL COMPRESSION FRACTURE: ICD-10-CM

## 2018-02-27 PROBLEM — M81.0 OSTEOPOROSIS: Status: ACTIVE | Noted: 2018-02-27

## 2018-02-27 PROCEDURE — 99211 OFF/OP EST MAY X REQ PHY/QHP: CPT | Performed by: NURSE PRACTITIONER

## 2018-02-27 NOTE — PATIENT INSTRUCTIONS
Refill policies:    • Allow 2-3 business days for refills; controlled substances may take longer.   • Contact your pharmacy at least 5 days prior to running out of medication and have them send an electronic request or submit request through the [facility] recommended that you have a procedure or additional testing performed. [facility]) will contact your insurance carrier to obtain pre-certification or prior authorization.     Unfortunately, [facility] has seen an increase in denial of paym

## 2018-02-27 NOTE — PROGRESS NOTES
Name: Daniel Hernandez   : 7/3/1934   DOS: 2018     Pain Clinic Follow Up Visit:   Daniel Hernandez is a 80year old female who presents for recheck of her chronic thoracic back pain.   She has history of vertebral compression fracture with thoracic Tab Take 1 tablet (70 mg total) by mouth once a week. Disp: 12 tablet Rfl: 3   Acidophilus/Pectin Oral Cap Take 1 capsule by mouth daily.  Disp:  Rfl:    ferrous sulfate 325 (65 FE) MG Oral Tab EC Take 325 mg by mouth daily with breakfast. Disp:  Rfl:    as that a vertebral compression fracture which occurs from a standing height is considered diagnostic for osteoporosis.  Patient is not currently being treated for osteoporosis as she thought that her fractures were more traumatic in nature since they occurred

## 2018-03-02 ENCOUNTER — TELEPHONE (OUTPATIENT)
Dept: SURGERY | Facility: CLINIC | Age: 83
End: 2018-03-02

## 2018-03-05 ENCOUNTER — OFFICE VISIT (OUTPATIENT)
Dept: PHYSICAL THERAPY | Age: 83
End: 2018-03-05
Attending: NURSE PRACTITIONER
Payer: MEDICARE

## 2018-03-05 PROCEDURE — 97110 THERAPEUTIC EXERCISES: CPT

## 2018-03-05 PROCEDURE — 97112 NEUROMUSCULAR REEDUCATION: CPT

## 2018-03-05 NOTE — PROGRESS NOTES
Dx: impaired balance, fall risk         Authorized # of Visits:  8         Next MD visit: none scheduled  Fall Risk: standard         Precautions: n/a           Subjective: she reports no falls over the weekend, doing exercises, had a busy weekend with hus

## 2018-03-07 ENCOUNTER — OFFICE VISIT (OUTPATIENT)
Dept: INTERNAL MEDICINE CLINIC | Facility: CLINIC | Age: 83
End: 2018-03-07

## 2018-03-07 ENCOUNTER — LABORATORY ENCOUNTER (OUTPATIENT)
Dept: LAB | Age: 83
End: 2018-03-07
Attending: NURSE PRACTITIONER
Payer: MEDICARE

## 2018-03-07 VITALS
BODY MASS INDEX: 20.73 KG/M2 | DIASTOLIC BLOOD PRESSURE: 50 MMHG | HEART RATE: 60 BPM | TEMPERATURE: 98 F | WEIGHT: 117 LBS | RESPIRATION RATE: 16 BRPM | SYSTOLIC BLOOD PRESSURE: 120 MMHG | HEIGHT: 63 IN

## 2018-03-07 DIAGNOSIS — R25.1 TREMOR OF BOTH HANDS: ICD-10-CM

## 2018-03-07 DIAGNOSIS — D50.8 OTHER IRON DEFICIENCY ANEMIA: ICD-10-CM

## 2018-03-07 DIAGNOSIS — M79.606 LOWER EXTREMITY PAIN, DIFFUSE, UNSPECIFIED LATERALITY: ICD-10-CM

## 2018-03-07 DIAGNOSIS — E78.5 DYSLIPIDEMIA: ICD-10-CM

## 2018-03-07 DIAGNOSIS — Z78.0 POSTMENOPAUSAL: ICD-10-CM

## 2018-03-07 DIAGNOSIS — M54.2 CERVICALGIA: ICD-10-CM

## 2018-03-07 DIAGNOSIS — I48.0 PAROXYSMAL ATRIAL FIBRILLATION (HCC): ICD-10-CM

## 2018-03-07 DIAGNOSIS — E78.00 PURE HYPERCHOLESTEROLEMIA: ICD-10-CM

## 2018-03-07 DIAGNOSIS — M71.9 DISORDER OF BURSAE AND TENDONS IN SHOULDER REGION: ICD-10-CM

## 2018-03-07 DIAGNOSIS — T81.89XD SUTURE GRANULOMA, SUBSEQUENT ENCOUNTER: ICD-10-CM

## 2018-03-07 DIAGNOSIS — S22.000D CLOSED COMPRESSION FRACTURE OF THORACIC VERTEBRA WITH ROUTINE HEALING, SUBSEQUENT ENCOUNTER: ICD-10-CM

## 2018-03-07 DIAGNOSIS — M19.012 PRIMARY OSTEOARTHRITIS OF LEFT SHOULDER: ICD-10-CM

## 2018-03-07 DIAGNOSIS — Z87.440 HISTORY OF RECURRENT UTI (URINARY TRACT INFECTION): ICD-10-CM

## 2018-03-07 DIAGNOSIS — I10 ESSENTIAL HYPERTENSION WITH GOAL BLOOD PRESSURE LESS THAN 130/80: ICD-10-CM

## 2018-03-07 DIAGNOSIS — R41.3 MEMORY CHANGES: ICD-10-CM

## 2018-03-07 DIAGNOSIS — Z00.00 ENCOUNTER FOR ANNUAL HEALTH EXAMINATION: Primary | ICD-10-CM

## 2018-03-07 DIAGNOSIS — G89.28 CHRONIC PAIN FOLLOWING SURGERY OR PROCEDURE: ICD-10-CM

## 2018-03-07 DIAGNOSIS — Z90.49 HISTORY OF COLON RESECTION: ICD-10-CM

## 2018-03-07 DIAGNOSIS — R41.3 MEMORY DEFICIT: ICD-10-CM

## 2018-03-07 DIAGNOSIS — M70.71 BURSITIS OF RIGHT HIP, UNSPECIFIED BURSA: ICD-10-CM

## 2018-03-07 DIAGNOSIS — Z87.19 HX OF SMALL BOWEL OBSTRUCTION: ICD-10-CM

## 2018-03-07 DIAGNOSIS — K56.609 SBO (SMALL BOWEL OBSTRUCTION) (HCC): ICD-10-CM

## 2018-03-07 DIAGNOSIS — M48.061 SPINAL STENOSIS, LUMBAR REGION, WITHOUT NEUROGENIC CLAUDICATION: ICD-10-CM

## 2018-03-07 DIAGNOSIS — M67.919 DISORDER OF BURSAE AND TENDONS IN SHOULDER REGION: ICD-10-CM

## 2018-03-07 DIAGNOSIS — J44.9 CHRONIC OBSTRUCTIVE PULMONARY DISEASE, UNSPECIFIED COPD TYPE (HCC): ICD-10-CM

## 2018-03-07 DIAGNOSIS — M47.892 OTHER OSTEOARTHRITIS OF SPINE, CERVICAL REGION: ICD-10-CM

## 2018-03-07 DIAGNOSIS — T85.44XD CAPSULAR CONTRACTURE OF BREAST IMPLANT, SUBSEQUENT ENCOUNTER: ICD-10-CM

## 2018-03-07 DIAGNOSIS — G31.84 MILD COGNITIVE IMPAIRMENT: ICD-10-CM

## 2018-03-07 DIAGNOSIS — M81.0 AGE-RELATED OSTEOPOROSIS WITHOUT CURRENT PATHOLOGICAL FRACTURE: ICD-10-CM

## 2018-03-07 DIAGNOSIS — M47.814 ARTHROPATHY OF THORACIC FACET JOINT: ICD-10-CM

## 2018-03-07 PROBLEM — R53.1 WEAKNESS GENERALIZED: Status: RESOLVED | Noted: 2017-07-15 | Resolved: 2018-03-07

## 2018-03-07 PROBLEM — D75.839 THROMBOCYTOSIS: Status: RESOLVED | Noted: 2017-07-15 | Resolved: 2018-03-07

## 2018-03-07 LAB — HAV AB SERPL IA-ACNC: 396 PG/ML (ref 193–986)

## 2018-03-07 PROCEDURE — G0439 PPPS, SUBSEQ VISIT: HCPCS | Performed by: NURSE PRACTITIONER

## 2018-03-07 PROCEDURE — 82607 VITAMIN B-12: CPT

## 2018-03-07 NOTE — PATIENT INSTRUCTIONS
[address] SCHEDULE   Tests on this list are recommended by your physician but may not be covered, or covered at this frequency, by your insurer. Please check with your insurance carrier before scheduling to verify coverage.    NORBERTO criteria:   • Men who are 73-68 years old and have smoked more than 100 cigarettes in their lifetime   • Anyone with a family history    Colorectal Cancer Screening  Covered up to Age 76     Colonoscopy Screen   Covered every 10 years- more often if abnorm display for this patient.  Please get this Mammogram regularly   Immunizations      Influenza  Covered Annually   Orders placed or performed in visit on 10/12/15  -FLU VACC PRSV FREE INC ANTIG    Please get every year    Pneumococcal 13 (Prevnar)  Covered O also available in [address])  www. [email or web address] org  This link also has information from the [address] [place] [address] regarding Advance Directives.

## 2018-03-07 NOTE — PROGRESS NOTES
HPI:   Brianna Wiggins is a 80year old female who presents for a Medicare Subsequent Annual Wellness visit (Pt already had Initial Annual Wellness). Spinal stenosis, lumbar region, without neurogenic claudication   Stable  Received LEATHA per Dr Sher Guerin.   C encounter  Due for dexa  Cont fosamax. Lower extremity pain, diffuse, unspecified laterality  stable    Suture granuloma, subsequent encounter  Stable     Arthropathy of thoracic facet joint (HCC)  Stable   S/p injections.       Age-related osteoporos Never Used                           Ms. Cheryl Black already takes aspirin and has it on her medication list.   CAGE Alcohol screening   24 Serg Rudd was screened for Alcohol abuse and had a score of 0 so is at low risk.     Patient Care Team: Patient Care T Osteoporosis    Wt Readings from Last 3 Encounters:  03/07/18 : 117 lb  02/16/18 : 112 lb  01/19/18 : 114 lb     Last Cholesterol Labs:     Lab Results  Component Value Date   CHOLEST 162 11/06/2017    11/06/2017   LDL 42 11/06/2017   TRIG 59 11/06/ MEDICAL INFORMATION:   She  has a past medical history of Back problem; Cataract; COPD (chronic obstructive pulmonary disease) ([address] Diverticulitis of colon; Diverticulitis of colon (without mention of hemorrhage)(562.11);  Diverticulosis of large int sore throat, neck pain and dental problem. Eyes: Negative for pain and visual disturbance. Respiratory: Negative for cough, chest tightness, shortness of breath and wheezing. Cardiovascular: Negative for chest pain, palpitations and leg swelling. symmetric   Skin: Skin color, texture, turgor normal, no rashes or lesions   Lymph nodes: Cervical, supraclavicular, and axillary nodes normal   Neurologic: Normal    and Breasts:  normal appearance, no masses or tenderness      Vaccination History   Immun Chronic obstructive pulmonary disease, unspecified COPD type ([facility] [address]  Stable   Per DR Coats    Paroxysmal atrial fibrillation (HCC)  Stable   No anticoagualation due to risk of bleeding. She will discuss further with DR Montalvo/cardiology  Cont meds. Screening      HbgA1C   Annually No results found for: A1C No flowsheet data found.     Fasting Blood Sugar (FSB)Annually   Glucose (mg/dL)   Date Value   02/16/2018 65 (L)   ----------  GLUCOSE (mg/dL)   Date Value   07/02/2014 95   ----------       Cardio after your 65th birthday    Pneumococcal 23 (Pneumovax)  Covered Once after 65 03/06/2017 Please get once after your 65th birthday    Hepatitis B for Moderate/High Risk No vaccine history found Medium/high risk factors:   End-stage renal disease   Hemophil

## 2018-03-08 ENCOUNTER — OFFICE VISIT (OUTPATIENT)
Dept: PHYSICAL THERAPY | Age: 83
End: 2018-03-08
Attending: NURSE PRACTITIONER
Payer: MEDICARE

## 2018-03-08 PROCEDURE — 97112 NEUROMUSCULAR REEDUCATION: CPT

## 2018-03-08 PROCEDURE — 97110 THERAPEUTIC EXERCISES: CPT

## 2018-03-08 NOTE — PROGRESS NOTES
Dx: impaired balance, fall risk         Authorized # of Visits:  8         Next MD visit: none scheduled  Fall Risk: standard         Precautions: n/a           Subjective: she reports no falls, still feels 'off' at times when walking  Objective: frequent

## 2018-03-09 ENCOUNTER — TELEPHONE (OUTPATIENT)
Dept: SURGERY | Facility: CLINIC | Age: 83
End: 2018-03-09

## 2018-03-10 ENCOUNTER — HOSPITAL ENCOUNTER (OUTPATIENT)
Dept: BONE DENSITY | Age: 83
Discharge: HOME | End: 2018-03-10
Attending: NURSE PRACTITIONER
Payer: MEDICARE

## 2018-03-10 ENCOUNTER — TELEPHONE (OUTPATIENT)
Dept: INTERNAL MEDICINE CLINIC | Facility: CLINIC | Age: 83
End: 2018-03-10

## 2018-03-10 DIAGNOSIS — Z78.0 POSTMENOPAUSAL: ICD-10-CM

## 2018-03-10 PROCEDURE — 77080 DXA BONE DENSITY AXIAL: CPT | Performed by: NURSE PRACTITIONER

## 2018-03-12 ENCOUNTER — TELEPHONE (OUTPATIENT)
Dept: INTERNAL MEDICINE CLINIC | Facility: CLINIC | Age: 83
End: 2018-03-12

## 2018-03-12 ENCOUNTER — OFFICE VISIT (OUTPATIENT)
Dept: PHYSICAL THERAPY | Age: 83
End: 2018-03-12
Attending: NURSE PRACTITIONER
Payer: MEDICARE

## 2018-03-12 PROCEDURE — 97112 NEUROMUSCULAR REEDUCATION: CPT

## 2018-03-12 PROCEDURE — 97110 THERAPEUTIC EXERCISES: CPT

## 2018-03-12 NOTE — PROGRESS NOTES
Dx: impaired balance, fall risk         Authorized # of Visits:  8         Next MD visit: none scheduled  Fall Risk: standard         Precautions: n/a           Subjective: she reports no falls, still feels 'off' at times when walking  Objective: TUG 9 sec Total Timed Treatment: 40 min  Total Treatment Time: 40 min

## 2018-03-14 ENCOUNTER — APPOINTMENT (OUTPATIENT)
Dept: PHYSICAL THERAPY | Age: 83
End: 2018-03-14
Attending: NURSE PRACTITIONER
Payer: MEDICARE

## 2018-03-15 ENCOUNTER — OFFICE VISIT (OUTPATIENT)
Dept: PHYSICAL THERAPY | Age: 83
End: 2018-03-15
Attending: NURSE PRACTITIONER
Payer: MEDICARE

## 2018-03-15 PROCEDURE — 97110 THERAPEUTIC EXERCISES: CPT

## 2018-03-15 PROCEDURE — 97112 NEUROMUSCULAR REEDUCATION: CPT

## 2018-03-15 NOTE — PROGRESS NOTES
Dx: impaired balance, fall risk         Authorized # of Visits:  8         Next MD visit: none scheduled  Fall Risk: standard         Precautions: n/a           Subjective: she reports no falls. Feeling more steady on her feet.    Objective: tandem on airex board med/lat with balance in middle 3x15, with hold in middle      ---- Pt ed and HEP update TM walking 1. 5mph 5 min  --- ---     Skilled Services: contact guard given for safety throughout session    Charges: there ex 1, neur re-ed x2       Total Timed T

## 2018-03-19 ENCOUNTER — OFFICE VISIT (OUTPATIENT)
Dept: PHYSICAL THERAPY | Age: 83
End: 2018-03-19
Attending: NURSE PRACTITIONER
Payer: MEDICARE

## 2018-03-19 PROCEDURE — 97112 NEUROMUSCULAR REEDUCATION: CPT

## 2018-03-19 PROCEDURE — 97110 THERAPEUTIC EXERCISES: CPT

## 2018-03-19 NOTE — TELEPHONE ENCOUNTER
Patient said she was to call back on Monday and talk to Mojgan Corrales or Edwina Linda about how she liked her meter  Please call [phone]

## 2018-03-19 NOTE — PROGRESS NOTES
Dx: impaired balance, fall risk         Authorized # of Visits:  8         Next MD visit: none scheduled  Fall Risk: standard         Precautions: n/a           Subjective: she reports no falls, feeling a little stronger and a little steadier on my feet. closed, tandem with head motions  3x30 sec bilaterally  Tandem balance on airex  3x30 B   rhomberg balance on airex 3x30 with eyes closed rhomberg balance eyes open and eyes closed 3x30 sec each  3x30 sec bilaterally  AP board 3x15 with balance in middle 3

## 2018-03-22 ENCOUNTER — OFFICE VISIT (OUTPATIENT)
Dept: PHYSICAL THERAPY | Age: 83
End: 2018-03-22
Attending: NURSE PRACTITIONER
Payer: MEDICARE

## 2018-03-22 ENCOUNTER — APPOINTMENT (OUTPATIENT)
Dept: PHYSICAL THERAPY | Age: 83
End: 2018-03-22
Attending: NURSE PRACTITIONER
Payer: MEDICARE

## 2018-03-22 PROCEDURE — 97112 NEUROMUSCULAR REEDUCATION: CPT

## 2018-03-22 PROCEDURE — 97110 THERAPEUTIC EXERCISES: CPT

## 2018-03-22 NOTE — PROGRESS NOTES
Dx: impaired balance, fall risk         Authorized # of Visits:  8         Next MD visit: none scheduled  Fall Risk: standard         Precautions: n/a           Subjective: she reports no falls, feeling stronger and steadier on her feet.  No complaints of p cone obstacle 10 laps each  Fwd cone obstacles x10 laps fwd out of bars x10 stepping over cones    Tandem balance eyes closed, tandem with head motions  3x30 sec bilaterally  Tandem balance on airex  3x30 B   rhomberg balance on airex 3x30 with eyes closed

## 2018-03-26 ENCOUNTER — APPOINTMENT (OUTPATIENT)
Dept: PHYSICAL THERAPY | Age: 83
End: 2018-03-26
Attending: NURSE PRACTITIONER
Payer: MEDICARE

## 2018-03-26 PROCEDURE — 97112 NEUROMUSCULAR REEDUCATION: CPT

## 2018-03-26 PROCEDURE — 97110 THERAPEUTIC EXERCISES: CPT

## 2018-03-26 NOTE — PROGRESS NOTES
Dx: impaired balance, fall risk         Authorized # of Visits:  8         Next MD visit: none scheduled  Fall Risk: standard         Precautions: n/a           Subjective: she reports no falls, feeling stronger and steadier on her feet.  Plans to try a 5k 10 laps each  Fwd cone obstacles x10 laps fwd out of bars x10 stepping over cones  x10 stepping over cones laterally    Tandem balance eyes closed, tandem with head motions  3x30 sec bilaterally  Tandem balance on airex  3x30 B   rhomberg balance on airex

## 2018-03-29 ENCOUNTER — APPOINTMENT (OUTPATIENT)
Dept: PHYSICAL THERAPY | Age: 83
End: 2018-03-29
Attending: NURSE PRACTITIONER
Payer: MEDICARE

## 2018-03-30 ENCOUNTER — OFFICE VISIT (OUTPATIENT)
Dept: INTERNAL MEDICINE CLINIC | Facility: CLINIC | Age: 83
End: 2018-03-30

## 2018-03-30 VITALS
HEIGHT: 63.5 IN | RESPIRATION RATE: 16 BRPM | TEMPERATURE: 98 F | BODY MASS INDEX: 20.47 KG/M2 | DIASTOLIC BLOOD PRESSURE: 72 MMHG | WEIGHT: 117 LBS | HEART RATE: 80 BPM | SYSTOLIC BLOOD PRESSURE: 130 MMHG

## 2018-03-30 DIAGNOSIS — B37.0 ORAL THRUSH: Primary | ICD-10-CM

## 2018-03-30 PROCEDURE — 99213 OFFICE O/P EST LOW 20 MIN: CPT | Performed by: NURSE PRACTITIONER

## 2018-04-03 ENCOUNTER — OFFICE VISIT (OUTPATIENT)
Dept: SURGERY | Facility: CLINIC | Age: 83
End: 2018-04-03

## 2018-04-03 VITALS — SYSTOLIC BLOOD PRESSURE: 140 MMHG | RESPIRATION RATE: 18 BRPM | DIASTOLIC BLOOD PRESSURE: 70 MMHG | HEART RATE: 88 BPM

## 2018-04-03 DIAGNOSIS — Z87.81 HX OF COMPRESSION FRACTURE OF SPINE: ICD-10-CM

## 2018-04-03 DIAGNOSIS — M54.6 LEFT-SIDED THORACIC BACK PAIN, UNSPECIFIED CHRONICITY: Primary | ICD-10-CM

## 2018-04-03 DIAGNOSIS — R26.81 GAIT INSTABILITY: ICD-10-CM

## 2018-04-03 DIAGNOSIS — Z98.890 S/P KYPHOPLASTY: ICD-10-CM

## 2018-04-03 PROCEDURE — 99213 OFFICE O/P EST LOW 20 MIN: CPT | Performed by: PHYSICIAN ASSISTANT

## 2018-04-03 NOTE — PROGRESS NOTES
F/u after PT and pain management. Patient has 1 more PT visit left. States PT has been helping balance/walking.

## 2018-04-03 NOTE — PATIENT INSTRUCTIONS
Refill policies:    • Allow 2-3 business days for refills; controlled substances may take longer.   • Contact your pharmacy at least 5 days prior to running out of medication and have them send an electronic request or submit request through the [facility] for the entire amount billed. Precertification and Prior Authorizations  If your physician has recommended that you have a procedure or additional testing performed.   [employer] ([employer]) will contact your insurance carrier to obtain pr

## 2018-04-03 NOTE — PROGRESS NOTES
Neurosurgery   Follow up      [address] Nicolle Gillespie is a 80year old female presenting for follow up after PT and pain management injections. States the one spot of \"piercing pain\" is not improved. Sitting relieves it.  Walking and m x 3.  Speech fluent. Comprehension intact. Face is symmetrical.     SPINE: Gait intact. Pin point tenderness to palpation at the medial aspect of the left scapula. Non tender to palpation of the midline thoracic spine.  Mild tenderness to palpation of the l with the plan, verbalized understanding and is appreciative. All questions were sought out and thoroughly answered to satisfaction. Total visit time: 15 min  More than 50% spent coordinating care and counseling. Kiya Steven M.S., PA-C  Ed

## 2018-04-15 DIAGNOSIS — G31.84 MILD COGNITIVE IMPAIRMENT: ICD-10-CM

## 2018-04-15 RX ORDER — DONEPEZIL HYDROCHLORIDE 5 MG/1
TABLET, FILM COATED ORAL
Qty: 90 TABLET | Refills: 0 | Status: CANCELLED | OUTPATIENT
Start: 2018-04-15

## 2018-04-18 NOTE — PATIENT INSTRUCTIONS
Refill policies:    • Allow 2-3 business days for refills; controlled substances may take longer.   • Contact your pharmacy at least 5 days prior to running out of medication and have them send an electronic request or submit request through the [facility] and the [facility] for the entire amount billed. Precertification and Prior Authorizations  If your physician has recommended that you have a procedure or additional testing performed.   [employer] ([employer]) will contact your insurance carrier to obtain pr

## 2018-04-18 NOTE — PROGRESS NOTES
Neurology H&P    Zoe Apple Patient Status:  No patient class for patient encounter    7/3/1934 MRN [id number]   Location [address] [place] [address], [address] [place] [address] Drive, [address] [facility] Attending No att. providers found   [facility] Day # 0 PCP MD Newton Byrd Alendronate Sodium 70 MG Oral Tab Take 1 tablet (70 mg total) by mouth once a week. Disp: 12 tablet Rfl: 3   Acidophilus/Pectin Oral Cap Take 1 capsule by mouth daily.  Disp:  Rfl:    ferrous sulfate 325 (65 FE) MG Oral Tab EC Take 325 mg by mouth daily w cholesterol    • HYPERLIPIDEMIA    • HYPERTENSION    • Increased urinary frequency    • Lower back pain    • Lumbago    • Osteoarthritis    • Other and unspecified hyperlipidemia    • Pneumonia, organism unspecified(486)    • Unspecified essential hyperten LIGATION    SocHx:  Smoking status: Former Smoker                                                              Packs/day: 0.50      Years: 15.00        Quit date: 1/1/1969  Smokeless tobacco: Never Used                      Alcohol use:  No                F LLE: HF: 5/5, HE: 5/5, KF: 5/5, KE: 5/5, DF: 5/5, PF: 5/5       Normal tone, no cogwheeling       Normal muscle bulk    Sens:        UE: Intact to light touch       LE: Inact to light touch    Reflexes:        UE: 2+ biceps, 2+ brachioradialis       LE: 2+

## 2018-04-18 NOTE — PROGRESS NOTES
Patient has run out of Donepezil but states she did not notice a difference in memory when taking it.

## 2018-04-26 ENCOUNTER — TELEPHONE (OUTPATIENT)
Dept: INTERNAL MEDICINE CLINIC | Facility: CLINIC | Age: 83
End: 2018-04-26

## 2018-05-01 ENCOUNTER — LAB ENCOUNTER (OUTPATIENT)
Dept: LAB | Age: 83
End: 2018-05-01
Attending: INTERNAL MEDICINE
Payer: MEDICARE

## 2018-05-01 ENCOUNTER — OFFICE VISIT (OUTPATIENT)
Dept: INTERNAL MEDICINE CLINIC | Facility: CLINIC | Age: 83
End: 2018-05-01

## 2018-05-01 VITALS
SYSTOLIC BLOOD PRESSURE: 132 MMHG | WEIGHT: 120 LBS | TEMPERATURE: 98 F | HEART RATE: 66 BPM | DIASTOLIC BLOOD PRESSURE: 58 MMHG | RESPIRATION RATE: 14 BRPM | BODY MASS INDEX: 21.26 KG/M2 | HEIGHT: 63 IN

## 2018-05-01 DIAGNOSIS — I10 ESSENTIAL HYPERTENSION: ICD-10-CM

## 2018-05-01 DIAGNOSIS — R10.9 ABDOMINAL DISCOMFORT: ICD-10-CM

## 2018-05-01 DIAGNOSIS — R00.2 HEART PALPITATIONS: ICD-10-CM

## 2018-05-01 DIAGNOSIS — Z63.4 DEATH OF HUSBAND: ICD-10-CM

## 2018-05-01 DIAGNOSIS — E78.5 DYSLIPIDEMIA: ICD-10-CM

## 2018-05-01 DIAGNOSIS — R00.2 HEART PALPITATIONS: Primary | ICD-10-CM

## 2018-05-01 PROCEDURE — 93000 ELECTROCARDIOGRAM COMPLETE: CPT | Performed by: INTERNAL MEDICINE

## 2018-05-01 PROCEDURE — 84443 ASSAY THYROID STIM HORMONE: CPT

## 2018-05-01 PROCEDURE — 80053 COMPREHEN METABOLIC PANEL: CPT

## 2018-05-01 PROCEDURE — 85025 COMPLETE CBC W/AUTO DIFF WBC: CPT

## 2018-05-01 PROCEDURE — 99214 OFFICE O/P EST MOD 30 MIN: CPT | Performed by: INTERNAL MEDICINE

## 2018-05-01 SDOH — SOCIAL STABILITY - SOCIAL INSECURITY: DISSAPEARANCE AND DEATH OF FAMILY MEMBER: Z63.4

## 2018-05-01 NOTE — PROGRESS NOTES
Laura Perry is a 80year old female.   Patient presents with:  Stomach Pain: AB RM 4, pt states in past having blockages in stomach but states feeling better  Palpitations  Grief      HPI:     Patient here with complaints of upset stomach for the last tablet by mouth for 30 days then increase to 10mg QHS Disp: 90 tablet Rfl: 1   gabapentin 300 MG Oral Cap Take 1 capsule (300 mg total) by mouth 2 (two) times daily.  Disp: 60 capsule Rfl: 3   SIMVASTATIN 10 MG Oral Tab TAKE ONE TABLET BY MOUTH ONCE DAILY D Former Smoker                                                              Packs/day: 0.50      Years: 15.00        Quit date: 1/1/1969  Smokeless tobacco: Never Used                      Alcohol use:  No              Family History   Problem Relation Age o Visit:  No prescriptions requested or ordered in this encounter    Imaging & Consults:  ELECTROCARDIOGRAM, COMPLETE    Return in about 3 months (around 8/1/2018), or if symptoms worsen or fail to improve.   There are no Patient Instructions on file for this

## 2018-05-06 ENCOUNTER — OFFICE VISIT (OUTPATIENT)
Dept: FAMILY MEDICINE CLINIC | Facility: CLINIC | Age: 83
End: 2018-05-06

## 2018-05-06 VITALS
TEMPERATURE: 99 F | WEIGHT: 120 LBS | BODY MASS INDEX: 21.26 KG/M2 | RESPIRATION RATE: 20 BRPM | DIASTOLIC BLOOD PRESSURE: 52 MMHG | OXYGEN SATURATION: 96 % | SYSTOLIC BLOOD PRESSURE: 132 MMHG | HEIGHT: 63 IN | HEART RATE: 94 BPM

## 2018-05-06 DIAGNOSIS — R05.8 COUGH WITH SPUTUM: Primary | ICD-10-CM

## 2018-05-06 PROCEDURE — 99213 OFFICE O/P EST LOW 20 MIN: CPT | Performed by: NURSE PRACTITIONER

## 2018-05-06 RX ORDER — AZITHROMYCIN 250 MG/1
TABLET, FILM COATED ORAL
Qty: 6 TABLET | Refills: 0 | Status: SHIPPED | OUTPATIENT
Start: 2018-05-06 | End: 2018-06-15

## 2018-05-06 RX ORDER — BENZONATATE 200 MG/1
200 CAPSULE ORAL 3 TIMES DAILY PRN
Qty: 20 CAPSULE | Refills: 0 | Status: SHIPPED | OUTPATIENT
Start: 2018-05-06 | End: 2018-05-13

## 2018-05-06 NOTE — PROGRESS NOTES
Patient presents with:  Cough: cough up dark green mucus x2wks      HPI:   Beni Painter is a 80year old female who presents for cough  for  2  weeks.  Patient reports sore throat only at the beginning of sx's, congestion, yellow colored nasal discharge Rfl: CRANBERRY OR Take 1 tablet by mouth daily. Disp:  Rfl:    CALCIUM + D OR Take 1 tablet by mouth daily. Disp:  Rfl:    MULTIVITAMIN OR Take 1 tablet by mouth daily.  Disp:  Rfl:       Past Medical History:   Diagnosis Date   • Back problem    • Ca Samy  2015: OTHER SURGICAL HISTORY      Comment: intestinal blockage  2015: OTHER SURGICAL HISTORY      Comment: hole in intestine  3/16/2016: OTHER SURGICAL HISTORY Bilateral      Comment: Bilateral capsulectomy and removal of silicone               im phase to inspiratory phase. No expiratory wheezing, no rales, no crackles. Normal on percussion. No decreased BS. Normal on palpation,normal vocal fremitus.   CARDIO: RRR without murmur  GI: good BS's,no masses, HSM or tenderness    ASSESSMENT AND PLAN:   Alvin

## 2018-05-29 NOTE — PLAN OF CARE
GASTROINTESTINAL - ADULT    • Minimal or absence of nausea and vomiting Progressing        METABOLIC/FLUID AND ELECTROLYTES - ADULT    • Glucose maintained within prescribed range Progressing        PAIN - ADULT    • Verbalizes/displays adequate comfort le Protocol followed, refill sent

## 2018-05-30 ENCOUNTER — APPOINTMENT (OUTPATIENT)
Dept: PHYSICAL THERAPY | Age: 83
End: 2018-05-30
Attending: PHYSICIAN ASSISTANT
Payer: MEDICARE

## 2018-06-04 ENCOUNTER — APPOINTMENT (OUTPATIENT)
Dept: PHYSICAL THERAPY | Age: 83
End: 2018-06-04
Attending: PHYSICIAN ASSISTANT
Payer: MEDICARE

## 2018-06-07 ENCOUNTER — APPOINTMENT (OUTPATIENT)
Dept: PHYSICAL THERAPY | Age: 83
End: 2018-06-07
Attending: PHYSICIAN ASSISTANT
Payer: MEDICARE

## 2018-06-08 ENCOUNTER — OFFICE VISIT (OUTPATIENT)
Dept: INTERNAL MEDICINE CLINIC | Facility: CLINIC | Age: 83
End: 2018-06-08

## 2018-06-08 VITALS
HEIGHT: 63 IN | SYSTOLIC BLOOD PRESSURE: 142 MMHG | TEMPERATURE: 98 F | HEART RATE: 66 BPM | WEIGHT: 117 LBS | RESPIRATION RATE: 14 BRPM | BODY MASS INDEX: 20.73 KG/M2 | DIASTOLIC BLOOD PRESSURE: 70 MMHG

## 2018-06-08 DIAGNOSIS — K56.7 ILEUS (HCC): ICD-10-CM

## 2018-06-08 DIAGNOSIS — R19.7 DIARRHEA, UNSPECIFIED TYPE: Primary | ICD-10-CM

## 2018-06-08 PROCEDURE — 99213 OFFICE O/P EST LOW 20 MIN: CPT | Performed by: NURSE PRACTITIONER

## 2018-06-08 RX ORDER — ALENDRONATE SODIUM 70 MG/1
TABLET ORAL
Qty: 12 TABLET | Refills: 1 | Status: SHIPPED | OUTPATIENT
Start: 2018-06-08 | End: 2018-12-18

## 2018-06-08 RX ORDER — ALENDRONATE SODIUM 70 MG/1
TABLET ORAL
Qty: 12 TABLET | Refills: 1 | Status: SHIPPED | OUTPATIENT
Start: 2018-06-08 | End: 2018-06-08

## 2018-06-08 RX ORDER — ALBUTEROL SULFATE 90 UG/1
2 AEROSOL, METERED RESPIRATORY (INHALATION) EVERY 6 HOURS PRN
Qty: 3 INHALER | Refills: 3 | Status: CANCELLED | OUTPATIENT
Start: 2018-06-08

## 2018-06-08 NOTE — PROGRESS NOTES
Patient presents with:  Constipation: AB RM 2, Patient was seen in [facility] 06/02/2018 for stomach pain, X 1-2 days with constipation       HPI:  Presents for follow up visit of ER visit on 6/2/18 in [place] for abd pain.  Thought she had bowel obstruction diverticulitis, 2010, with colostomy     Chronic pain following surgery or procedure     Capsular contracture of breast implant     Iron deficiency anemia     Chronic obstructive pulmonary disease (HCC)     Paroxysmal atrial fibrillation ([facility] [address]     [facility] daily.   Disp:  Rfl:    CALCIUM + D OR Take 1 tablet by mouth daily. Disp:  Rfl:    MULTIVITAMIN OR Take 1 tablet by mouth daily.  Disp:  Rfl:        Physical Exam  /70   Pulse 66   Temp 97.9 °F (36.6 °C) (Oral)   Resp 14   Ht 63\"   Wt 117 lb   BMI 2

## 2018-06-11 ENCOUNTER — APPOINTMENT (OUTPATIENT)
Dept: PHYSICAL THERAPY | Age: 83
End: 2018-06-11
Attending: PHYSICIAN ASSISTANT
Payer: MEDICARE

## 2018-06-11 ENCOUNTER — LAB ENCOUNTER (OUTPATIENT)
Dept: LAB | Age: 83
End: 2018-06-11
Attending: NURSE PRACTITIONER
Payer: MEDICARE

## 2018-06-11 DIAGNOSIS — R19.7 DIARRHEA, UNSPECIFIED TYPE: ICD-10-CM

## 2018-06-11 PROCEDURE — 87046 STOOL CULTR AEROBIC BACT EA: CPT

## 2018-06-11 PROCEDURE — 87045 FECES CULTURE AEROBIC BACT: CPT

## 2018-06-11 PROCEDURE — 89055 LEUKOCYTE ASSESSMENT FECAL: CPT

## 2018-06-11 PROCEDURE — 82272 OCCULT BLD FECES 1-3 TESTS: CPT

## 2018-06-11 PROCEDURE — 87493 C DIFF AMPLIFIED PROBE: CPT

## 2018-06-11 PROCEDURE — 87427 SHIGA-LIKE TOXIN AG IA: CPT

## 2018-06-12 ENCOUNTER — HOSPITAL ENCOUNTER (OUTPATIENT)
Dept: GENERAL RADIOLOGY | Age: 83
Discharge: HOME | End: 2018-06-12
Attending: NURSE PRACTITIONER
Payer: MEDICARE

## 2018-06-12 ENCOUNTER — TELEPHONE (OUTPATIENT)
Dept: INTERNAL MEDICINE CLINIC | Facility: CLINIC | Age: 83
End: 2018-06-12

## 2018-06-12 ENCOUNTER — HOSPITAL ENCOUNTER (OUTPATIENT)
Dept: GENERAL RADIOLOGY | Age: 83
End: 2018-06-12
Attending: NURSE PRACTITIONER
Payer: MEDICARE

## 2018-06-12 DIAGNOSIS — K56.7 ILEUS (HCC): ICD-10-CM

## 2018-06-12 DIAGNOSIS — R19.7 DIARRHEA, UNSPECIFIED TYPE: ICD-10-CM

## 2018-06-12 PROCEDURE — 74019 RADEX ABDOMEN 2 VIEWS: CPT | Performed by: NURSE PRACTITIONER

## 2018-06-12 NOTE — TELEPHONE ENCOUNTER
Pt is there waiting. They need clarification re: the order JV placed about the 3rd view. , They normally do 2 views. Please advise.  She is holding on the back line

## 2018-06-12 NOTE — TELEPHONE ENCOUNTER
Spoke with JJALIL, states ok for XR abdomen, obstructive series 2 views instead of 3 views. Sharmaine from [address] radiology informed, she will enter new order and note change per JV.

## 2018-06-13 ENCOUNTER — TELEPHONE (OUTPATIENT)
Dept: SURGERY | Facility: CLINIC | Age: 83
End: 2018-06-13

## 2018-06-13 ENCOUNTER — APPOINTMENT (OUTPATIENT)
Dept: PHYSICAL THERAPY | Age: 83
End: 2018-06-13
Attending: PHYSICIAN ASSISTANT
Payer: MEDICARE

## 2018-06-13 ENCOUNTER — OFFICE VISIT (OUTPATIENT)
Dept: PHYSICAL THERAPY | Age: 83
End: 2018-06-13
Attending: PHYSICIAN ASSISTANT
Payer: MEDICARE

## 2018-06-13 PROCEDURE — 97140 MANUAL THERAPY 1/> REGIONS: CPT

## 2018-06-13 PROCEDURE — 97162 PT EVAL MOD COMPLEX 30 MIN: CPT

## 2018-06-13 NOTE — PROGRESS NOTES
SPINE EVALUATION:   Referring Physician: Dr. Arden Castaneda  Diagnosis: thoracic pain, decreased balance      Date of Service: 6/13/2018     PATIENT Gladis House is a 80year old y/o female who presents to therapy today with complaints of sharp pa sharp in nature with insidious onset. She does have a h/o compression fx with kyphoplasty of T9-10 and had one facet LEATHA with good pain relief in the past. She does have osteoporosis, as well and postural deficits and scoliosis.  She also presents with impa Treatment Time: 8 min STM and 10 min MHP     PLAN OF CARE:    Goals:    1)Pt. to be independent with HEP to promote self-recovery and increase ease with ADL’s.   2) patient to report decrease in severity of low back pain to 4/10 or less on average  3) patie

## 2018-06-15 ENCOUNTER — OFFICE VISIT (OUTPATIENT)
Dept: INTERNAL MEDICINE CLINIC | Facility: CLINIC | Age: 83
End: 2018-06-15

## 2018-06-15 VITALS
HEART RATE: 64 BPM | WEIGHT: 117 LBS | TEMPERATURE: 98 F | DIASTOLIC BLOOD PRESSURE: 64 MMHG | HEIGHT: 63 IN | RESPIRATION RATE: 16 BRPM | BODY MASS INDEX: 20.73 KG/M2 | SYSTOLIC BLOOD PRESSURE: 136 MMHG

## 2018-06-15 DIAGNOSIS — R19.7 DIARRHEA, UNSPECIFIED TYPE: Primary | ICD-10-CM

## 2018-06-15 PROCEDURE — 99213 OFFICE O/P EST LOW 20 MIN: CPT | Performed by: NURSE PRACTITIONER

## 2018-06-15 RX ORDER — MELATONIN
1000 DAILY
COMMUNITY
End: 2022-01-01

## 2018-06-15 NOTE — PROGRESS NOTES
Patient presents with: Follow - Up: Room 7 AH- Per last seen for constipation, states she no longer has this - unaware of the reason for follow up       HPI:  Presents for follow up of visit on 6/8/18 for diarrhea after ER eval for GI obstruction.  Stated hypertension with goal blood pressure less than 130/80     Dyslipidemia     SBO (small bowel obstruction) (HCC)     Memory deficit     Mild cognitive impairment     Tremor of both hands     Closed compression fracture of thoracic vertebra (HCC)     Leg debby Wt 117 lb   BMI 20.73 kg/m²   Constitutional: well developed, well nourished,in no apparent distress  HEENT: Normocephalic and atraumatic. Cardiovascular: Normal rate, regular rhythm. No murmur. Pulmonary/Chest: No respiratory distress.  Effort normal.

## 2018-06-18 ENCOUNTER — OFFICE VISIT (OUTPATIENT)
Dept: PHYSICAL THERAPY | Age: 83
End: 2018-06-18
Attending: PHYSICIAN ASSISTANT
Payer: MEDICARE

## 2018-06-18 PROCEDURE — 97110 THERAPEUTIC EXERCISES: CPT

## 2018-06-18 PROCEDURE — 97112 NEUROMUSCULAR REEDUCATION: CPT

## 2018-06-18 NOTE — PROGRESS NOTES
Dx: back pain with h/o compression fx, balance and fall impairments         Authorized # of Visits:  8         Next MD visit: none scheduled  Fall Risk: elevated       Precautions: n/a             Subjective: she reports no back pain this morning and less

## 2018-06-20 ENCOUNTER — OFFICE VISIT (OUTPATIENT)
Dept: PHYSICAL THERAPY | Age: 83
End: 2018-06-20
Attending: PHYSICIAN ASSISTANT
Payer: MEDICARE

## 2018-06-20 PROCEDURE — 97112 NEUROMUSCULAR REEDUCATION: CPT

## 2018-06-20 PROCEDURE — 97110 THERAPEUTIC EXERCISES: CPT

## 2018-06-20 NOTE — PROGRESS NOTES
Dx: back pain with h/o compression fx, balance and fall impairments         Authorized # of Visits:  8         Next MD visit: none scheduled  Fall Risk: elevated       Precautions: n/a             Subjective: getting a new mattress soon.  Back pain seems to

## 2018-06-25 ENCOUNTER — OFFICE VISIT (OUTPATIENT)
Dept: PHYSICAL THERAPY | Age: 83
End: 2018-06-25
Attending: PHYSICIAN ASSISTANT
Payer: MEDICARE

## 2018-06-25 PROCEDURE — 97112 NEUROMUSCULAR REEDUCATION: CPT

## 2018-06-25 PROCEDURE — 97110 THERAPEUTIC EXERCISES: CPT

## 2018-06-25 NOTE — PROGRESS NOTES
Dx: back pain with h/o compression fx, balance and fall impairments         Authorized # of Visits:  8         Next MD visit: none scheduled  Fall Risk: elevated       Precautions: n/a             Subjective: not having the sharp pains nearly as frequent a Time: 40 min

## 2018-06-26 ENCOUNTER — OFFICE VISIT (OUTPATIENT)
Dept: SURGERY | Facility: CLINIC | Age: 83
End: 2018-06-26

## 2018-06-26 VITALS — HEART RATE: 80 BPM | SYSTOLIC BLOOD PRESSURE: 120 MMHG | DIASTOLIC BLOOD PRESSURE: 70 MMHG

## 2018-06-26 DIAGNOSIS — G89.29 CHRONIC LEFT-SIDED THORACIC BACK PAIN: Primary | ICD-10-CM

## 2018-06-26 DIAGNOSIS — M51.34 DDD (DEGENERATIVE DISC DISEASE), THORACIC: ICD-10-CM

## 2018-06-26 DIAGNOSIS — Z87.81 HISTORY OF COMPRESSION FRACTURE OF SPINE: ICD-10-CM

## 2018-06-26 DIAGNOSIS — M54.6 CHRONIC LEFT-SIDED THORACIC BACK PAIN: Primary | ICD-10-CM

## 2018-06-26 DIAGNOSIS — Z98.890 S/P KYPHOPLASTY: ICD-10-CM

## 2018-06-26 PROCEDURE — 99213 OFFICE O/P EST LOW 20 MIN: CPT | Performed by: PHYSICIAN ASSISTANT

## 2018-06-26 NOTE — PROGRESS NOTES
Neurosurgery   Follow up      REASON FOR VISIT: follow up on thoracic back pain      HISTORY OF PRESENT Margarito Esteskallie Jackson is a 80year old female following up after PT for thoracic back pain. Patient states has had significant improvement with PT.  D needed  2. Medication: None prescribed   3. Imaging:    - Reviewed today:    - No recent imaging   - Ordered today:    - None  4. Activity:    - Further PT provided. Patient is benefiting significantly from PT and would like to continue with therapy.      I

## 2018-06-26 NOTE — PATIENT INSTRUCTIONS
Refill policies:    • Allow 2-3 business days for refills; controlled substances may take longer.   • Contact your pharmacy at least 5 days prior to running out of medication and have them send an electronic request or submit request through the “request re entire amount billed. Precertification and Prior Authorizations: If your physician has recommended that you have a procedure or additional testing performed.   [facility]) will contact your insurance carrier to obtain pre-certi

## 2018-07-02 ENCOUNTER — OFFICE VISIT (OUTPATIENT)
Dept: PHYSICAL THERAPY | Age: 83
End: 2018-07-02
Attending: INTERNAL MEDICINE
Payer: MEDICARE

## 2018-07-02 PROCEDURE — 97112 NEUROMUSCULAR REEDUCATION: CPT

## 2018-07-02 PROCEDURE — 97110 THERAPEUTIC EXERCISES: CPT

## 2018-07-02 NOTE — PROGRESS NOTES
Dx: back pain with h/o compression fx, balance and fall impairments         Authorized # of Visits:  8         Next MD visit: none scheduled  Fall Risk: elevated       Precautions: n/a           Subjective: feeling good, no c/o back pain the symptoms have 4 bouts each      Skilled Services: one on one services, CGA/SBA throughout entire session for safety     Charges: there ex 1, neuro re ed 1     Total Timed Treatment: 30 min  Total Treatment Time: 30 min

## 2018-07-09 ENCOUNTER — TELEPHONE (OUTPATIENT)
Dept: INTERNAL MEDICINE CLINIC | Facility: CLINIC | Age: 83
End: 2018-07-09

## 2018-07-09 ENCOUNTER — OFFICE VISIT (OUTPATIENT)
Dept: PHYSICAL THERAPY | Age: 83
End: 2018-07-09
Attending: INTERNAL MEDICINE
Payer: MEDICARE

## 2018-07-09 PROCEDURE — 97112 NEUROMUSCULAR REEDUCATION: CPT

## 2018-07-09 PROCEDURE — 97110 THERAPEUTIC EXERCISES: CPT

## 2018-07-09 NOTE — PROGRESS NOTES
Dx: back pain with h/o compression fx, balance and fall impairments         Authorized # of Visits:  8         Next MD visit: none scheduled  Fall Risk: elevated       Precautions: n/a           Subjective: feeling good, no c/o back pain, no falls, Feeling update  Gait with head motions 5 laps, CGA  Pt ed and HEP update with lateral resisted gait Cone obstacles, fwd/laterally 4 bouts each 4 bouts each direction, SBA only      Skilled Services: one on one services,SBA throughout entire session for safety

## 2018-07-09 NOTE — TELEPHONE ENCOUNTER
Pt was NO SHOW for appt today-called at 2:40 for a 4:15 appt today she cannot get a ride-will call back to r/s-breast issue

## 2018-07-12 ENCOUNTER — OFFICE VISIT (OUTPATIENT)
Dept: INTERNAL MEDICINE CLINIC | Facility: CLINIC | Age: 83
End: 2018-07-12

## 2018-07-12 VITALS
SYSTOLIC BLOOD PRESSURE: 128 MMHG | RESPIRATION RATE: 14 BRPM | HEIGHT: 63 IN | TEMPERATURE: 98 F | WEIGHT: 124.63 LBS | HEART RATE: 76 BPM | BODY MASS INDEX: 22.08 KG/M2 | DIASTOLIC BLOOD PRESSURE: 52 MMHG

## 2018-07-12 DIAGNOSIS — N64.4 BREAST PAIN, LEFT: Primary | ICD-10-CM

## 2018-07-12 DIAGNOSIS — Z12.31 ENCOUNTER FOR SCREENING MAMMOGRAM FOR MALIGNANT NEOPLASM OF BREAST: ICD-10-CM

## 2018-07-12 PROCEDURE — 99213 OFFICE O/P EST LOW 20 MIN: CPT | Performed by: INTERNAL MEDICINE

## 2018-07-12 NOTE — PROGRESS NOTES
Zhane Byrnes is a 80year old female. Patient presents with: Other: cn room 4: check up  Left breast pain  feeling better       HPI:     C/o left armpit and left breast discomfort since last week.  She gave an awkward hug to her son in law and felt sh Disp: 60 capsule Rfl: 3   SIMVASTATIN 10 MG Oral Tab TAKE ONE TABLET BY MOUTH ONCE DAILY Disp: 90 tablet Rfl: 1   Albuterol Sulfate  (90 Base) MCG/ACT Inhalation Aero Soln Inhale 2 puffs into the lungs every 6 (six) hours as needed for Wheezing.  Dis Comment: occasional    Family History   Problem Relation Age of Onset   • Psychiatric Father    • suicide completion [OTHER] Father    • Other Saeed Crass Mother      Cerebral hemorrhage   • Other [OTHER] Son      asthma  2 sons   • alzheimer's disease [OTHER]

## 2018-07-16 ENCOUNTER — OFFICE VISIT (OUTPATIENT)
Dept: PHYSICAL THERAPY | Age: 83
End: 2018-07-16
Attending: INTERNAL MEDICINE
Payer: MEDICARE

## 2018-07-16 PROCEDURE — 97110 THERAPEUTIC EXERCISES: CPT

## 2018-07-16 PROCEDURE — 97112 NEUROMUSCULAR REEDUCATION: CPT

## 2018-07-16 RX ORDER — GABAPENTIN 300 MG/1
CAPSULE ORAL
Qty: 60 CAPSULE | Refills: 3 | Status: SHIPPED | OUTPATIENT
Start: 2018-07-16 | End: 2018-11-26

## 2018-07-16 NOTE — PROGRESS NOTES
Dx: back pain with h/o compression fx, balance and fall impairments         Authorized # of Visits:  8         Next MD visit: none scheduled  Fall Risk: elevated       Precautions: n/a           Subjective:  no c/o back pain, no falls, denies any near fall ext 2x15 B       scap pinches 2x10  Sit to stand 2x10  2x15  2x15  x15  Cone step over x10 each LE fwd  Laterally x10 each     RTB rows 2x10  Pt ed/HEP update  Gait with head motions 5 laps, CGA  Pt ed and HEP update with lateral resisted gait Cone obstacl

## 2018-07-17 ENCOUNTER — OFFICE VISIT (OUTPATIENT)
Dept: NEUROLOGY | Facility: CLINIC | Age: 83
End: 2018-07-17
Payer: MEDICARE

## 2018-07-17 VITALS
BODY MASS INDEX: 20.33 KG/M2 | HEIGHT: 65 IN | RESPIRATION RATE: 16 BRPM | WEIGHT: 122 LBS | HEART RATE: 78 BPM | SYSTOLIC BLOOD PRESSURE: 130 MMHG | DIASTOLIC BLOOD PRESSURE: 56 MMHG

## 2018-07-17 DIAGNOSIS — G31.84 MILD COGNITIVE IMPAIRMENT: ICD-10-CM

## 2018-07-17 PROCEDURE — 99213 OFFICE O/P EST LOW 20 MIN: CPT | Performed by: OTHER

## 2018-07-17 RX ORDER — DONEPEZIL HYDROCHLORIDE 10 MG/1
TABLET, FILM COATED ORAL
Qty: 90 TABLET | Refills: 1 | Status: SHIPPED | OUTPATIENT
Start: 2018-07-17 | End: 2018-12-18

## 2018-07-17 NOTE — PATIENT INSTRUCTIONS
Refill policies:    • Allow 2-3 business days for refills; controlled substances may take longer.   • Contact your pharmacy at least 5 days prior to running out of medication and have them send an electronic request or submit request through the “request re entire amount billed. Precertification and Prior Authorizations: If your physician has recommended that you have a procedure or additional testing performed.   [facility]) will contact your insurance carrier to obtain pre-certi

## 2018-07-17 NOTE — PROGRESS NOTES
Neurology H&P    Curry Castro Patient Status:  No patient class for patient encounter    7/3/1934 MRN [id number]   Location ED [facility], [address], [address] Attending No att. providers found   [facility] Day # 0 PCP MD Rudy Carmen 1   SIMVASTATIN 10 MG Oral Tab TAKE ONE TABLET BY MOUTH ONCE DAILY Disp: 90 tablet Rfl: 1   Albuterol Sulfate  (90 Base) MCG/ACT Inhalation Aero Soln Inhale 2 puffs into the lungs every 6 (six) hours as needed for Wheezing.  Disp: 3 Inhaler Rfl: 3 Suture granuloma     Arthropathy of thoracic facet joint (HCC)     Osteoporosis      PMHx:  Past Medical History:   Diagnosis Date   • Back problem    • Cataract    • COPD (chronic obstructive pulmonary disease) ([address]    • Diverticulitis of colon    • Dive Comment: hole in intestine  3/16/2016: OTHER SURGICAL HISTORY Bilateral      Comment: Bilateral capsulectomy and removal of silicone               implants  01/05/2018: OTHER SURGICAL HISTORY      Comment: excision suture granuloma   No date: PART Raymona Nail murmur  Pulm: CTAB  GI: non-tender, normal bowel sounds  Skin: normal, dry  Extremities: No clubbing or cyanosis      Neurologic:   MS: awake and alert and oriented x 3, speech is fluent and conversant  MOCA 4/18/18:    CN: PERRL, EOMI, VF grossly intact, With points off for 0/5 word recall  - Continue Donepezil 10mg QHS    2.  Essential/action tremor  - No treatment at this time      Michelle Monge DO  Neurology

## 2018-07-19 ENCOUNTER — HOSPITAL ENCOUNTER (INPATIENT)
Facility: HOSPITAL | Age: 83
LOS: 3 days | Discharge: HOME | DRG: 389 | End: 2018-07-22
Attending: STUDENT IN AN ORGANIZED HEALTH CARE EDUCATION/TRAINING PROGRAM | Admitting: HOSPITALIST
Payer: MEDICARE

## 2018-07-19 ENCOUNTER — APPOINTMENT (OUTPATIENT)
Dept: CT IMAGING | Facility: HOSPITAL | Age: 83
DRG: 389 | End: 2018-07-19
Attending: STUDENT IN AN ORGANIZED HEALTH CARE EDUCATION/TRAINING PROGRAM
Payer: MEDICARE

## 2018-07-19 DIAGNOSIS — R10.9 ABDOMINAL PAIN, ACUTE: Primary | ICD-10-CM

## 2018-07-19 PROBLEM — N17.9 ACUTE KIDNEY INJURY (HCC): Status: ACTIVE | Noted: 2018-07-19

## 2018-07-19 PROBLEM — R73.9 HYPERGLYCEMIA: Status: ACTIVE | Noted: 2018-07-19

## 2018-07-19 LAB
ALBUMIN SERPL-MCNC: 4 G/DL (ref 3.5–4.8)
ALBUMIN/GLOB SERPL: 1.2 {RATIO} (ref 1–2)
ALP LIVER SERPL-CCNC: 107 U/L (ref 55–142)
ALT SERPL-CCNC: 27 U/L (ref 14–54)
ANION GAP SERPL CALC-SCNC: 8 MMOL/L (ref 0–18)
AST SERPL-CCNC: 25 U/L (ref 15–41)
BASOPHILS # BLD AUTO: 0.03 X10(3) UL (ref 0–0.1)
BASOPHILS NFR BLD AUTO: 0.4 %
BILIRUB SERPL-MCNC: 0.6 MG/DL (ref 0.1–2)
BILIRUB UR QL STRIP.AUTO: NEGATIVE
BUN BLD-MCNC: 15 MG/DL (ref 8–20)
BUN/CREAT SERPL: 14 (ref 10–20)
CALCIUM BLD-MCNC: 9.5 MG/DL (ref 8.3–10.3)
CHLORIDE: 106 MMOL/L (ref 101–111)
CO2: 27 MMOL/L (ref 22–32)
COLOR UR AUTO: YELLOW
CREAT BLD-MCNC: 1.07 MG/DL (ref 0.55–1.02)
EOSINOPHIL # BLD AUTO: 0.11 X10(3) UL (ref 0–0.3)
EOSINOPHIL NFR BLD AUTO: 1.4 %
ERYTHROCYTE [DISTWIDTH] IN BLOOD BY AUTOMATED COUNT: 13.8 % (ref 11.5–16)
GLOBULIN PLAS-MCNC: 3.3 G/DL (ref 2.5–3.7)
GLUCOSE BLD-MCNC: 125 MG/DL (ref 70–99)
GLUCOSE UR STRIP.AUTO-MCNC: NEGATIVE MG/DL
HCT VFR BLD AUTO: 41.5 % (ref 34–50)
HGB BLD-MCNC: 13.5 G/DL (ref 12–16)
IMMATURE GRANULOCYTE COUNT: 0.02 X10(3) UL (ref 0–1)
IMMATURE GRANULOCYTE RATIO %: 0.3 %
KETONES UR STRIP.AUTO-MCNC: NEGATIVE MG/DL
LEUKOCYTE ESTERASE UR QL STRIP.AUTO: NEGATIVE
LIPASE: 142 U/L (ref 73–393)
LYMPHOCYTES # BLD AUTO: 0.5 X10(3) UL (ref 0.9–4)
LYMPHOCYTES NFR BLD AUTO: 6.4 %
M PROTEIN MFR SERPL ELPH: 7.3 G/DL (ref 6.1–8.3)
MCH RBC QN AUTO: 30.3 PG (ref 27–33.2)
MCHC RBC AUTO-ENTMCNC: 32.5 G/DL (ref 31–37)
MCV RBC AUTO: 93 FL (ref 81–100)
MONOCYTES # BLD AUTO: 0.45 X10(3) UL (ref 0.1–1)
MONOCYTES NFR BLD AUTO: 5.8 %
NEUTROPHIL ABS PRELIM: 6.69 X10 (3) UL (ref 1.3–6.7)
NEUTROPHILS # BLD AUTO: 6.69 X10(3) UL (ref 1.3–6.7)
NEUTROPHILS NFR BLD AUTO: 85.7 %
NITRITE UR QL STRIP.AUTO: NEGATIVE
OSMOLALITY SERPL CALC.SUM OF ELEC: 294 MOSM/KG (ref 275–295)
PH UR STRIP.AUTO: 7 [PH] (ref 4.5–8)
PLATELET # BLD AUTO: 250 10(3)UL (ref 150–450)
POTASSIUM SERPL-SCNC: 3.9 MMOL/L (ref 3.6–5.1)
PROT UR STRIP.AUTO-MCNC: NEGATIVE MG/DL
RBC # BLD AUTO: 4.46 X10(6)UL (ref 3.8–5.1)
RBC UR QL AUTO: NEGATIVE
RED CELL DISTRIBUTION WIDTH-SD: 47.1 FL (ref 35.1–46.3)
SODIUM SERPL-SCNC: 141 MMOL/L (ref 136–144)
SP GR UR STRIP.AUTO: 1.03 (ref 1–1.03)
UROBILINOGEN UR STRIP.AUTO-MCNC: <2 MG/DL
WBC # BLD AUTO: 7.8 X10(3) UL (ref 4–13)

## 2018-07-19 PROCEDURE — 74177 CT ABD & PELVIS W/CONTRAST: CPT | Performed by: STUDENT IN AN ORGANIZED HEALTH CARE EDUCATION/TRAINING PROGRAM

## 2018-07-19 PROCEDURE — 99223 1ST HOSP IP/OBS HIGH 75: CPT | Performed by: HOSPITALIST

## 2018-07-19 PROCEDURE — 99223 1ST HOSP IP/OBS HIGH 75: CPT | Performed by: COLON & RECTAL SURGERY

## 2018-07-19 RX ORDER — MORPHINE SULFATE 4 MG/ML
4 INJECTION, SOLUTION INTRAMUSCULAR; INTRAVENOUS EVERY 2 HOUR PRN
Status: DISCONTINUED | OUTPATIENT
Start: 2018-07-19 | End: 2018-07-22

## 2018-07-19 RX ORDER — BIOTIN 1 MG
1 TABLET ORAL DAILY
Status: ON HOLD | COMMUNITY
End: 2020-05-30

## 2018-07-19 RX ORDER — METOCLOPRAMIDE HYDROCHLORIDE 5 MG/ML
5 INJECTION INTRAMUSCULAR; INTRAVENOUS ONCE
Status: COMPLETED | OUTPATIENT
Start: 2018-07-19 | End: 2018-07-19

## 2018-07-19 RX ORDER — MORPHINE SULFATE 4 MG/ML
2 INJECTION, SOLUTION INTRAMUSCULAR; INTRAVENOUS EVERY 2 HOUR PRN
Status: DISCONTINUED | OUTPATIENT
Start: 2018-07-19 | End: 2018-07-22

## 2018-07-19 RX ORDER — METOPROLOL TARTRATE 5 MG/5ML
5 INJECTION INTRAVENOUS EVERY 4 HOURS PRN
Status: DISCONTINUED | OUTPATIENT
Start: 2018-07-19 | End: 2018-07-22

## 2018-07-19 RX ORDER — MORPHINE SULFATE 4 MG/ML
1 INJECTION, SOLUTION INTRAMUSCULAR; INTRAVENOUS EVERY 2 HOUR PRN
Status: DISCONTINUED | OUTPATIENT
Start: 2018-07-19 | End: 2018-07-22

## 2018-07-19 RX ORDER — ENOXAPARIN SODIUM 100 MG/ML
40 INJECTION SUBCUTANEOUS DAILY
Status: DISCONTINUED | OUTPATIENT
Start: 2018-07-19 | End: 2018-07-22

## 2018-07-19 RX ORDER — SODIUM CHLORIDE 9 MG/ML
INJECTION, SOLUTION INTRAVENOUS CONTINUOUS
Status: ACTIVE | OUTPATIENT
Start: 2018-07-19 | End: 2018-07-19

## 2018-07-19 RX ORDER — METOCLOPRAMIDE HYDROCHLORIDE 5 MG/ML
5 INJECTION INTRAMUSCULAR; INTRAVENOUS EVERY 6 HOURS PRN
Status: DISCONTINUED | OUTPATIENT
Start: 2018-07-19 | End: 2018-07-22

## 2018-07-19 RX ORDER — ONDANSETRON 2 MG/ML
4 INJECTION INTRAMUSCULAR; INTRAVENOUS EVERY 4 HOURS PRN
Status: DISCONTINUED | OUTPATIENT
Start: 2018-07-19 | End: 2018-07-22

## 2018-07-19 RX ORDER — ONDANSETRON 2 MG/ML
4 INJECTION INTRAMUSCULAR; INTRAVENOUS EVERY 6 HOURS PRN
Status: DISCONTINUED | OUTPATIENT
Start: 2018-07-19 | End: 2018-07-19

## 2018-07-19 RX ORDER — SODIUM CHLORIDE 9 MG/ML
INJECTION, SOLUTION INTRAVENOUS CONTINUOUS
Status: DISCONTINUED | OUTPATIENT
Start: 2018-07-19 | End: 2018-07-20

## 2018-07-19 RX ORDER — ONDANSETRON 2 MG/ML
4 INJECTION INTRAMUSCULAR; INTRAVENOUS ONCE
Status: COMPLETED | OUTPATIENT
Start: 2018-07-19 | End: 2018-07-19

## 2018-07-19 RX ORDER — HYDRALAZINE HYDROCHLORIDE 20 MG/ML
10 INJECTION INTRAMUSCULAR; INTRAVENOUS EVERY 6 HOURS PRN
Status: DISCONTINUED | OUTPATIENT
Start: 2018-07-19 | End: 2018-07-22

## 2018-07-19 NOTE — ED NOTES
Report received from Caridad, [address] [place] [address]. Patient care assumed at this time.

## 2018-07-19 NOTE — PLAN OF CARE
GASTROINTESTINAL - ADULT    • Maintains or returns to baseline bowel function Not Progressing    • Achieves appropriate nutritional intake (bariatric) Not Progressing        Patient/Family Goals    • Patient/Family Long Term Goal Not Progressing    • Patie

## 2018-07-19 NOTE — ED INITIAL ASSESSMENT (HPI)
Pt arrives to ER via EMS c/o abdominal pain X 6 hrs, with nausea, last bowel movement on Monday. History of SBO, patient reports this feels similar.  EMS gave 4 mg odt zofran en route

## 2018-07-19 NOTE — H&P
DILCIA HOSPITALIST  History and Physical     [facility] Patient Status:  Emergency    7/3/1934 MRN [id number]   Location [address] [facility] Attending Bernie Cary MD   Hosp Day # 0 PCP Faizan Sellers MD     Chief Complaint: bilateral breast implants  07/11/2017: OTHER      Comment: Kyphoplasty, Dr. Isabel Bran: OTHER SURGICAL HISTORY      Comment: Cyst in vagina  1974: OTHER SURGICAL HISTORY      Comment: Breast implants  2006: OTHER SURGICAL HISTORY      Comment: Right Criselda Pinedo one-konstantin;f tablet by mouth for 30 days then increase to 10mg QHS Disp: 90 tablet Rfl: 1   GABAPENTIN 300 MG Oral Cap TAKE 1 CAPSULE BY MOUTH TWICE DAILY Disp: 60 capsule Rfl: 3   Vitamin B-12 1000 MCG Oral Tab Take 1,000 mcg by mouth daily.  Disp:  Rfl:    A No murmurs, rubs or gallops. Equal pulses. Chest and Back: No tenderness or deformity. Abdomen: Soft, diffusely tender, nondistended. Hypoactive bowel sounds. Neurologic: No focal neurological deficits. Musculoskeletal: Moves all extremities.   Extr

## 2018-07-19 NOTE — CONSULTS
[facility]  Report of Consultation    Chantell Pabon Patient Status:  Inpatient    7/3/1934 MRN [id number]   [facility] 3NW-A Attending Albino Zavala MD   Hosp Day # 0 PCP Elina Kovacs MD     Reason for Consultation:  Small bowel mention of hemorrhage)(562.11)    • Diverticulosis of large intestine    • Dizziness and giddiness    • Hearing impairment    • High blood pressure    • High cholesterol    • HYPERLIPIDEMIA    • HYPERTENSION    • Increased urinary frequency    • Lower back SKIN SURGERY Left      Comment: exc - L mid forearm - atypical squamous                proliferation  1947: TONSILLECTOMY  1966: TUBAL LIGATION  Family History   Problem Relation Age of Onset   • Psychiatric Father    • suicide completion [OTHER] Father Negative for cool extremity and irregular heartbeat/palpitations  Constitutional:  Negative for fever.  Negative for decreased activity, irritability and lethargy  Endocrine:  Negative for abnormal sleep patterns, increased activity, polydipsia and polyphag turgor.       Laboratory Data:  Recent Labs   Lab  07/19/18   0355   RBC  4.46   HGB  13.5   HCT  41.5   MCV  93.0   MCH  30.3   MCHC  32.5   RDW  13.8   NEPRELIM  6.69   WBC  7.8   PLT  250.0       Recent Labs   Lab  07/19/18   0355   GLU  125*   BUN  15 prophylaxis    Time spent on counseling/coordination of care:  45 Minutes    Total time spent with patient:  1 Hour 15 Minutes    Nelson Chasity  7/19/2018  9:10 AM    I, Dr. Anthony Locke, personally performed the services described in this documentati

## 2018-07-19 NOTE — ED NOTES
Partial report given to Aric Gayle RN x [phone] at [phone]. Pt awaits UA and CT Scan results. Pt and daughter at bedside updated on plan of care.

## 2018-07-19 NOTE — ED PROVIDER NOTES
Patient CT scan shows evidence of a small bowel obstruction as per radiologist.  There is no evidence of any free air appreciated. Patient on repeat exam with no other acute complaints at this time. Patient will have an NG tube placed at this time.   Roselyn

## 2018-07-19 NOTE — ED PROVIDER NOTES
Patient Seen in: [facility] Emergency Department    History   Patient presents with:  Abdomen/Flank Pain (GI/)    Stated Complaint: abd pain    HPI    Patient is an 60-year-old female with previous history of multiple prior abdominal surgeries and implants  07/11/2017: OTHER      Comment: Kyphoplasty, Dr. Sykes Listen: OTHER SURGICAL HISTORY      Comment: Cyst in vagina  1974: OTHER SURGICAL HISTORY      Comment: Breast implants  2006: OTHER SURGICAL HISTORY      Comment: Right breast lump- benign  2 53.5 kg   SpO2 98%   BMI 20.25 kg/m²         Physical Exam    Constitutional: oriented to person, place, and time, appears well-developed and well-nourished. HENT:   Head: Normocephalic and atraumatic.    Eyes: Pupils are equal, round, and reactive to lig [id number]  ------------------------------------------------------------      [facility]     Admission disposition: 7/19/2018  5:31 AM         Extensive differential was considered including diverticulitis, cholecystitis, appendicitis, colitis, gastroenteritis, bowel o

## 2018-07-20 ENCOUNTER — APPOINTMENT (OUTPATIENT)
Dept: GENERAL RADIOLOGY | Facility: HOSPITAL | Age: 83
DRG: 389 | End: 2018-07-20
Attending: COLON & RECTAL SURGERY
Payer: MEDICARE

## 2018-07-20 LAB
ANION GAP SERPL CALC-SCNC: 10 MMOL/L (ref 0–18)
BASOPHILS # BLD AUTO: 0.02 X10(3) UL (ref 0–0.1)
BASOPHILS NFR BLD AUTO: 0.5 %
BUN BLD-MCNC: 13 MG/DL (ref 8–20)
BUN/CREAT SERPL: 20 (ref 10–20)
CALCIUM BLD-MCNC: 8.7 MG/DL (ref 8.3–10.3)
CHLORIDE SERPL-SCNC: 109 MMOL/L (ref 101–111)
CO2 SERPL-SCNC: 23 MMOL/L (ref 22–32)
CREAT BLD-MCNC: 0.65 MG/DL (ref 0.55–1.02)
EOSINOPHIL # BLD AUTO: 0.02 X10(3) UL (ref 0–0.3)
EOSINOPHIL NFR BLD AUTO: 0.5 %
ERYTHROCYTE [DISTWIDTH] IN BLOOD BY AUTOMATED COUNT: 14.4 % (ref 11.5–16)
GLUCOSE BLD-MCNC: 111 MG/DL (ref 70–99)
HAV IGM SER QL: 2 MG/DL (ref 1.8–2.5)
HCT VFR BLD AUTO: 46.8 % (ref 34–50)
HGB BLD-MCNC: 14 G/DL (ref 12–16)
IMMATURE GRANULOCYTE COUNT: 0.01 X10(3) UL (ref 0–1)
IMMATURE GRANULOCYTE RATIO %: 0.2 %
LYMPHOCYTES # BLD AUTO: 0.31 X10(3) UL (ref 0.9–4)
LYMPHOCYTES NFR BLD AUTO: 7.4 %
MCH RBC QN AUTO: 30.7 PG (ref 27–33.2)
MCHC RBC AUTO-ENTMCNC: 29.9 G/DL (ref 31–37)
MCV RBC AUTO: 102.6 FL (ref 81–100)
MONOCYTES # BLD AUTO: 0.78 X10(3) UL (ref 0.1–1)
MONOCYTES NFR BLD AUTO: 18.5 %
NEUTROPHIL ABS PRELIM: 3.07 X10 (3) UL (ref 1.3–6.7)
NEUTROPHILS # BLD AUTO: 3.07 X10(3) UL (ref 1.3–6.7)
NEUTROPHILS NFR BLD AUTO: 72.9 %
OSMOLALITY SERPL CALC.SUM OF ELEC: 295 MOSM/KG (ref 275–295)
PHOSPHATE SERPL-MCNC: 3.4 MG/DL (ref 2.5–4.9)
PLATELET # BLD AUTO: 231 10(3)UL (ref 150–450)
POTASSIUM SERPL-SCNC: 4.3 MMOL/L (ref 3.6–5.1)
RBC # BLD AUTO: 4.56 X10(6)UL (ref 3.8–5.1)
RED CELL DISTRIBUTION WIDTH-SD: 54.8 FL (ref 35.1–46.3)
SODIUM SERPL-SCNC: 142 MMOL/L (ref 136–144)
WBC # BLD AUTO: 4.2 X10(3) UL (ref 4–13)

## 2018-07-20 PROCEDURE — 99232 SBSQ HOSP IP/OBS MODERATE 35: CPT | Performed by: HOSPITALIST

## 2018-07-20 PROCEDURE — 74019 RADEX ABDOMEN 2 VIEWS: CPT | Performed by: COLON & RECTAL SURGERY

## 2018-07-20 RX ORDER — DEXTROSE, SODIUM CHLORIDE, AND POTASSIUM CHLORIDE 5; .45; .15 G/100ML; G/100ML; G/100ML
INJECTION INTRAVENOUS CONTINUOUS
Status: DISCONTINUED | OUTPATIENT
Start: 2018-07-20 | End: 2018-07-22

## 2018-07-20 NOTE — PHYSICAL THERAPY NOTE
PHYSICAL THERAPY QUICK EVALUATION - INPATIENT    Room Number: 303/303-A  Evaluation Date: 7/20/2018  Presenting Problem: abdominal pain  Physician Order: PT Eval and Treat    Problem List  Principal Problem:    Abdominal pain, acute  Active Problems: Right breast lump- benign  2006: OTHER SURGICAL HISTORY      Comment: Fhgyfbqpxe-afhfdguosj-[id number]  2007: OTHER SURGICAL HISTORY      Comment: Removed cholostomy  2010: OTHER SURGICAL HISTORY      Comment: intestinal  1980: OTHER SURGICAL HISTORY Sitting down on and standing up from a chair with arms (e.g., wheelchair, bedside commode, etc.): None   -   Moving from lying on back to sitting on the side of the bed?: None   How much help from another person does the patient currently need. ..   -   Mov low.  PT Discharge Recommendations: Outpatient PT    PLAN  Patient currently does not meet criteria for skilled inpatient physical therapy services, however patient will remain on Inpatient Mobility Team and will continue with encouraged ambulation to main

## 2018-07-20 NOTE — PLAN OF CARE
GASTROINTESTINAL - ADULT    • Minimal or absence of nausea and vomiting Not Progressing    • Maintains or returns to baseline bowel function Not Progressing    • Achieves appropriate nutritional intake (bariatric) Not Progressing          No gas noted.  Guadalupe Dos Santos

## 2018-07-20 NOTE — PROGRESS NOTES
[facility]  Progress Note    Zoe Apple Patient Status:  Inpatient    7/3/1934 MRN [id number]   [facility] 3NW-A Attending Abelino Lee1 [address] Day # 1 PCP Lord Mela MD     Subjective:  Pt sitting up in chair, ju levels are seen in the mid to lower left thigh abdomen that could be due to a ileus or partial small bowel obstruction. Clinical correlation and followup suggested. NG tube tip in the stomach. No free air. Surgical clips in the right quadrant.   Sutur morning which continues to demonstrate air fluid levels within the small bowel consistent with SBO     Plan:  1. Continue NGT to LIS   2. May have ice chips or gum for comfort   3. Frequent ambulation encouraged   4. Discussed KUB results with pt  5.  No in

## 2018-07-20 NOTE — PROGRESS NOTES
[facility]  Progress Note     Arianna Morgan Patient Status:  Inpatient    7/3/1934 MRN [id number]   [facility] 3NW-A Attending Don Jose Cruz  [address] Day # 1 PCP Monica Rasheed MD     Chief Complaint: SBO    S: Patient suspected secondary to SBO  1. Obstructive series this morning still consistent with small bowel obstruction. 2. NPO, IVF, anti-emetics, pain control  3. NGT to LIS  4. Surgery on consult. 2. H/o PAF/PSVT  1. Currently in NSR  2. Cardizem on hold  3.  Dem

## 2018-07-20 NOTE — PLAN OF CARE
GASTROINTESTINAL - ADULT    • Minimal or absence of nausea and vomiting Progressing    • Maintains or returns to baseline bowel function Progressing    • Achieves appropriate nutritional intake (bariatric) Progressing        PAIN - ADULT    • Verbalizes/di

## 2018-07-20 NOTE — PHYSICAL THERAPY NOTE
Attempted to see pt for PT this AM.  Per RN, pt off the floor for x-ray. Will f/u as schedule permits.

## 2018-07-20 NOTE — PROGRESS NOTES
[facility] Note:  Renal Dose Adjustment for Metoclopramide (REGLAN)    Milagro Khan has been prescribed Metoclopramide (REGLAN) 10 mg every 8 hours as needed for nausea/vomiting.     Estimated Creatinine Clearance: 33.1 mL/min (A) (based on SCr of 1.0

## 2018-07-21 LAB
ERYTHROCYTE [DISTWIDTH] IN BLOOD BY AUTOMATED COUNT: 13.8 % (ref 11.5–16)
HCT VFR BLD AUTO: 37 % (ref 34–50)
HGB BLD-MCNC: 12.4 G/DL (ref 12–16)
MCH RBC QN AUTO: 31.3 PG (ref 27–33.2)
MCHC RBC AUTO-ENTMCNC: 33.5 G/DL (ref 31–37)
MCV RBC AUTO: 93.4 FL (ref 81–100)
PLATELET # BLD AUTO: 207 10(3)UL (ref 150–450)
RBC # BLD AUTO: 3.96 X10(6)UL (ref 3.8–5.1)
RED CELL DISTRIBUTION WIDTH-SD: 47.8 FL (ref 35.1–46.3)
WBC # BLD AUTO: 3.4 X10(3) UL (ref 4–13)

## 2018-07-21 PROCEDURE — 99232 SBSQ HOSP IP/OBS MODERATE 35: CPT | Performed by: HOSPITALIST

## 2018-07-21 PROCEDURE — 99232 SBSQ HOSP IP/OBS MODERATE 35: CPT | Performed by: COLON & RECTAL SURGERY

## 2018-07-21 NOTE — PLAN OF CARE
GASTROINTESTINAL - ADULT    • Minimal or absence of nausea and vomiting Progressing    • Maintains or returns to baseline bowel function Progressing    • Achieves appropriate nutritional intake (bariatric) Progressing        Impaired Functional Mobility

## 2018-07-21 NOTE — PLAN OF CARE
0730: Assumed care of patient.    ~1200- 12 beats of SVT. HR now NSR. Patient with family at bedside. She is ambulating with daughter in room. Denies chest pain, SOB, palpitations. Will continue to monitor. Alert and oriented x3.  Up ad bernie in hallway

## 2018-07-21 NOTE — PROGRESS NOTES
Notified Dr. Robin Taylor that the patient is having short runs of PSVT, highest heart rate was in 150's, patient is asymptomatic, denies any pain. Systolic blood pressure elevated in the 170's, Metoprolol 5 mg IV given prn.  Dr. Robin Taylor stated that he will review

## 2018-07-21 NOTE — PROGRESS NOTES
[facility]  Progress Note    Chantell Pabon Patient Status:  Inpatient    7/3/1934 MRN [id number]   [facility] 3NW-A Attending Jamison Blancas[address] Day # 2 PCP Elina Kovacs MD     Subjective:  Patient denies nausea, Ashlyn Bull Capsular contracture of breast implant     Iron deficiency anemia     Chronic obstructive pulmonary disease (HCC)     Paroxysmal atrial fibrillation (HCC)     Essential hypertension with goal blood pressure less than 130/80     Dyslipidemia     SBO (small

## 2018-07-22 VITALS
RESPIRATION RATE: 20 BRPM | WEIGHT: 118 LBS | OXYGEN SATURATION: 99 % | TEMPERATURE: 98 F | HEIGHT: 64 IN | BODY MASS INDEX: 20.14 KG/M2 | DIASTOLIC BLOOD PRESSURE: 66 MMHG | SYSTOLIC BLOOD PRESSURE: 128 MMHG | HEART RATE: 65 BPM

## 2018-07-22 PROCEDURE — 99239 HOSP IP/OBS DSCHRG MGMT >30: CPT | Performed by: HOSPITALIST

## 2018-07-22 RX ORDER — GABAPENTIN 300 MG/1
300 CAPSULE ORAL 2 TIMES DAILY
Status: DISCONTINUED | OUTPATIENT
Start: 2018-07-22 | End: 2018-07-22

## 2018-07-22 RX ORDER — PRAVASTATIN SODIUM 20 MG
20 TABLET ORAL NIGHTLY
Status: DISCONTINUED | OUTPATIENT
Start: 2018-07-22 | End: 2018-07-22

## 2018-07-22 RX ORDER — DONEPEZIL HYDROCHLORIDE 5 MG/1
10 TABLET, FILM COATED ORAL NIGHTLY
Status: DISCONTINUED | OUTPATIENT
Start: 2018-07-22 | End: 2018-07-22

## 2018-07-22 RX ORDER — MELATONIN
325
Status: DISCONTINUED | OUTPATIENT
Start: 2018-07-22 | End: 2018-07-22

## 2018-07-22 NOTE — PROGRESS NOTES
[facility]  Progress Note    Nigel Maldonado Patient Status:  Inpatient    7/3/1934 MRN [id number]   [facility] 3NW-A Attending Ezio Reza [facility] Day # 3 PCP Cadence Jay MD     Subjective:   The patient is doing very obstruction) ([facility])     Memory deficit     Mild cognitive impairment     Tremor of both hands     Closed compression fracture of thoracic vertebra ([facility])     Leg pain, diffuse     Suture granuloma     Arthropathy of thoracic facet joint ([address]     Osteoporosi

## 2018-07-22 NOTE — PROGRESS NOTES
NURSING DISCHARGE NOTE    Discharged Home via Ambulatory. Accompanied by Family member  Belongings Taken by patient/family. Patient cleared for discharge by all consulting physicians. Tolerated soft diet for dinner without nausea or abdominal pain.

## 2018-07-22 NOTE — PROGRESS NOTES
[facility]  Progress Note     Wei Stringer Patient Status:  Inpatient    7/3/1934 MRN [id number]   [facility] 3NW[facility]A [facility] León Barahona MD     Chief Complaint: SBO    S: Patient umeclidinium-vilanterol  1 puff Inhalation Daily   • enoxaparin  40 mg Subcutaneous Daily   • pantoprazole (PROTONIX) IV push  40 mg Intravenous Q24H       ASSESSMENT / PLAN:     1. Abdominal pain suspected secondary to SBO  1.  Continue  anti-emetics, pain

## 2018-07-23 ENCOUNTER — PATIENT OUTREACH (OUTPATIENT)
Dept: CASE MANAGEMENT | Age: 83
End: 2018-07-23

## 2018-07-23 DIAGNOSIS — Z02.9 ENCOUNTERS FOR ADMINISTRATIVE PURPOSE: ICD-10-CM

## 2018-07-24 NOTE — DISCHARGE SUMMARY
[facility]  DISCHARGE SUMMARY     Arianna Morgan Patient Status:  Inpatient    7/3/1934 MRN [id number]   [facility] 3NW-A Attending No att. providers found   Hosp Day # 3 PCP Windy Black MD     Date of Admission: 2018  Bennie Acidophilus/Pectin Caps  Commonly known as:  PROBIOTIC      Take 1 capsule by mouth daily. Refills:  0     Albuterol Sulfate  (90 Base) MCG/ACT Aers      Inhale 2 puffs into the lungs every 6 (six) hours as needed for Wheezing.    Quantity:  3 In today  follow up 1-2 weeks or sooner if needed      Vital signs:       Physical Exam:    General: No acute distress. Respiratory: Clear to auscultation bilaterally. No wheezes. No rhonchi. Cardiovascular: S1, S2. Regular rate and rhythm.  No murmurs, rub

## 2018-07-25 ENCOUNTER — TELEPHONE (OUTPATIENT)
Dept: INTERNAL MEDICINE CLINIC | Facility: CLINIC | Age: 83
End: 2018-07-25

## 2018-07-25 NOTE — PROGRESS NOTES
Pt called back   Initial Post Discharge Follow Up   Discharge Date: 7/22/18  Contact Date: 7/25/2018    Consent Verification:  Assessment Completed With: Patient  HIPAA Verified?   Yes    Discharge Dx:     Small bowel obstruction      General:   • How have Rfl:    Alendronate Sodium 70 MG Oral Tab TAKE ONE TABLET BY MOUTH ONCE A WEEK Disp: 12 tablet Rfl: 1   SIMVASTATIN 10 MG Oral Tab TAKE ONE TABLET BY MOUTH ONCE DAILY Disp: 90 tablet Rfl: 1   Albuterol Sulfate  (90 Base) MCG/ACT Inhalation Aero Soln FU appt with Dr. Vivien Stanley, see below. Pt stated she plans to see Dr. Vivien Stanley and then see if she needs to see her PCP. Pt denies any barriers to keeping/getting to HFU appts.        Your appointments     Date & Time Appointment Department [facility])    Jul 26, 201 28, 2018  1:30 PM CDT Follow up with Pramod Tsai PA-C [address] ([address])    Aug 29, 2018 10:30 AM CDT EXAM-ESTABLISHED with Jessica Cooley MD [facility] at 1200

## 2018-07-26 ENCOUNTER — OFFICE VISIT (OUTPATIENT)
Dept: SURGERY | Facility: CLINIC | Age: 83
End: 2018-07-26
Payer: MEDICARE

## 2018-07-26 VITALS
HEART RATE: 72 BPM | SYSTOLIC BLOOD PRESSURE: 123 MMHG | HEIGHT: 65 IN | DIASTOLIC BLOOD PRESSURE: 61 MMHG | WEIGHT: 115 LBS | BODY MASS INDEX: 19.16 KG/M2

## 2018-07-26 DIAGNOSIS — Z87.19 HX OF SMALL BOWEL OBSTRUCTION: Primary | ICD-10-CM

## 2018-07-26 PROCEDURE — 99213 OFFICE O/P EST LOW 20 MIN: CPT | Performed by: COLON & RECTAL SURGERY

## 2018-07-26 NOTE — PATIENT INSTRUCTIONS
Assessment   Hx of small bowel obstruction  (primary encounter diagnosis)      Plan   Currently the patient is doing well after discharge for small bowel obstruction.   She has had multiple previous surgeries with small bowel resection for various small bow

## 2018-07-26 NOTE — TELEPHONE ENCOUNTER
Contacted the pt for TCM, pt currently does not have her HFU appt scheduled. Pt was offered an appt but declined at this time. TCM/HFU is recommended by 8/5/18 as pt is a moderate risk for readmission.  Pt is scheduled to see Dr. Zenobia Wheat tomorrow, please foll

## 2018-07-26 NOTE — H&P
New Patient Visit Note       Active Problems      1.  Hx of small bowel obstruction        Chief Complaint   Diarrhea    History of Present Illness   Wei Stringer is a 80year old female who was hospitalized from 7/19/2018 7/22/2018 for small bowel obst ADHESIONS      Comment: expl lap  2006: PAULINO NEEDLE LOCALIZATION W/ SPECIMEN 1 SITE RIG* Right      Comment: benign.   No date: OTHER Bilateral      Comment: bilateral breast implants  07/11/2017: OTHER      Comment: Kyphoplasty, Dr. Skip Rivera: Karla Montemayor Packs/day: 0.50      Years: 15.00          Quit date: 1/1/1969    Smokeless tobacco: Never Used                        Alcohol use: Yes           0.6 oz/week       Standard drinks or equivalent: 1 per week       Comment: occasional    Drug use: N Systems  The Review of Systems has been reviewed by me during today. Review of Systems   Constitutional: Negative for chills, diaphoresis, fatigue, fever and unexpected weight change.    HENT: Negative for hearing loss, nosebleeds, rhinorrhea, sore throat midline incision well-healed. Musculoskeletal: Normal range of motion. She exhibits no edema. Lymphadenopathy:     She has no cervical adenopathy. Neurological: She is alert and oriented to person, place, and time. Skin: Skin is warm and dry.  No ra

## 2018-07-30 ENCOUNTER — OFFICE VISIT (OUTPATIENT)
Dept: PHYSICAL THERAPY | Age: 83
End: 2018-07-30
Attending: INTERNAL MEDICINE
Payer: MEDICARE

## 2018-07-30 PROCEDURE — 97110 THERAPEUTIC EXERCISES: CPT

## 2018-07-30 PROCEDURE — 97112 NEUROMUSCULAR REEDUCATION: CPT

## 2018-07-30 NOTE — PROGRESS NOTES
Dx: back pain with h/o compression fx, balance and fall impairments         Authorized # of Visits:  8         Next MD visit: none scheduled  Fall Risk: elevated       Precautions: n/a            Discharge Summary    Pt has attended 8, cancelled 2, and no Yes  I certify the need for these services furnished under this plan of treatment and while under my care.     X___________________________________________________ Date____________________    Certification From: 7/92/6431  To:10/28/2018    Subjective:  nle hammonds laps  Resisted gait fwd/bwd   6 bouts YTB 6 bouts YTB  RTB 6 bouts  Lateral resisted gait RTB 6 laps  ASU  6inch x10 B   LSU 6 inch 2x10 B    Corner pec stretch 3x30 sec  Standing gastroc stretch 3x30 sec  Lateral resisted gait YTB 6 laps  6 laps  6 laps R

## 2018-08-15 ENCOUNTER — OFFICE VISIT (OUTPATIENT)
Dept: INTERNAL MEDICINE CLINIC | Facility: CLINIC | Age: 83
End: 2018-08-15
Payer: MEDICARE

## 2018-08-15 VITALS
DIASTOLIC BLOOD PRESSURE: 62 MMHG | BODY MASS INDEX: 21.62 KG/M2 | HEIGHT: 63 IN | WEIGHT: 122 LBS | SYSTOLIC BLOOD PRESSURE: 118 MMHG | HEART RATE: 72 BPM | TEMPERATURE: 97 F | OXYGEN SATURATION: 98 %

## 2018-08-15 DIAGNOSIS — J06.9 ACUTE URI: Primary | ICD-10-CM

## 2018-08-15 DIAGNOSIS — R05.9 COUGH: ICD-10-CM

## 2018-08-15 PROCEDURE — 99213 OFFICE O/P EST LOW 20 MIN: CPT | Performed by: NURSE PRACTITIONER

## 2018-08-15 RX ORDER — AZITHROMYCIN 250 MG/1
TABLET, FILM COATED ORAL
Qty: 6 TABLET | Refills: 0 | Status: ON HOLD | OUTPATIENT
Start: 2018-08-15 | End: 2018-09-23

## 2018-08-15 NOTE — PROGRESS NOTES
Sonny Butler is a 80year old female. Patient presents with:  Cough: LG. Room 11. Cough with alot phlem for a while       HPI:   Presents for eval of cough and chest congestion. Low grade fever  Robitussin dm. Fatigue and generalized malaise.   Brian Rfl:    Alendronate Sodium 70 MG Oral Tab TAKE ONE TABLET BY MOUTH ONCE A WEEK Disp: 12 tablet Rfl: 1   SIMVASTATIN 10 MG Oral Tab TAKE ONE TABLET BY MOUTH ONCE DAILY Disp: 90 tablet Rfl: 1   Albuterol Sulfate  (90 Base) MCG/ACT Inhalation Aero Soln use: Yes           0.6 oz/week     Standard drinks or equivalent: 1 per week     Comment: Cage done 8-15-18    Family History   Problem Relation Age of Onset   • Psychiatric Father    • suicide completion [OTHER] Father    • Other Rachel Mulling Mother      Cereb

## 2018-08-17 ENCOUNTER — HOSPITAL ENCOUNTER (OUTPATIENT)
Dept: MAMMOGRAPHY | Age: 83
Discharge: HOME | End: 2018-08-17
Attending: INTERNAL MEDICINE
Payer: MEDICARE

## 2018-08-17 DIAGNOSIS — Z12.31 ENCOUNTER FOR SCREENING MAMMOGRAM FOR MALIGNANT NEOPLASM OF BREAST: ICD-10-CM

## 2018-08-17 PROCEDURE — 77063 BREAST TOMOSYNTHESIS BI: CPT | Performed by: INTERNAL MEDICINE

## 2018-08-17 PROCEDURE — 77067 SCR MAMMO BI INCL CAD: CPT | Performed by: INTERNAL MEDICINE

## 2018-08-24 ENCOUNTER — TELEPHONE (OUTPATIENT)
Dept: INTERNAL MEDICINE CLINIC | Facility: CLINIC | Age: 83
End: 2018-08-24

## 2018-08-24 NOTE — TELEPHONE ENCOUNTER
Received fax from [address] [place] [address], patient received Fluzone HD PF 18-19    Abstracted   Place in TB bin

## 2018-09-06 RX ORDER — SIMVASTATIN 10 MG
TABLET ORAL
Qty: 90 TABLET | Refills: 0 | Status: SHIPPED | OUTPATIENT
Start: 2018-09-06 | End: 2018-12-03

## 2018-09-17 ENCOUNTER — TELEPHONE (OUTPATIENT)
Dept: INTERNAL MEDICINE CLINIC | Facility: CLINIC | Age: 83
End: 2018-09-17

## 2018-09-17 NOTE — TELEPHONE ENCOUNTER
Pt has [phone] with TB 10-22-18 and would like BW sent to [facility].

## 2018-09-22 ENCOUNTER — APPOINTMENT (OUTPATIENT)
Dept: GENERAL RADIOLOGY | Age: 83
End: 2018-09-22
Attending: EMERGENCY MEDICINE
Payer: MEDICARE

## 2018-09-22 ENCOUNTER — HOSPITAL ENCOUNTER (OUTPATIENT)
Facility: HOSPITAL | Age: 83
Setting detail: OBSERVATION
Discharge: HOME | End: 2018-09-23
Attending: EMERGENCY MEDICINE | Admitting: HOSPITALIST
Payer: MEDICARE

## 2018-09-22 ENCOUNTER — APPOINTMENT (OUTPATIENT)
Dept: CT IMAGING | Age: 83
End: 2018-09-22
Attending: EMERGENCY MEDICINE
Payer: MEDICARE

## 2018-09-22 DIAGNOSIS — R07.9 CHEST PAIN OF UNCERTAIN ETIOLOGY: Primary | ICD-10-CM

## 2018-09-22 LAB
ALBUMIN SERPL-MCNC: 4.3 G/DL (ref 3.5–4.8)
ALBUMIN/GLOB SERPL: 1.3 {RATIO} (ref 1–2)
ALP LIVER SERPL-CCNC: 145 U/L (ref 55–142)
ALT SERPL-CCNC: 28 U/L (ref 14–54)
ANION GAP SERPL CALC-SCNC: 9 MMOL/L (ref 0–18)
APTT PPP: 29.4 SECONDS (ref 26.1–34.6)
AST SERPL-CCNC: 24 U/L (ref 15–41)
BASOPHILS # BLD AUTO: 0.02 X10(3) UL (ref 0–0.1)
BASOPHILS NFR BLD AUTO: 0.3 %
BILIRUB SERPL-MCNC: 0.5 MG/DL (ref 0.1–2)
BUN BLD-MCNC: 19 MG/DL (ref 8–20)
BUN/CREAT SERPL: 18.8 (ref 10–20)
CALCIUM BLD-MCNC: 9.8 MG/DL (ref 8.3–10.3)
CHLORIDE SERPL-SCNC: 107 MMOL/L (ref 101–111)
CO2 SERPL-SCNC: 26 MMOL/L (ref 22–32)
CREAT BLD-MCNC: 1.01 MG/DL (ref 0.55–1.02)
D-DIMER: 4.84 UG/ML FEU (ref 0–0.49)
EOSINOPHIL # BLD AUTO: 0.2 X10(3) UL (ref 0–0.3)
EOSINOPHIL NFR BLD AUTO: 3 %
ERYTHROCYTE [DISTWIDTH] IN BLOOD BY AUTOMATED COUNT: 13.2 % (ref 11.5–16)
GLOBULIN PLAS-MCNC: 3.2 G/DL (ref 2.5–4)
GLUCOSE BLD-MCNC: 96 MG/DL (ref 70–99)
HCT VFR BLD AUTO: 43.8 % (ref 34–50)
HGB BLD-MCNC: 14.3 G/DL (ref 12–16)
IMMATURE GRANULOCYTE COUNT: 0.02 X10(3) UL (ref 0–1)
IMMATURE GRANULOCYTE RATIO %: 0.3 %
INR BLD: 1.03 (ref 0.92–1.08)
LYMPHOCYTES # BLD AUTO: 0.8 X10(3) UL (ref 0.9–4)
LYMPHOCYTES NFR BLD AUTO: 11.9 %
M PROTEIN MFR SERPL ELPH: 7.5 G/DL (ref 6.1–8.3)
MCH RBC QN AUTO: 31 PG (ref 27–33.2)
MCHC RBC AUTO-ENTMCNC: 32.6 G/DL (ref 31–37)
MCV RBC AUTO: 94.8 FL (ref 81–100)
MONOCYTES # BLD AUTO: 0.49 X10(3) UL (ref 0.1–1)
MONOCYTES NFR BLD AUTO: 7.3 %
NEUTROPHIL ABS PRELIM: 5.2 X10 (3) UL (ref 1.3–6.7)
NEUTROPHILS # BLD AUTO: 5.2 X10(3) UL (ref 1.3–6.7)
NEUTROPHILS NFR BLD AUTO: 77.2 %
OSMOLALITY SERPL CALC.SUM OF ELEC: 296 MOSM/KG (ref 275–295)
PLATELET # BLD AUTO: 239 10(3)UL (ref 150–450)
POTASSIUM SERPL-SCNC: 3.8 MMOL/L (ref 3.6–5.1)
PSA SERPL DL<=0.01 NG/ML-MCNC: 13.6 SECONDS (ref 12.5–14.1)
RBC # BLD AUTO: 4.62 X10(6)UL (ref 3.8–5.1)
RED CELL DISTRIBUTION WIDTH-SD: 46.2 FL (ref 35.1–46.3)
SODIUM SERPL-SCNC: 142 MMOL/L (ref 136–144)
TROPONIN I SERPL-MCNC: <0.046 NG/ML (ref ?–0.05)
WBC # BLD AUTO: 6.7 X10(3) UL (ref 4–13)

## 2018-09-22 PROCEDURE — 71045 X-RAY EXAM CHEST 1 VIEW: CPT | Performed by: EMERGENCY MEDICINE

## 2018-09-22 PROCEDURE — 71275 CT ANGIOGRAPHY CHEST: CPT | Performed by: EMERGENCY MEDICINE

## 2018-09-22 RX ORDER — NITROGLYCERIN 0.4 MG/1
0.4 TABLET SUBLINGUAL ONCE
Status: COMPLETED | OUTPATIENT
Start: 2018-09-22 | End: 2018-09-22

## 2018-09-22 RX ORDER — ASPIRIN 81 MG/1
324 TABLET, CHEWABLE ORAL ONCE
Status: COMPLETED | OUTPATIENT
Start: 2018-09-22 | End: 2018-09-22

## 2018-09-23 ENCOUNTER — APPOINTMENT (OUTPATIENT)
Dept: CV DIAGNOSTICS | Facility: HOSPITAL | Age: 83
End: 2018-09-23
Attending: HOSPITALIST
Payer: MEDICARE

## 2018-09-23 VITALS
OXYGEN SATURATION: 98 % | TEMPERATURE: 98 F | HEIGHT: 65 IN | DIASTOLIC BLOOD PRESSURE: 47 MMHG | SYSTOLIC BLOOD PRESSURE: 132 MMHG | RESPIRATION RATE: 18 BRPM | BODY MASS INDEX: 21.01 KG/M2 | HEART RATE: 68 BPM | WEIGHT: 126.13 LBS

## 2018-09-23 LAB
ATRIAL RATE: 69 BPM
P AXIS: 57 DEGREES
P-R INTERVAL: 164 MS
POTASSIUM SERPL-SCNC: 3.9 MMOL/L (ref 3.6–5.1)
Q-T INTERVAL: 402 MS
QRS DURATION: 76 MS
QTC CALCULATION (BEZET): 430 MS
R AXIS: 48 DEGREES
T AXIS: 58 DEGREES
TROPONIN I SERPL-MCNC: <0.046 NG/ML (ref ?–0.05)
VENTRICULAR RATE: 69 BPM

## 2018-09-23 PROCEDURE — 93306 TTE W/DOPPLER COMPLETE: CPT | Performed by: HOSPITALIST

## 2018-09-23 PROCEDURE — 99219 INITIAL OBSERVATION CARE,LEVL II: CPT | Performed by: HOSPITALIST

## 2018-09-23 RX ORDER — POTASSIUM CHLORIDE 20 MEQ/1
40 TABLET, EXTENDED RELEASE ORAL ONCE
Status: COMPLETED | OUTPATIENT
Start: 2018-09-23 | End: 2018-09-23

## 2018-09-23 RX ORDER — TRAZODONE HYDROCHLORIDE 50 MG/1
50 TABLET ORAL NIGHTLY PRN
Status: DISCONTINUED | OUTPATIENT
Start: 2018-09-23 | End: 2018-09-23

## 2018-09-23 RX ORDER — GARLIC EXTRACT 500 MG
1 CAPSULE ORAL DAILY
Status: DISCONTINUED | OUTPATIENT
Start: 2018-09-23 | End: 2018-09-23

## 2018-09-23 RX ORDER — ONDANSETRON 2 MG/ML
4 INJECTION INTRAMUSCULAR; INTRAVENOUS EVERY 6 HOURS PRN
Status: DISCONTINUED | OUTPATIENT
Start: 2018-09-23 | End: 2018-09-23

## 2018-09-23 RX ORDER — METOCLOPRAMIDE HYDROCHLORIDE 5 MG/ML
5 INJECTION INTRAMUSCULAR; INTRAVENOUS EVERY 8 HOURS PRN
Status: DISCONTINUED | OUTPATIENT
Start: 2018-09-23 | End: 2018-09-23

## 2018-09-23 RX ORDER — IBUPROFEN 400 MG/1
400 TABLET ORAL ONCE
Status: COMPLETED | OUTPATIENT
Start: 2018-09-23 | End: 2018-09-23

## 2018-09-23 RX ORDER — GABAPENTIN 300 MG/1
300 CAPSULE ORAL 2 TIMES DAILY
Status: DISCONTINUED | OUTPATIENT
Start: 2018-09-23 | End: 2018-09-23

## 2018-09-23 RX ORDER — ASPIRIN 81 MG/1
81 TABLET, CHEWABLE ORAL DAILY
Status: DISCONTINUED | OUTPATIENT
Start: 2018-09-23 | End: 2018-09-23

## 2018-09-23 RX ORDER — MELATONIN
325
Status: DISCONTINUED | OUTPATIENT
Start: 2018-09-23 | End: 2018-09-23

## 2018-09-23 RX ORDER — HYDROMORPHONE HYDROCHLORIDE 1 MG/ML
0.5 INJECTION, SOLUTION INTRAMUSCULAR; INTRAVENOUS; SUBCUTANEOUS EVERY 30 MIN PRN
Status: ACTIVE | OUTPATIENT
Start: 2018-09-23 | End: 2018-09-23

## 2018-09-23 RX ORDER — DILTIAZEM HYDROCHLORIDE 300 MG/1
300 CAPSULE, COATED, EXTENDED RELEASE ORAL
Status: DISCONTINUED | OUTPATIENT
Start: 2018-09-23 | End: 2018-09-23

## 2018-09-23 RX ORDER — ALBUTEROL SULFATE 90 UG/1
2 AEROSOL, METERED RESPIRATORY (INHALATION) EVERY 6 HOURS PRN
Status: DISCONTINUED | OUTPATIENT
Start: 2018-09-23 | End: 2018-09-23

## 2018-09-23 RX ORDER — ACETAMINOPHEN 325 MG/1
650 TABLET ORAL EVERY 6 HOURS PRN
Status: DISCONTINUED | OUTPATIENT
Start: 2018-09-23 | End: 2018-09-23

## 2018-09-23 RX ORDER — ENOXAPARIN SODIUM 100 MG/ML
40 INJECTION SUBCUTANEOUS DAILY
Status: DISCONTINUED | OUTPATIENT
Start: 2018-09-24 | End: 2018-09-23

## 2018-09-23 RX ORDER — ACETAMINOPHEN 500 MG
500 TABLET ORAL ONCE
Status: COMPLETED | OUTPATIENT
Start: 2018-09-23 | End: 2018-09-23

## 2018-09-23 RX ORDER — ONDANSETRON 2 MG/ML
4 INJECTION INTRAMUSCULAR; INTRAVENOUS EVERY 4 HOURS PRN
Status: DISCONTINUED | OUTPATIENT
Start: 2018-09-23 | End: 2018-09-23

## 2018-09-23 RX ORDER — PRAVASTATIN SODIUM 20 MG
20 TABLET ORAL NIGHTLY
Status: DISCONTINUED | OUTPATIENT
Start: 2018-09-23 | End: 2018-09-23

## 2018-09-23 NOTE — HISTORICAL OFFICE NOTE
CAMRON MORALES  : 1934  ACCOUNT:  [id number]  [phone]  PCP: Dr. Cyndee Gross    TODAY'S DATE: 2017  DICTATED BY:  Jesenia Rossi M.D.]    CHIEF COMPLAINT: [Followup of hospital discharge.]    HPI:  [On 2017, marquita Goins 80-yea ENT: mucosa pink and moist. NECK: jugular venous pressure not elevated. RESP: respirations with normal rate and rhythm, clear to auscultation. GI: no masses, tenderness or hepatosplenomegaly, rectal deferred. MS: adequate gait for exercise/testing.  EXT: no 1 CAPSULE DAILY.  08/13/09 Vitamin B Complex               1 CAPSULE DAILY.  08/13/09 Vitamin E             400UNIT   1 CAPSULE EVERY MORNING.         OTTO Lemons/ - DD: 09/22/2017 - DT: 10/06/2017 - Job ID: [id number]  C: Dr. Abdi Nolen

## 2018-09-23 NOTE — PLAN OF CARE
Problem: CARDIOVASCULAR - ADULT  Goal: Maintains optimal cardiac output and hemodynamic stability  INTERVENTIONS:  - Monitor vital signs, rhythm, and trends  - Monitor for bleeding, hypotension and signs of decreased cardiac output  - Evaluate effectivenes management  - Manage/alleviate anxiety  - Utilize distraction and/or relaxation techniques  - Monitor for opioid side effects  - Notify MD/LIP if interventions unsuccessful or patient reports new pain  - Anticipate increased pain with activity and pre-medi

## 2018-09-23 NOTE — ED PROVIDER NOTES
Patient Seen in: [facility] Emergency Department [place]    History   Patient presents with:  Chest Pain Angina (cardiovascular)    Stated Complaint: Left Breast Pain, Pain with Breathing    HPI    Patient is an 72-year-old female states for the past 2 biopsy  No date: COLOSTOMY      Comment:  and reversal  8/20/2017: EXPLORATORY LAPAROTOMY; N/A      Comment:  Performed by Kamilah Zazueta MD at [address]  08/20/2017: LYSIS OF ADHESIONS      Comment:  expl lap  2006: PAULINO NEEDLE LOCALIZATION W/ SPECIME done 8-15-18    Drug use: No  Former smoker 20 years    Family history no history of premature coronary disease, no history of PE or DVT    Review of Systems    Positive for stated complaint: Left Breast Pain, Pain with Breathing  Other systems are as note Equivalent Units.     D-Dimer results of less than 0.5 ug/mL (FEU) have been shown to contribute to the exclusion of venous thromboembolism with a negative predictive value of approximately 95% when results are used as part of the total clinical evaluation (primary encounter diagnosis)    Disposition:  Admit  9/22/2018 11:00 pm    Follow-up:  No follow-up provider specified.       Medications Prescribed:  Current Discharge Medication List        Present on Admission  Date Reviewed: 9/5/2018          ICD-10-CM

## 2018-09-23 NOTE — H&P
[facility]  History and Physical     Curry Castro Patient Status:  Observation    7/3/1934 MRN [id number]   [facility] 8NE-A Attending Alexis Shields MD   Hosp Day # 0 PCP Margaret Nichole MD     Chief Complaint: chest connie    H expl lap  2006: PAULINO NEEDLE LOCALIZATION W/ SPECIMEN 1 SITE RIGHT; Right      Comment:  benign.   No date: OTHER; Bilateral      Comment:  bilateral breast implants  07/11/2017: OTHER      Comment:  Kyphoplasty, Dr. Rae Smyth: [address] [place] [address] syndrome) Other         family hx       Allergies: No Known Allergies    Medications:    No current facility-administered medications on file prior to encounter.    Current Outpatient Medications on File Prior to Encounter:  SIMVASTATIN 10 MG Oral Tab TAKE negatives noted in the HPI. Physical Exam:    BP (!) 165/73   Pulse 84   Temp 98.4 °F (36.9 °C) (Oral)   Resp 16   Ht 5' 5\" (1.651 m)   Wt 120 lb (54.4 kg)   SpO2 98%   BMI 19.97 kg/m²   General: No acute distress. Alert and oriented x 3.   HEENT: Normo

## 2018-09-23 NOTE — CONSULTS
[facility]  Report of Consultation    Rigoberto Feliz Patient Status:  Observation    7/3/1934 MRN [id number]   [facility] 8NE-A Attending Gibson Nichole MD   Hosp Day # 0 PCP Soha Salazar MD     Reason for Consultation:  Chest at [facility] MAIN OR  08/20/2017: LYSIS OF ADHESIONS      Comment:  expl lap  2006: PAULINO NEEDLE LOCALIZATION W/ SPECIMEN 1 SITE RIGHT; Right      Comment:  benign.   No date: OTHER; Bilateral      Comment:  bilateral breast implants  07/11/2017: OTHER      Comment: per week. She reports that she does not use drugs.     Allergies:  No Known Allergies    Medications:    Current Facility-Administered Medications:   •  aspirin chewable tab 81 mg, 81 mg, Oral, Daily  •  ferrous sulfate EC tab 325 mg, 325 mg, Oral, Daily wi palpation, left chest wall. Abdomen: Soft, non-tender. Extremities: Without clubbing, cyanosis or edema. Peripheral pulses are 2+. Neurologic: Alert, no motor deficits, + short term memory deficit. Skin: Warm and dry.      Laboratories and Data:  Diag Continue current medical regimen. Workup muscle/skeltal and non-cardiac causes of chest pain per primary service. Thanks.     Dewayne Cheadle  9/23/2018  5:06 PM

## 2018-09-23 NOTE — ED NOTES
Report given to Kianna March [facility]. Pt remains stable. PT and family comfortable with admission plan of care.

## 2018-09-23 NOTE — PLAN OF CARE
Pt received this am, states she is still having left chest pain it is worse with breathing. Pt states she had this chest pain prior to the fall yesterday. But increased after the fall. She states it has not affected her ambulation or activities.   She ramirez

## 2018-09-23 NOTE — SIGNIFICANT EVENT
Received patient from [place] ED via ambulance. She is alert and oriented and still complaining of pain in the left chest. Daughter is at bedside. She was oriented to room set up. Needs attended.

## 2018-09-23 NOTE — ED INITIAL ASSESSMENT (HPI)
Patient to er with c/o pain behind left breast. Patient states that she also fell today c/o left shoulder, bilateral knee and left elbow injury

## 2018-09-24 NOTE — DISCHARGE SUMMARY
[facility]OSPITAL[facility]  DISCHARGE SUMMARY     Megan Valles Patient Status:  Observation    7/3/1934 MRN [id number]   [facility] 8NE-A Attending No att. providers found   Hosp Day # 0 PCP John Thomas MD     Date of Admission: 2018  D [facility] OR      Take 1 tablet by mouth daily. Refills:  0     DilTIAZem HCl ER Coated Beads 300 MG Cp24  Commonly known as:  CARTIA XT      Take 1 capsule (300 mg total) by mouth once daily.    Quantity:  90 capsule  Refills:  2     Donepezil HCl 10 M

## 2018-09-24 NOTE — PLAN OF CARE
Plan for d/c home tonight follow up with pulmonary md as outpt. Iv and tele remved prior to d/c home.

## 2018-09-25 ENCOUNTER — PATIENT OUTREACH (OUTPATIENT)
Dept: CASE MANAGEMENT | Age: 83
End: 2018-09-25

## 2018-09-25 ENCOUNTER — PATIENT MESSAGE (OUTPATIENT)
Dept: CASE MANAGEMENT | Age: 83
End: 2018-09-25

## 2018-09-25 RX ORDER — DILTIAZEM HYDROCHLORIDE 300 MG/1
CAPSULE, EXTENDED RELEASE ORAL
Qty: 90 CAPSULE | Refills: 0 | Status: SHIPPED | OUTPATIENT
Start: 2018-09-25 | End: 2018-12-18

## 2018-09-26 ENCOUNTER — OFFICE VISIT (OUTPATIENT)
Dept: INTERNAL MEDICINE CLINIC | Facility: CLINIC | Age: 83
End: 2018-09-26
Payer: MEDICARE

## 2018-09-26 VITALS
BODY MASS INDEX: 22.21 KG/M2 | HEIGHT: 63 IN | DIASTOLIC BLOOD PRESSURE: 58 MMHG | WEIGHT: 125.38 LBS | SYSTOLIC BLOOD PRESSURE: 124 MMHG | TEMPERATURE: 97 F | HEART RATE: 68 BPM | RESPIRATION RATE: 16 BRPM

## 2018-09-26 DIAGNOSIS — R26.81 UNSTEADY GAIT: ICD-10-CM

## 2018-09-26 DIAGNOSIS — R07.89 CHEST WALL PAIN: Primary | ICD-10-CM
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
## 2018-09-26 DIAGNOSIS — M25.512 ACUTE PAIN OF LEFT SHOULDER: ICD-10-CM

## 2018-09-26 PROCEDURE — 99213 OFFICE O/P EST LOW 20 MIN: CPT | Performed by: INTERNAL MEDICINE

## 2018-09-26 RX ORDER — TRAMADOL HYDROCHLORIDE 50 MG/1
50 TABLET ORAL NIGHTLY PRN
Qty: 30 TABLET | Refills: 0 | Status: SHIPPED | OUTPATIENT
Start: 2018-09-26 | End: 2019-04-16

## 2018-09-26 RX ORDER — NAPROXEN 500 MG/1
500 TABLET ORAL 2 TIMES DAILY WITH MEALS
Qty: 30 TABLET | Refills: 1 | Status: SHIPPED | OUTPATIENT
Start: 2018-09-26 | End: 2018-12-05 | Stop reason: ALTCHOICE

## 2018-09-26 NOTE — PROGRESS NOTES
Zana Whitlock is a 80year old female.   Patient presents with:  Fall: cn room 4: fall , hurts when she breathes is seeing lung dr Jus Elliott but needs somethingb to sleep      HPI:     Patient c/o chest wall and left shoulder pain ever since she tripped o etiology      Current Outpatient Medications:  naproxen 500 MG Oral Tab Take 1 tablet (500 mg total) by mouth 2 (two) times daily with meals.  Disp: 30 tablet Rfl: 1   TraMADol HCl 50 MG Oral Tab Take 1 tablet (50 mg total) by mouth nightly as needed for Pa obstructive pulmonary disease) (HCC)    • Diverticulitis of colon    • Diverticulitis of colon (without mention of hemorrhage)(562.11)    • Diverticulosis of large intestine    • Dizziness and giddiness    • Hearing impairment    • High blood pressure    • lungs clear to auscultation  CARDIO: RRR nl S1 S2, +TTP left chest wall  GI: normal bowel sounds, no masses, HSM or tenderness  EXTREMITIES: no cyanosis, clubbing or edema, normal strength and tone  NEURO: Alert and oriented    ASSESSMENT AND PLAN:   Fall,

## 2018-10-18 ENCOUNTER — OFFICE VISIT (OUTPATIENT)
Dept: PHYSICAL THERAPY | Age: 83
End: 2018-10-18
Attending: INTERNAL MEDICINE
Payer: MEDICARE

## 2018-10-18 DIAGNOSIS — R26.81 UNSTEADY GAIT: ICD-10-CM

## 2018-10-18 DIAGNOSIS — M25.512 ACUTE PAIN OF LEFT SHOULDER: ICD-10-CM

## 2018-10-18 DIAGNOSIS — R07.89 CHEST WALL PAIN: ICD-10-CM
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
## 2018-10-18 PROCEDURE — 97161 PT EVAL LOW COMPLEX 20 MIN: CPT

## 2018-10-18 NOTE — PROGRESS NOTES
FALL SCREEN EVALUATION   Referring Physician: Dr. Maribel Crowell  Diagnosis: increased fall risk      Date of Service: 10/18/2018     PATIENT Jeff Mercado is a 80year old y/o female who presents to therapy today with complaints of shoulder pain a tandem balance putter her at increased risk for falls, and her goal is to feel steadier on her feet.    Aaliyah would benefit from skilled Physical Therapy to address the above impairments to increase balance and proprioceptive training, increase functional devices, slower speed, mild gait deviations. (1)  Moderate Impairment: Walks 20’, slow speed, abnormal gait pattern, evidence of imbalance. (0)  Severe Impairment: Cannot walk 20’ without assistance, severe gait deviations or imbalance.      2. Change in Impairment: Preforms head turns with moderate change in gait velocity, slows down, staggers but recovers, can continue to walk.   (0) Severe Impairment: Preforms task with severe disruption of gait, i.e., staggers outside 15” path, loses balance, stops, lillie return this letter via fax as soon as possible to [phone].  If you have any questions, please contact me at Dept: [phone]    Sincerely,  Electronically signed by therapist: Indiana Portillo PT    Physician's certification required: Yes  I certify

## 2018-10-18 NOTE — PATIENT INSTRUCTIONS
Standing tandem balance, 3x30 sec each side in corner with a chair in front of her    SL balance, 3x10 sec bilaterally, in a corner and with a chair in front of her for safety.

## 2018-10-22 ENCOUNTER — OFFICE VISIT (OUTPATIENT)
Dept: PHYSICAL THERAPY | Age: 83
End: 2018-10-22
Attending: INTERNAL MEDICINE
Payer: MEDICARE

## 2018-10-22 PROCEDURE — 97112 NEUROMUSCULAR REEDUCATION: CPT

## 2018-10-22 PROCEDURE — 97110 THERAPEUTIC EXERCISES: CPT

## 2018-10-22 NOTE — PROGRESS NOTES
Dx:  increased fall risk              Authorized # of Visits:  Medicare         Next MD visit: none scheduled  Fall Risk: standard         Precautions: n/a             Subjective: Patient has no complaints of pain today, no falls recently.       Objective:

## 2018-10-24 ENCOUNTER — APPOINTMENT (OUTPATIENT)
Dept: PHYSICAL THERAPY | Age: 83
End: 2018-10-24
Attending: INTERNAL MEDICINE
Payer: MEDICARE

## 2018-10-25 ENCOUNTER — OFFICE VISIT (OUTPATIENT)
Dept: PHYSICAL THERAPY | Age: 83
End: 2018-10-25
Attending: INTERNAL MEDICINE
Payer: MEDICARE

## 2018-10-25 PROCEDURE — 97112 NEUROMUSCULAR REEDUCATION: CPT

## 2018-10-25 PROCEDURE — 97110 THERAPEUTIC EXERCISES: CPT

## 2018-10-25 NOTE — PROGRESS NOTES
Dx:  increased fall risk              Authorized # of Visits:  Medicare         Next MD visit: none scheduled  Fall Risk: standard         Precautions: n/a             Subjective: Patient has no complaints of pain today, no falls, reports has been forgetfu care throughout session, CGA/SBA given for balance and safety     Charges: Neuro Re-Ed (x2), TE (x1)       Total Timed Treatment: 40 min  Total Treatment Time: 40 min

## 2018-10-29 ENCOUNTER — OFFICE VISIT (OUTPATIENT)
Dept: PHYSICAL THERAPY | Age: 83
End: 2018-10-29
Attending: INTERNAL MEDICINE
Payer: MEDICARE

## 2018-10-29 PROCEDURE — 97112 NEUROMUSCULAR REEDUCATION: CPT

## 2018-10-29 PROCEDURE — 97110 THERAPEUTIC EXERCISES: CPT

## 2018-10-29 NOTE — PROGRESS NOTES
Dx:  increased fall risk              Authorized # of Visits:  Medicare         Next MD visit: none scheduled  Fall Risk: standard         Precautions: n/a             Subjective: Patient has no complaints of pain today, no falls, reports that she is sandi on airex 6 laps  Lateral stepping on airex 6 laps          rhomberg balance on airex with eyes closed, 3x30 sec          ----- Sit to stand, 2x10 reps without UE assist            Standing hip abduction    3x10, R/L, yellow band          FT, feet semi- tan

## 2018-10-31 ENCOUNTER — OFFICE VISIT (OUTPATIENT)
Dept: PHYSICAL THERAPY | Age: 83
End: 2018-10-31
Attending: INTERNAL MEDICINE
Payer: MEDICARE

## 2018-10-31 PROCEDURE — 97110 THERAPEUTIC EXERCISES: CPT

## 2018-10-31 PROCEDURE — 97112 NEUROMUSCULAR REEDUCATION: CPT

## 2018-10-31 NOTE — PROGRESS NOTES
Dx:  increased fall risk              Authorized # of Visits:  Medicare         Next MD visit: none scheduled  Fall Risk: standard         Precautions: n/a             Subjective: Patient has no complaints of pain today, no falls, reports that she is sandi on airex 6 laps  Tandem gait on airex 6 laps        Lateral stepping on airex 6 laps  Lateral stepping on airex 6 laps  Lateral stepping on airex 6 laps         rhomberg balance on airex with eyes closed, 3x30 sec   Back stepping on airex 6 laps        ---

## 2018-11-02 VITALS
TEMPERATURE: 98 F | BODY MASS INDEX: 18.61 KG/M2 | HEIGHT: 64 IN | HEART RATE: 80 BPM | OXYGEN SATURATION: 98 % | RESPIRATION RATE: 16 BRPM | WEIGHT: 109 LBS | SYSTOLIC BLOOD PRESSURE: 142 MMHG | DIASTOLIC BLOOD PRESSURE: 70 MMHG

## 2018-11-05 ENCOUNTER — OFFICE VISIT (OUTPATIENT)
Dept: PHYSICAL THERAPY | Age: 83
End: 2018-11-05
Attending: INTERNAL MEDICINE
Payer: MEDICARE

## 2018-11-05 PROCEDURE — 97110 THERAPEUTIC EXERCISES: CPT

## 2018-11-05 PROCEDURE — 97112 NEUROMUSCULAR REEDUCATION: CPT

## 2018-11-05 NOTE — PROGRESS NOTES
Dx:  increased fall risk              Authorized # of Visits:  Medicare         Next MD visit: none scheduled  Fall Risk: standard         Precautions: n/a             Subjective: Patient arrives 15 min late to therapy.   Patient has no complaints of pain t Tandem balance on airex 3x30 sec B  Tandem balance on airex 3x30 sec B  Tandem balance on airex 3x30 sec B       Standing rows- red band SL balance 3x30 sec bilaterally with UE assist   Standing rows- red band, 3x12        Tandem gait on airex 6 laps  Tand

## 2018-11-07 ENCOUNTER — APPOINTMENT (OUTPATIENT)
Dept: PHYSICAL THERAPY | Age: 83
End: 2018-11-07
Attending: INTERNAL MEDICINE
Payer: MEDICARE

## 2018-11-07 ENCOUNTER — OFFICE VISIT (OUTPATIENT)
Dept: PHYSICAL THERAPY | Age: 83
End: 2018-11-07
Attending: INTERNAL MEDICINE
Payer: MEDICARE

## 2018-11-07 PROCEDURE — 97112 NEUROMUSCULAR REEDUCATION: CPT

## 2018-11-07 PROCEDURE — 97110 THERAPEUTIC EXERCISES: CPT

## 2018-11-07 NOTE — PROGRESS NOTES
Dx:  increased fall risk              Authorized # of Visits:  Medicare         Next MD visit: none scheduled  Fall Risk: standard         Precautions: n/a           Subjective: no complaints of pain today, no falls, feeling steadier on feet.       Objectiv Tandem balance on airex 3x30 sec B  Tandem balance on airex 3x30 sec B  Tandem balance on airex 3x30 sec      Standing rows- red band SL balance 3x30 sec bilaterally with UE assist   Standing rows- red band, 3x12  SL balance with intermittent UE assist 3x

## 2018-11-12 ENCOUNTER — TELEPHONE (OUTPATIENT)
Dept: INTERNAL MEDICINE CLINIC | Facility: CLINIC | Age: 83
End: 2018-11-12

## 2018-11-12 ENCOUNTER — OFFICE VISIT (OUTPATIENT)
Dept: INTERNAL MEDICINE CLINIC | Facility: CLINIC | Age: 83
End: 2018-11-12
Payer: MEDICARE

## 2018-11-12 VITALS
RESPIRATION RATE: 16 BRPM | OXYGEN SATURATION: 99 % | BODY MASS INDEX: 21.85 KG/M2 | TEMPERATURE: 98 F | DIASTOLIC BLOOD PRESSURE: 60 MMHG | HEART RATE: 72 BPM | WEIGHT: 128 LBS | HEIGHT: 64 IN | SYSTOLIC BLOOD PRESSURE: 130 MMHG

## 2018-11-12 DIAGNOSIS — R06.02 SOB (SHORTNESS OF BREATH): Primary | ICD-10-CM

## 2018-11-12 DIAGNOSIS — J44.9 CHRONIC OBSTRUCTIVE PULMONARY DISEASE, UNSPECIFIED COPD TYPE (HCC): ICD-10-CM

## 2018-11-12 DIAGNOSIS — R09.82 POST-NASAL DRIP: ICD-10-CM

## 2018-11-12 PROCEDURE — 93000 ELECTROCARDIOGRAM COMPLETE: CPT | Performed by: INTERNAL MEDICINE

## 2018-11-12 PROCEDURE — 99213 OFFICE O/P EST LOW 20 MIN: CPT | Performed by: INTERNAL MEDICINE

## 2018-11-12 RX ORDER — ALBUTEROL SULFATE 90 UG/1
2 AEROSOL, METERED RESPIRATORY (INHALATION) EVERY 6 HOURS PRN
Qty: 3 INHALER | Refills: 3 | Status: SHIPPED | OUTPATIENT
Start: 2018-11-12 | End: 2018-12-18

## 2018-11-12 NOTE — PROGRESS NOTES
Zoe Apple is a 80year old female. Patient presents with:  Shortness Of Breath: TN Exam Room 3:       HPI:     Patient here with c/o SOB last several weeks.  Has chronic SOB from COPD and not any worse than usual. Became concerned because lot of muc 3   naproxen 500 MG Oral Tab Take 1 tablet (500 mg total) by mouth 2 (two) times daily with meals. Disp: 30 tablet Rfl: 1   TraMADol HCl 50 MG Oral Tab Take 1 tablet (50 mg total) by mouth nightly as needed for Pain.  Disp: 30 tablet Rfl: 0   CARTIA  Lumbago    • Osteoarthritis    • Other and unspecified hyperlipidemia    • Pneumonia, organism unspecified(486)    • Unspecified essential hypertension    • Vertigo       Social History:  Social History    Tobacco Use      Smoking status: Former Smoker signs of infection on exam. Patient reassured she does not need any ABX at this time. EKG is WNL. Offered to check labs but she had them 2 months ago and declined any labs today.   Chronic obstructive pulmonary disease, unspecified copd type (hcc)- stable,

## 2018-11-12 NOTE — TELEPHONE ENCOUNTER
LOV 9/26/18 with TB.     Patient states she has felt out of breath going up the stairs for the last month, periodically feels a flutter in her chest off and on, Dr Anupama Olmstead told her a while ago that she had afib but states she has never been treated for afib,

## 2018-11-12 NOTE — TELEPHONE ENCOUNTER
Pt is calling to ask that she be seen for her heart, has been going on for a short time , states it was fluttering,  was told at one time it was AFIB.

## 2018-11-14 ENCOUNTER — OFFICE VISIT (OUTPATIENT)
Dept: PHYSICAL THERAPY | Age: 83
End: 2018-11-14
Attending: INTERNAL MEDICINE
Payer: MEDICARE

## 2018-11-14 ENCOUNTER — TELEPHONE (OUTPATIENT)
Dept: INTERNAL MEDICINE CLINIC | Facility: CLINIC | Age: 83
End: 2018-11-14

## 2018-11-14 PROCEDURE — 97110 THERAPEUTIC EXERCISES: CPT

## 2018-11-14 PROCEDURE — 97112 NEUROMUSCULAR REEDUCATION: CPT

## 2018-11-14 NOTE — TELEPHONE ENCOUNTER
Called and spoke with pharmacist @[facility] indicating is unusual we received PA form for Albuterol Sulfate  (90 Base) MCG/ACT Inhalation. Ran rx through system and indicated rx is covered by insurance. Will disregard PA.  Pharmacist verbalized understand

## 2018-11-14 NOTE — PROGRESS NOTES
Discharge Summary    Pt has attended 8, cancelled 1, and no shown 0 visits in Physical Therapy. Subjective: Patient has no complaints of pain currently at rest for shoulder. New, recent onset of low back pain.   Patient has no functional mobility comp Men: 8, Women: 5               80-79 y/o Men: 6, Women: 8               80-79 y/o Men 7, Women: 4]     Gait lateral deviation noted occasionally during gait     Goals:   (to be met in 8 visits)  · Pt will demonstrate improved SLS to >9 seconds DANNY to prom 3 bouts 20 touches - on 8\" step, across the body  3 bouts 20 touches - on 8\" step, across the body, foam surface  3x20 touches 3x20 touches    Wobble board forward/back, side to side 2 sets 15 each direction, w/o UE 2 sets 20 each direction, w/o UE   2x2 Total Timed Treatment: 40 min  Total Treatment Time: 40 min

## 2018-11-14 NOTE — TELEPHONE ENCOUNTER
Albuterol needs PA? Can we please call and see, that is unusual. Maybe different brand is covered. Raquel Aaron

## 2018-11-14 NOTE — TELEPHONE ENCOUNTER
Ok to initiate PA Albuterol Sulfate  (90 Base) MCG/ACT Inhalation or recommend alternative med? Please advise. Thank you.     Key: [id number]

## 2018-11-27 RX ORDER — GABAPENTIN 300 MG/1
CAPSULE ORAL
Qty: 60 CAPSULE | Refills: 0 | Status: SHIPPED | OUTPATIENT
Start: 2018-11-27 | End: 2018-12-18

## 2018-11-27 NOTE — TELEPHONE ENCOUNTER
Medication: Gabapentin 300 Mg     Date of last refill: 7/16/18  60 Caps  3 Refills     [place]     Last office visit: 6/26/18    Due back to clinic per last office note:  2 Months     Date next office visit scheduled:

## 2018-11-27 NOTE — TELEPHONE ENCOUNTER
Approved refill for Gabapentin. Further refills should be provided by the patient's PCP, as she is following with neurosurgery on an as needed basis. Will have nursing advise.

## 2018-12-04 RX ORDER — SIMVASTATIN 10 MG
TABLET ORAL
Qty: 90 TABLET | Refills: 0 | Status: SHIPPED | OUTPATIENT
Start: 2018-12-04 | End: 2018-12-18

## 2018-12-04 NOTE — TELEPHONE ENCOUNTER
Protocol failed due to Lipid panel within past 12 months    Please advise ,    LOV:11/12/18 TB  FOV:12/5/18  LAST RX:9/6/18 10 mg take 1 tab daily 90 tabs 0 refills   LAST LABS:9/23/18 troponin,potassium   PER PROTOCOL: to provider

## 2018-12-05 ENCOUNTER — OFFICE VISIT (OUTPATIENT)
Dept: INTERNAL MEDICINE CLINIC | Facility: CLINIC | Age: 83
End: 2018-12-05
Payer: MEDICARE

## 2018-12-05 VITALS
BODY MASS INDEX: 21.85 KG/M2 | TEMPERATURE: 98 F | OXYGEN SATURATION: 98 % | RESPIRATION RATE: 16 BRPM | DIASTOLIC BLOOD PRESSURE: 60 MMHG | HEIGHT: 64 IN | WEIGHT: 128 LBS | HEART RATE: 73 BPM | SYSTOLIC BLOOD PRESSURE: 138 MMHG

## 2018-12-05 DIAGNOSIS — M54.50 ACUTE LEFT-SIDED LOW BACK PAIN WITHOUT SCIATICA: Primary | ICD-10-CM

## 2018-12-05 PROCEDURE — 99213 OFFICE O/P EST LOW 20 MIN: CPT | Performed by: INTERNAL MEDICINE

## 2018-12-05 RX ORDER — METHOCARBAMOL 500 MG/1
500 TABLET, FILM COATED ORAL NIGHTLY PRN
Qty: 30 TABLET | Refills: 1 | Status: SHIPPED | OUTPATIENT
Start: 2018-12-05 | End: 2019-03-28

## 2018-12-05 NOTE — PROGRESS NOTES
Taj Mejia is a 80year old female. Patient presents with:  Low Back Pain: ROOM 4- Lower back pain has had for a couple months. Stays in one spot. Pain with movement       HPI:     Patient c/o left low back pain for 2-3 months.  Had similar pains las Rfl: 0   Albuterol Sulfate  (90 Base) MCG/ACT Inhalation Aero Soln Inhale 2 puffs into the lungs every 6 (six) hours as needed for Wheezing.  Disp: 3 Inhaler Rfl: 3   ANORO ELLIPTA 62.5-25 MCG/INH Inhalation Aerosol Powder, Breath Activated INHALE ON • Lumbago    • Osteoarthritis    • Other and unspecified hyperlipidemia    • Pneumonia, organism unspecified(486)    • Unspecified essential hypertension    • Vertigo       Social History:  Social History    Tobacco Use      Smoking status: Former Smoker improvement. Refill given, SE reviewed  -referred to PT     No orders of the defined types were placed in this encounter.       Meds & Refills for this Visit:  Requested Prescriptions     Signed Prescriptions Disp Refills   • methocarbamol 500 MG Oral Tab 3

## 2018-12-14 ENCOUNTER — OFFICE VISIT (OUTPATIENT)
Dept: FAMILY MEDICINE CLINIC | Facility: CLINIC | Age: 83
End: 2018-12-14
Payer: MEDICARE

## 2018-12-14 DIAGNOSIS — Z02.9 ENCOUNTER FOR ADMINISTRATIVE EXAMINATIONS: Primary | ICD-10-CM

## 2018-12-14 NOTE — PROGRESS NOTES
Pt presented with right upper quadrant abdominal pain for the last few days. History of bowel obstructions. Denies nausea, vomiting. Denies constipation. Has no gallbladder. No fevers. There were no vitals taken for this visit.   Accompanied by: self  A

## 2018-12-18 ENCOUNTER — HOSPITAL ENCOUNTER (INPATIENT)
Facility: HOSPITAL | Age: 83
LOS: 2 days | Discharge: HOME | DRG: 390 | End: 2018-12-20
Attending: EMERGENCY MEDICINE | Admitting: HOSPITALIST
Payer: MEDICARE

## 2018-12-18 ENCOUNTER — APPOINTMENT (OUTPATIENT)
Dept: CT IMAGING | Facility: HOSPITAL | Age: 83
DRG: 390 | End: 2018-12-18
Attending: EMERGENCY MEDICINE
Payer: MEDICARE

## 2018-12-18 DIAGNOSIS — K56.600 PARTIAL SMALL BOWEL OBSTRUCTION (HCC): Primary | ICD-10-CM

## 2018-12-18 PROCEDURE — 99223 1ST HOSP IP/OBS HIGH 75: CPT | Performed by: HOSPITALIST

## 2018-12-18 PROCEDURE — 99223 1ST HOSP IP/OBS HIGH 75: CPT | Performed by: COLON & RECTAL SURGERY

## 2018-12-18 PROCEDURE — 74177 CT ABD & PELVIS W/CONTRAST: CPT | Performed by: EMERGENCY MEDICINE

## 2018-12-18 RX ORDER — DONEPEZIL HYDROCHLORIDE 10 MG/1
10 TABLET, FILM COATED ORAL NIGHTLY
Status: DISCONTINUED | OUTPATIENT
Start: 2018-12-18 | End: 2018-12-20

## 2018-12-18 RX ORDER — DONEPEZIL HYDROCHLORIDE 10 MG/1
10 TABLET, FILM COATED ORAL NIGHTLY
COMMUNITY
End: 2019-01-10

## 2018-12-18 RX ORDER — DEXTROSE, SODIUM CHLORIDE, AND POTASSIUM CHLORIDE 5; .45; .075 G/100ML; G/100ML; G/100ML
INJECTION INTRAVENOUS CONTINUOUS
Status: DISCONTINUED | OUTPATIENT
Start: 2018-12-18 | End: 2018-12-20

## 2018-12-18 RX ORDER — MORPHINE SULFATE 4 MG/ML
2 INJECTION, SOLUTION INTRAMUSCULAR; INTRAVENOUS EVERY 2 HOUR PRN
Status: DISCONTINUED | OUTPATIENT
Start: 2018-12-18 | End: 2018-12-20

## 2018-12-18 RX ORDER — ALENDRONATE SODIUM 70 MG/1
70 TABLET ORAL WEEKLY
COMMUNITY
End: 2019-01-31

## 2018-12-18 RX ORDER — GABAPENTIN 300 MG/1
300 CAPSULE ORAL DAILY
COMMUNITY
End: 2019-01-10 | Stop reason: DRUGHIGH

## 2018-12-18 RX ORDER — ONDANSETRON 2 MG/ML
4 INJECTION INTRAMUSCULAR; INTRAVENOUS EVERY 6 HOURS PRN
Status: DISCONTINUED | OUTPATIENT
Start: 2018-12-18 | End: 2018-12-20

## 2018-12-18 RX ORDER — ONDANSETRON 2 MG/ML
4 INJECTION INTRAMUSCULAR; INTRAVENOUS EVERY 4 HOURS PRN
Status: DISCONTINUED | OUTPATIENT
Start: 2018-12-18 | End: 2018-12-18

## 2018-12-18 RX ORDER — SODIUM CHLORIDE 9 MG/ML
INJECTION, SOLUTION INTRAVENOUS CONTINUOUS
Status: ACTIVE | OUTPATIENT
Start: 2018-12-18 | End: 2018-12-18

## 2018-12-18 RX ORDER — ENOXAPARIN SODIUM 100 MG/ML
40 INJECTION SUBCUTANEOUS DAILY
Status: DISCONTINUED | OUTPATIENT
Start: 2018-12-18 | End: 2018-12-20

## 2018-12-18 RX ORDER — ACETAMINOPHEN 325 MG/1
650 TABLET ORAL EVERY 6 HOURS PRN
Status: DISCONTINUED | OUTPATIENT
Start: 2018-12-18 | End: 2018-12-20

## 2018-12-18 RX ORDER — METOCLOPRAMIDE HYDROCHLORIDE 5 MG/ML
10 INJECTION INTRAMUSCULAR; INTRAVENOUS EVERY 8 HOURS PRN
Status: DISCONTINUED | OUTPATIENT
Start: 2018-12-18 | End: 2018-12-20

## 2018-12-18 RX ORDER — DILTIAZEM HYDROCHLORIDE 300 MG/1
300 CAPSULE, COATED, EXTENDED RELEASE ORAL DAILY
COMMUNITY
End: 2019-01-03

## 2018-12-18 RX ORDER — SIMVASTATIN 10 MG
10 TABLET ORAL NIGHTLY
COMMUNITY
End: 2019-03-21

## 2018-12-18 RX ORDER — MORPHINE SULFATE 4 MG/ML
1 INJECTION, SOLUTION INTRAMUSCULAR; INTRAVENOUS EVERY 2 HOUR PRN
Status: DISCONTINUED | OUTPATIENT
Start: 2018-12-18 | End: 2018-12-20

## 2018-12-18 RX ORDER — HYDROMORPHONE HYDROCHLORIDE 1 MG/ML
0.5 INJECTION, SOLUTION INTRAMUSCULAR; INTRAVENOUS; SUBCUTANEOUS EVERY 30 MIN PRN
Status: ACTIVE | OUTPATIENT
Start: 2018-12-18 | End: 2018-12-18

## 2018-12-18 RX ORDER — SODIUM CHLORIDE 9 MG/ML
125 INJECTION, SOLUTION INTRAVENOUS CONTINUOUS
Status: DISCONTINUED | OUTPATIENT
Start: 2018-12-18 | End: 2018-12-18

## 2018-12-18 NOTE — ED PROVIDER NOTES
Patient Seen in: [facility] 4nw-a    History   Patient presents with:  Abdomen/Flank Pain (GI/)    Stated Complaint: Rt sided abd pain for few days    HPI    Patient is a 15-year-old female who states last 3 days she has had abdominal pain mostly expl lap   • PAULINO NEEDLE LOCALIZATION W/ SPECIMEN 1 SITE RIGHT Right 2006    benign.    • OTHER Bilateral     bilateral breast implants   • OTHER  07/11/2017    Kyphoplasty, Dr. Yessy Wu   • [place]    Cyst in vagina   • OTHER SURGICAL HIST systems reviewed and negative except as noted above.     Physical Exam     ED Triage Vitals [12/18/18 0811]   /59   Pulse 64   Resp 17   Temp 98.1 °F (36.7 °C)   Temp src Temporal   SpO2 97 %   O2 Device None (Room air)       Current:BP (!) 133/97   P PLATELET.   Procedure                               Abnormality         Status                     ---------                               -----------         ------                     CBC W/ DIFFERENTIAL[[id number]]          Abnormal            Final resul

## 2018-12-18 NOTE — H&P
[facility]  History and Physical     Nigel Maldonado Patient Status:  Inpatient    7/3/1934 MRN [id number]   [facility] 4NW-A Attending Angelique Foreman MD   Hosp Day # 0 PCP Cadence Jay MD     Chief Complaint: abd pain    Hist LOCALIZATION W/ SPECIMEN 1 SITE RIGHT Right 2006    benign.    • OTHER Bilateral     bilateral breast implants   • OTHER  07/11/2017    Kyphoplasty, Dr. Yessy Wu   • [place]    Cyst in vagina   • OTHER SURGICAL HISTORY  1974    Breast impl File Prior to Encounter:  simvastatin 10 MG Oral Tab Take 10 mg by mouth nightly. Disp:  Rfl:    gabapentin 300 MG Oral Cap Take 300 mg by mouth 2 (two) times daily. Disp:  Rfl:    alendronate 70 MG Oral Tab Take 70 mg by mouth once a week.  On wednesdays D EOM-MARCELLO METCALF. Anicteric. Neck: No lymphadenopathy. No JVD. No carotid bruits. Respiratory: Clear to auscultation bilaterally. No wheezes. No rhonchi. Cardiovascular: S1, S2. Regular rate and rhythm. No murmurs, no rubs or gallops. Equal pulses.    Chest anticipate that, after discharge, patient will require TBD.

## 2018-12-18 NOTE — CONSULTS
[facility]  Report of Consultation    [facility] Patient Status:  Inpatient    7/3/1934 MRN [id number]   [facility] 4NW-A Attending Colby Llamas MD   Hosp Day # 0 PCP Drake Staton MD     Reason for Consultation:  Consulted b Surgical history is significant as noted above, as well as for multiple back operations and breast implant/implant removal.    I acted as a scribe in this encounter.      The physician obtained a history, independently performed a physical exam, and develop Uzjrddjoac-aahmaylsaj-ltslbtelbsqczk   • OTHER SURGICAL HISTORY  2007    Removed cholostomy   • OTHER SURGICAL HISTORY  2010    intestinal   • OTHER SURGICAL HISTORY  1980    removal of mass near thyroid   • OTHER SURGICAL HISTORY  12/14/12    Dr. Leah leblanc Sodium (LOVENOX) 40 MG/0.4ML injection 40 mg, 40 mg, Subcutaneous, Daily  •  acetaminophen (TYLENOL) tab 650 mg, 650 mg, Oral, Q6H PRN  •  ondansetron HCl (ZOFRAN) injection 4 mg, 4 mg, Intravenous, Q6H PRN  •  Metoclopramide HCl (REGLAN) injection 10 mg, respiratory distress. No wheezes, no rales, no rhonchi. No secondary use of accessory respiratory musculature. Cardiac: Regular rate and rhythm. Abdomen:  The abdomen is soft, mildly distended, tender in her lower abdomen, with localized pain suprapu PANCREAS:  No lesion, fluid collection, ductal dilatation, or atrophy. SPLEEN:  No enlargement or focal lesion. KIDNEYS:  No mass, obstruction, or calcification. ADRENALS:  No mass or enlargement. AORTA/VASCULAR:  No aneurysm or dissection. MD on 12/18/2018 at 10:44       Approved by: Niru Moore MD    Impression:  Patient Active Problem List:     Spinal stenosis, lumbar region, without neurogenic claudication     Bursitis, hip     Pure hypercholesterolemia     History of recurrent UTI Josefa Cannon and it is both accurate and complete.

## 2018-12-18 NOTE — PLAN OF CARE
NURSING ADMISSION NOTE      Patient admitted via Cart  Oriented to room. Safety precautions initiated. Bed in low position. Call light in reach. Admission navigator completed. Patient's granddaughter at the bedside.     Dr. Asya Chinchilla at the bedside

## 2018-12-19 PROCEDURE — 99233 SBSQ HOSP IP/OBS HIGH 50: CPT | Performed by: COLON & RECTAL SURGERY

## 2018-12-19 PROCEDURE — 99232 SBSQ HOSP IP/OBS MODERATE 35: CPT | Performed by: INTERNAL MEDICINE

## 2018-12-19 RX ORDER — PRAVASTATIN SODIUM 20 MG
20 TABLET ORAL NIGHTLY
Status: DISCONTINUED | OUTPATIENT
Start: 2018-12-19 | End: 2018-12-20

## 2018-12-19 RX ORDER — ASPIRIN 81 MG/1
81 TABLET, CHEWABLE ORAL DAILY
Status: DISCONTINUED | OUTPATIENT
Start: 2018-12-20 | End: 2018-12-20

## 2018-12-19 RX ORDER — POTASSIUM CHLORIDE 20 MEQ/1
40 TABLET, EXTENDED RELEASE ORAL EVERY 4 HOURS
Status: COMPLETED | OUTPATIENT
Start: 2018-12-19 | End: 2018-12-19

## 2018-12-19 NOTE — PROGRESS NOTES
Pt. AOx4. Pt reports having mild generalized pain and requested tylenol before bed. It was given. Pt. Went to sleep early at 10pm. Slept majority of the night getting up to the bathroom two times without BM. Pt.  Woke up at 530am and reports she is feeling

## 2018-12-19 NOTE — PROGRESS NOTES
[facility]  Progress Note    Zoe Apple Patient Status:  Inpatient    7/3/1934 MRN [id number]   [facility] 4NW-A Attending James Ortiz MD   Hosp Day # 1 PCP Lord Mela MD     Subjective:  No new complaints.  No further pain for diverticulitis, 2010, with colostomy     Chronic pain following surgery or procedure     Capsular contracture of breast implant     Iron deficiency anemia     Chronic obstructive pulmonary disease ([address]     Paroxysmal atrial fibrillation ([address]     Julianne nontender, nondistended. Advanced to solid diet. Patient tolerates solid dinner is okay from my standpoint if she is discharged home, if okay with hospitalist as well. .    My total face time with this patient was 24 minutes.   Greater than half of our v

## 2018-12-19 NOTE — PROGRESS NOTES
[facility]  Progress Note     Brianna Wiggins Patient Status:  Inpatient    7/3/1934 MRN [id number]   [facility] 4NW-A Attending Luis Daniel Moya MD   Hosp Day # 1 PCP Faizan Sellers MD     Chief Complaint: SBO    S: Patient with fla Daily       ASSESSMENT / PLAN:     1. Partial SBO vs ileus  1. CLD today   2. Antiemetic   3. IVF  4. Surgery rec appreciated   2. HTN on diltiazem   3. DL  4. Spinal DDD  5. COPD  1. Stable  2. Cont.  Home inhalers        Quality:  · DVT Prophylaxis: pepe

## 2018-12-20 VITALS
TEMPERATURE: 99 F | SYSTOLIC BLOOD PRESSURE: 130 MMHG | DIASTOLIC BLOOD PRESSURE: 50 MMHG | HEIGHT: 63 IN | RESPIRATION RATE: 18 BRPM | HEART RATE: 68 BPM | BODY MASS INDEX: 22.21 KG/M2 | WEIGHT: 125.38 LBS | OXYGEN SATURATION: 98 %

## 2018-12-20 PROCEDURE — 99232 SBSQ HOSP IP/OBS MODERATE 35: CPT | Performed by: COLON & RECTAL SURGERY

## 2018-12-20 PROCEDURE — 99239 HOSP IP/OBS DSCHRG MGMT >30: CPT | Performed by: INTERNAL MEDICINE

## 2018-12-20 RX ORDER — LOPERAMIDE HYDROCHLORIDE 2 MG/1
2 CAPSULE ORAL 4 TIMES DAILY PRN
Status: DISCONTINUED | OUTPATIENT
Start: 2018-12-20 | End: 2018-12-20

## 2018-12-20 NOTE — CM/SW NOTE
12/20/18 1400   CM/SW Screening   Referral Source    Information Source Chart review;Nursing rounds   Patient's Mental Status Alert;Oriented   Patient's [address]   Patient lives with Children   Patient Status Prior to Admission

## 2018-12-20 NOTE — DISCHARGE SUMMARY
[facility]ITAL[facility]  DISCHARGE SUMMARY     [facility] Patient Status:  Inpatient    7/3/1934 MRN [id number]   [facility] 4NW-A Attending No att. providers found   Hosp Day # 2 PCP Drake Staton MD     Date of Admission: 2018  Da Beads      Take 300 mg by mouth daily. Refills:  0     CRANBERRY OR      Take 1 tablet by mouth daily. Refills:  0     Donepezil HCl 10 MG Tabs  Commonly known as:  ARICEPT      Take 10 mg by mouth nightly.    Refills:  0     ferrous sulfate 325 (65 FE) PT VISIT - BY THERAPIST [1266] 45 min. [facility] in [place], [address] [place] [address]               1/10/2019  2:00 PM  FOLLOW UP [9994] 20 min.  [address] [place], [place]               1/11/201

## 2018-12-20 NOTE — PROGRESS NOTES
Pt up every hour with diarrhea starting around midnight. Pt without much sleep due to frequent bowel movements. Pt wanting to go home because she feels nothing is happening here that is helping her. Pt AOx4 VSS and denies having pain.

## 2018-12-20 NOTE — PROGRESS NOTES
[facility]  Progress Note    Henrik Espinoza Patient Status:  Inpatient    7/3/1934 MRN [id number]   [facility] 4NW-A Attending Jessica Ramos MD   Hosp Day # 2 PCP Katalina Kwok MD     Subjective:  Patient states that she is not hav HTN     Dyslipidemia     SBO (small bowel obstruction) (HCC)     Memory deficit     Mild cognitive impairment     Tremor of both hands     Closed compression fracture of thoracic vertebra (HCC)     Leg pain, diffuse     Suture granuloma     Arthropathy of

## 2018-12-21 ENCOUNTER — TELEPHONE (OUTPATIENT)
Dept: CASE MANAGEMENT | Age: 83
End: 2018-12-21

## 2018-12-21 ENCOUNTER — PATIENT OUTREACH (OUTPATIENT)
Dept: CASE MANAGEMENT | Age: 83
End: 2018-12-21

## 2018-12-21 DIAGNOSIS — Z02.9 ENCOUNTERS FOR UNSPECIFIED ADMINISTRATIVE PURPOSE: ICD-10-CM

## 2018-12-21 NOTE — PROGRESS NOTES
Initial Post Discharge Follow Up   Discharge Date: 12/20/18  Contact Date: 12/21/2018    Consent Verification:  Assessment Completed With: Patient  HIPAA Verified?   Yes    Discharge Dx:   Partial SBO, HTN, HLD    General:   • How have you been since you tablet Rfl: 0   ketoconazole 2 % External Cream Apply to AA's of foot BID Disp: 30 g Rfl: 1   Cholecalciferol (VITAMIN D3) 1000 units Oral Cap Take 1 tablet by mouth daily. Disp:  Rfl:    Vitamin B-12 1000 MCG Oral Tab Take 1,000 mcg by mouth daily.  Disp: Follow up with Traci Lopez, [facility], [address] ([address] [facility])    Jan 14, 2019 11:45 AM CST Physical Therapy Evaluation with Erna Roach PT [facility] in [place] ([facility] [address] [place] [id number]  [phone]          PCP TCM/HFU appointment: scheduled at D/C within 7-14 days  no     NCM Reviewed/scheduled/rescheduled PCP TCM/HFU appointment with pt:  TE to PCP regarding a TCM HFU appt recommendation.

## 2018-12-21 NOTE — TELEPHONE ENCOUNTER
NCM spoke with pt today for TCM. Pt d/c'd on 12/20/18 dx partial SBO. Pt states she is feeling good today and no pain or discomfort currently. Pt declined scheduling a TCM HFU appt as pt states she was just seen by PCP on 12/05/18.   Please advise PCP an

## 2018-12-29 ENCOUNTER — APPOINTMENT (OUTPATIENT)
Dept: GENERAL RADIOLOGY | Facility: HOSPITAL | Age: 83
DRG: 390 | End: 2018-12-29
Attending: EMERGENCY MEDICINE
Payer: MEDICARE

## 2018-12-29 ENCOUNTER — HOSPITAL ENCOUNTER (INPATIENT)
Facility: HOSPITAL | Age: 83
LOS: 1 days | Discharge: HOME | DRG: 390 | End: 2018-12-30
Attending: EMERGENCY MEDICINE | Admitting: HOSPITALIST
Payer: MEDICARE

## 2018-12-29 ENCOUNTER — APPOINTMENT (OUTPATIENT)
Dept: CT IMAGING | Facility: HOSPITAL | Age: 83
DRG: 390 | End: 2018-12-29
Attending: EMERGENCY MEDICINE
Payer: MEDICARE

## 2018-12-29 DIAGNOSIS — K56.609 SBO (SMALL BOWEL OBSTRUCTION) (HCC): Primary | ICD-10-CM

## 2018-12-29 LAB
ALBUMIN SERPL-MCNC: 3.9 G/DL (ref 3.1–4.5)
ALBUMIN/GLOB SERPL: 1.3 {RATIO} (ref 1–2)
ALP LIVER SERPL-CCNC: 98 U/L (ref 55–142)
ALT SERPL-CCNC: 22 U/L (ref 14–54)
ANION GAP SERPL CALC-SCNC: 7 MMOL/L (ref 0–18)
AST SERPL-CCNC: 24 U/L (ref 15–41)
BASOPHILS # BLD AUTO: 0.03 X10(3) UL (ref 0–0.1)
BASOPHILS NFR BLD AUTO: 0.3 %
BILIRUB SERPL-MCNC: 0.7 MG/DL (ref 0.1–2)
BILIRUB UR QL STRIP.AUTO: NEGATIVE
BUN BLD-MCNC: 14 MG/DL (ref 8–20)
BUN/CREAT SERPL: 15.4 (ref 10–20)
CALCIUM BLD-MCNC: 9.6 MG/DL (ref 8.3–10.3)
CHLORIDE SERPL-SCNC: 109 MMOL/L (ref 101–111)
CO2 SERPL-SCNC: 26 MMOL/L (ref 22–32)
COLOR UR AUTO: YELLOW
CREAT BLD-MCNC: 0.91 MG/DL (ref 0.55–1.02)
EOSINOPHIL # BLD AUTO: 0.06 X10(3) UL (ref 0–0.3)
EOSINOPHIL NFR BLD AUTO: 0.6 %
ERYTHROCYTE [DISTWIDTH] IN BLOOD BY AUTOMATED COUNT: 12.9 % (ref 11.5–16)
GLOBULIN PLAS-MCNC: 3.1 G/DL (ref 2.8–4.4)
GLUCOSE BLD-MCNC: 148 MG/DL (ref 70–99)
GLUCOSE UR STRIP.AUTO-MCNC: NEGATIVE MG/DL
HCT VFR BLD AUTO: 44.5 % (ref 34–50)
HGB BLD-MCNC: 15.1 G/DL (ref 12–16)
IMMATURE GRANULOCYTE COUNT: 0.02 X10(3) UL (ref 0–1)
IMMATURE GRANULOCYTE RATIO %: 0.2 %
LIPASE: 122 U/L (ref 73–393)
LYMPHOCYTES # BLD AUTO: 0.51 X10(3) UL (ref 0.9–4)
LYMPHOCYTES NFR BLD AUTO: 5.1 %
M PROTEIN MFR SERPL ELPH: 7 G/DL (ref 6.4–8.2)
MCH RBC QN AUTO: 31.3 PG (ref 27–33.2)
MCHC RBC AUTO-ENTMCNC: 33.9 G/DL (ref 31–37)
MCV RBC AUTO: 92.1 FL (ref 81–100)
MONOCYTES # BLD AUTO: 0.5 X10(3) UL (ref 0.1–1)
MONOCYTES NFR BLD AUTO: 5 %
NEUTROPHIL ABS PRELIM: 8.95 X10 (3) UL (ref 1.3–6.7)
NEUTROPHILS # BLD AUTO: 8.95 X10(3) UL (ref 1.3–6.7)
NEUTROPHILS NFR BLD AUTO: 88.8 %
NITRITE UR QL STRIP.AUTO: NEGATIVE
OSMOLALITY SERPL CALC.SUM OF ELEC: 297 MOSM/KG (ref 275–295)
PH UR STRIP.AUTO: 6 [PH] (ref 4.5–8)
PLATELET # BLD AUTO: 274 10(3)UL (ref 150–450)
POTASSIUM SERPL-SCNC: 3.8 MMOL/L (ref 3.6–5.1)
PROT UR STRIP.AUTO-MCNC: NEGATIVE MG/DL
RBC # BLD AUTO: 4.83 X10(6)UL (ref 3.8–5.1)
RBC UR QL AUTO: NEGATIVE
RED CELL DISTRIBUTION WIDTH-SD: 43.7 FL (ref 35.1–46.3)
SODIUM SERPL-SCNC: 142 MMOL/L (ref 136–144)
SP GR UR STRIP.AUTO: 1.06 (ref 1–1.03)
UROBILINOGEN UR STRIP.AUTO-MCNC: <2 MG/DL
WBC # BLD AUTO: 10.1 X10(3) UL (ref 4–13)

## 2018-12-29 PROCEDURE — 71045 X-RAY EXAM CHEST 1 VIEW: CPT | Performed by: EMERGENCY MEDICINE

## 2018-12-29 PROCEDURE — 99223 1ST HOSP IP/OBS HIGH 75: CPT | Performed by: HOSPITALIST

## 2018-12-29 PROCEDURE — 74177 CT ABD & PELVIS W/CONTRAST: CPT | Performed by: EMERGENCY MEDICINE

## 2018-12-29 PROCEDURE — 99223 1ST HOSP IP/OBS HIGH 75: CPT | Performed by: SURGERY

## 2018-12-29 RX ORDER — SODIUM CHLORIDE 9 MG/ML
INJECTION, SOLUTION INTRAVENOUS CONTINUOUS
Status: DISCONTINUED | OUTPATIENT
Start: 2018-12-29 | End: 2018-12-29

## 2018-12-29 RX ORDER — DEXTROSE AND SODIUM CHLORIDE 5; .9 G/100ML; G/100ML
INJECTION, SOLUTION INTRAVENOUS CONTINUOUS
Status: DISCONTINUED | OUTPATIENT
Start: 2018-12-29 | End: 2018-12-30

## 2018-12-29 RX ORDER — HYDROMORPHONE HYDROCHLORIDE 1 MG/ML
0.8 INJECTION, SOLUTION INTRAMUSCULAR; INTRAVENOUS; SUBCUTANEOUS EVERY 2 HOUR PRN
Status: DISCONTINUED | OUTPATIENT
Start: 2018-12-29 | End: 2018-12-30

## 2018-12-29 RX ORDER — SODIUM CHLORIDE 9 MG/ML
125 INJECTION, SOLUTION INTRAVENOUS CONTINUOUS
Status: DISCONTINUED | OUTPATIENT
Start: 2018-12-29 | End: 2018-12-30

## 2018-12-29 RX ORDER — ENOXAPARIN SODIUM 100 MG/ML
40 INJECTION SUBCUTANEOUS DAILY
Status: DISCONTINUED | OUTPATIENT
Start: 2018-12-29 | End: 2018-12-30

## 2018-12-29 RX ORDER — HYDROMORPHONE HYDROCHLORIDE 1 MG/ML
0.4 INJECTION, SOLUTION INTRAMUSCULAR; INTRAVENOUS; SUBCUTANEOUS EVERY 2 HOUR PRN
Status: DISCONTINUED | OUTPATIENT
Start: 2018-12-29 | End: 2018-12-30

## 2018-12-29 RX ORDER — ONDANSETRON 2 MG/ML
4 INJECTION INTRAMUSCULAR; INTRAVENOUS EVERY 6 HOURS PRN
Status: DISCONTINUED | OUTPATIENT
Start: 2018-12-29 | End: 2018-12-30

## 2018-12-29 RX ORDER — HYDROMORPHONE HYDROCHLORIDE 1 MG/ML
0.5 INJECTION, SOLUTION INTRAMUSCULAR; INTRAVENOUS; SUBCUTANEOUS EVERY 30 MIN PRN
Status: DISCONTINUED | OUTPATIENT
Start: 2018-12-29 | End: 2018-12-29 | Stop reason: ALTCHOICE

## 2018-12-29 RX ORDER — ONDANSETRON 2 MG/ML
4 INJECTION INTRAMUSCULAR; INTRAVENOUS EVERY 4 HOURS PRN
Status: DISCONTINUED | OUTPATIENT
Start: 2018-12-29 | End: 2018-12-29 | Stop reason: ALTCHOICE

## 2018-12-29 RX ORDER — SODIUM CHLORIDE 9 MG/ML
INJECTION, SOLUTION INTRAVENOUS CONTINUOUS
Status: ACTIVE | OUTPATIENT
Start: 2018-12-29 | End: 2018-12-29

## 2018-12-29 RX ORDER — HYDROMORPHONE HYDROCHLORIDE 1 MG/ML
0.2 INJECTION, SOLUTION INTRAMUSCULAR; INTRAVENOUS; SUBCUTANEOUS EVERY 2 HOUR PRN
Status: DISCONTINUED | OUTPATIENT
Start: 2018-12-29 | End: 2018-12-30

## 2018-12-29 RX ORDER — HYDROMORPHONE HYDROCHLORIDE 1 MG/ML
0.5 INJECTION, SOLUTION INTRAMUSCULAR; INTRAVENOUS; SUBCUTANEOUS EVERY 30 MIN PRN
Status: DISCONTINUED | OUTPATIENT
Start: 2018-12-29 | End: 2018-12-30

## 2018-12-29 RX ORDER — ONDANSETRON 2 MG/ML
4 INJECTION INTRAMUSCULAR; INTRAVENOUS ONCE
Status: COMPLETED | OUTPATIENT
Start: 2018-12-29 | End: 2018-12-29

## 2018-12-29 RX ORDER — ACETAMINOPHEN 325 MG/1
650 TABLET ORAL EVERY 6 HOURS PRN
Status: DISCONTINUED | OUTPATIENT
Start: 2018-12-29 | End: 2018-12-30

## 2018-12-29 NOTE — ED NOTES
NG supplies brought to pt room. Pt refuses NG upon arrival to room. Thorough education provided to pt on importance of NG with SBO. Pt sts \"its only a partial and I've had them before. \" Daughter at bedside encouraging pt to have the NG placed.  Pt continu

## 2018-12-29 NOTE — CONSULTS
Chantell Pabon is a 80year old female  Patient presents with:  Abdomen/Flank Pain (GI/)      REFERRED BY    Patient presents with diffuse abdominal discomfort and nausea. Patient has a history of repeated small bowel obstructions.   She was discharged MD JASMIN at [facility]   • LYSIS OF ADHESIONS  08/20/2017    expl lap   • PAULINO NEEDLE LOCALIZATION W/ SPECIMEN 1 SITE RIGHT Right 2006    benign.    • OTHER Bilateral     bilateral breast implants   • OTHER  07/11/2017    Kyphoplasty, Dr. Funes Fix   • OTHER [place] [address] Rfl:    Donepezil HCl 10 MG Oral Tab Take 10 mg by mouth nightly. Disp:  Rfl:    methocarbamol 500 MG Oral Tab Take 1 tablet (500 mg total) by mouth nightly as needed.  Disp: 30 tablet Rfl: 1   TraMADol HCl 50 MG Oral Tab Take 1 tablet (50 mg total) by mout sore throat; hearing loss negative,   EYES , no diplopia or vision changes  RESPIRATORY: denies shortness of breath, wheezing or cough   CARDIOVASCULAR: denies chest pain or ALFRED; no palpitations   GI: denies nausea, vomiting, constipation, diarrhea; no rec 12/18/2018 106  101 - 111 mmol/L Final   • CO2 12/18/2018 30.0  22.0 - 32.0 mmol/L Final   • Anion Gap 12/18/2018 6  0 - 18 mmol/L Final   • BUN 12/18/2018 18  8 - 20 mg/dL Final   • Creatinine 12/18/2018 1.02  0.55 - 1.02 mg/dL Final   • BUN/CREA Ratio 12 Final   • Transitional Cells 12/18/2018 None Seen  Small /LPF Final   • Mucous Urine 12/18/2018 1+* None Seen Final   • Yeast Urine 12/18/2018 None Seen  None Seen Final   • Non-Squamous Epithelial 12/18/2018 None Seen  None Seen Final   • Lipase 12/18/201 Mixed Nelia   Final   • C.  Difficile(Toxigenic) By Pcr 12/18/2018 Negative for Toxigenic C. difficile  Negative Final   • Glucose 12/19/2018 93  70 - 99 mg/dL Final   • Sodium 12/19/2018 139  136 - 144 mmol/L Final   • Potassium 12/19/2018 3.6  3.6 - 5.1 m 0. 01  0.00 - 1.00 x10(3) uL Final   • Neutrophil % 12/19/2018 61.8  % Final   • Lymphocyte % 12/19/2018 19.6  % Final   • Monocyte % 12/19/2018 10.4  % Final   • Eosinophil % 12/19/2018 7.3  % Final   • Basophil % 12/19/2018 0.6  % Final   • Immature Granu

## 2018-12-29 NOTE — ED NOTES
Round on pt. NG placed to L nare, on low cont suction. Pt sts abd pain has improved. Updated on room number and eta to floor.  Pending XR

## 2018-12-29 NOTE — H&P
[facility]  History and Physical     [facility] Patient Status:  Emergency    7/3/1934 MRN [id number]   Location [facility] Attending Callum Baca MD   Hosp Day # 0 PCP Bob Cortez MD     Chief Complaint: implants   • OTHER  07/11/2017    Kyphoplasty, Dr. Perla Johns   • OTHER SURGICAL HISTORY  1949    Cyst in vagina   • OTHER SURGICAL HISTORY  1974    Breast implants   • OTHER SURGICAL HISTORY  2006    Right breast lump- benign   • OTHER SURGICAL HISTORY  2006 nightly. Disp:  Rfl:    gabapentin 300 MG Oral Cap Take 300 mg by mouth 2 (two) times daily. Disp:  Rfl:    alendronate 70 MG Oral Tab Take 70 mg by mouth once a week.  On wednesdays Disp:  Rfl:    umeclidinium-vilanterol (ANORO ELLIPTA) 62.5-25 MCG/INH Inh Clear to auscultation bilaterally. No wheezes. No rhonchi. Cardiovascular: S1, S2. Regular rate and rhythm. No murmurs, rubs or gallops. Equal pulses. Chest and Back: No tenderness or deformity.   Abdomen: Soft, RUQ tender, no guarding, BS hypoactive  Ne

## 2018-12-29 NOTE — ED PROVIDER NOTES
Patient Seen in: [facility] Emergency Department    History   Patient presents with:  Abdomen/Flank Pain (GI/)    Stated Complaint: low abd. pain     HPI    66-year-old female comes to the hospital complaint of having trouble with abdominal pain. bilateral breast implants   • OTHER  07/11/2017    Kyphoplasty, Dr. Roslyn Lennox   • OTHER SURGICAL HISTORY  1949    Cyst in vagina   • OTHER SURGICAL HISTORY  1974    Breast implants   • OTHER SURGICAL HISTORY  2006    Right breast lump- benign   • OTHER SURG °C)   Temp src Temporal   SpO2 97 %   O2 Device None (Room air)       Current:/69   Pulse 77   Temp 98.3 °F (36.8 °C) (Temporal)   Resp 16   Ht 157.5 cm (5' 2\")   Wt 57.2 kg   SpO2 96%   BMI 23.05 kg/m²         Physical Exam    HEENT : NCAT, EOMI, P (CONTRAST ONLY) (CPT=74177)  COMPARISON:  [facility] , CT ABDOMEN+PELVIS(CONTRAST ONLY)(CPT=74177), 12/18/2018, 9:55.   INDICATIONS:  low abd. pain  TECHNIQUE:  CT scanning was performed from the dome of the diaphragm to the pubic symphysis with non-ionic intra obstruction. 2. Multiple nodular opacities are again seen within the right middle lobe and right lower lobe measuring up to 1.9 cm. Differential considerations would include an infectious or neoplastic process.   A short-term followup chest CT is recommend the sigmoid rectal region with anastomotic clips, correlate clinically. No free air. ABDOMINAL WALL:  Stable including postsurgical changes with midline surgical clips along the anterior pelvic wall.   URINARY BLADDER:  No visible focal wall thickening, l for her bowel obstruction.     MDM   As above  Admission disposition: 12/29/2018  2:06 PM                 Disposition and Plan     Clinical Impression:  SBO (small bowel obstruction) ([facility] [address]  (primary encounter diagnosis)    Disposition:  Admit  12/29/2018  2

## 2018-12-29 NOTE — ED NOTES
Round on pt. Updated on eta to floor. Denies any needs at this time. Pt was sleeping on arrival to room.

## 2018-12-29 NOTE — ED NOTES
Per [facility] PCT, SPO2 dropped to 60-70% on RA. Pt was placed on 2L O2. SPO2 100% at this time.

## 2018-12-29 NOTE — ED NOTES
Report to Olga Lidia MARIEE. Pending transport for XR result and tube placement. XR at bedside at this time.

## 2018-12-29 NOTE — ED NOTES
Round on pt. Updated on room number and eta to floor. No distress noted. Pt denies any needs  Pt denies any pain at this time.

## 2018-12-29 NOTE — ED NOTES
Pt to ER with c/o R side/mid abd pain. Pt reports chronic diarrhea. Vomiting over night. Denies being dizzy. Pt sts she lives at home with family. Afebrile. Denies recent travel or being around anyone sick.

## 2018-12-29 NOTE — ED NOTES
Round on pt. Pt denies any pain. FAmily at bedside. Pt watching TV. Denies any needs at this time.  O2 removed from pt

## 2018-12-29 NOTE — ED INITIAL ASSESSMENT (HPI)
Pt here via ems after developing diffuse abdominal pain with increased nausea, appetite ok, last bm yesterday lose in nature. Attempted vomiting without success.

## 2018-12-29 NOTE — ED NOTES
Pt up to floor via cart with transporter. Pt denies any needs prior to transport. NG clamped and adapter placed in biohazard bag, transporter made aware. Pt sts daughter took her personal belongings home.

## 2018-12-30 VITALS
BODY MASS INDEX: 23.19 KG/M2 | DIASTOLIC BLOOD PRESSURE: 85 MMHG | SYSTOLIC BLOOD PRESSURE: 168 MMHG | RESPIRATION RATE: 18 BRPM | HEART RATE: 75 BPM | TEMPERATURE: 99 F | HEIGHT: 62 IN | WEIGHT: 126 LBS | OXYGEN SATURATION: 94 %

## 2018-12-30 LAB
ANION GAP SERPL CALC-SCNC: 5 MMOL/L (ref 0–18)
BASOPHILS # BLD AUTO: 0.01 X10(3) UL (ref 0–0.1)
BASOPHILS NFR BLD AUTO: 0.2 %
BUN BLD-MCNC: 8 MG/DL (ref 8–20)
BUN/CREAT SERPL: 10.7 (ref 10–20)
CALCIUM BLD-MCNC: 7.9 MG/DL (ref 8.3–10.3)
CHLORIDE SERPL-SCNC: 114 MMOL/L (ref 101–111)
CO2 SERPL-SCNC: 24 MMOL/L (ref 22–32)
CREAT BLD-MCNC: 0.75 MG/DL (ref 0.55–1.02)
EOSINOPHIL # BLD AUTO: 0.23 X10(3) UL (ref 0–0.3)
EOSINOPHIL NFR BLD AUTO: 5.1 %
ERYTHROCYTE [DISTWIDTH] IN BLOOD BY AUTOMATED COUNT: 13.2 % (ref 11.5–16)
GLUCOSE BLD-MCNC: 97 MG/DL (ref 70–99)
HCT VFR BLD AUTO: 37 % (ref 34–50)
HGB BLD-MCNC: 12.4 G/DL (ref 12–16)
IMMATURE GRANULOCYTE COUNT: 0.01 X10(3) UL (ref 0–1)
IMMATURE GRANULOCYTE RATIO %: 0.2 %
LYMPHOCYTES # BLD AUTO: 0.92 X10(3) UL (ref 0.9–4)
LYMPHOCYTES NFR BLD AUTO: 20.5 %
MCH RBC QN AUTO: 31.8 PG (ref 27–33.2)
MCHC RBC AUTO-ENTMCNC: 33.5 G/DL (ref 31–37)
MCV RBC AUTO: 94.9 FL (ref 81–100)
MONOCYTES # BLD AUTO: 0.39 X10(3) UL (ref 0.1–1)
MONOCYTES NFR BLD AUTO: 8.7 %
NEUTROPHIL ABS PRELIM: 2.93 X10 (3) UL (ref 1.3–6.7)
NEUTROPHILS # BLD AUTO: 2.93 X10(3) UL (ref 1.3–6.7)
NEUTROPHILS NFR BLD AUTO: 65.3 %
OSMOLALITY SERPL CALC.SUM OF ELEC: 294 MOSM/KG (ref 275–295)
PLATELET # BLD AUTO: 214 10(3)UL (ref 150–450)
POTASSIUM SERPL-SCNC: 3.5 MMOL/L (ref 3.6–5.1)
RBC # BLD AUTO: 3.9 X10(6)UL (ref 3.8–5.1)
RED CELL DISTRIBUTION WIDTH-SD: 45.2 FL (ref 35.1–46.3)
SODIUM SERPL-SCNC: 143 MMOL/L (ref 136–144)
WBC # BLD AUTO: 4.5 X10(3) UL (ref 4–13)

## 2018-12-30 PROCEDURE — 99239 HOSP IP/OBS DSCHRG MGMT >30: CPT | Performed by: HOSPITALIST

## 2018-12-30 PROCEDURE — 99232 SBSQ HOSP IP/OBS MODERATE 35: CPT | Performed by: SURGERY

## 2018-12-30 RX ORDER — POTASSIUM CHLORIDE 20 MEQ/1
40 TABLET, EXTENDED RELEASE ORAL EVERY 4 HOURS
Status: COMPLETED | OUTPATIENT
Start: 2018-12-30 | End: 2018-12-30

## 2018-12-30 RX ORDER — DILTIAZEM HYDROCHLORIDE 300 MG/1
300 CAPSULE, COATED, EXTENDED RELEASE ORAL DAILY
Status: DISCONTINUED | OUTPATIENT
Start: 2018-12-30 | End: 2018-12-30

## 2018-12-30 NOTE — PLAN OF CARE
Patient tolerated diet, no n/v/pain. VSS. She has been steady on her feet and walking in the halls. NURSING DISCHARGE NOTE    Discharged Home via Ambulatory. Accompanied by Support staff  Belongings Taken by patient/family.     Patient left shoes and

## 2018-12-30 NOTE — PLAN OF CARE
CARDIOVASCULAR - ADULT    • Maintains optimal cardiac output and hemodynamic stability Progressing          GASTROINTESTINAL - ADULT    • Minimal or absence of nausea and vomiting Progressing          PAIN - ADULT    • Verbalizes/displays adequate comfort

## 2018-12-30 NOTE — PROGRESS NOTES
[facility] HOSPITALIST  Progress Note     Wei Stringer Patient Status:  Inpatient    7/3/1934 MRN [id number]   [facility] 3NE-A Attending Moustapha Mcneil MD   Hosp Day # 1 PCP León Barahona MD     Chief Complaint: Vomiting    S: Patient did tolerates. 2. Pulmonary nodules:  Not present previously, will refer to her pulmonologist.  Discussed with patient, she has appointment in January. 3. HTN  4. HL  5.  Chronic COPD    Plan of care: As above    Quality:  · DVT Prophylaxis: Lovenox  · CODE s

## 2018-12-30 NOTE — PROGRESS NOTES
[facility]  Progress Note    Christie Pica Patient Status:  Inpatient    7/3/1934 MRN [id number]   [facility] 3NE-A Attending Chuck Lee MD   Hosp Day # 1 PCP Bob Cortez MD     Subjective:  Feels good. Multiple BMs.   No leonides colon resection, for diverticulitis, 2010, with colostomy     Chronic pain following surgery or procedure     Capsular contracture of breast implant     Iron deficiency anemia     Chronic obstructive pulmonary disease (HCC)     Paroxysmal atrial fibrillati

## 2018-12-30 NOTE — PLAN OF CARE
Pt c/o pain from NGT to right nare, states it is difficulty to talk or swallow. CXR confirmed appropriate placement of NGT. Dr. Deloris Hernadez and Dr. Ruddy Brooks notified of pt pain from NGT. Dr. Ruddy Brooks looked at CXR, chloraseptic spray ordered.  Pt informed that leaving

## 2018-12-31 ENCOUNTER — PATIENT OUTREACH (OUTPATIENT)
Dept: CASE MANAGEMENT | Age: 83
End: 2018-12-31

## 2018-12-31 DIAGNOSIS — Z02.9 ENCOUNTERS FOR ADMINISTRATIVE PURPOSE: ICD-10-CM

## 2018-12-31 NOTE — PROGRESS NOTES
Initial Post Discharge Follow Up   Discharge Date: 12/30/18  Contact Date: 12/31/2018    Consent Verification:  Assessment Completed With: Patient  HIPAA Verified?   Yes    Discharge Dx:   Partial small bowel obstruction  Hypertension  Hyperlipidemia  Chr 30 g Rfl: 1   Cholecalciferol (VITAMIN D3) 1000 units Oral Cap Take 1 tablet by mouth daily. Disp:  Rfl:    Vitamin B-12 1000 MCG Oral Tab Take 1,000 mcg by mouth daily. Disp:  Rfl:    Acidophilus/Pectin Oral Cap Take 1 capsule by mouth daily.  Disp:  Rfl: Cassy)    Jan 11, 2019  3:15 PM CST Exam - New Patient with Geo Purcell MD [id number] ([facility])    Jan 14, 2019 11:45 AM CST Physical Therapy Evaluation with Alan Hsu PT [facility] in days  no     NCM Reviewed/scheduled/rescheduled PCP TCM/HFU appointment with pt:  Patient declined to schedule at this time. Have you made all of your follow up appointments? no    Is there any reason as to why you cannot make your appointments?    No

## 2018-12-31 NOTE — DISCHARGE SUMMARY
[facility] [place] HOSPITALIST  DISCHARGE SUMMARY     Chantell Pabon Patient Status:  Inpatient    7/3/1934 MRN [id number]   [facility] 3NE-A Attending No att. providers found   Hosp Day # 1 PCP Elina Kovcas MD     Date of Admission: 2018  Da initially serious enough to expect a more lengthy hospitalization but patient improved faster than expected.                Procedures during hospitalization:   • None    Incidental or significant findings and recommendations (brief descriptions):  • none Tabs  Commonly known as:  ULTRAM      Take 1 tablet (50 mg total) by mouth nightly as needed for Pain. Quantity:  30 tablet  Refills:  0     Vitamin B-12 1000 MCG Tabs  Commonly known as:  VITAMIN B12      Take 1,000 mcg by mouth daily.    Refills:  0 °C)] 98.5 °F (36.9 °C)  Pulse:  [75] 75  Resp:  [18] 18  BP: (168)/(85) 168/85    Physical Exam:    General: No acute distress. Respiratory: Clear to auscultation bilaterally. No wheezes. No rhonchi. Cardiovascular: S1, S2. Regular rate and rhythm.  No m

## 2019-01-03 ENCOUNTER — OFFICE VISIT (OUTPATIENT)
Dept: PHYSICAL THERAPY | Age: 84
End: 2019-01-03
Attending: INTERNAL MEDICINE
Payer: MEDICARE

## 2019-01-03 ENCOUNTER — TELEPHONE (OUTPATIENT)
Dept: INTERNAL MEDICINE CLINIC | Facility: CLINIC | Age: 84
End: 2019-01-03

## 2019-01-03 DIAGNOSIS — M54.50 ACUTE LEFT-SIDED LOW BACK PAIN WITHOUT SCIATICA: ICD-10-CM

## 2019-01-03 PROCEDURE — 97162 PT EVAL MOD COMPLEX 30 MIN: CPT

## 2019-01-03 PROCEDURE — 97140 MANUAL THERAPY 1/> REGIONS: CPT

## 2019-01-03 RX ORDER — DILTIAZEM HYDROCHLORIDE 300 MG/1
300 CAPSULE, COATED, EXTENDED RELEASE ORAL DAILY
Qty: 90 CAPSULE | Refills: 1 | Status: SHIPPED | OUTPATIENT
Start: 2019-01-03 | End: 2019-07-21

## 2019-01-03 RX ORDER — DILTIAZEM HYDROCHLORIDE 300 MG/1
300 CAPSULE, COATED, EXTENDED RELEASE ORAL DAILY
Qty: 90 CAPSULE | Refills: 1 | Status: SHIPPED | OUTPATIENT
Start: 2019-01-03 | End: 2019-01-03

## 2019-01-03 NOTE — TELEPHONE ENCOUNTER
LFV: 12/5/18 with TB  Future Appt: none on file  Last Rx:09/25/2018 for 90 days  Last Labs:12/30/2018 cbc, bmp  Per protocol to provider

## 2019-01-03 NOTE — TELEPHONE ENCOUNTER
Pt called back stating she needs this rx filled if possible [facility]-she has not gotten it there yet-was using [facility] before

## 2019-01-03 NOTE — TELEPHONE ENCOUNTER
Re CARTIA  MG Oral Capsule SR. Alexandra Gray Pt was told by the pharmacy that the script can not be refilled because it is under [place] name, should be TB. Pt states she is still taking it.

## 2019-01-03 NOTE — PROGRESS NOTES
SPINE EVALUATION:   Referring Physician: Dr. Jose Herring  Diagnosis: chronic neck pain, chronic back pain pain     Date of Service: 1/3/2019     PATIENT SUMMARY   Jennifer Gagnon is a 80year old y/o female who presents to therapy today with complaints of radiating symptoms     cervical ROM:   Flexion: 25  Extension: 18   rotation: R 45; L 40 with with ipsilateral neck pain on the   sidebending: R 15; L 15 with right sided neck pain    Accessory motion: hypomobility at C6-7, C7-T1  Palpation: increased tens precautions, and treatment options and has agreed to actively participate in planning and for this course of care. Thank you for your referral. Please co-sign or sign and return this letter via fax as soon as possible to [phone].  If you have any qu

## 2019-01-04 ENCOUNTER — TELEPHONE (OUTPATIENT)
Dept: SURGERY | Facility: CLINIC | Age: 84
End: 2019-01-04

## 2019-01-04 NOTE — TELEPHONE ENCOUNTER
Medication: Gabapentin 300 Mg     Date of last refill: 11/27/18  60 Tabs     [place]     Last office visit: 6/26/18     Due back to clinic per last office note: 2 Months     Date next office visit scheduled: No Appoin

## 2019-01-07 ENCOUNTER — OFFICE VISIT (OUTPATIENT)
Dept: PHYSICAL THERAPY | Age: 84
End: 2019-01-07
Attending: INTERNAL MEDICINE
Payer: MEDICARE

## 2019-01-07 PROCEDURE — 97110 THERAPEUTIC EXERCISES: CPT

## 2019-01-07 PROCEDURE — 97140 MANUAL THERAPY 1/> REGIONS: CPT

## 2019-01-07 RX ORDER — GABAPENTIN 300 MG/1
CAPSULE ORAL
Qty: 90 CAPSULE | Refills: 1 | Status: SHIPPED | OUTPATIENT
Start: 2019-01-07 | End: 2019-01-10

## 2019-01-07 NOTE — TELEPHONE ENCOUNTER
Gabapentin refilled. Will have nursing advise patient that is feel is doing well, and not planning to follow neurosurgery at this time, further refills should be provided by PCP. PCP can continue to monitor her medication therapy.

## 2019-01-07 NOTE — PROGRESS NOTES
Dx: chronic neck and back pain         Authorized # of Visits:  8 recommended        Next MD visit: none scheduled  Fall Risk: standard         Precautions:            Subjective: she reports no pain when not moving her neck, but still hurts turning side t

## 2019-01-10 ENCOUNTER — OFFICE VISIT (OUTPATIENT)
Dept: PHYSICAL THERAPY | Age: 84
End: 2019-01-10
Attending: INTERNAL MEDICINE
Payer: MEDICARE

## 2019-01-10 ENCOUNTER — TELEPHONE (OUTPATIENT)
Dept: SURGERY | Facility: CLINIC | Age: 84
End: 2019-01-10

## 2019-01-10 PROCEDURE — 97110 THERAPEUTIC EXERCISES: CPT

## 2019-01-10 PROCEDURE — 97140 MANUAL THERAPY 1/> REGIONS: CPT

## 2019-01-10 RX ORDER — GABAPENTIN 100 MG/1
100 CAPSULE ORAL SEE ADMIN INSTRUCTIONS
Qty: 10 CAPSULE | Refills: 0 | Status: SHIPPED | OUTPATIENT
Start: 2019-01-10 | End: 2019-01-10 | Stop reason: CLARIF

## 2019-01-10 RX ORDER — GABAPENTIN 100 MG/1
100 CAPSULE ORAL SEE ADMIN INSTRUCTIONS
Qty: 3 CAPSULE | Refills: 0 | Status: SHIPPED | OUTPATIENT
Start: 2019-01-10 | End: 2019-03-28

## 2019-01-10 NOTE — TELEPHONE ENCOUNTER
Spoke with patient and notified her of message below. Patient verbalized understanding. Will get further refills from PCP. Nothing further needed.

## 2019-01-10 NOTE — TELEPHONE ENCOUNTER
Patient is doing well with no pain at this time. Patient states she completed last pill of Gabapentin and doesn't want to refill. Patient was taking 300 mg daily. Please advise if patient okay to d/c or if patient needs to taper off.

## 2019-01-10 NOTE — PATIENT INSTRUCTIONS
Refill policies:    • Allow 2-3 business days for refills; controlled substances may take longer.   • Contact your pharmacy at least 5 days prior to running out of medication and have them send an electronic request or submit request through the “request re entire amount billed. Precertification and Prior Authorizations: If your physician has recommended that you have a procedure or additional testing performed.   [facility]) will contact your insurance carrier to obtain pre-certi

## 2019-01-10 NOTE — PROGRESS NOTES
Dx: chronic neck and back pain         Authorized # of Visits:  8 recommended        Next MD visit: none scheduled  Fall Risk: standard         Precautions:            Subjective: she reports the pain is slightly better, but feels like she can turn more no

## 2019-01-10 NOTE — TELEPHONE ENCOUNTER
Called patient and LM to call back. Need to notify patient that is recommended to taper off Gabapentin to avoid withdrawal like symptoms. Taper rx faxed to pharmacy.

## 2019-01-10 NOTE — TELEPHONE ENCOUNTER
Patient should taper Gabapentin:    Will discuss with nursing. Will have nursing send fax to pharmacy and discuss with patient.     Taper for Gabapentin:    Day 1: Take 100 mg in the morning and 100 mg at night  Day 2: Take 100 mg in the morning  Day 3: Dis

## 2019-01-10 NOTE — PROGRESS NOTES
Neurology H&P    Milagro Khan Patient Status:  No patient class for patient encounter    7/3/1934 MRN [id number]   Location ED [facility], [address], [address] Attending No att. providers found   [facility] Day # 0 PCP MD Bull Warren (ANORO ELLIPTA) 62.5-25 MCG/INH Inhalation Aerosol Powder, Breath Activated Inhale 1 puff into the lungs daily. Disp:  Rfl:    Donepezil HCl 10 MG Oral Tab Take 10 mg by mouth nightly.  Disp:  Rfl:    ketoconazole 2 % External Cream Apply to AA's of foot BI compression fracture of thoracic vertebra (HCC)     Leg pain, diffuse     Suture granuloma     Arthropathy of thoracic facet joint     Osteoporosis     Acute kidney injury ([facility] [address]     Hyperglycemia     Abdominal pain, acute     Chest pain of uncertain etiolo OTHER SURGICAL HISTORY  2007    Removed cholostomy   • OTHER SURGICAL HISTORY  2010    intestinal   • OTHER SURGICAL HISTORY  1980    removal of mass near thyroid   • OTHER SURGICAL HISTORY  12/14/12    cysto, Dr. Rachel Elam   • OTHER SURGICAL HISTORY  2015 urinating   Heme: no new bruising, no unexplained fevers or chills  Endocrine: no unexplained weight loss or gain, no new fatigue  Musculoskeletal: denies back pain, denies joint pain  Psych: denies depression   Skin: denies any new masses, rashes, lumps o (PYRIDOXAL 5-PHOSPH      20.0 - 125.0 nmol/L  52.9           Imaging:  [facility] 9/17  =====  CONCLUSION:  No acute intracranial abnormality identified. Stable chronic ischemic changes as above.  If there is clinical concern for acute ischemia/infarction, an MRI

## 2019-01-11 NOTE — TELEPHONE ENCOUNTER
Spoke with patient and notified her of Kiya Justice's recommendations. Patient agrees with plan. She also confirmed she was previously taking (1) 300 mg tablet of Gabapentin per day. Patient will  rx today.  Pharmacy confirmed receipt of Rx of

## 2019-01-14 DIAGNOSIS — G31.84 MILD COGNITIVE IMPAIRMENT: ICD-10-CM

## 2019-01-14 RX ORDER — DONEPEZIL HYDROCHLORIDE 10 MG/1
TABLET, FILM COATED ORAL
Qty: 75 TABLET | Refills: 0 | OUTPATIENT
Start: 2019-01-14

## 2019-01-14 NOTE — TELEPHONE ENCOUNTER
Spoke with the pharmacist who states that they have the prescription from 01/10/19 on file and will fill. Pharmacist states to disregard.        Medication: DONEPEZIL HCL 10 MG Oral Tab    Date of last refill: 01/10/19 (#90/1)  [place]

## 2019-01-17 ENCOUNTER — OFFICE VISIT (OUTPATIENT)
Dept: PHYSICAL THERAPY | Age: 84
End: 2019-01-17
Attending: INTERNAL MEDICINE
Payer: MEDICARE

## 2019-01-17 PROCEDURE — 97140 MANUAL THERAPY 1/> REGIONS: CPT

## 2019-01-17 PROCEDURE — 97110 THERAPEUTIC EXERCISES: CPT

## 2019-01-17 NOTE — PROGRESS NOTES
Dx: chronic neck and back pain         Authorized # of Visits:  8 recommended        Next MD visit: none scheduled  Fall Risk: standard         Precautions:            Subjective: she reports she feels like she is moving better, but still pain at end range Charges: there ex 1, mt 2       Total Timed Treatment: 40 min  Total Treatment Time: 50 min

## 2019-01-24 ENCOUNTER — OFFICE VISIT (OUTPATIENT)
Dept: PHYSICAL THERAPY | Age: 84
End: 2019-01-24
Attending: INTERNAL MEDICINE
Payer: MEDICARE

## 2019-01-24 PROCEDURE — 97140 MANUAL THERAPY 1/> REGIONS: CPT

## 2019-01-24 PROCEDURE — 97110 THERAPEUTIC EXERCISES: CPT

## 2019-01-24 NOTE — PROGRESS NOTES
Dx: chronic neck and back pain         Authorized # of Visits:  8 recommended        Next MD visit: none scheduled  Fall Risk: standard         Precautions:            Subjective: she reports the pain has been feeling better, still some when turning all th bilaterally  Manual cerv distraction with rotation x10 bouts bilaterally       Thor ext over chair with towel roll upright 3x10 sec hold  STM to bilaterally paraspinals and upper trap 15 min   STM to upper trap and bilateral paraspinals 15 min  STM to uppe

## 2019-01-28 ENCOUNTER — APPOINTMENT (OUTPATIENT)
Dept: PHYSICAL THERAPY | Age: 84
End: 2019-01-28
Attending: INTERNAL MEDICINE
Payer: MEDICARE

## 2019-01-31 ENCOUNTER — APPOINTMENT (OUTPATIENT)
Dept: PHYSICAL THERAPY | Age: 84
End: 2019-01-31
Attending: INTERNAL MEDICINE
Payer: MEDICARE

## 2019-01-31 RX ORDER — ALENDRONATE SODIUM 70 MG/1
TABLET ORAL
Qty: 12 TABLET | Refills: 1 | Status: SHIPPED | OUTPATIENT
Start: 2019-01-31 | End: 2019-04-16

## 2019-01-31 NOTE — TELEPHONE ENCOUNTER
Last Office Visit: 12-5-18 with TB for low back pain  Last Rx Filled: historical   Last Labs: 12-30-18 cbc/bmp  Future Appointment: none    Per protocol to provider    Dexa: 3-10-18

## 2019-02-07 ENCOUNTER — OFFICE VISIT (OUTPATIENT)
Dept: PHYSICAL THERAPY | Age: 84
End: 2019-02-07
Attending: INTERNAL MEDICINE
Payer: MEDICARE

## 2019-02-07 PROCEDURE — 97140 MANUAL THERAPY 1/> REGIONS: CPT

## 2019-02-07 PROCEDURE — 97110 THERAPEUTIC EXERCISES: CPT

## 2019-02-07 NOTE — PROGRESS NOTES
Dx: chronic neck and back pain         Authorized # of Visits:  8 recommended        Next MD visit: none scheduled  Fall Risk: standard         Precautions:            Subjective: she reports the pain has been feeling better, still some when turning all th rotation ROM x5 bouts bilaterally      Thor ext over chair with towel roll upright 3x10 sec hold  STM to bilaterally paraspinals and upper trap 15 min   STM to upper trap and bilateral paraspinals 15 min  STM to upper trap and paraspinals near lower C spin

## 2019-02-14 ENCOUNTER — APPOINTMENT (OUTPATIENT)
Dept: PHYSICAL THERAPY | Age: 84
End: 2019-02-14
Attending: INTERNAL MEDICINE
Payer: MEDICARE

## 2019-02-14 PROCEDURE — 97110 THERAPEUTIC EXERCISES: CPT

## 2019-02-14 PROCEDURE — 97140 MANUAL THERAPY 1/> REGIONS: CPT

## 2019-02-14 NOTE — PROGRESS NOTES
Dx: chronic neck and back pain         Authorized # of Visits:  8 recommended        Next MD visit: none scheduled  Fall Risk: standard         Precautions:            Subjective: she reports the pain has been feeling better overall.  But turning all the wa bout bilaterally  --- Upslope C6-7  Upslope C5-6  Gr 3-4, 3x10 bouts bilaterally  Manual cerv distraction with rotation x10 bouts bilaterally  Distraction with rotation ROM x5 bouts bilaterally  x10 bouts bilaterally     Thor ext over chair with towel roll

## 2019-02-21 ENCOUNTER — OFFICE VISIT (OUTPATIENT)
Dept: PHYSICAL THERAPY | Age: 84
End: 2019-02-21
Attending: INTERNAL MEDICINE
Payer: MEDICARE

## 2019-02-21 ENCOUNTER — TELEPHONE (OUTPATIENT)
Dept: INTERNAL MEDICINE CLINIC | Facility: CLINIC | Age: 84
End: 2019-02-21

## 2019-02-21 DIAGNOSIS — Z00.00 ROUTINE GENERAL MEDICAL EXAMINATION AT A HEALTH CARE FACILITY: Primary | ICD-10-CM

## 2019-02-21 DIAGNOSIS — E78.00 PURE HYPERCHOLESTEROLEMIA: ICD-10-CM

## 2019-02-21 DIAGNOSIS — I48.0 PAROXYSMAL ATRIAL FIBRILLATION (HCC): ICD-10-CM

## 2019-02-21 DIAGNOSIS — I10 BENIGN ESSENTIAL HTN: ICD-10-CM

## 2019-02-21 PROCEDURE — 97140 MANUAL THERAPY 1/> REGIONS: CPT

## 2019-02-21 PROCEDURE — 97110 THERAPEUTIC EXERCISES: CPT

## 2019-02-21 NOTE — PROGRESS NOTES
Dx: chronic neck and back pain         Authorized # of Visits:  8 recommended        Next MD visit: none scheduled  Fall Risk: standard         Precautions:       Discharge Summary    Pt has attended 8, cancelled 2, and no shown 0 visits in Physical Therap Dept: [phone]. Sincerely,  Electronically signed by therapist: Tang Putnam, PT  [de-identified] certification required: Yes  I certify the need for these services furnished under this plan of treatment and while under my care.     X_________________ Manual upslope gr 3-4, 3x10 bouts C5-6, C6-7, CT17-   Gr 3-4, 2x10 bouts C5-6, C6-7, C7-T1    sideglide Gr 3 C7-T1 2x10 bout bilaterally  --- Upslope C6-7  Upslope C5-6  Gr 3-4, 3x10 bouts bilaterally  Manual cerv distraction with rotation x10 bouts bilat

## 2019-02-21 NOTE — TELEPHONE ENCOUNTER
Medicare Wellness  Future Appointments   Date Time Provider [address]   3/21/2019  1:15 PM Gaby Cuenca MD [facility]     Orders to Vivian Price aware must fast no call back required

## 2019-02-28 VITALS
DIASTOLIC BLOOD PRESSURE: 60 MMHG | WEIGHT: 114 LBS | BODY MASS INDEX: 19.46 KG/M2 | SYSTOLIC BLOOD PRESSURE: 118 MMHG | HEIGHT: 64 IN | HEART RATE: 76 BPM

## 2019-02-28 VITALS
BODY MASS INDEX: 18.78 KG/M2 | DIASTOLIC BLOOD PRESSURE: 50 MMHG | WEIGHT: 110 LBS | HEART RATE: 72 BPM | SYSTOLIC BLOOD PRESSURE: 122 MMHG | HEIGHT: 64 IN

## 2019-03-05 ENCOUNTER — LAB ENCOUNTER (OUTPATIENT)
Dept: LAB | Age: 84
End: 2019-03-05
Attending: INTERNAL MEDICINE
Payer: MEDICARE

## 2019-03-05 DIAGNOSIS — E78.00 PURE HYPERCHOLESTEROLEMIA: ICD-10-CM

## 2019-03-05 DIAGNOSIS — I48.0 PAROXYSMAL ATRIAL FIBRILLATION (HCC): ICD-10-CM

## 2019-03-05 DIAGNOSIS — Z00.00 ROUTINE GENERAL MEDICAL EXAMINATION AT A HEALTH CARE FACILITY: ICD-10-CM

## 2019-03-05 DIAGNOSIS — I10 BENIGN ESSENTIAL HTN: ICD-10-CM

## 2019-03-05 LAB
ALBUMIN SERPL-MCNC: 4.3 G/DL (ref 3.4–5)
ALBUMIN/GLOB SERPL: 1.4 {RATIO} (ref 1–2)
ALP LIVER SERPL-CCNC: 96 U/L (ref 55–142)
ALT SERPL-CCNC: 27 U/L (ref 13–56)
ANION GAP SERPL CALC-SCNC: 8 MMOL/L (ref 0–18)
AST SERPL-CCNC: 25 U/L (ref 15–37)
BASOPHILS # BLD AUTO: 0.04 X10(3) UL (ref 0–0.2)
BASOPHILS NFR BLD AUTO: 0.7 %
BILIRUB SERPL-MCNC: 0.8 MG/DL (ref 0.1–2)
BUN BLD-MCNC: 21 MG/DL (ref 7–18)
BUN/CREAT SERPL: 23.1 (ref 10–20)
CALCIUM BLD-MCNC: 9.8 MG/DL (ref 8.5–10.1)
CHLORIDE SERPL-SCNC: 108 MMOL/L (ref 98–107)
CHOLEST SMN-MCNC: 173 MG/DL (ref ?–200)
CO2 SERPL-SCNC: 26 MMOL/L (ref 21–32)
CREAT BLD-MCNC: 0.91 MG/DL (ref 0.55–1.02)
DEPRECATED RDW RBC AUTO: 47.7 FL (ref 35.1–46.3)
EOSINOPHIL # BLD AUTO: 0.06 X10(3) UL (ref 0–0.7)
EOSINOPHIL NFR BLD AUTO: 1.1 %
ERYTHROCYTE [DISTWIDTH] IN BLOOD BY AUTOMATED COUNT: 13.6 % (ref 11–15)
GLOBULIN PLAS-MCNC: 3.1 G/DL (ref 2.8–4.4)
GLUCOSE BLD-MCNC: 89 MG/DL (ref 70–99)
HCT VFR BLD AUTO: 44.7 % (ref 35–48)
HDLC SERPL-MCNC: 103 MG/DL (ref 40–59)
HGB BLD-MCNC: 14.9 G/DL (ref 12–16)
IMM GRANULOCYTES # BLD AUTO: 0.01 X10(3) UL (ref 0–1)
IMM GRANULOCYTES NFR BLD: 0.2 %
LDLC SERPL CALC-MCNC: 54 MG/DL (ref ?–100)
LYMPHOCYTES # BLD AUTO: 0.64 X10(3) UL (ref 1–4)
LYMPHOCYTES NFR BLD AUTO: 11.5 %
M PROTEIN MFR SERPL ELPH: 7.4 G/DL (ref 6.4–8.2)
MCH RBC QN AUTO: 31.9 PG (ref 26–34)
MCHC RBC AUTO-ENTMCNC: 33.3 G/DL (ref 31–37)
MCV RBC AUTO: 95.7 FL (ref 80–100)
MONOCYTES # BLD AUTO: 0.38 X10(3) UL (ref 0.1–1)
MONOCYTES NFR BLD AUTO: 6.8 %
NEUTROPHILS # BLD AUTO: 4.45 X10 (3) UL (ref 1.5–7.7)
NEUTROPHILS # BLD AUTO: 4.45 X10(3) UL (ref 1.5–7.7)
NEUTROPHILS NFR BLD AUTO: 79.7 %
NONHDLC SERPL-MCNC: 70 MG/DL (ref ?–130)
OSMOLALITY SERPL CALC.SUM OF ELEC: 296 MOSM/KG (ref 275–295)
PLATELET # BLD AUTO: 227 10(3)UL (ref 150–450)
POTASSIUM SERPL-SCNC: 4.1 MMOL/L (ref 3.5–5.1)
RBC # BLD AUTO: 4.67 X10(6)UL (ref 3.8–5.3)
SODIUM SERPL-SCNC: 142 MMOL/L (ref 136–145)
TRIGL SERPL-MCNC: 81 MG/DL (ref 30–149)
TSI SER-ACNC: 0.39 MIU/ML (ref 0.36–3.74)
VLDLC SERPL CALC-MCNC: 16 MG/DL (ref 0–30)
WBC # BLD AUTO: 5.6 X10(3) UL (ref 4–11)

## 2019-03-05 PROCEDURE — 80061 LIPID PANEL: CPT

## 2019-03-05 PROCEDURE — 80053 COMPREHEN METABOLIC PANEL: CPT

## 2019-03-05 PROCEDURE — 84443 ASSAY THYROID STIM HORMONE: CPT

## 2019-03-05 PROCEDURE — 85025 COMPLETE CBC W/AUTO DIFF WBC: CPT

## 2019-03-21 RX ORDER — SIMVASTATIN 10 MG
TABLET ORAL
Qty: 90 TABLET | Refills: 0 | Status: SHIPPED | OUTPATIENT
Start: 2019-03-21 | End: 2019-04-16

## 2019-03-25 NOTE — PROGRESS NOTES
Order for TLSO brace was faxed to [address] [place] [address].   [address] [place] [address]. [address]  Ph: [phone]  Fax: [phone] Prior Auth Team - Dr. Collado was aware at time of order that this would likely require PA    Please assist with PA for James Padilla, Registered Nurse   [facility]

## 2019-03-28 ENCOUNTER — OFFICE VISIT (OUTPATIENT)
Dept: INTERNAL MEDICINE CLINIC | Facility: CLINIC | Age: 84
End: 2019-03-28
Payer: MEDICARE

## 2019-03-28 VITALS
RESPIRATION RATE: 18 BRPM | BODY MASS INDEX: 21.83 KG/M2 | WEIGHT: 123.19 LBS | DIASTOLIC BLOOD PRESSURE: 68 MMHG | HEART RATE: 72 BPM | SYSTOLIC BLOOD PRESSURE: 142 MMHG | HEIGHT: 63 IN

## 2019-03-28 DIAGNOSIS — G31.84 MILD COGNITIVE IMPAIRMENT: ICD-10-CM

## 2019-03-28 DIAGNOSIS — I10 BENIGN ESSENTIAL HTN: ICD-10-CM

## 2019-03-28 DIAGNOSIS — M81.0 AGE-RELATED OSTEOPOROSIS WITHOUT CURRENT PATHOLOGICAL FRACTURE: ICD-10-CM

## 2019-03-28 DIAGNOSIS — Z00.00 ENCOUNTER FOR ANNUAL HEALTH EXAMINATION: Primary | ICD-10-CM

## 2019-03-28 DIAGNOSIS — G89.28 CHRONIC PAIN FOLLOWING SURGERY OR PROCEDURE: ICD-10-CM

## 2019-03-28 DIAGNOSIS — S22.000D CLOSED COMPRESSION FRACTURE OF THORACIC VERTEBRA WITH ROUTINE HEALING, SUBSEQUENT ENCOUNTER: ICD-10-CM

## 2019-03-28 DIAGNOSIS — Z87.440 HISTORY OF RECURRENT UTI (URINARY TRACT INFECTION): ICD-10-CM

## 2019-03-28 DIAGNOSIS — I48.0 PAROXYSMAL ATRIAL FIBRILLATION (HCC): ICD-10-CM

## 2019-03-28 DIAGNOSIS — D50.8 OTHER IRON DEFICIENCY ANEMIA: ICD-10-CM

## 2019-03-28 DIAGNOSIS — R73.9 HYPERGLYCEMIA: ICD-10-CM

## 2019-03-28 DIAGNOSIS — R26.81 UNSTEADY GAIT: ICD-10-CM

## 2019-03-28 DIAGNOSIS — J44.9 CHRONIC OBSTRUCTIVE PULMONARY DISEASE, UNSPECIFIED COPD TYPE (HCC): ICD-10-CM

## 2019-03-28 DIAGNOSIS — E78.00 PURE HYPERCHOLESTEROLEMIA: ICD-10-CM

## 2019-03-28 DIAGNOSIS — M47.892 OTHER OSTEOARTHRITIS OF SPINE, CERVICAL REGION: ICD-10-CM

## 2019-03-28 DIAGNOSIS — E78.5 DYSLIPIDEMIA: ICD-10-CM

## 2019-03-28 DIAGNOSIS — M54.2 CERVICALGIA: ICD-10-CM

## 2019-03-28 PROBLEM — M79.606 LEG PAIN, DIFFUSE: Status: RESOLVED | Noted: 2017-07-15 | Resolved: 2019-03-28

## 2019-03-28 PROBLEM — R25.1 TREMOR OF BOTH HANDS: Status: RESOLVED | Noted: 2017-03-20 | Resolved: 2019-03-28

## 2019-03-28 PROBLEM — T81.89XA SUTURE GRANULOMA: Status: RESOLVED | Noted: 2017-12-19 | Resolved: 2019-03-28

## 2019-03-28 PROBLEM — K56.600 PARTIAL SMALL BOWEL OBSTRUCTION (HCC): Status: RESOLVED | Noted: 2018-12-18 | Resolved: 2019-03-28

## 2019-03-28 PROBLEM — R07.9 CHEST PAIN OF UNCERTAIN ETIOLOGY: Status: RESOLVED | Noted: 2018-09-22 | Resolved: 2019-03-28

## 2019-03-28 PROBLEM — N17.9 ACUTE KIDNEY INJURY (HCC): Status: RESOLVED | Noted: 2018-07-19 | Resolved: 2019-03-28

## 2019-03-28 PROBLEM — R41.3 MEMORY DEFICIT: Status: RESOLVED | Noted: 2017-03-20 | Resolved: 2019-03-28

## 2019-03-28 PROBLEM — R10.9 ABDOMINAL PAIN, ACUTE: Status: RESOLVED | Noted: 2018-07-19 | Resolved: 2019-03-28

## 2019-03-28 PROCEDURE — G0439 PPPS, SUBSEQ VISIT: HCPCS | Performed by: NURSE PRACTITIONER

## 2019-03-28 PROCEDURE — 99214 OFFICE O/P EST MOD 30 MIN: CPT | Performed by: NURSE PRACTITIONER

## 2019-03-28 NOTE — PATIENT INSTRUCTIONS
[address] SCHEDULE   Tests on this list are recommended by your physician but may not be covered, or covered at this frequency, by your insurer. Please check with your insurance carrier before scheduling to verify coverage.    NORBERTO a complete set of results.  Limited to patients who meet one of the following criteria:   • Men who are 73-68 years old and have smoked more than 100 cigarettes in their lifetime   • Anyone with a family history    Colorectal Cancer Screening  Covered up to for: CHLAMYDIA No flowsheet data found. Screening Mammogram      Mammogram    Recommend Annually to at least age 76, and as needed after 76 There are no preventive care reminders to display for this patient.  Please get this Mammogram regularly   Thalia Evans 09/26/17   • TETANUS AND DIPHTHERIA, PRESERVE FREE     *Note: Due to a large number of results and/or encounters for the requested time period, some results have not been displayed. A complete set of results can be found in Results Review.     This may be c

## 2019-03-28 NOTE — PROGRESS NOTES
HPI:   Linda Carrera is a 80year old female who presents for a Medicare Subsequent Annual Wellness visit (Pt already had Initial Annual Wellness). Paroxysmal atrial fibrillation (HCC)  Stable   Diltiazem.     Benign essential HTN  High initially toda She does NOT have a Living Will on file in [address] [place] [address] Rd. The patient has this document but we do not have it in [facility], and patient is instructed to get our office a copy of it for scanning into Epic.        She does NOT have a Power of  for [employer] o obstructive pulmonary disease (HCC)     Paroxysmal atrial fibrillation (HCC)     Benign essential HTN     Dyslipidemia     Mild cognitive impairment     Closed compression fracture of thoracic vertebra (HCC)     Arthropathy of thoracic facet joint     Oste Oral Tab EC Take 325 mg by mouth daily with breakfast.   aspirin 81 MG Oral Tab Take 81 mg by mouth daily. CRANBERRY OR Take 1 tablet by mouth daily. CALCIUM + D OR Take 1 tablet by mouth daily. MULTIVITAMIN OR Take 1 tablet by mouth daily.       ME alzheimer's disease in her sister; stroke syndrome in an other family member; suicide completion in her father. SOCIAL HISTORY:   She  reports that she quit smoking about 50 years ago. She has a 7.50 pack-year smoking history.  she has never used smokeles trachea midline, no adenopathy;  thyroid: not enlarged, no JVD   Back:   Symmetric, no curvature, ROM normal, no CVA tenderness   Lungs:   Clear to auscultation bilaterally, respirations unlabored   Heart:  Regular rate and rhythm, S1 and S2 normal,    Abd Other iron deficiency anemia  Oral iron  hgb 14.9    Age-related osteoporosis without current pathological fracture  Stable  Alendronate weekly    Closed compression fracture of thoracic vertebra with routine healing, subsequent encounter  stable    Al Fecal Occult Blood Annually Occult Blood Result (no units)   Date Value   06/11/2018 Negative for Occult Blood    No flowsheet data found.     Glaucoma Screening      Ophthalmology Visit Annually: Diabetics, FHx Glaucoma, AA>50, > 65  Up to date  No SPECIFIC DISEASE MONITORING Internal Lab or Procedure External Lab or Procedure            COPD      Spirometry Testing Annually Spirometry date:  No flowsheet data found.             Template: [facility] MEDICARE ANNUAL ASSESSMENT FEMALE [16663]

## 2019-04-01 ENCOUNTER — OFFICE VISIT (OUTPATIENT)
Dept: INTERNAL MEDICINE CLINIC | Facility: CLINIC | Age: 84
End: 2019-04-01
Payer: MEDICARE

## 2019-04-01 VITALS
HEART RATE: 72 BPM | BODY MASS INDEX: 22 KG/M2 | HEIGHT: 63 IN | OXYGEN SATURATION: 98 % | TEMPERATURE: 98 F | RESPIRATION RATE: 18 BRPM | SYSTOLIC BLOOD PRESSURE: 132 MMHG | DIASTOLIC BLOOD PRESSURE: 58 MMHG

## 2019-04-01 DIAGNOSIS — J06.9 URI, ACUTE: Primary | ICD-10-CM

## 2019-04-01 PROCEDURE — 99213 OFFICE O/P EST LOW 20 MIN: CPT | Performed by: INTERNAL MEDICINE

## 2019-04-01 RX ORDER — CEPHALEXIN 500 MG/1
500 CAPSULE ORAL 2 TIMES DAILY
Qty: 20 CAPSULE | Refills: 0 | Status: SHIPPED | OUTPATIENT
Start: 2019-04-01 | End: 2019-04-16

## 2019-04-01 NOTE — PROGRESS NOTES
Neeta Garcia is a 80year old female. Patient presents with:  Cough: Pt is having cough w/ phlegm and chest congestion going on for 3 days. LB-rm 4      HPI:     Patient c/o congestion, loss of voice and productive cough.  Symptoms since last week, wor Cholecalciferol (VITAMIN D3) 1000 units Oral Cap Take 1 tablet by mouth daily. Disp:  Rfl:    Vitamin B-12 1000 MCG Oral Tab Take 1,000 mcg by mouth daily. Disp:  Rfl:    Acidophilus/Pectin Oral Cap Take 1 capsule by mouth daily.  Disp:  Rfl:    ferrous s disease) Sister         x2   • Other (stroke syndrome) Other         family hx        Allergies  No Known Allergies      REVIEW OF SYSTEMS:   GENERAL HEALTH:  no fevers or chills  SKIN: denies any unusual skin lesions or rashes  RESPIRATORY: + cough  CARDI

## 2019-04-11 RX ORDER — DILTIAZEM HYDROCHLORIDE 300 MG/1
300 CAPSULE, COATED, EXTENDED RELEASE ORAL DAILY
Qty: 90 CAPSULE | Refills: 0 | OUTPATIENT
Start: 2019-04-11

## 2019-04-16 ENCOUNTER — HOSPITAL ENCOUNTER (INPATIENT)
Facility: HOSPITAL | Age: 84
LOS: 1 days | Discharge: HOME | DRG: 390 | End: 2019-04-17
Attending: EMERGENCY MEDICINE | Admitting: HOSPITALIST
Payer: MEDICARE

## 2019-04-16 ENCOUNTER — APPOINTMENT (OUTPATIENT)
Dept: CT IMAGING | Facility: HOSPITAL | Age: 84
DRG: 390 | End: 2019-04-16
Attending: EMERGENCY MEDICINE
Payer: MEDICARE

## 2019-04-16 DIAGNOSIS — K56.609 SBO (SMALL BOWEL OBSTRUCTION) (HCC): Primary | ICD-10-CM

## 2019-04-16 DIAGNOSIS — R74.01 TRANSAMINITIS: ICD-10-CM

## 2019-04-16 DIAGNOSIS — R74.01 TRANSAMINASEMIA: ICD-10-CM

## 2019-04-16 PROCEDURE — 99223 1ST HOSP IP/OBS HIGH 75: CPT | Performed by: HOSPITALIST

## 2019-04-16 PROCEDURE — 99223 1ST HOSP IP/OBS HIGH 75: CPT | Performed by: SURGERY

## 2019-04-16 PROCEDURE — 74177 CT ABD & PELVIS W/CONTRAST: CPT | Performed by: EMERGENCY MEDICINE

## 2019-04-16 RX ORDER — DEXTROSE AND SODIUM CHLORIDE 5; .45 G/100ML; G/100ML
INJECTION, SOLUTION INTRAVENOUS CONTINUOUS
Status: DISCONTINUED | OUTPATIENT
Start: 2019-04-16 | End: 2019-04-17

## 2019-04-16 RX ORDER — MORPHINE SULFATE 4 MG/ML
4 INJECTION, SOLUTION INTRAMUSCULAR; INTRAVENOUS EVERY 2 HOUR PRN
Status: DISCONTINUED | OUTPATIENT
Start: 2019-04-16 | End: 2019-04-17

## 2019-04-16 RX ORDER — ALENDRONATE SODIUM 70 MG/1
70 TABLET ORAL WEEKLY
COMMUNITY
End: 2019-08-08 | Stop reason: ALTCHOICE

## 2019-04-16 RX ORDER — HYDRALAZINE HYDROCHLORIDE 20 MG/ML
10 INJECTION INTRAMUSCULAR; INTRAVENOUS EVERY 6 HOURS PRN
Status: DISCONTINUED | OUTPATIENT
Start: 2019-04-16 | End: 2019-04-17

## 2019-04-16 RX ORDER — CETIRIZINE HYDROCHLORIDE 10 MG/1
10 TABLET ORAL DAILY
COMMUNITY
End: 2019-10-16

## 2019-04-16 RX ORDER — METOPROLOL TARTRATE 5 MG/5ML
5 INJECTION INTRAVENOUS EVERY 6 HOURS
Status: DISCONTINUED | OUTPATIENT
Start: 2019-04-16 | End: 2019-04-17

## 2019-04-16 RX ORDER — ONDANSETRON 2 MG/ML
4 INJECTION INTRAMUSCULAR; INTRAVENOUS ONCE
Status: COMPLETED | OUTPATIENT
Start: 2019-04-16 | End: 2019-04-16

## 2019-04-16 RX ORDER — ENOXAPARIN SODIUM 100 MG/ML
40 INJECTION SUBCUTANEOUS DAILY
Status: DISCONTINUED | OUTPATIENT
Start: 2019-04-16 | End: 2019-04-17

## 2019-04-16 RX ORDER — FLUTICASONE PROPIONATE 50 MCG
1 SPRAY, SUSPENSION (ML) NASAL DAILY PRN
COMMUNITY
End: 2019-10-17

## 2019-04-16 RX ORDER — SIMVASTATIN 10 MG
10 TABLET ORAL DAILY
COMMUNITY
End: 2019-08-30 | Stop reason: ALTCHOICE

## 2019-04-16 RX ORDER — MORPHINE SULFATE 4 MG/ML
2 INJECTION, SOLUTION INTRAMUSCULAR; INTRAVENOUS EVERY 2 HOUR PRN
Status: DISCONTINUED | OUTPATIENT
Start: 2019-04-16 | End: 2019-04-17

## 2019-04-16 RX ORDER — MORPHINE SULFATE 4 MG/ML
1 INJECTION, SOLUTION INTRAMUSCULAR; INTRAVENOUS EVERY 2 HOUR PRN
Status: DISCONTINUED | OUTPATIENT
Start: 2019-04-16 | End: 2019-04-17

## 2019-04-16 RX ORDER — SODIUM CHLORIDE 9 MG/ML
1000 INJECTION, SOLUTION INTRAVENOUS CONTINUOUS
Status: DISCONTINUED | OUTPATIENT
Start: 2019-04-16 | End: 2019-04-16

## 2019-04-16 RX ORDER — MORPHINE SULFATE 4 MG/ML
4 INJECTION, SOLUTION INTRAMUSCULAR; INTRAVENOUS EVERY 30 MIN PRN
Status: DISCONTINUED | OUTPATIENT
Start: 2019-04-16 | End: 2019-04-16

## 2019-04-16 RX ORDER — ONDANSETRON 2 MG/ML
4 INJECTION INTRAMUSCULAR; INTRAVENOUS EVERY 6 HOURS PRN
Status: DISCONTINUED | OUTPATIENT
Start: 2019-04-16 | End: 2019-04-17

## 2019-04-16 NOTE — H&P
[facility] HOSPITALIST  History and Physical     [facility] Patient Status:  Emergency    7/3/1934 MRN [id number]   Location [address] [facility] Attending Alvaro Yuen MD   Hosp Day # 0 PCP Bob Cortez MD     Chief Complaint MD at [facility] MAIN OR   • LYSIS OF ADHESIONS  08/20/2017    expl lap   • PAULINO NEEDLE LOCALIZATION W/ SPECIMEN 1 SITE RIGHT Right 2006    benign.    • OTHER Bilateral     bilateral breast implants   • OTHER  07/11/2017    Kyphoplasty, Dr. Yamileth Gillespie   • [address] [place] [address] current facility-administered medications on file prior to encounter. Current Outpatient Medications on File Prior to Encounter:  cephALEXin 500 MG Oral Cap Take 1 capsule (500 mg total) by mouth 2 (two) times daily.  Disp: 20 capsule Rfl: 0   SIMVASTATIN oriented x 3. HEENT: Normocephalic atraumatic. Moist mucous membranes. Respiratory: Clear to auscultation bilaterally. Cardiovascular: S1, S2. Regular rate and rhythm. Equal pulses. Abdomen: Soft, mildly tender, nondistended. Positive bowel sounds.

## 2019-04-16 NOTE — ED PROVIDER NOTES
Patient Seen in: [facility] Emergency Department    History   Patient presents with:  Abdomen/Flank Pain (GI/)    Stated Complaint: abd pain    HPI    Patient is an 80-year-old female comes in emergency room for evaluation of abdominal pain.   Roselyn • OTHER  07/11/2017    Kyphoplasty, Dr. Betina Little   • OTHER SURGICAL HISTORY  1949    Cyst in vagina   • OTHER SURGICAL HISTORY  1974    Breast implants   • OTHER SURGICAL HISTORY  2006    Right breast lump- benign   • OTHER SURGICAL HISTORY  2006    [facility] O[facility] Device None (Room air)       Current:/66   Pulse 75   Temp 97.8 °F (36.6 °C) (Temporal)   Resp 14   Ht 160 cm (5' 3\")   Wt 55.3 kg   SpO2 100%   BMI 21.61 kg/m²         Physical Exam    GENERAL: No acute distress, well appearing and non-toxic, Abnormality         Status                     ---------                               -----------         ------                     CBC W/ DIFFERENTIAL[[id number]]          Abnormal            Final result                 Please view results for these brissa are fluid distended measuring up to 3.7 cm. Findings are not safely change since 12/29/2018 study. This likely represents a partial small bowel obstruction which is recurrent. Visualized colon is grossly unremarkable.   There is no evidence of abscess or diagnosis)    Disposition:  Admit  4/16/2019 10:18 am    Follow-up:  No follow-up provider specified.       Medications Prescribed:  Current Discharge Medication List        Present on Admission  Date Reviewed: 4/1/2019          ICD-10-CM Noted POA    SBO (

## 2019-04-16 NOTE — CONSULTS
[facility]  Report of Consultation    Chantell Pabon Patient Status:  Emergency    7/3/1934 MRN [id number]   Location [address] [facility] Attending Kira Westbrook MD   Hosp Day # 0 PCP Elina Kovacs MD     [facility] SERVICES, [facility]) obstructive pulmonary disease) (HCC)    • Diverticulitis of colon    • Diverticulitis of colon (without mention of hemorrhage)(562.11)    • Diverticulosis of large intestine    • Dizziness and giddiness    • Hearing impairment    • High blood pressure    • implants   • OTHER SURGICAL HISTORY  01/05/2018    excision suture granuloma    • PART REMOVAL COLON W END COLOSTOMY     • REMOVAL GALLBLADDER     • SKIN SURGERY Left 03/09/2018    exc - L mid forearm - atypical squamous proliferation   • THORACIC FACET [place] disturbance  Psychiatric:  Negative for inappropriate interaction and psychiatric symptoms  Respiratory:  Negative for cough, dyspnea and wheezing    Physical Exam:  Blood pressure 142/66, pulse 75, temperature 97.8 °F (36.6 °C), temperature source Tempora contrast material. Post contrast coronal MPR imaging was performed. Dose reduction techniques were used.  Dose information is   transmitted to the [facility] [employer]) NRDR ([address]) which includes the Dose Index Reg or fracture. Severe degenerative disc disease at L5-S1. LUNG BASES:  Partially imaged airspace opacity in the right lower lobe is not safely change since previous study but is difficult to assess as is only partially imaged.     OTHER:  Negative.       == verbalized understanding    Time spent on counseling/coordination of care:  45 Minutes    Total time spent with patient:  1 Hour 15 Minutes    Charleen Fisher  4/16/2019  10:54 AM    Pt seen and examined.   Complains of abdominal pain which started yesterda

## 2019-04-16 NOTE — PLAN OF CARE
NURSING ADMISSION NOTE      Patient admitted via cart  Oriented to room. Safety precautions initiated. Bed in low position. Call light in reach.     Admission navigator completed

## 2019-04-16 NOTE — PROGRESS NOTES
04/16/19 1430   Clinical Encounter Type   Patient's Supportive Strategies/Resources  made a referral to the [facility] for [employer] as requested.     [facility] Encounters   Spiritual Requests During Visit / Hospitalization Cat

## 2019-04-16 NOTE — ED INITIAL ASSESSMENT (HPI)
Pt c/o abd pain that started last night. Pt denies N/V/D. Pt states she had a small loose BM this am which is not normal for pt. Hx of bowel obstruction.

## 2019-04-17 VITALS
DIASTOLIC BLOOD PRESSURE: 53 MMHG | TEMPERATURE: 98 F | BODY MASS INDEX: 21.62 KG/M2 | SYSTOLIC BLOOD PRESSURE: 156 MMHG | OXYGEN SATURATION: 98 % | RESPIRATION RATE: 20 BRPM | HEIGHT: 63 IN | HEART RATE: 73 BPM | WEIGHT: 122 LBS

## 2019-04-17 PROCEDURE — 99239 HOSP IP/OBS DSCHRG MGMT >30: CPT | Performed by: HOSPITALIST

## 2019-04-17 RX ORDER — FUROSEMIDE 10 MG/ML
20 INJECTION INTRAMUSCULAR; INTRAVENOUS ONCE
Status: COMPLETED | OUTPATIENT
Start: 2019-04-17 | End: 2019-04-17

## 2019-04-17 RX ORDER — HYDROCODONE BITARTRATE AND ACETAMINOPHEN 5; 325 MG/1; MG/1
2 TABLET ORAL EVERY 4 HOURS PRN
Status: DISCONTINUED | OUTPATIENT
Start: 2019-04-17 | End: 2019-04-17

## 2019-04-17 RX ORDER — ACETAMINOPHEN 500 MG
500 TABLET ORAL EVERY 6 HOURS PRN
Status: DISCONTINUED | OUTPATIENT
Start: 2019-04-17 | End: 2019-04-17

## 2019-04-17 RX ORDER — IBUPROFEN 400 MG/1
400 TABLET ORAL EVERY 6 HOURS PRN
Status: DISCONTINUED | OUTPATIENT
Start: 2019-04-17 | End: 2019-04-17

## 2019-04-17 RX ORDER — ACETAMINOPHEN 500 MG
1000 TABLET ORAL EVERY 6 HOURS PRN
Status: DISCONTINUED | OUTPATIENT
Start: 2019-04-17 | End: 2019-04-17

## 2019-04-17 RX ORDER — IBUPROFEN 600 MG/1
600 TABLET ORAL EVERY 6 HOURS PRN
Status: DISCONTINUED | OUTPATIENT
Start: 2019-04-17 | End: 2019-04-17

## 2019-04-17 RX ORDER — HYDROCODONE BITARTRATE AND ACETAMINOPHEN 5; 325 MG/1; MG/1
1 TABLET ORAL EVERY 4 HOURS PRN
Status: DISCONTINUED | OUTPATIENT
Start: 2019-04-17 | End: 2019-04-17

## 2019-04-17 NOTE — PROGRESS NOTES
[facility]  Progress Note    Seun Elder Patient Status:  Inpatient    7/3/1934 MRN [id number]   [facility] 3NW-A Attending Katharina Gunn MD   Hosp Day # 1 PCP Antonia Sims MD     Subjective:  Dalia Lundborg good. \"  Multiple procedure     Iron deficiency anemia     Chronic obstructive pulmonary disease (HCC)     Paroxysmal atrial fibrillation (HCC)     Benign essential HTN     Dyslipidemia     Mild cognitive impairment     Closed compression fracture of thoracic vertebra ([address]

## 2019-04-17 NOTE — PLAN OF CARE
Problem: PAIN - ADULT  Goal: Verbalizes/displays adequate comfort level or patient's stated pain goal  Description  INTERVENTIONS:  - Encourage pt to monitor pain and request assistance  - Assess pain using appropriate pain scale  - Administer analgesics appropriate  - Identify discharge learning needs (meds, wound care, etc)  - Arrange for interpreters to assist at discharge as needed  - Consider post-discharge preferences of patient/family/discharge partner  - Complete POLST form as appropriate  - Assess

## 2019-04-17 NOTE — PLAN OF CARE
Problem: PAIN - ADULT  Goal: Verbalizes/displays adequate comfort level or patient's stated pain goal  Description  INTERVENTIONS:  - Encourage pt to monitor pain and request assistance  - Assess pain using appropriate pain scale  - Administer analgesics PLANNING  Goal: Discharge to home or other facility with appropriate resources  Description  INTERVENTIONS:  - Identify barriers to discharge w/pt and caregiver  - Include patient/family/discharge partner in discharge planning  - Arrange for needed dischar

## 2019-04-17 NOTE — PROGRESS NOTES
[facility]  Progress Note     [facility] Patient Status:  Inpatient    7/3/1934 MRN [id number]   [facility] 3NW-A Attending Valarie Hernandez MD   Hosp Day # 1 PCP Drake Staton MD     Chief Complaint: SBO    S: Patient doing w Subcutaneous Daily   • pantoprazole (PROTONIX) IV push  40 mg Intravenous Q24H       ASSESSMENT / PLAN:     1. SBO likely d/t adhesions given multiple prior abdominal surgeries, resolved  2.  Transaminitis, TBili normal, sp cholecystectomy, no GI symptoms,

## 2019-04-17 NOTE — PROGRESS NOTES
Patient is alert and oriented x4. NPO. Bowel sounds are active. Passing gas. BM this evening. Voiding freely. IVF infusing per MAR. BP elevated but returns to normal range after IV lopressor. Up ad bernie and walking in the halls. VS stable. Afebrile.  Will co

## 2019-04-17 NOTE — DISCHARGE SUMMARY
[facility] HOSPITALIST  DISCHARGE SUMMARY     Eva White Patient Status:  Inpatient    7/3/1934 MRN [id number]   [facility] 3NW-A Attending Kesha Clayton MD   Hosp Day # 1 PCP Tiana Medina MD     Date of Admission: 2019  Date of initially serious enough to expect a more lengthy hospitalization but patient improved faster than expected.                Discharge Medication List:     Discharge Medications      CONTINUE taking these medications      Instructions Prescription details 5/17/2019      Date Arrival Time Visit Type Length Department Provider     4/25/2019 10:40 AM  DIAGNOSTIC PROCEDURE [1124] 40 min.  SP PULMONARY MEDICINE Tech, Sp Pulm    Patient Instructions:          Location Instructions:      [address] [place] [address] [address]

## 2019-04-18 ENCOUNTER — PATIENT OUTREACH (OUTPATIENT)
Dept: CASE MANAGEMENT | Age: 84
End: 2019-04-18

## 2019-04-18 DIAGNOSIS — Z02.9 ENCOUNTERS FOR UNSPECIFIED ADMINISTRATIVE PURPOSE: ICD-10-CM

## 2019-04-18 DIAGNOSIS — K56.609 SBO (SMALL BOWEL OBSTRUCTION) (HCC): ICD-10-CM

## 2019-04-18 PROCEDURE — 1111F DSCHRG MED/CURRENT MED MERGE: CPT

## 2019-04-18 NOTE — PROGRESS NOTES
Initial Post Discharge Follow Up   Discharge Date: 4/17/19  Contact Date: 4/18/2019    Consent Verification:  Assessment Completed With: Patient  HIPAA Verified?   Yes    Discharge Dx:    Small Bowel Obstruction  HX: colon resection, recurrent SBO, COPD ALLERGY) 10 MG Oral Tab Take 10 mg by mouth daily. Disp:  Rfl:    Fluticasone Propionate 50 MCG/ACT Nasal Suspension 1 spray by Each Nare route daily. Disp:  Rfl:    simvastatin 10 MG Oral Tab Take 10 mg by mouth daily.  Disp:  Rfl:    alendronate 70 MG Ora need addressed before your next visit with your PCP?  (DME, meds, disease concerns, Etc): No     Follow up appointments:      Your appointments     Date & Time Appointment Department [facility])    Apr 24, 2019 11:45 AM T [facility] Follow Up with Juan Pablo Lucas [address], [address]  [facility] [id number] [address], [facility] Group [facility] [address], [address] [facility] [id number]  [address] [place] [address] Velia in [address]

## 2019-04-20 ENCOUNTER — APPOINTMENT (OUTPATIENT)
Dept: CT IMAGING | Facility: HOSPITAL | Age: 84
DRG: 390 | End: 2019-04-20
Attending: EMERGENCY MEDICINE
Payer: MEDICARE

## 2019-04-20 ENCOUNTER — APPOINTMENT (OUTPATIENT)
Dept: GENERAL RADIOLOGY | Facility: HOSPITAL | Age: 84
DRG: 390 | End: 2019-04-20
Attending: EMERGENCY MEDICINE
Payer: MEDICARE

## 2019-04-20 ENCOUNTER — HOSPITAL ENCOUNTER (INPATIENT)
Facility: HOSPITAL | Age: 84
LOS: 4 days | Discharge: HOME | DRG: 390 | End: 2019-04-24
Attending: EMERGENCY MEDICINE | Admitting: HOSPITALIST
Payer: MEDICARE

## 2019-04-20 DIAGNOSIS — K56.609 SMALL BOWEL OBSTRUCTION (HCC): Primary | ICD-10-CM

## 2019-04-20 PROCEDURE — 99223 1ST HOSP IP/OBS HIGH 75: CPT | Performed by: SURGERY

## 2019-04-20 PROCEDURE — 0D9670Z DRAINAGE OF STOMACH WITH DRAINAGE DEVICE, VIA NATURAL OR ARTIFICIAL OPENING: ICD-10-PCS | Performed by: HOSPITALIST

## 2019-04-20 PROCEDURE — 74177 CT ABD & PELVIS W/CONTRAST: CPT | Performed by: EMERGENCY MEDICINE

## 2019-04-20 PROCEDURE — 99223 1ST HOSP IP/OBS HIGH 75: CPT | Performed by: HOSPITALIST

## 2019-04-20 RX ORDER — IPRATROPIUM BROMIDE AND ALBUTEROL SULFATE 2.5; .5 MG/3ML; MG/3ML
3 SOLUTION RESPIRATORY (INHALATION) EVERY 4 HOURS PRN
Status: DISCONTINUED | OUTPATIENT
Start: 2019-04-20 | End: 2019-04-24

## 2019-04-20 RX ORDER — LORAZEPAM 2 MG/ML
1 INJECTION INTRAMUSCULAR ONCE AS NEEDED
Status: ACTIVE | OUTPATIENT
Start: 2019-04-20 | End: 2019-04-20

## 2019-04-20 RX ORDER — METOPROLOL TARTRATE 5 MG/5ML
5 INJECTION INTRAVENOUS EVERY 6 HOURS
Status: DISCONTINUED | OUTPATIENT
Start: 2019-04-20 | End: 2019-04-24

## 2019-04-20 RX ORDER — MORPHINE SULFATE 4 MG/ML
1 INJECTION, SOLUTION INTRAMUSCULAR; INTRAVENOUS EVERY 2 HOUR PRN
Status: DISCONTINUED | OUTPATIENT
Start: 2019-04-20 | End: 2019-04-24

## 2019-04-20 RX ORDER — ENOXAPARIN SODIUM 100 MG/ML
40 INJECTION SUBCUTANEOUS NIGHTLY
Status: DISCONTINUED | OUTPATIENT
Start: 2019-04-20 | End: 2019-04-24

## 2019-04-20 RX ORDER — MORPHINE SULFATE 4 MG/ML
2 INJECTION, SOLUTION INTRAMUSCULAR; INTRAVENOUS EVERY 2 HOUR PRN
Status: DISCONTINUED | OUTPATIENT
Start: 2019-04-20 | End: 2019-04-24

## 2019-04-20 RX ORDER — SODIUM CHLORIDE, SODIUM LACTATE, POTASSIUM CHLORIDE, CALCIUM CHLORIDE 600; 310; 30; 20 MG/100ML; MG/100ML; MG/100ML; MG/100ML
INJECTION, SOLUTION INTRAVENOUS CONTINUOUS
Status: DISCONTINUED | OUTPATIENT
Start: 2019-04-20 | End: 2019-04-24

## 2019-04-20 RX ORDER — METOCLOPRAMIDE HYDROCHLORIDE 5 MG/ML
10 INJECTION INTRAMUSCULAR; INTRAVENOUS EVERY 8 HOURS PRN
Status: DISCONTINUED | OUTPATIENT
Start: 2019-04-20 | End: 2019-04-24

## 2019-04-20 RX ORDER — ONDANSETRON 2 MG/ML
4 INJECTION INTRAMUSCULAR; INTRAVENOUS EVERY 6 HOURS PRN
Status: DISCONTINUED | OUTPATIENT
Start: 2019-04-20 | End: 2019-04-24

## 2019-04-20 RX ORDER — ONDANSETRON 2 MG/ML
4 INJECTION INTRAMUSCULAR; INTRAVENOUS ONCE
Status: COMPLETED | OUTPATIENT
Start: 2019-04-20 | End: 2019-04-20

## 2019-04-20 RX ORDER — MORPHINE SULFATE 4 MG/ML
2 INJECTION, SOLUTION INTRAMUSCULAR; INTRAVENOUS ONCE
Status: COMPLETED | OUTPATIENT
Start: 2019-04-20 | End: 2019-04-20

## 2019-04-20 RX ORDER — MORPHINE SULFATE 4 MG/ML
4 INJECTION, SOLUTION INTRAMUSCULAR; INTRAVENOUS EVERY 2 HOUR PRN
Status: DISCONTINUED | OUTPATIENT
Start: 2019-04-20 | End: 2019-04-24

## 2019-04-20 NOTE — ED INITIAL ASSESSMENT (HPI)
Patient complaining of lower abdominal pain since this morning. \"States it feels like another bowel obstruction. \" Reports she was discharged from this hospital a few days ago for SBO.

## 2019-04-20 NOTE — ED PROVIDER NOTES
Patient Seen in: [facility] Emergency Department    History   Patient presents with:  Abdomen/Flank Pain (GI/)    Stated Complaint: possible bowel obstruction    HPI    Patient is an 26-year-old female who has history of multiple abdominal surgeri Performed by Jannell Schirmer, MD at [address] [place] [address]   • LYSIS OF ADHESIONS  08/20/2017    expl lap   • PAULINO NEEDLE LOCALIZATION W/ SPECIMEN 1 SITE RIGHT Right 2006    benign.    • OTHER Bilateral     bilateral breast implants   • OTHER  07/11/2017    Kyphoplast and vital signs reviewed. All other systems reviewed and negative except as noted above.     Physical Exam     ED Triage Vitals [04/20/19 1543]   BP (!) 169/70   Pulse 67   Resp 16   Temp 97.1 °F (36.2 °C)   Temp src Temporal   SpO2 98 %   O2 Device No other components within normal limits   LIPASE - Normal   CBC WITH DIFFERENTIAL WITH PLATELET    Narrative: The following orders were created for panel order CBC WITH DIFFERENTIAL WITH PLATELET.   Procedure                               Abnormality obstruction on CT scan. She improved quickly and was discharged the next day, she states she has been feeling fine until today when the symptoms recurred. She denies any nausea, vomiting, diarrhea, constipation. No fevers or chills either.   She has mild

## 2019-04-20 NOTE — ED NOTES
Attempted to insert NG tube x3, patient unable to tolerate at this time. MD aware, patient to go to floor without NG.

## 2019-04-20 NOTE — H&P
[facility]  History and Physical     Neda Barker Patient Status:  Emergency    7/3/1934 MRN [id number]   Location [facility] [facility] Attending Brian Silveira MD   Hosp Day # 0 PCP Lb Lord MD     Chief Complaint: SECOND STAGE/ REVISION Bilateral 3/16/2016    Performed by Fiordaliza Lu MD at [facility] MAIN OR   • BREAST SURGERY Bilateral 3/2016    implant removal   • Taya    x2   • COLECTOMY     • COLONOSCOPY  2004, 5/8/2012    x2, in 2012 w/ forcep Family History   Problem Relation Age of Onset   • Psychiatric Father    • Other (suicide completion) Father    • Other (Other) Mother         Cerebral hemorrhage   • Other (Other) Son         asthma  2 sons   • Other (alzheimer's disease) Sister lb (55.3 kg)   SpO2 97%   BMI 22.31 kg/m²   General: No acute distress. HEENT: Normocephalic, atraumatic. EOM-I. Anicteric. Neck: No lymphadenopathy. No JVD. No carotid bruits. Respiratory: Good inspiratory effort. Clear to auscultation bilaterally.  Rosiland Fallow

## 2019-04-21 PROCEDURE — 99232 SBSQ HOSP IP/OBS MODERATE 35: CPT | Performed by: SURGERY

## 2019-04-21 PROCEDURE — 99232 SBSQ HOSP IP/OBS MODERATE 35: CPT | Performed by: HOSPITALIST

## 2019-04-21 NOTE — PROGRESS NOTES
[facility]  Progress Note     Neda Barker Patient Status:  Inpatient    7/3/1934 MRN [id number]   [facility] 3NW-A Attending Eliot Ferris MD   Hosp Day # 1 PCP Lb Lord MD     Chief Complaint: Abd pain   S:  Still no Cardizem and start metoprolol IV every 6  4. Paroxysmal A. Fib-IV beta-blocker for now  5. Dyslipidemia-hold statin until taking oral  6. Low grade fever- suspect no acute bacterial infection  1. Hold abx and monitor   2.  IS     Quality:  · DVT Prophylaxis

## 2019-04-21 NOTE — PROGRESS NOTES
Spoke with dr Kira Godinez re: er unable to insert ngt. New orders noted for ativan 1mg iv x 1 prn prior to inserting ngt.     Pt arrived to unit via cart

## 2019-04-21 NOTE — PROGRESS NOTES
[facility]  Progress Note    Eva White Patient Status:  Inpatient    7/3/1934 MRN [id number]   [facility] 3NW-A Attending Kesha Clayton MD   Hosp Day # 1 PCP Tiana Medina MD     Subjective:  Patient states abdominal pain has rest for recurrent small bowel obstruction      Ernesto Lau MD

## 2019-04-21 NOTE — CONSULTS
[facility]  Report of Consultation    Milagro Johnsonterri Patient Status:  Inpatient    7/3/1934 MRN [id number]   [facility] 3NW-A Attending Hannah Ceballos MD   Hosp Day # 0 PCP Megan Hernandez MD     Date of consultation:      , hypertension, OA      History:  Past Medical History:   Diagnosis Date   • Back problem    • Cataract    • COPD (chronic obstructive pulmonary disease) (HCC)    • Diverticulitis of colon    • Diverticulitis of colon (without mention of hemorrhage)(562.11) hole in intestine   • OTHER SURGICAL HISTORY Bilateral 3/16/2016    Bilateral capsulectomy and removal of silicone implants   • OTHER SURGICAL HISTORY  01/05/2018    excision suture granuloma    • PART REMOVAL COLON W END COLOSTOMY     • REMOVAL GALLBLA Systems:    Allergic/Immuno:  Review of patient's allergies performed.   Constitutional:  Negative for decreased activity, - fever, irritability and lethargy, - unintentional weight loss  Endocrine:  Negative for abnormal sleep patterns, increased activity, Data:  Recent Labs   Lab 04/20/19  1559   RBC 4.56   HGB 14.5   HCT 42.0   MCV 92.1   MCH 31.8   MCHC 34.5   RDW 13.2   NEPRELIM 8.64*   WBC 9.9   .0     Recent Labs   Lab 04/16/19  0751 04/17/19  0506 04/20/19  1559   * 97 116*   BUN 22* 10 available. Time spent on counseling/coordination of care:       60 minutes  Over 50% of the time was spent with patient counseling and coordination of care.       Thank you,   Geovanni Pereira MD  4/20/2019  10:22 PM

## 2019-04-21 NOTE — PLAN OF CARE
Pt reports pain '4/10' throughout shift. Declines pain medications. Reinforced to call rn if she would like pain meds. Pt noted with slight anxiety today. Pt frequently talking about her 'fever' of 101.0.    Gently reinforced to pt that her temp this am

## 2019-04-22 PROCEDURE — 99232 SBSQ HOSP IP/OBS MODERATE 35: CPT | Performed by: HOSPITALIST

## 2019-04-22 PROCEDURE — 99232 SBSQ HOSP IP/OBS MODERATE 35: CPT | Performed by: SURGERY

## 2019-04-22 RX ORDER — HYDRALAZINE HYDROCHLORIDE 20 MG/ML
10 INJECTION INTRAMUSCULAR; INTRAVENOUS EVERY 6 HOURS PRN
Status: DISCONTINUED | OUTPATIENT
Start: 2019-04-22 | End: 2019-04-24

## 2019-04-22 NOTE — PROGRESS NOTES
[facility]  Progress Note    Linda Deandre Patient Status:  Inpatient    7/3/1934 MRN [id number]   [facility] 3NW-A Attending Alirio Medina MD   Hosp Day # 2 PCP Eamon Cantu MD     Subjective:  No new complaints.  Mild right-eleonora cognitive impairment     Closed compression fracture of thoracic vertebra (HCC)     Arthropathy of thoracic facet joint     Osteoporosis     Hyperglycemia     SBO (small bowel obstruction) (HCC)     Small bowel obstruction (HCC)     Small bowel obstruction

## 2019-04-22 NOTE — DIETARY NOTE
[address]     Admitting diagnosis:  Small bowel obstruction ([facility] [P82.758]    Ht: 157.5 cm (5' 2\")  Wt: 54.8 kg (120 lb 12.8 oz). This is 109 % of IBW  BMI: Body mass index is 22.09 kg/m².   IBW: Ideal body w

## 2019-04-22 NOTE — PLAN OF CARE
Problem: PAIN - ADULT  Goal: Verbalizes/displays adequate comfort level or patient's stated pain goal  Description  INTERVENTIONS:  - Encourage pt to monitor pain and request assistance  - Assess pain using appropriate pain scale  - Administer analgesics patient's ability to be responsible for managing their own health  - Refer to Case Management Department for coordinating discharge planning if the patient needs post-hospital services based on physician/LIP order or complex needs related to functional sta values  - Obtain nutritional consult as needed  - Evaluate psychosocial factors contributing to over-consumption  Outcome: Progressing     Problem: METABOLIC/FLUID AND ELECTROLYTES - ADULT  Goal: Electrolytes maintained within normal limits  Description  I

## 2019-04-22 NOTE — PLAN OF CARE
Problem: PAIN - ADULT  Goal: Verbalizes/displays adequate comfort level or patient's stated pain goal  Description  INTERVENTIONS:  - Encourage pt to monitor pain and request assistance  - Assess pain using appropriate pain scale  - Administer analgesics maintained within normal limits  Description  INTERVENTIONS:  - Monitor labs and rhythm and assess patient for signs and symptoms of electrolyte imbalances  - Administer electrolyte replacement as ordered  - Monitor response to electrolyte replacements, in consumed  - Identify factors contributing to decreased intake, treat as appropriate  - Assist with meals as needed  - Monitor I&O, WT and lab values  - Obtain nutritional consult as needed  - Optimize oral hygiene and moisture  - Encourage food from home;

## 2019-04-22 NOTE — PROGRESS NOTES
[facility]  Progress Note     Daniel Dedesiree Patient Status:  Inpatient    7/3/1934 MRN [id number]   [facility] 3NW-A Attending Peggy Rush MD   Hosp Day # 2 PCP Trini Edwards MD     Chief Complaint: Abd pain   S:  Still no and start metoprolol IV every 6  4. Paroxysmal A. Fib-IV beta-blocker for now  5. Dyslipidemia-hold statin until taking oral  6.  Low grade fever- suspect no acute bacterial infection  1. IS     Quality:  · DVT Prophylaxis: lovenox   · CODE status: full  Wi

## 2019-04-23 ENCOUNTER — TELEPHONE (OUTPATIENT)
Dept: INTERNAL MEDICINE CLINIC | Facility: CLINIC | Age: 84
End: 2019-04-23

## 2019-04-23 PROCEDURE — 99232 SBSQ HOSP IP/OBS MODERATE 35: CPT | Performed by: HOSPITALIST

## 2019-04-23 PROCEDURE — 99232 SBSQ HOSP IP/OBS MODERATE 35: CPT | Performed by: SURGERY

## 2019-04-23 NOTE — PROGRESS NOTES
Patient tolerated her tap water enema. Was able to hold 750cc and eventually had a very small soft brown BM. Patient is also persistently hypertensive despite managing pain and given her scheduled IV lopressor. Dr. Pan Ashford notified.  PRN hydralazine given for

## 2019-04-23 NOTE — PLAN OF CARE
No nausea with tube clamped.    Tube pulled two hours later  Clear liquid diet started, will advance very slowly  Ambulating in hallway frequently    Problem: PAIN - ADULT  Goal: Verbalizes/displays adequate comfort level or patient's stated pain goal  Desc assist at discharge as needed  - Consider post-discharge preferences of patient/family/discharge partner  - Complete POLST form as appropriate  - Assess patient's ability to be responsible for managing their own health  - Refer to Case Management Annika Contreras (bariatric)  Description  INTERVENTIONS:  - Monitor for over-consumption  - Identify factors contributing to increased intake, treat as appropriate  - Monitor I&O, WT and lab values  - Obtain nutritional consult as needed  - Evaluate psychosocial factors c

## 2019-04-23 NOTE — PROGRESS NOTES
REFUSED PAIN MEDS. NO FLATUS . WITH ON AND OFF ABDOMINAL PAIN. USE OF I.S. AND AMBULATION ENCOURAGED. WILL MONITOR.

## 2019-04-23 NOTE — TELEPHONE ENCOUNTER
Pt canceled HFU for 4-24-19, Pt  in hospital still, will call back to let us know when she is released to reschedule.

## 2019-04-23 NOTE — PROGRESS NOTES
[facility]  Progress Note     Beni Painter Patient Status:  Inpatient    7/3/1934 MRN [id number]   [facility] 3NW-A Attending Ruth Short MD   Hosp Day # 3 PCP Ita Blancas MD     Chief Complaint: Abd pain   S:  +flatus. now  5. Dyslipidemia-hold statin until taking oral  6.  Low grade fever- no acute bacterial infection  1. IS     Quality:  · DVT Prophylaxis: lovenox   · CODE status: full  Will the patient be referred to TCC on discharge?: yes  Estimated date of discharge:

## 2019-04-23 NOTE — PROGRESS NOTES
[facility]  Progress Note    Neeta Roles Patient Status:  Inpatient    7/3/1934 MRN [id number]   [facility] 3NW-A Attending Lynsey Lyon MD   Hosp Day # 3 PCP Meta Opitz, MD     Subjective:  Patient has had a BM and passed l obstruction ([facility]     Small bowel obstruction due to adhesions [facility])     Atrial fibrillation ([facility]      Partial small bowel obstruction - resolving    Plan:  1. Clamp NGT x 2 hours, remove if tolerating and initiate clear liquid diet  2.  Advance diet as to

## 2019-04-24 VITALS
DIASTOLIC BLOOD PRESSURE: 43 MMHG | TEMPERATURE: 98 F | HEART RATE: 68 BPM | HEIGHT: 62 IN | SYSTOLIC BLOOD PRESSURE: 134 MMHG | WEIGHT: 120.81 LBS | RESPIRATION RATE: 18 BRPM | OXYGEN SATURATION: 96 % | BODY MASS INDEX: 22.23 KG/M2

## 2019-04-24 PROCEDURE — 99239 HOSP IP/OBS DSCHRG MGMT >30: CPT | Performed by: HOSPITALIST

## 2019-04-24 PROCEDURE — 99232 SBSQ HOSP IP/OBS MODERATE 35: CPT | Performed by: SURGERY

## 2019-04-24 RX ORDER — METOCLOPRAMIDE HYDROCHLORIDE 5 MG/ML
5 INJECTION INTRAMUSCULAR; INTRAVENOUS EVERY 8 HOURS PRN
Status: DISCONTINUED | OUTPATIENT
Start: 2019-04-24 | End: 2019-04-24

## 2019-04-24 RX ORDER — ASPIRIN 81 MG/1
81 TABLET, CHEWABLE ORAL DAILY
Status: DISCONTINUED | OUTPATIENT
Start: 2019-04-24 | End: 2019-04-24

## 2019-04-24 NOTE — PLAN OF CARE
Patient denied pain/n/v. Tolerating clear liquids, diet advanced to fulls this am. +bm overnight, did not witness, however patient states normal, +flatus. Voiding freely. VSS. Safety maintained.

## 2019-04-24 NOTE — PROGRESS NOTES
[facility]  Progress Note     Henrik [place] Patient Status:  Inpatient    7/3/1934 MRN [id number]   [facility] 3NW-A Attending [facility] Day # 4 PCP Katalina Kwok MD     Chief Complaint: Abdominal pain • dilTIAZem HCl ER Coated Beads  300 mg Oral Daily   • umeclidinium-vilanterol  1 puff Inhalation Daily   • pantoprazole (PROTONIX) IV push  40 mg Intravenous Q24H   • metoprolol Tartrate  5 mg Intravenous Q6H   • enoxaparin  40 mg Subcutaneous Nightly

## 2019-04-24 NOTE — TELEPHONE ENCOUNTER
Spoke to Fonemesh, pt's dtr, pt discharged from the hospital last night, kyphoplasty, UTI, pain in legs, weakness, started Gabapentin 300mg BID and Cipro while in the hospital.  Fonemesh notified ok to take Gabapentin and Tramadol but take Gabapentin in the am [FreeTextEntry1] : Spirometry: Normal without a significant bronchodilator response.  Large improvement in flow rates from last visit.\par

## 2019-04-24 NOTE — PROGRESS NOTES
[facility] Pharmacy Note:  Renal Dose Adjustment for Metoclopramide (REGLAN)    Beni Painter has been prescribed Metoclopramide (REGLAN) 10 mg every 8 hours as needed for NV. Estimated Creatinine Clearance: 36.8 mL/min (based on SCr of 0.9 mg/dL).     Her c

## 2019-04-25 ENCOUNTER — TELEPHONE (OUTPATIENT)
Dept: INTERNAL MEDICINE CLINIC | Facility: CLINIC | Age: 84
End: 2019-04-25

## 2019-04-25 ENCOUNTER — PATIENT OUTREACH (OUTPATIENT)
Dept: CASE MANAGEMENT | Age: 84
End: 2019-04-25

## 2019-04-25 DIAGNOSIS — K56.50 SMALL BOWEL OBSTRUCTION DUE TO ADHESIONS (HCC): ICD-10-CM

## 2019-04-25 PROCEDURE — 1111F DSCHRG MED/CURRENT MED MERGE: CPT

## 2019-04-25 NOTE — TELEPHONE ENCOUNTER
Spoke with patient stating was discharged from [facility] yesterday. She was admitted for small bowel obstruction on 4/20/2019. Patient indicates is doing well. She scheduled HFU with TB on 4/29/2019.    QUE

## 2019-04-25 NOTE — PROGRESS NOTES
Condition Update Post Discharge   Discharge Date: 4/24/19  Contact Date: 4/25/2019    Consent Verification:  Assessment Completed With: Patient  HIPAA Verified? Yes    General:   • How have you been since your discharge from the hospital/facility?  Gil Sanchez daily as needed. Disp:  Rfl:    simvastatin 10 MG Oral Tab Take 10 mg by mouth daily. Disp:  Rfl:    alendronate 70 MG Oral Tab Take 70 mg by mouth once a week. Disp:  Rfl:    Donepezil HCl 10 MG Oral Tab Take 1 tablet (10 mg total) by mouth nightly.  Dis Aniya Tjeeda MD [facility] PULMONARY MEDICINE (Farzad at [address] [facility])        Apr 29, 2019 12:00 PM CDT Hospital Follow Up with Geovanny Yeager ([address])        Apr 29, 2019  5:00 PM CDT [facility] in [facility], [address] [facility] [address]

## 2019-04-29 ENCOUNTER — OFFICE VISIT (OUTPATIENT)
Dept: INTERNAL MEDICINE CLINIC | Facility: CLINIC | Age: 84
End: 2019-04-29
Payer: MEDICARE

## 2019-04-29 VITALS
HEIGHT: 63 IN | BODY MASS INDEX: 20.91 KG/M2 | HEART RATE: 80 BPM | WEIGHT: 118 LBS | TEMPERATURE: 98 F | DIASTOLIC BLOOD PRESSURE: 65 MMHG | SYSTOLIC BLOOD PRESSURE: 136 MMHG

## 2019-04-29 DIAGNOSIS — J44.9 CHRONIC OBSTRUCTIVE PULMONARY DISEASE, UNSPECIFIED COPD TYPE (HCC): ICD-10-CM

## 2019-04-29 DIAGNOSIS — K56.609 SBO (SMALL BOWEL OBSTRUCTION) (HCC): Primary | ICD-10-CM

## 2019-04-29 DIAGNOSIS — I10 ESSENTIAL HYPERTENSION: ICD-10-CM

## 2019-04-29 PROCEDURE — 1111F DSCHRG MED/CURRENT MED MERGE: CPT | Performed by: INTERNAL MEDICINE

## 2019-04-29 PROCEDURE — 99214 OFFICE O/P EST MOD 30 MIN: CPT | Performed by: INTERNAL MEDICINE

## 2019-04-29 NOTE — DISCHARGE SUMMARY
[facility] [place] HOSPITALIST  DISCHARGE SUMMARY     Wei Stringer Patient Status:  Inpatient    7/3/1934 MRN [id number]   [facility] 3NW-A Attending No att. providers found   Hosp Day # 4 PCP León Barahona MD     Date of Admission: 2019  Bennie · None    Consultants:  • General surgery- Dr. James Duncan    Discharge Medication List:     Discharge Medications      CONTINUE taking these medications      Instructions Prescription details   Acidophilus/Pectin Caps  Commonly known as:  PROBIOTIC      Take 4/28/2019 - 5/28/2019      Date Arrival Time Visit Type Length Department Provider     4/29/2019  5:00 PM  [facility] HOSPITAL FOLLOW UP [5060] 30 min.  Darrin Kemp Beaulah Savage, MD    Patient Instructions:

## 2019-04-29 NOTE — PROGRESS NOTES
Christie Wu is a 80year old female. Patient presents with:  Hospital F/U: LG. Room 3. HPI:     Patient here for [place]-  Admitted 4/20-4/24/19 for SBO. Patient had severe pain, no flatus and nausea.  She went to the hospital and was treated with N daily. Disp:  Rfl:    Fluticasone Propionate 50 MCG/ACT Nasal Suspension 1 spray by Each Nare route daily as needed. Disp:  Rfl:    simvastatin 10 MG Oral Tab Take 10 mg by mouth daily.  Disp:  Rfl:    alendronate 70 MG Oral Tab Take 70 mg by mouth once a Smoking status: Former Smoker        Packs/day: 0.50        Years: 15.00        Pack years: 7.5        Quit date: 1969        Years since quittin.3      Smokeless tobacco: Never Used    Alcohol use:  Yes      Alcohol/week: 0.6 oz      Types: 1 Mayda  this encounter. Meds & Refills for this Visit:  Requested Prescriptions      No prescriptions requested or ordered in this encounter       Imaging & Consults:  None    No follow-ups on file. There are no Patient Instructions on file for this visit.

## 2019-05-01 ENCOUNTER — OFFICE VISIT (OUTPATIENT)
Dept: PHYSICAL THERAPY | Age: 84
End: 2019-05-01
Attending: NURSE PRACTITIONER
Payer: MEDICARE

## 2019-05-01 DIAGNOSIS — R26.81 UNSTEADY GAIT: ICD-10-CM

## 2019-05-01 DIAGNOSIS — M54.2 CERVICALGIA: ICD-10-CM

## 2019-05-01 DIAGNOSIS — M47.892 OTHER OSTEOARTHRITIS OF SPINE, CERVICAL REGION: ICD-10-CM

## 2019-05-01 PROCEDURE — 97140 MANUAL THERAPY 1/> REGIONS: CPT

## 2019-05-01 PROCEDURE — 97161 PT EVAL LOW COMPLEX 20 MIN: CPT

## 2019-05-01 NOTE — PROGRESS NOTES
SPINE EVALUATION:   Referring Physician: Dr. Aleta Ford  Diagnosis: cervicalgia, right sided neck pain     Date of Service: 5/1/2019     PATIENT Traci Scott is a 80year old y/o female who presents to therapy today with complaints of right [place] 10; L 10  Rotation: R 45 with sharp 8/10 right sided neck pain, L 55    Accessory motion: restricted mobility globally of thoracic spine, with kyphosis and scoliosis noted  Palpation: significant increased muscle tension of right upper trap and levator on treatment options and has agreed to actively participate in planning and for this course of care. Thank you for your referral. Please co-sign or sign and return this letter via fax as soon as possible to [phone].  If you have any questions, please c

## 2019-05-03 ENCOUNTER — TELEPHONE (OUTPATIENT)
Dept: INTERNAL MEDICINE CLINIC | Facility: CLINIC | Age: 84
End: 2019-05-03

## 2019-05-08 ENCOUNTER — OFFICE VISIT (OUTPATIENT)
Dept: PHYSICAL THERAPY | Age: 84
End: 2019-05-08
Attending: NURSE PRACTITIONER
Payer: MEDICARE

## 2019-05-08 PROCEDURE — 97014 ELECTRIC STIMULATION THERAPY: CPT

## 2019-05-08 PROCEDURE — 97140 MANUAL THERAPY 1/> REGIONS: CPT

## 2019-05-08 PROCEDURE — 97110 THERAPEUTIC EXERCISES: CPT

## 2019-05-08 NOTE — PROGRESS NOTES
Dx: cervicalgia, right sided neck pain             Authorized # of Visits:  10, medicare         Next MD visit: none scheduled  Fall Risk: standard         Precautions: n/a           Subjective: she reports feeling better today and feeling less sore than l

## 2019-05-22 ENCOUNTER — OFFICE VISIT (OUTPATIENT)
Dept: PHYSICAL THERAPY | Age: 84
End: 2019-05-22
Attending: NURSE PRACTITIONER
Payer: MEDICARE

## 2019-05-22 PROCEDURE — 97140 MANUAL THERAPY 1/> REGIONS: CPT

## 2019-05-22 PROCEDURE — 97014 ELECTRIC STIMULATION THERAPY: CPT

## 2019-05-22 PROCEDURE — 97110 THERAPEUTIC EXERCISES: CPT

## 2019-05-22 NOTE — PROGRESS NOTES
Dx: cervicalgia, right sided neck pain             Authorized # of Visits:  10, medicare         Next MD visit: none scheduled  Fall Risk: standard         Precautions: n/a           Subjective: she reports feeling better today and feeling less sharp pains

## 2019-05-29 ENCOUNTER — OFFICE VISIT (OUTPATIENT)
Dept: PHYSICAL THERAPY | Age: 84
End: 2019-05-29
Attending: NURSE PRACTITIONER
Payer: MEDICARE

## 2019-05-29 PROCEDURE — 97110 THERAPEUTIC EXERCISES: CPT

## 2019-05-29 PROCEDURE — 97014 ELECTRIC STIMULATION THERAPY: CPT

## 2019-05-29 PROCEDURE — 97140 MANUAL THERAPY 1/> REGIONS: CPT

## 2019-05-29 NOTE — PROGRESS NOTES
Dx: cervicalgia, right sided neck pain             Authorized # of Visits:  10, medicare         Next MD visit: none scheduled  Fall Risk: standard         Precautions: n/a           Subjective: she reports feeling better overall, definitely less sharp Carolyn Larsen

## 2019-06-06 ENCOUNTER — OFFICE VISIT (OUTPATIENT)
Dept: INTERNAL MEDICINE CLINIC | Facility: CLINIC | Age: 84
End: 2019-06-06
Payer: MEDICARE

## 2019-06-06 VITALS
SYSTOLIC BLOOD PRESSURE: 138 MMHG | HEIGHT: 63 IN | WEIGHT: 120.19 LBS | HEART RATE: 72 BPM | DIASTOLIC BLOOD PRESSURE: 60 MMHG | RESPIRATION RATE: 16 BRPM | BODY MASS INDEX: 21.3 KG/M2

## 2019-06-06 DIAGNOSIS — R63.4 WEIGHT LOSS: Primary | ICD-10-CM

## 2019-06-06 PROCEDURE — 99213 OFFICE O/P EST LOW 20 MIN: CPT | Performed by: INTERNAL MEDICINE

## 2019-06-06 NOTE — PROGRESS NOTES
Ilene Hauser is a 80year old female.   Patient presents with:  Weight Loss: cn room 3: patient is here for weight loss from being sick , patient states she is fine now       HPI:     Patient here because concerned about weight-  She went down to 116 po 50 MCG/ACT Nasal Suspension 1 spray by Each Nare route daily as needed. Disp:  Rfl:    simvastatin 10 MG Oral Tab Take 10 mg by mouth daily. Disp:  Rfl:    alendronate 70 MG Oral Tab Take 70 mg by mouth once a week.  Disp:  Rfl:    Donepezil HCl 10 MG Ora History:  Social History    Tobacco Use      Smoking status: Former Smoker        Packs/day: 0.50        Years: 15.00        Pack years: 7.5        Quit date: 1969        Years since quittin.4      Smokeless tobacco: Never Used    Alcohol use: Yes Imaging & Consults:  None    Return in about 3 months (around 9/6/2019) for weight (if needed). There are no Patient Instructions on file for this visit. The patient indicates understanding of these issues and agrees to the plan.

## 2019-06-13 ENCOUNTER — APPOINTMENT (OUTPATIENT)
Dept: PHYSICAL THERAPY | Age: 84
End: 2019-06-13
Attending: NURSE PRACTITIONER
Payer: MEDICARE

## 2019-06-13 ENCOUNTER — OFFICE VISIT (OUTPATIENT)
Dept: PHYSICAL THERAPY | Age: 84
End: 2019-06-13
Attending: NURSE PRACTITIONER
Payer: MEDICARE

## 2019-06-13 PROCEDURE — 97110 THERAPEUTIC EXERCISES: CPT

## 2019-06-13 PROCEDURE — 97014 ELECTRIC STIMULATION THERAPY: CPT

## 2019-06-13 NOTE — PROGRESS NOTES
Dx: cervicalgia, right sided neck pain             Authorized # of Visits:  10, medicare         Next MD visit: none scheduled  Fall Risk: standard         Precautions: n/a           Subjective: she reports feeling better overall, feeling very good, even d 3x10 bout B  IFC upper trap and c spine 15 min with MHP       --- --- IFC quad polar with heat 15 min supine ----      Skilled Services: skilled selection of there ex, MT, and modalities to manage pain and symptoms    Charges: there ex 2, e-stim 1        T

## 2019-06-15 RX ORDER — SIMVASTATIN 10 MG
TABLET ORAL
Qty: 90 TABLET | Refills: 0 | Status: SHIPPED | OUTPATIENT
Start: 2019-06-15 | End: 2019-09-09

## 2019-06-15 NOTE — TELEPHONE ENCOUNTER
Last Ov: 6/6/19, TB, acute  Upcoming appt: 12/9/19  Last labs: CBC, CMP, Lipid, TSH w Ref T4 3/5/19  Last Rx: simvastatin 10mg last refilled historically    Per Protocol, pass.  Rx refill sent to pharmacy

## 2019-06-20 ENCOUNTER — OFFICE VISIT (OUTPATIENT)
Dept: PHYSICAL THERAPY | Age: 84
End: 2019-06-20
Attending: NURSE PRACTITIONER
Payer: MEDICARE

## 2019-06-20 PROCEDURE — 97110 THERAPEUTIC EXERCISES: CPT

## 2019-06-20 PROCEDURE — 97014 ELECTRIC STIMULATION THERAPY: CPT

## 2019-06-20 NOTE — PROGRESS NOTES
Dx: cervicalgia, right sided neck pain             Authorized # of Visits:  10, medicare         Next MD visit: none scheduled  Fall Risk: standard         Precautions: n/a           Subjective: she reports her neck is feeling better overall, had a fall 2 IFC quad polar 15 min with MPH Supine transverse CT junction lateral glide, gr3, 3x10 bout B  IFC upper trap and c spine 15 min with MHP  IFC upper trap with MHP 10 min      --- --- IFC quad polar with heat 15 min supine ---- ----     Skilled Services: ski

## 2019-06-27 ENCOUNTER — OFFICE VISIT (OUTPATIENT)
Dept: PHYSICAL THERAPY | Age: 84
End: 2019-06-27
Attending: NURSE PRACTITIONER
Payer: MEDICARE

## 2019-06-27 PROCEDURE — 97110 THERAPEUTIC EXERCISES: CPT

## 2019-06-27 PROCEDURE — 97014 ELECTRIC STIMULATION THERAPY: CPT

## 2019-06-27 NOTE — PROGRESS NOTES
Dx: cervicalgia, right sided neck pain             Authorized # of Visits:  10, medicare         Next MD visit: none scheduled  Fall Risk: standard         Precautions: n/a     Discharge Summary  Pt has attended 7, cancelled 1, and no shown 0 visits in [facility] time  Objective: grossly 50 deg rotation bilaterally ROM without pain.      Assessment: and P: see note  Date: 5/8/2019 TX#: 2/10 Date:5/22/2019             TX#: 3/   Date: 5/29/2019              TX#: 4/ Date: 6/13/2019              TX#: 5/ Date: 6/20/2019 manage pain and symptoms    Charges: there ex 2, e-stim 1        Total Timed Treatment: 45 min  Total Treatment Time: 45 min

## 2019-07-13 DIAGNOSIS — G31.84 MILD COGNITIVE IMPAIRMENT: ICD-10-CM

## 2019-07-15 RX ORDER — DONEPEZIL HYDROCHLORIDE 10 MG/1
10 TABLET, FILM COATED ORAL NIGHTLY
Qty: 90 TABLET | Refills: 0 | Status: SHIPPED | OUTPATIENT
Start: 2019-07-15 | End: 2019-10-10

## 2019-07-15 RX ORDER — ALENDRONATE SODIUM 70 MG/1
TABLET ORAL
Qty: 12 TABLET | Refills: 0 | Status: SHIPPED | OUTPATIENT
Start: 2019-07-15 | End: 2019-10-13

## 2019-07-15 NOTE — TELEPHONE ENCOUNTER
Last Ov: 6/6/19, TB, weight loss  Upcoming appt: 12/9/19  Last labs: CBC, CMP, Lipid, TSH 3/5/19  Last Rx: alendronate 70mg last refilled historically    Per Protocol, pass. Rx refill sent to pharmacy.

## 2019-07-15 NOTE — TELEPHONE ENCOUNTER
Medication: DONEPEZIL HCL 10 MG Oral Tab    Date of last refill: 01/10/19 (#90/1)  [place]    Last office visit: 01/10/19  Due back to clinic per last office note:  Around 07/10/19  Date next office visit scheduled:

## 2019-07-17 ENCOUNTER — TELEPHONE (OUTPATIENT)
Dept: INTERNAL MEDICINE CLINIC | Facility: CLINIC | Age: 84
End: 2019-07-17

## 2019-07-17 NOTE — TELEPHONE ENCOUNTER
Patient is calling because she just picked up a medication at the pharmacy  ALENDRONATE 70 MG Oral Tab 12 tablet 0 7/15/2019     Sig: TAKE 1 TABLET BY MOUTH ONCE A WEEK      She does not remember talking to Dr Lis Romano about this and would like a call back

## 2019-07-22 RX ORDER — DILTIAZEM HYDROCHLORIDE 300 MG/1
300 CAPSULE, COATED, EXTENDED RELEASE ORAL DAILY
Qty: 90 CAPSULE | Refills: 1 | Status: SHIPPED | OUTPATIENT
Start: 2019-07-22 | End: 2019-11-15

## 2019-07-24 NOTE — TELEPHONE ENCOUNTER
2nd attempt to contact. Called pt stating had spoken with pharmacist to confirm medication as pt knows it as brand name Fosamax. Pt will continue as directed. No further question or concerns. Pt verbalized understanding and agreed with POC.

## 2019-07-31 ENCOUNTER — TELEPHONE (OUTPATIENT)
Dept: INTERNAL MEDICINE CLINIC | Facility: CLINIC | Age: 84
End: 2019-07-31

## 2019-07-31 DIAGNOSIS — Z12.31 ENCOUNTER FOR SCREENING MAMMOGRAM FOR MALIGNANT NEOPLASM OF BREAST: Primary | ICD-10-CM

## 2019-07-31 DIAGNOSIS — N64.89 BREAST ASYMMETRY: ICD-10-CM

## 2019-07-31 NOTE — TELEPHONE ENCOUNTER
LOV 4/29/19  Notes recorded by Joshua Baca MD on 8/17/2018 at 11:30 AM CDT  [facility] JOS 2D+3D SCREENING BILAT: Benign  Repeat in 1 year    Mammo pended for your approval.  Please review and advise. Thank you.

## 2019-08-01 NOTE — TELEPHONE ENCOUNTER
Order signed, thx Detail Level: Zone Recommendation Preamble: The following recommendations were made during the visit: Recommendations (Free Text): Apply clotrimazole 1% cream twice daily for two weeks

## 2019-08-06 ENCOUNTER — TELEPHONE (OUTPATIENT)
Dept: INTERNAL MEDICINE CLINIC | Facility: CLINIC | Age: 84
End: 2019-08-06

## 2019-08-06 NOTE — TELEPHONE ENCOUNTER
Pt been feeling out of breath lately, been going on a couple of weeks.      Does have 30 minute FU scheduled with CB 8-8-19

## 2019-08-06 NOTE — TELEPHONE ENCOUNTER
Called pt to triage. Left detailed message (Shaila Phelps per HIPAA) advising pt to go to ER if she has worsening shortness of breath, dizziness, chest pain. Asked pt to call office back to discuss symptoms.

## 2019-08-07 NOTE — TELEPHONE ENCOUNTER
Pt returned call. Reports feeling more fatigue lately and not c/o shortness of breath or \"feeling out of breath\". Just says she \"feels ill\", denies fever/chills, chest pain, dizziness.  Confirmed appt tomorrow with CB at 2:30p, pt verbalized Jen Bal

## 2019-08-07 NOTE — TELEPHONE ENCOUNTER
Called pt again, #2 VM left detailed message (Ok per HIPAA) asking for pt to call back if still having symptoms and also confirmed upcoming appt tomorrow 8/8 with CB at 2:30p.

## 2019-08-08 ENCOUNTER — OFFICE VISIT (OUTPATIENT)
Dept: INTERNAL MEDICINE CLINIC | Facility: CLINIC | Age: 84
End: 2019-08-08
Payer: MEDICARE

## 2019-08-08 VITALS
BODY MASS INDEX: 21.76 KG/M2 | DIASTOLIC BLOOD PRESSURE: 70 MMHG | OXYGEN SATURATION: 97 % | HEART RATE: 68 BPM | SYSTOLIC BLOOD PRESSURE: 138 MMHG | HEIGHT: 63 IN | RESPIRATION RATE: 19 BRPM | WEIGHT: 122.81 LBS

## 2019-08-08 DIAGNOSIS — J44.9 CHRONIC OBSTRUCTIVE PULMONARY DISEASE, UNSPECIFIED COPD TYPE (HCC): Primary | ICD-10-CM

## 2019-08-08 DIAGNOSIS — R49.0 HOARSENESS OF VOICE: ICD-10-CM

## 2019-08-08 DIAGNOSIS — I10 ESSENTIAL HYPERTENSION: ICD-10-CM

## 2019-08-08 DIAGNOSIS — R09.82 POST-NASAL DRAINAGE: ICD-10-CM

## 2019-08-08 PROCEDURE — 99214 OFFICE O/P EST MOD 30 MIN: CPT | Performed by: PHYSICIAN ASSISTANT

## 2019-08-08 NOTE — PROGRESS NOTES
Patient presents with:  Shortness Of Breath: x 2 weeks. HPI:  Pt presents with c/o feeling a little SOB about 1-2 weeks ago. She has no current sxs. She denies cough and wheeze. Reports taking her inhaler as prescribed.   Has f/u appt with pulm next obstruction ([facility] [address]     Small bowel obstruction due to adhesions [facility])     Atrial fibrillation (HCC)        Current Outpatient Medications:  DILTIAZEM HCL ER COATED BEADS 300 MG Oral Capsule SR 24 Hr Take 1 capsule (300 mg total) by mouth daily.  Disp: 90 cap 122 lb 12.8 oz   SpO2 97%   BMI 21.75 kg/m²   Constitutional:  No distress. HEENT:  Normocephalic and atraumatic. Neck: Normal range of motion. Neck supple. Cardiovascular: Normal rate, regular rhythm. No murmur, rubs or gallops.    Pulmonary/Chest: E

## 2019-08-16 ENCOUNTER — TELEPHONE (OUTPATIENT)
Dept: INTERNAL MEDICINE CLINIC | Facility: CLINIC | Age: 84
End: 2019-08-16

## 2019-08-19 NOTE — TELEPHONE ENCOUNTER
Left detailed message on cell ok per hipaa. Provided CS phone number to schedule. Call the office if any other questions.

## 2019-08-20 ENCOUNTER — HOSPITAL ENCOUNTER (EMERGENCY)
Facility: HOSPITAL | Age: 84
Discharge: HOME | End: 2019-08-20
Attending: EMERGENCY MEDICINE
Payer: MEDICARE

## 2019-08-20 ENCOUNTER — APPOINTMENT (OUTPATIENT)
Dept: GENERAL RADIOLOGY | Facility: HOSPITAL | Age: 84
End: 2019-08-20
Attending: EMERGENCY MEDICINE
Payer: MEDICARE

## 2019-08-20 VITALS
SYSTOLIC BLOOD PRESSURE: 160 MMHG | HEIGHT: 65 IN | WEIGHT: 122 LBS | BODY MASS INDEX: 20.33 KG/M2 | DIASTOLIC BLOOD PRESSURE: 74 MMHG | HEART RATE: 64 BPM | RESPIRATION RATE: 16 BRPM | TEMPERATURE: 98 F | OXYGEN SATURATION: 100 %

## 2019-08-20 DIAGNOSIS — S22.41XA CLOSED FRACTURE OF MULTIPLE RIBS OF RIGHT SIDE, INITIAL ENCOUNTER: ICD-10-CM

## 2019-08-20 DIAGNOSIS — S01.01XA LACERATION OF SCALP, INITIAL ENCOUNTER: Primary | ICD-10-CM

## 2019-08-20 DIAGNOSIS — S20.211A CONTUSION OF RIGHT CHEST WALL, INITIAL ENCOUNTER: ICD-10-CM

## 2019-08-20 DIAGNOSIS — S51.811A SKIN TEAR OF FOREARM WITHOUT COMPLICATION, RIGHT, INITIAL ENCOUNTER: ICD-10-CM

## 2019-08-20 PROCEDURE — 71101 X-RAY EXAM UNILAT RIBS/CHEST: CPT | Performed by: EMERGENCY MEDICINE

## 2019-08-20 PROCEDURE — 12001 RPR S/N/AX/GEN/TRNK 2.5CM/<: CPT

## 2019-08-20 PROCEDURE — 99283 EMERGENCY DEPT VISIT LOW MDM: CPT

## 2019-08-20 RX ORDER — ACETAMINOPHEN 500 MG
1000 TABLET ORAL ONCE
Status: COMPLETED | OUTPATIENT
Start: 2019-08-20 | End: 2019-08-20

## 2019-08-20 NOTE — ED PROVIDER NOTES
Patient Seen in: [facility] Emergency Department    History   Patient presents with:  Fall (musculoskeletal, neurologic)    Stated Complaint: fall, head injury     HPI      Patient has a history of high blood pressure, high cholesterol, mild cogniti SURGERY Bilateral 3/2016    implant removal   • CHOLECYSTECTOMY  1993, 1998    x2   • COLECTOMY     • COLONOSCOPY  2004, 5/8/2012    x2, in 2012 w/ forceps biopsy   • COLOSTOMY      and reversal   • EXPLORATORY LAPAROTOMY N/A 8/20/2017    Performed by Barbara Tran 2-4 times a month      Drinks per session: 1 or 2      Binge frequency: Never      Comment: cage done 3/28/19    Drug use: No             Review of Systems    Positive for stated complaint: fall, head injury   Other systems are as noted in HPI.   Constituti moves all extremities with good strength and coordination. ED Course   Labs Reviewed - No data to display         MDM   Patient had a non-syncopal fall with a head injury without loss of consciousness. She denies any headache or neck pain.   She does

## 2019-08-20 NOTE — ED INITIAL ASSESSMENT (HPI)
Pt to ED via ambulance for fall at home. Pt was standing on top of bed trying to close a vent and fell hitting head on night stand. Pt denies any LOC.

## 2019-08-23 ENCOUNTER — OFFICE VISIT (OUTPATIENT)
Dept: INTERNAL MEDICINE CLINIC | Facility: CLINIC | Age: 84
End: 2019-08-23
Payer: MEDICARE

## 2019-08-23 VITALS
BODY MASS INDEX: 21.79 KG/M2 | HEIGHT: 63 IN | SYSTOLIC BLOOD PRESSURE: 140 MMHG | WEIGHT: 123 LBS | HEART RATE: 72 BPM | TEMPERATURE: 98 F | DIASTOLIC BLOOD PRESSURE: 68 MMHG

## 2019-08-23 DIAGNOSIS — S22.41XD CLOSED FRACTURE OF MULTIPLE RIBS OF RIGHT SIDE WITH ROUTINE HEALING, SUBSEQUENT ENCOUNTER: ICD-10-CM

## 2019-08-23 DIAGNOSIS — S01.01XD LACERATION OF SCALP, SUBSEQUENT ENCOUNTER: ICD-10-CM

## 2019-08-23 DIAGNOSIS — I48.91 ATRIAL FIBRILLATION, UNSPECIFIED TYPE (HCC): ICD-10-CM

## 2019-08-23 DIAGNOSIS — I10 ESSENTIAL HYPERTENSION: ICD-10-CM

## 2019-08-23 DIAGNOSIS — M62.9 MUSCULOSKELETAL DISORDER INVOLVING UPPER TRAPEZIUS MUSCLE: ICD-10-CM
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
## 2019-08-23 PROCEDURE — 99214 OFFICE O/P EST MOD 30 MIN: CPT | Performed by: NURSE PRACTITIONER

## 2019-08-23 RX ORDER — ACETAMINOPHEN AND CODEINE PHOSPHATE 300; 30 MG/1; MG/1
1 TABLET ORAL EVERY 6 HOURS PRN
Qty: 60 TABLET | Refills: 0 | Status: SHIPPED | OUTPATIENT
Start: 2019-08-23 | End: 2019-10-17

## 2019-08-23 NOTE — PROGRESS NOTES
Arianna Morgan is a 80year old female. Patient presents with:  ER F/U: LG. Room 11. Follow up after a fall. She states she isn't doing ok       HPI:   Presents for follow up fall   Reports dropped her hearing aid and fell while picking it up.   Landed o mouth nightly.   Disp: 90 tablet Rfl: 0   ALENDRONATE 70 MG Oral Tab TAKE 1 TABLET BY MOUTH ONCE A WEEK Disp: 12 tablet Rfl: 0   SIMVASTATIN 10 MG Oral Tab TAKE ONE TABLET BY MOUTH ONCE DAILY Disp: 90 tablet Rfl: 0   ketoconazole 2 % External Cream Apply to Lumbago    • Osteoarthritis    • Other and unspecified hyperlipidemia    • Pneumonia, organism unspecified(486)    • Unspecified essential hypertension    • Vertigo       Social History:  Social History    Tobacco Use      Smoking status: Former Smoker masses, HSM or tenderness  EXTREMITIES: no edema, normal strength and tone  PSYCH: alert and oriented x 3    ASSESSMENT AND PLAN:     Fall, subsequent encounter  (primary encounter diagnosis)  Closed fracture of multiple ribs of right side with routine hea

## 2019-08-30 ENCOUNTER — OFFICE VISIT (OUTPATIENT)
Dept: INTERNAL MEDICINE CLINIC | Facility: CLINIC | Age: 84
End: 2019-08-30
Payer: MEDICARE

## 2019-08-30 VITALS
WEIGHT: 122 LBS | RESPIRATION RATE: 16 BRPM | TEMPERATURE: 98 F | DIASTOLIC BLOOD PRESSURE: 62 MMHG | BODY MASS INDEX: 21.62 KG/M2 | HEIGHT: 63 IN | HEART RATE: 72 BPM | SYSTOLIC BLOOD PRESSURE: 146 MMHG
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
## 2019-08-30 DIAGNOSIS — S01.01XD LACERATION OF SCALP, SUBSEQUENT ENCOUNTER: Primary | ICD-10-CM

## 2019-08-30 DIAGNOSIS — Z48.02: ICD-10-CM

## 2019-08-30 PROCEDURE — 99213 OFFICE O/P EST LOW 20 MIN: CPT | Performed by: INTERNAL MEDICINE

## 2019-08-30 NOTE — PROGRESS NOTES
Megan Valles  7/3/1934    Patient presents with:  Staple Removal: back of head      [address] [facility] Juan Pablo Dasilva is a 80year old female who presents for staple removal.    The patient was in her usual state of health until 8/20 when she suffered a sl Disp:  Rfl:    umeclidinium-vilanterol (ANORO ELLIPTA) 62.5-25 MCG/INH Inhalation Aerosol Powder, Breath Activated Inhale 1 puff into the lungs daily. Disp:  Rfl:    Cholecalciferol (VITAMIN D3) 1000 units Oral Cap Take 1 tablet by mouth daily.  Disp:  Rfl: compression fracture of thoracic vertebra (HCC)     Arthropathy of thoracic facet joint     Osteoporosis     Hyperglycemia     SBO (small bowel obstruction) (HCC)     Small bowel obstruction (HCC)     [facility] to [facility]     Sherly Cardozo INJECTION DIAGNOSTIC Bilateral 1/29/2018    Performed by Leandro Tompkins MD at [facility] MAIN OR   • TONSILLECTOMY  1947   • [address] 44      Social History    Tobacco Use      Smoking status: Former Smoker        Packs/day: 0.50        Years: 15.00 Consults:  None    No follow-ups on file. There are no Patient Instructions on file for this visit. All questions were answered and the patient agrees with the plan.      Thank you,  Yunior Norton MD

## 2019-09-03 ENCOUNTER — TELEPHONE (OUTPATIENT)
Dept: INTERNAL MEDICINE CLINIC | Facility: CLINIC | Age: 84
End: 2019-09-03

## 2019-09-03 NOTE — TELEPHONE ENCOUNTER
Spoke with patient stating she continues to have pain, she would like to look further into injections for the right shoulder, does have PT starting Thursday, has been using heat and ice on the area, as well as Tylenol #3.  Please advise if ok to refer to pa

## 2019-09-03 NOTE — TELEPHONE ENCOUNTER
Would like pain shots for pain in right shoulder, has a dull ache all the time. SD is aware of the pain, was given pain pills Acetaminophen-Codeine, but they don't do much for her. Can she go somewhere to get a shot? Pls call to discuss.

## 2019-09-04 NOTE — TELEPHONE ENCOUNTER
LM on  at [phone] for patient informing per SD she would like to see what PT evaluation is, an injection on upper trap will not help. Patient to call back with any further questions.

## 2019-09-05 ENCOUNTER — APPOINTMENT (OUTPATIENT)
Dept: PHYSICAL THERAPY | Age: 84
End: 2019-09-05
Attending: NURSE PRACTITIONER
Payer: MEDICARE

## 2019-09-05 ENCOUNTER — APPOINTMENT (OUTPATIENT)
Dept: CT IMAGING | Age: 84
End: 2019-09-05
Attending: EMERGENCY MEDICINE
Payer: MEDICARE

## 2019-09-05 ENCOUNTER — APPOINTMENT (OUTPATIENT)
Dept: GENERAL RADIOLOGY | Age: 84
End: 2019-09-05
Attending: EMERGENCY MEDICINE
Payer: MEDICARE

## 2019-09-05 ENCOUNTER — HOSPITAL ENCOUNTER (EMERGENCY)
Age: 84
Discharge: HOME | End: 2019-09-05
Attending: EMERGENCY MEDICINE
Payer: MEDICARE

## 2019-09-05 VITALS
TEMPERATURE: 97 F | SYSTOLIC BLOOD PRESSURE: 125 MMHG | HEIGHT: 62.99 IN | DIASTOLIC BLOOD PRESSURE: 50 MMHG | WEIGHT: 120 LBS | BODY MASS INDEX: 21.26 KG/M2 | OXYGEN SATURATION: 94 % | HEART RATE: 66 BPM | RESPIRATION RATE: 16 BRPM

## 2019-09-05 DIAGNOSIS — S16.1XXD STRAIN OF NECK MUSCLE, SUBSEQUENT ENCOUNTER: Primary | ICD-10-CM

## 2019-09-05 DIAGNOSIS — S09.8XXD BLUNT HEAD TRAUMA, SUBSEQUENT ENCOUNTER: ICD-10-CM

## 2019-09-05 PROCEDURE — 71046 X-RAY EXAM CHEST 2 VIEWS: CPT | Performed by: EMERGENCY MEDICINE

## 2019-09-05 PROCEDURE — 99284 EMERGENCY DEPT VISIT MOD MDM: CPT

## 2019-09-05 PROCEDURE — 72125 CT NECK SPINE W/O DYE: CPT | Performed by: EMERGENCY MEDICINE

## 2019-09-05 PROCEDURE — 70450 CT HEAD/BRAIN W/O DYE: CPT | Performed by: EMERGENCY MEDICINE

## 2019-09-05 PROCEDURE — 73030 X-RAY EXAM OF SHOULDER: CPT | Performed by: EMERGENCY MEDICINE

## 2019-09-05 RX ORDER — TRAMADOL HYDROCHLORIDE 50 MG/1
50 TABLET ORAL ONCE
Status: COMPLETED | OUTPATIENT
Start: 2019-09-05 | End: 2019-09-05

## 2019-09-05 RX ORDER — TRAMADOL HYDROCHLORIDE 50 MG/1
50 TABLET ORAL EVERY 8 HOURS PRN
Qty: 12 TABLET | Refills: 0 | Status: SHIPPED | OUTPATIENT
Start: 2019-09-05 | End: 2019-10-14 | Stop reason: ALTCHOICE

## 2019-09-05 NOTE — ED INITIAL ASSESSMENT (HPI)
Pt presented to ER c/o right shoulder and neck pain.  Pt states she fell approx 10 days ago and fractured some ribs on the right side

## 2019-09-05 NOTE — ED PROVIDER NOTES
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 There is good equal air entry bilaterally. HEART: Regular rate and rhythm. Normal S1, S2 no S3, or S4. No murmur.   ABDOMEN: There is no focal tenderness to palpation appreciated anywhere throughout the abd cervical spine. No acute cervical spine fracture. Dictated by: Claudia Norwood MD on 9/05/2019 at 7:35     Approved by: Claudia Norwood MD                  MDM   7:50 patient with no other new complaints this time and is feeling better at this time.   Roselyn week        Medications Prescribed:  Current Discharge Medication List    START taking these medications    traMADol HCl 50 MG Oral Tab  Take 1 tablet (50 mg total) by mouth every 8 (eight) hours as needed for Pain.   Qty: 12 tablet Refills: 0

## 2019-09-09 ENCOUNTER — OFFICE VISIT (OUTPATIENT)
Dept: PHYSICAL THERAPY | Age: 84
End: 2019-09-09
Attending: NURSE PRACTITIONER
Payer: MEDICARE

## 2019-09-09 PROCEDURE — 97014 ELECTRIC STIMULATION THERAPY: CPT

## 2019-09-09 PROCEDURE — 97162 PT EVAL MOD COMPLEX 30 MIN: CPT

## 2019-09-09 RX ORDER — SIMVASTATIN 10 MG
TABLET ORAL
Qty: 90 TABLET | Refills: 0 | Status: SHIPPED | OUTPATIENT
Start: 2019-09-09 | End: 2019-11-15

## 2019-09-09 NOTE — PROGRESS NOTES
FALL SCREEN EVALUATION   Referring Physician: Dr. Sheikh Call  Diagnosis: Closed fracture of multiple ribs of right side with routine healing, subsequent encounter (S22.41XD) following fall  Musculoskeletal disorder involving upper trapezius muscle      Date of Noted: 2/27/2018   Arthropathy of thoracic facet joint   Noted: 1/19/2018   Closed compression fracture of thoracic vertebra ([address]   Noted: 7/11/2017   Mild cognitive impairment   Noted: 3/20/2017   Essential hypertension   Noted: Unknown   Dyslipidemia Precautions:  Fall Risk  OBJECTIVE:   Physical Exam:  Posture/Observation: alert female, forward head posture, increased thoracic kyphosis    LE Strength: WNL with seated MMT               Cervical AROM;   Flexion 40 deg  Extension 30 deg  Right rotatio including changing pain levels.   PLAN OF CARE:    Goals: (to be met in 8 visits)  · Pt will improve cervical AROM extension to 40 degrees without pain to improve tolerance for putting dishes into overhead cabinets   · Pt will improve cervical AROM rotation

## 2019-09-17 ENCOUNTER — OFFICE VISIT (OUTPATIENT)
Dept: FAMILY MEDICINE CLINIC | Facility: CLINIC | Age: 84
End: 2019-09-17
Payer: MEDICARE

## 2019-09-17 ENCOUNTER — HOSPITAL ENCOUNTER (EMERGENCY)
Age: 84
Discharge: HOME | End: 2019-09-17
Attending: EMERGENCY MEDICINE
Payer: MEDICARE

## 2019-09-17 ENCOUNTER — APPOINTMENT (OUTPATIENT)
Dept: CT IMAGING | Age: 84
End: 2019-09-17
Attending: EMERGENCY MEDICINE
Payer: MEDICARE

## 2019-09-17 VITALS
HEART RATE: 71 BPM | TEMPERATURE: 98 F | DIASTOLIC BLOOD PRESSURE: 66 MMHG | SYSTOLIC BLOOD PRESSURE: 138 MMHG | OXYGEN SATURATION: 98 % | HEIGHT: 63 IN | BODY MASS INDEX: 21 KG/M2

## 2019-09-17 VITALS
HEIGHT: 62 IN | BODY MASS INDEX: 21.71 KG/M2 | SYSTOLIC BLOOD PRESSURE: 143 MMHG | OXYGEN SATURATION: 99 % | WEIGHT: 118 LBS | RESPIRATION RATE: 16 BRPM | DIASTOLIC BLOOD PRESSURE: 58 MMHG | HEART RATE: 60 BPM | TEMPERATURE: 98 F

## 2019-09-17 DIAGNOSIS — R82.71 BACTERIA IN URINE: ICD-10-CM

## 2019-09-17 DIAGNOSIS — R10.9 RIGHT SIDED ABDOMINAL PAIN: ICD-10-CM

## 2019-09-17 DIAGNOSIS — Z02.9 ADMINISTRATIVE ENCOUNTER: Primary | ICD-10-CM

## 2019-09-17 DIAGNOSIS — K52.9 ILEITIS: Primary | ICD-10-CM

## 2019-09-17 LAB
ALBUMIN SERPL-MCNC: 3.5 G/DL (ref 3.4–5)
ALBUMIN/GLOB SERPL: 1.2 {RATIO} (ref 1–2)
ALP LIVER SERPL-CCNC: 86 U/L (ref 55–142)
ALT SERPL-CCNC: 25 U/L (ref 13–56)
ANION GAP SERPL CALC-SCNC: 7 MMOL/L (ref 0–18)
AST SERPL-CCNC: 22 U/L (ref 15–37)
BASOPHILS # BLD AUTO: 0.04 X10(3) UL (ref 0–0.2)
BASOPHILS NFR BLD AUTO: 0.7 %
BILIRUB SERPL-MCNC: 0.8 MG/DL (ref 0.1–2)
BILIRUB UR QL STRIP.AUTO: NEGATIVE
BILIRUBIN: NEGATIVE
BUN BLD-MCNC: 22 MG/DL (ref 7–18)
BUN/CREAT SERPL: 21.8 (ref 10–20)
CALCIUM BLD-MCNC: 9.6 MG/DL (ref 8.5–10.1)
CHLORIDE SERPL-SCNC: 106 MMOL/L (ref 98–112)
CLARITY UR REFRACT.AUTO: CLEAR
CO2 SERPL-SCNC: 27 MMOL/L (ref 21–32)
CREAT BLD-MCNC: 1.01 MG/DL (ref 0.55–1.02)
DEPRECATED RDW RBC AUTO: 43.5 FL (ref 35.1–46.3)
EOSINOPHIL # BLD AUTO: 0.12 X10(3) UL (ref 0–0.7)
EOSINOPHIL NFR BLD AUTO: 2.1 %
ERYTHROCYTE [DISTWIDTH] IN BLOOD BY AUTOMATED COUNT: 12.8 % (ref 11–15)
GLOBULIN PLAS-MCNC: 2.9 G/DL (ref 2.8–4.4)
GLUCOSE (URINE DIPSTICK): NEGATIVE MG/DL
GLUCOSE BLD-MCNC: 136 MG/DL (ref 70–99)
GLUCOSE UR STRIP.AUTO-MCNC: NEGATIVE MG/DL
HCT VFR BLD AUTO: 41.1 % (ref 35–48)
HGB BLD-MCNC: 14.1 G/DL (ref 12–16)
IMM GRANULOCYTES # BLD AUTO: 0.01 X10(3) UL (ref 0–1)
IMM GRANULOCYTES NFR BLD: 0.2 %
LIPASE SERPL-CCNC: 200 U/L (ref 73–393)
LYMPHOCYTES # BLD AUTO: 0.61 X10(3) UL (ref 1–4)
LYMPHOCYTES NFR BLD AUTO: 10.9 %
M PROTEIN MFR SERPL ELPH: 6.4 G/DL (ref 6.4–8.2)
MCH RBC QN AUTO: 31.5 PG (ref 26–34)
MCHC RBC AUTO-ENTMCNC: 34.3 G/DL (ref 31–37)
MCV RBC AUTO: 91.7 FL (ref 80–100)
MONOCYTES # BLD AUTO: 0.36 X10(3) UL (ref 0.1–1)
MONOCYTES NFR BLD AUTO: 6.4 %
MULTISTIX LOT#: ABNORMAL NUMERIC
NEUTROPHILS # BLD AUTO: 4.45 X10 (3) UL (ref 1.5–7.7)
NEUTROPHILS # BLD AUTO: 4.45 X10(3) UL (ref 1.5–7.7)
NEUTROPHILS NFR BLD AUTO: 79.7 %
NITRITE UR QL STRIP.AUTO: NEGATIVE
NITRITE, URINE: NEGATIVE
OCCULT BLOOD: NEGATIVE
OSMOLALITY SERPL CALC.SUM OF ELEC: 295 MOSM/KG (ref 275–295)
PH UR STRIP.AUTO: 5.5 [PH] (ref 4.5–8)
PH, URINE: 5.5 (ref 4.5–8)
PLATELET # BLD AUTO: 240 10(3)UL (ref 150–450)
POTASSIUM SERPL-SCNC: 3.1 MMOL/L (ref 3.5–5.1)
PROTEIN (URINE DIPSTICK): 100 MG/DL
RBC # BLD AUTO: 4.48 X10(6)UL (ref 3.8–5.3)
RBC UR QL AUTO: NEGATIVE
SODIUM SERPL-SCNC: 140 MMOL/L (ref 136–145)
SP GR UR STRIP.AUTO: >=1.03 (ref 1–1.03)
SPECIFIC GRAVITY: 1.03 (ref 1–1.03)
URINE-COLOR: YELLOW
UROBILINOGEN UR STRIP.AUTO-MCNC: 0.2 MG/DL
UROBILINOGEN,SEMI-QN: 0.2 MG/DL (ref 0–1.9)
WBC # BLD AUTO: 5.6 X10(3) UL (ref 4–11)

## 2019-09-17 PROCEDURE — 99284 EMERGENCY DEPT VISIT MOD MDM: CPT

## 2019-09-17 PROCEDURE — 81015 MICROSCOPIC EXAM OF URINE: CPT | Performed by: EMERGENCY MEDICINE

## 2019-09-17 PROCEDURE — 96360 HYDRATION IV INFUSION INIT: CPT

## 2019-09-17 PROCEDURE — 84132 ASSAY OF SERUM POTASSIUM: CPT | Performed by: EMERGENCY MEDICINE

## 2019-09-17 PROCEDURE — 81003 URINALYSIS AUTO W/O SCOPE: CPT | Performed by: NURSE PRACTITIONER

## 2019-09-17 PROCEDURE — 74177 CT ABD & PELVIS W/CONTRAST: CPT | Performed by: EMERGENCY MEDICINE

## 2019-09-17 PROCEDURE — 80053 COMPREHEN METABOLIC PANEL: CPT | Performed by: EMERGENCY MEDICINE

## 2019-09-17 PROCEDURE — 85025 COMPLETE CBC W/AUTO DIFF WBC: CPT | Performed by: EMERGENCY MEDICINE

## 2019-09-17 PROCEDURE — 81001 URINALYSIS AUTO W/SCOPE: CPT | Performed by: EMERGENCY MEDICINE

## 2019-09-17 PROCEDURE — 83690 ASSAY OF LIPASE: CPT | Performed by: EMERGENCY MEDICINE

## 2019-09-17 PROCEDURE — 84450 TRANSFERASE (AST) (SGOT): CPT | Performed by: EMERGENCY MEDICINE

## 2019-09-17 RX ORDER — AMOXICILLIN AND CLAVULANATE POTASSIUM 875; 125 MG/1; MG/1
1 TABLET, FILM COATED ORAL 2 TIMES DAILY
Qty: 20 TABLET | Refills: 0 | Status: SHIPPED | OUTPATIENT
Start: 2019-09-17 | End: 2019-09-27

## 2019-09-17 RX ORDER — SODIUM CHLORIDE 9 MG/ML
INJECTION, SOLUTION INTRAVENOUS CONTINUOUS
Status: DISCONTINUED | OUTPATIENT
Start: 2019-09-17 | End: 2019-09-17

## 2019-09-17 NOTE — ED INITIAL ASSESSMENT (HPI)
REPORTS MONTHS HX OF RIGHT SIDED ABD PAIN. SENT FROM [facility].

## 2019-09-17 NOTE — PROGRESS NOTES
Avery Jackson is a 80year old female who presents for abdominal pain. The patient complaints of abdominal pain. Pain is located at RLQ, Periumbilical. Pain is described as aching, burning, sharp. Severity is mild. Associated symptoms: none.  The debby CODEINE #3) 300-30 MG Oral Tab Take 1 tablet by mouth every 6 (six) hours as needed for Pain. Disp: 60 tablet Rfl: 0   DILTIAZEM HCL ER COATED BEADS 300 MG Oral Capsule SR 24 Hr Take 1 capsule (300 mg total) by mouth daily.  Disp: 90 capsule Rfl: 1   Almon Mehreeny impairment    • High blood pressure    • High cholesterol    • HYPERLIPIDEMIA    • HYPERTENSION    • Increased urinary frequency    • Lower back pain    • Lumbago    • Osteoarthritis    • Other and unspecified hyperlipidemia    • Pneumonia, organism unspec squamous proliferation   • THORACIC FACET JOINT INJECTION DIAGNOSTIC Bilateral 1/29/2018    Performed by Jose Flores MD at [facility] MAIN OR   • TONSILLECTOMY  1947   • [address]da 44      Family History   Problem Relation Age of Onset   • Psychiatric Fath No hernias. Right sided abdominal tenderness, minimal guarding. EXTREMITIES: no edema  NEURO: DTR 2+bilaterally upper and lower extremities.     ASSESSMENT AND PLAN:   Zoe Apple is a 80year old female who presents for:    Administrative encounter

## 2019-09-19 ENCOUNTER — APPOINTMENT (OUTPATIENT)
Dept: PHYSICAL THERAPY | Age: 84
End: 2019-09-19
Attending: NURSE PRACTITIONER
Payer: MEDICARE

## 2019-09-23 ENCOUNTER — HOSPITAL ENCOUNTER (OUTPATIENT)
Dept: MAMMOGRAPHY | Age: 84
Discharge: HOME | End: 2019-09-23
Attending: INTERNAL MEDICINE
Payer: MEDICARE

## 2019-09-23 DIAGNOSIS — Z12.31 ENCOUNTER FOR SCREENING MAMMOGRAM FOR MALIGNANT NEOPLASM OF BREAST: ICD-10-CM

## 2019-09-23 DIAGNOSIS — N64.89 BREAST ASYMMETRY: ICD-10-CM

## 2019-09-23 PROCEDURE — 77063 BREAST TOMOSYNTHESIS BI: CPT | Performed by: INTERNAL MEDICINE

## 2019-09-23 PROCEDURE — 77067 SCR MAMMO BI INCL CAD: CPT | Performed by: INTERNAL MEDICINE

## 2019-09-24 ENCOUNTER — OFFICE VISIT (OUTPATIENT)
Dept: INTERNAL MEDICINE CLINIC | Facility: CLINIC | Age: 84
End: 2019-09-24
Payer: MEDICARE

## 2019-09-24 ENCOUNTER — OFFICE VISIT (OUTPATIENT)
Dept: PHYSICAL THERAPY | Age: 84
End: 2019-09-24
Attending: NURSE PRACTITIONER
Payer: MEDICARE

## 2019-09-24 VITALS
DIASTOLIC BLOOD PRESSURE: 60 MMHG | HEART RATE: 68 BPM | BODY MASS INDEX: 21.33 KG/M2 | OXYGEN SATURATION: 99 % | SYSTOLIC BLOOD PRESSURE: 136 MMHG | HEIGHT: 63 IN | RESPIRATION RATE: 16 BRPM | WEIGHT: 120.38 LBS | TEMPERATURE: 98 F

## 2019-09-24 DIAGNOSIS — R10.31 RLQ ABDOMINAL PAIN: ICD-10-CM

## 2019-09-24 DIAGNOSIS — K52.9 ILEITIS: Primary | ICD-10-CM

## 2019-09-24 PROCEDURE — 97110 THERAPEUTIC EXERCISES: CPT

## 2019-09-24 PROCEDURE — 99213 OFFICE O/P EST LOW 20 MIN: CPT | Performed by: INTERNAL MEDICINE

## 2019-09-24 PROCEDURE — 97140 MANUAL THERAPY 1/> REGIONS: CPT

## 2019-09-24 NOTE — PROGRESS NOTES
Dx: Closed fracture of multiple ribs of right side with routine healing, subsequent encounter (S22.41XD) following fall  Musculoskeletal disorder involving upper trapezius muscle              Insurance (Authorized # of Visits):  10 (medicare)            Au supine       Pt ed re reading/TV positions        HEP: supine cerv rotation, MHP     Charges: there ex 2, MT 1       Total Timed Treatment: 45 min  Total Treatment Time: 45  min

## 2019-09-24 NOTE — PROGRESS NOTES
Chauncy Boast is a 80year old female. Patient presents with:  Er F/u: cn room 4: er follow up for inflamation around the small instestines      HPI:      Patient c/o RLQ pain on 9/16 prompting visit to ER on 9/17/19.  Denies any n/v/diarrhea/constipati 12 tablet Rfl: 0   Acetaminophen-Codeine (TYLENOL WITH CODEINE #3) 300-30 MG Oral Tab Take 1 tablet by mouth every 6 (six) hours as needed for Pain.  Disp: 60 tablet Rfl: 0   DILTIAZEM HCL ER COATED BEADS 300 MG Oral Capsule SR 24 Hr Take 1 capsule (300 mg intestine    • Dizziness and giddiness    • Hearing impairment    • High blood pressure    • High cholesterol    • HYPERLIPIDEMIA    • HYPERTENSION    • Increased urinary frequency    • Lower back pain    • Lumbago    • Osteoarthritis    • Other and unspec abdominal pain  - Pain has resolved, advised to finish Augmentin course  -If any recurrence of symptoms, [facility]    [facility] for this Visit:  Requested Prescriptions      No prescriptions requested or ordered in th

## 2019-09-26 ENCOUNTER — APPOINTMENT (OUTPATIENT)
Dept: PHYSICAL THERAPY | Age: 84
End: 2019-09-26
Attending: NURSE PRACTITIONER
Payer: MEDICARE

## 2019-09-26 PROCEDURE — 97014 ELECTRIC STIMULATION THERAPY: CPT

## 2019-09-26 PROCEDURE — 97140 MANUAL THERAPY 1/> REGIONS: CPT

## 2019-09-26 PROCEDURE — 97110 THERAPEUTIC EXERCISES: CPT

## 2019-09-26 NOTE — PROGRESS NOTES
Dx: Closed fracture of multiple ribs of right side with routine healing, subsequent encounter (S22.41XD) following fall  Musculoskeletal disorder involving upper trapezius muscle              Insurance (Authorized # of Visits):  10 (medicare)            Au STM right upper trap 8 min  Cervical distraction with left rotation x5 bouts       IFC with MHP 10 min supine STM/DTM in sidelying to right upper trap 10 min       Pt ed re reading/TV positions  IFC 15 min with heat       HEP: supine cerv rotation, MHP

## 2019-09-30 ENCOUNTER — OFFICE VISIT (OUTPATIENT)
Dept: PHYSICAL THERAPY | Age: 84
End: 2019-09-30
Attending: NURSE PRACTITIONER
Payer: MEDICARE

## 2019-09-30 PROCEDURE — 97110 THERAPEUTIC EXERCISES: CPT

## 2019-09-30 PROCEDURE — 97014 ELECTRIC STIMULATION THERAPY: CPT

## 2019-09-30 PROCEDURE — 97140 MANUAL THERAPY 1/> REGIONS: CPT

## 2019-09-30 NOTE — PROGRESS NOTES
Dx: Closed fracture of multiple ribs of right side with routine healing, subsequent encounter (S22.41XD) following fall  Musculoskeletal disorder involving upper trapezius muscle              Insurance (Authorized # of Visits):  10 (medicare)            Au 3x30 sec      Upslope C3-4, C4-5 gr 3-4 2x10 bouts on left  Right C5-6, C6-7 gr 3, 3x10 bouts  Manual cerv distraction, int 5 min  Int 5 min      STM right upper trap 8 min  Cervical distraction with left rotation x5 bouts  x5 bouts bilaterally      IFC wi

## 2019-10-02 ENCOUNTER — APPOINTMENT (OUTPATIENT)
Dept: PHYSICAL THERAPY | Age: 84
End: 2019-10-02
Attending: NURSE PRACTITIONER
Payer: MEDICARE

## 2019-10-06 ENCOUNTER — APPOINTMENT (OUTPATIENT)
Dept: GENERAL RADIOLOGY | Facility: HOSPITAL | Age: 84
DRG: 390 | End: 2019-10-06
Attending: EMERGENCY MEDICINE
Payer: MEDICARE

## 2019-10-06 ENCOUNTER — APPOINTMENT (OUTPATIENT)
Dept: CT IMAGING | Facility: HOSPITAL | Age: 84
DRG: 390 | End: 2019-10-06
Attending: EMERGENCY MEDICINE
Payer: MEDICARE

## 2019-10-06 ENCOUNTER — APPOINTMENT (OUTPATIENT)
Dept: GENERAL RADIOLOGY | Facility: HOSPITAL | Age: 84
DRG: 390 | End: 2019-10-06
Attending: SURGERY
Payer: MEDICARE

## 2019-10-06 ENCOUNTER — HOSPITAL ENCOUNTER (INPATIENT)
Facility: HOSPITAL | Age: 84
LOS: 4 days | Discharge: HOME | DRG: 390 | End: 2019-10-10
Attending: EMERGENCY MEDICINE | Admitting: HOSPITALIST
Payer: MEDICARE

## 2019-10-06 DIAGNOSIS — K56.609 SBO (SMALL BOWEL OBSTRUCTION) (HCC): Primary | ICD-10-CM

## 2019-10-06 DIAGNOSIS — M54.2 NECK PAIN: ICD-10-CM

## 2019-10-06 PROCEDURE — 99223 1ST HOSP IP/OBS HIGH 75: CPT | Performed by: HOSPITALIST

## 2019-10-06 PROCEDURE — 71045 X-RAY EXAM CHEST 1 VIEW: CPT | Performed by: SURGERY

## 2019-10-06 PROCEDURE — 74177 CT ABD & PELVIS W/CONTRAST: CPT | Performed by: EMERGENCY MEDICINE

## 2019-10-06 PROCEDURE — 99223 1ST HOSP IP/OBS HIGH 75: CPT | Performed by: SURGERY

## 2019-10-06 RX ORDER — ONDANSETRON 2 MG/ML
4 INJECTION INTRAMUSCULAR; INTRAVENOUS ONCE
Status: COMPLETED | OUTPATIENT
Start: 2019-10-06 | End: 2019-10-06

## 2019-10-06 RX ORDER — SODIUM CHLORIDE 9 MG/ML
INJECTION, SOLUTION INTRAVENOUS CONTINUOUS
Status: DISCONTINUED | OUTPATIENT
Start: 2019-10-06 | End: 2019-10-06

## 2019-10-06 RX ORDER — MORPHINE SULFATE 4 MG/ML
4 INJECTION, SOLUTION INTRAMUSCULAR; INTRAVENOUS EVERY 2 HOUR PRN
Status: DISCONTINUED | OUTPATIENT
Start: 2019-10-06 | End: 2019-10-10

## 2019-10-06 RX ORDER — SODIUM CHLORIDE 9 MG/ML
INJECTION, SOLUTION INTRAVENOUS CONTINUOUS
Status: DISCONTINUED | OUTPATIENT
Start: 2019-10-06 | End: 2019-10-10

## 2019-10-06 RX ORDER — MORPHINE SULFATE 4 MG/ML
4 INJECTION, SOLUTION INTRAMUSCULAR; INTRAVENOUS ONCE
Status: COMPLETED | OUTPATIENT
Start: 2019-10-06 | End: 2019-10-06

## 2019-10-06 RX ORDER — MORPHINE SULFATE 4 MG/ML
2 INJECTION, SOLUTION INTRAMUSCULAR; INTRAVENOUS EVERY 2 HOUR PRN
Status: DISCONTINUED | OUTPATIENT
Start: 2019-10-06 | End: 2019-10-10

## 2019-10-06 RX ORDER — HYDRALAZINE HYDROCHLORIDE 20 MG/ML
5 INJECTION INTRAMUSCULAR; INTRAVENOUS ONCE
Status: COMPLETED | OUTPATIENT
Start: 2019-10-06 | End: 2019-10-06

## 2019-10-06 RX ORDER — MORPHINE SULFATE 4 MG/ML
1 INJECTION, SOLUTION INTRAMUSCULAR; INTRAVENOUS EVERY 2 HOUR PRN
Status: DISCONTINUED | OUTPATIENT
Start: 2019-10-06 | End: 2019-10-10

## 2019-10-06 RX ORDER — ENOXAPARIN SODIUM 100 MG/ML
40 INJECTION SUBCUTANEOUS DAILY
Status: DISCONTINUED | OUTPATIENT
Start: 2019-10-06 | End: 2019-10-10

## 2019-10-06 RX ORDER — POTASSIUM CHLORIDE 14.9 MG/ML
20 INJECTION INTRAVENOUS ONCE
Status: COMPLETED | OUTPATIENT
Start: 2019-10-06 | End: 2019-10-06

## 2019-10-06 RX ORDER — ACETAMINOPHEN 325 MG/1
650 TABLET ORAL EVERY 6 HOURS PRN
Status: DISCONTINUED | OUTPATIENT
Start: 2019-10-06 | End: 2019-10-10

## 2019-10-06 RX ORDER — ONDANSETRON 2 MG/ML
4 INJECTION INTRAMUSCULAR; INTRAVENOUS EVERY 6 HOURS PRN
Status: DISCONTINUED | OUTPATIENT
Start: 2019-10-06 | End: 2019-10-10

## 2019-10-06 NOTE — CONSULTS
[facility]  Report of Consultation    Latoya Brian Patient Status:  Inpatient    7/3/1934 MRN [id number]   [facility] 3SW-A Attending Annabel Velazco MD   Hosp Day # 0 PCP Krystyna Vallecillo MD     Reason for Consultation:  Abdominal pa Dizziness and giddiness    • Hearing impairment    • High blood pressure    • High cholesterol    • HYPERLIPIDEMIA    • HYPERTENSION    • Increased urinary frequency    • Lower back pain    • Lumbago    • Osteoarthritis    • Other and unspecified hyperlipi - L mid forearm - atypical squamous proliferation   • THORACIC FACET JOINT INJECTION DIAGNOSTIC Bilateral 1/29/2018    Performed by Scott Ga MD at [facility] MAIN OR   • TONSILLECTOMY  1947   • Edda 44     Family History   Problem Relation Age of fever, irritability and lethargy  Endocrine:  Negative for abnormal sleep patterns, increased activity, polydipsia and polyphagia  ENMT:  Negative for ear drainage, hearing loss and nasal drainage  Eyes:  Negative for eye discharge and vision loss  Derrel Boxer NEPRELIM 6.61   WBC 8.4   .0       Recent Labs   Lab 10/06/19  0219   *   BUN 20*   CREATSERUM 0.87   GFRAA 70   GFRNAA 61   CA 10.3*   ALB 3.7      K 3.8      CO2 30.0   ALKPHO 99   AST 19   ALT 20   BILT 0.6   TP 6.4         N mixed plaque in the aorta and iliac arteries. RETROPERITONEUM:  Unremarkable. BOWEL/MESENTERY:  Widely patent anastomosis along the rectosigmoid junction. Moderate feces in the colon.   There are dilated loops of small bowel scattered in the abdomen, mos Approved by: Delia Cabral MD                 Impression:  Patient Active Problem List:     Spinal stenosis, lumbar region, without neurogenic claudication     Bursitis, hip     Pure hypercholesterolemia     History of recurrent UTI (urinary tract in only mild R sided abdominal pain. No flatus.     General: Alert, orientated x3. Cooperative. No apparent distress. Abdomen: Soft, non-distended, mild R mid-abdominal tenderness, with no rebound or guarding.  No peritoneal signs.        A/P SBO--pt appears

## 2019-10-06 NOTE — ED NOTES
Pt refused NGT insertion @ this time. No vomiting noted, no hx of vomiting as claimed.  Denies any nausea

## 2019-10-06 NOTE — ED NOTES
Ambulated to washroom, complained of R neck shoulder spasm after going to washroom. + hx of fall weeks ago.  Per pt, ongoing intermittent R shoulder pain since fall

## 2019-10-06 NOTE — H&P
D[facility] HOSPITALIST  History and Physical     Brianna Wiggins Patient Status:  Emergency    7/3/1934 MRN [id number]   Location [address] [facility] Attending Micha Stanley, [address] [place] [address] Day # 0 PCP Faizan Sellers MD     Chief Compla Kyphoplasty, Dr. Martinez Milder   • OTHER SURGICAL HISTORY  1949    Cyst in vagina   • OTHER SURGICAL HISTORY  1974    Breast implants   • OTHER SURGICAL HISTORY  2006    Right breast lump- benign   • OTHER SURGICAL HISTORY  2006    Fjuhqntcmi-odtkedcsap-bjylcxfdmy Disp: 90 tablet Rfl: 0   traMADol HCl 50 MG Oral Tab Take 1 tablet (50 mg total) by mouth every 8 (eight) hours as needed for Pain.  Disp: 12 tablet Rfl: 0   Acetaminophen-Codeine (TYLENOL WITH CODEINE #3) 300-30 MG Oral Tab Take 1 tablet by mouth every 6 ( noted in the HPI. Physical Exam:    BP (!) 167/69   Pulse 61   Temp 97.7 °F (36.5 °C) (Temporal)   Resp 18   Ht 157.5 cm (5' 2\")   Wt 120 lb (54.4 kg)   LMP  (LMP Unknown)   SpO2 94%   BMI 21.95 kg/m²   General: No acute distress.  Alert and oriented x

## 2019-10-06 NOTE — ED PROVIDER NOTES
Patient Seen in: [facility] Emergency Department      History   Patient presents with:  Abdomen/Flank Pain (GI/)    Stated Complaint: RLQ pain    HPI    This is a 42-year-old female past medical history of multiple bowel resections history of smal COLONOSCOPY  2004, 5/8/2012    x2, in 2012 w/ forceps biopsy   • COLOSTOMY      and reversal   • EXPLORATORY LAPAROTOMY N/A 8/20/2017    Performed by Ambar Lord MD at [facility]  08/20/2017    expl lap   • PAULINO LOCALIZATION [place] 3/28/19    Drug use: No             Review of Systems    Positive for stated complaint: RLQ pain  Other systems are as noted in HPI. Constitutional and vital signs reviewed. All other systems reviewed and negative except as noted above.     Physical E Leukocyte Esterase Urine Trace (*)     RBC URINE 3-5 (*)     Mucous Urine 1+ (*)     All other components within normal limits   CBC W/ DIFFERENTIAL - Abnormal; Notable for the following components:    Lymphocyte Absolute 0.68 (*)     All other components the [employer] teleradiology service. MDM     42-year-old female presents ED with complaint of abdominal pain.   Vital signs stable arrival patient appears nontoxic on examination patient does have some abdominal tenderness in the right u

## 2019-10-06 NOTE — PROGRESS NOTES
Advanced NG tube to 65cm per XR recommendations. Placement verified with auscultation at this time. There continues to be no output noted. NG to LIWS. Pt c/o RLQ abdominal pain. Pt is not passing gas from below, but is burping.   Morphine and zofran given

## 2019-10-06 NOTE — ED INITIAL ASSESSMENT (HPI)
Persistent RLQ pain for past few days. Seen @ [facility] yesterday, showed inflamed appendix.  Fentanyl given PTA

## 2019-10-06 NOTE — PLAN OF CARE
Pt A & O x3, forgetful, very impulsive. Bed alarm activated. IVF. NPO. NG LIWS. Up x sba. Lovenox. Last BM 10/5. Morphine PRN pain. Pt c/o pain in neck and R shoulder. Pt hx fall a few weeks ago. Fall precautions. Will continue to monitor.

## 2019-10-06 NOTE — PROGRESS NOTES
No output noted in NG cannister. Tube appears at 57cm as charted by ER RN. Not able to auscultate with air due to resistance. Called Dr. Taylor Jaffe, order received for CXR for tube placement verification. Called XRAY and informed of stat CXR.

## 2019-10-07 PROCEDURE — 99232 SBSQ HOSP IP/OBS MODERATE 35: CPT | Performed by: SURGERY

## 2019-10-07 PROCEDURE — 99233 SBSQ HOSP IP/OBS HIGH 50: CPT | Performed by: STUDENT IN AN ORGANIZED HEALTH CARE EDUCATION/TRAINING PROGRAM

## 2019-10-07 RX ORDER — METOPROLOL TARTRATE 5 MG/5ML
5 INJECTION INTRAVENOUS EVERY 6 HOURS PRN
Status: DISCONTINUED | OUTPATIENT
Start: 2019-10-07 | End: 2019-10-10

## 2019-10-07 RX ORDER — CAPSAICIN 0.025 %
CREAM (GRAM) TOPICAL 3 TIMES DAILY PRN
Status: DISCONTINUED | OUTPATIENT
Start: 2019-10-07 | End: 2019-10-10

## 2019-10-07 RX ORDER — MUSCLE RUB CREAM 100; 150 MG/G; MG/G
CREAM TOPICAL 3 TIMES DAILY PRN
Status: DISCONTINUED | OUTPATIENT
Start: 2019-10-07 | End: 2019-10-07

## 2019-10-07 NOTE — PLAN OF CARE
Pt is AAOX4, impulsive, NPO, with ice chips, NG to Low Int. Wall suction, IVF, NG output 250ml, voiding to restroom, brief placed D? T episode of incontinence overnight, IV pain given.  Left AC IV infiltrated, ICU RN placed new Lt upper mid arm IV working fi

## 2019-10-07 NOTE — PROGRESS NOTES
D[facility] HOSPITALIST  Progress Note     Jennifer Gagnon Patient Status:  Inpatient    7/3/1934 MRN [id number]   [facility] 3SW-A Attending Maryam Wiley MD   Hosp Day # 1 PCP Michelle Garza MD     Chief Complaint: SBO    S: Patient  Not pas Subcutaneous Daily   • pantoprazole (PROTONIX) IV push  40 mg Intravenous Q24H       ASSESSMENT / PLAN:     1. SBO  2. Hypertension  3. COPD  4.  Paroxysmal Afib    Plan of care:   Continue NGT  Pain control   Appreciate sx consult    Quality:  · DVT Prophy

## 2019-10-07 NOTE — PROGRESS NOTES
[facility]  Progress Note    Laura Perry Patient Status:  Inpatient    7/3/1934 MRN [id number]   [facility] 3SW-A Attending Vidal Tovar MD   Hosp Day # 1 PCP Matthew Peters MD     Subjective:  Pt without flatus.   No abdominal p

## 2019-10-08 ENCOUNTER — APPOINTMENT (OUTPATIENT)
Dept: GENERAL RADIOLOGY | Facility: HOSPITAL | Age: 84
DRG: 390 | End: 2019-10-08
Attending: PHYSICIAN ASSISTANT
Payer: MEDICARE

## 2019-10-08 PROCEDURE — 74019 RADEX ABDOMEN 2 VIEWS: CPT | Performed by: PHYSICIAN ASSISTANT

## 2019-10-08 PROCEDURE — 99233 SBSQ HOSP IP/OBS HIGH 50: CPT | Performed by: STUDENT IN AN ORGANIZED HEALTH CARE EDUCATION/TRAINING PROGRAM

## 2019-10-08 PROCEDURE — 99232 SBSQ HOSP IP/OBS MODERATE 35: CPT | Performed by: SURGERY

## 2019-10-08 RX ORDER — METHYLPREDNISOLONE 4 MG/1
4 TABLET ORAL
Status: COMPLETED | OUTPATIENT
Start: 2019-10-09 | End: 2019-10-09

## 2019-10-08 RX ORDER — METHYLPREDNISOLONE 4 MG/1
4 TABLET ORAL
Status: DISCONTINUED | OUTPATIENT
Start: 2019-10-11 | End: 2019-10-10

## 2019-10-08 RX ORDER — METHYLPREDNISOLONE 4 MG/1
4 TABLET ORAL 2 TIMES DAILY
Status: DISCONTINUED | OUTPATIENT
Start: 2019-10-12 | End: 2019-10-10

## 2019-10-08 RX ORDER — METHYLPREDNISOLONE 4 MG/1
4 TABLET ORAL
Status: DISCONTINUED | OUTPATIENT
Start: 2019-10-10 | End: 2019-10-10

## 2019-10-08 RX ORDER — METHYLPREDNISOLONE 4 MG/1
4 TABLET ORAL
Status: DISCONTINUED | OUTPATIENT
Start: 2019-10-13 | End: 2019-10-10

## 2019-10-08 RX ORDER — POTASSIUM CHLORIDE 14.9 MG/ML
20 INJECTION INTRAVENOUS ONCE
Status: COMPLETED | OUTPATIENT
Start: 2019-10-08 | End: 2019-10-08

## 2019-10-08 RX ORDER — METHYLPREDNISOLONE 4 MG/1
8 TABLET ORAL
Status: COMPLETED | OUTPATIENT
Start: 2019-10-09 | End: 2019-10-09

## 2019-10-08 RX ORDER — METHYLPREDNISOLONE 4 MG/1
8 TABLET ORAL
Status: COMPLETED | OUTPATIENT
Start: 2019-10-08 | End: 2019-10-08

## 2019-10-08 RX ORDER — BACLOFEN 10 MG/1
5 TABLET ORAL ONCE
Status: COMPLETED | OUTPATIENT
Start: 2019-10-08 | End: 2019-10-08

## 2019-10-08 NOTE — PROGRESS NOTES
D[facility] HOSPITALIST  Progress Note     Latoya Brian Patient Status:  Inpatient    7/3/1934 MRN [id number]   [facility] 3SW-A Attending Annabel Velazco MD   Hosp Day # 2 PCP Krystyna Vallecillo MD     Chief Complaint: SBO    S: Patient  Still n 0.61 mg/dL). No results for input(s): PTP, INR in the last 168 hours. No results for input(s): TROP, CK in the last 168 hours. Imaging: Imaging data reviewed in Epic.     Medications:   • [START ON 10/9/2019] methylPREDNISolone  4 mg Oral TID

## 2019-10-08 NOTE — PLAN OF CARE
B/P elevated,prn medication given as ordered,with some improvement. Will continue to monitor. NGT to LIS,draining greenish colored output,abdomen soft but tender,with distant bowel sound to left lower quad. Denies passing flatus yet. Resting on and off,O2 when

## 2019-10-08 NOTE — PROGRESS NOTES
[facility]  Progress Note    Cindy Taveras Patient Status:  Inpatient    7/3/1934 MRN [id number]   [facility] 3SW-A Attending Iggy Mccabe MD   Hosp Day # 2 PCP Juan Pablo Macario MD     Subjective:  Pt with mild abdominal pain krystle AM    ADDENDUM:     Patient was seen and examined. Pt with some RLQ pain. No flatus.     General: Alert. Cooperative. No apparent distress. Abdomen: Soft, non-distended,+ mild RLQ tenderness, with no rebound or guarding.  No peritoneal signs.        A/P

## 2019-10-09 PROCEDURE — 99232 SBSQ HOSP IP/OBS MODERATE 35: CPT | Performed by: SURGERY

## 2019-10-09 PROCEDURE — 99233 SBSQ HOSP IP/OBS HIGH 50: CPT | Performed by: HOSPITALIST

## 2019-10-09 RX ORDER — MAGNESIUM OXIDE 400 MG (241.3 MG MAGNESIUM) TABLET
400 TABLET ONCE
Status: COMPLETED | OUTPATIENT
Start: 2019-10-09 | End: 2019-10-09

## 2019-10-09 NOTE — PROGRESS NOTES
[facility] HOSPITALIST  Progress Note     Neeta Roles Patient Status:  Inpatient    7/3/1934 MRN [id number]   [facility] 3SW-A Attending Tom Witt MD   Hosp Day # 3 PCP Meta Opitz, MD     Chief Complaint: abd pain    S: Patient julioie results for input(s): TROP, CK in the last 168 hours. Imaging: Imaging data reviewed in Epic.     Medications:   • methylPREDNISolone  4 mg Oral TID CC   • methylPREDNISolone  8 mg Oral nightly   • [START ON 10/10/2019] methylPREDNISolone  4 mg Oral

## 2019-10-09 NOTE — PROGRESS NOTES
NG clamped per orders at [address] [facility]. Patient instructed that tube will remained clamped until 1230. If patient tolerating clamped NG at that time will start clear liquids per order and remove NG tube.

## 2019-10-09 NOTE — PROGRESS NOTES
Tolerated clear liquid diet. Denies abdominal pain, nausea or vomiting. Patient passing audible gas when up to BR. NG tube removed per orders. continuing CLD.

## 2019-10-09 NOTE — PLAN OF CARE
Pt is AOX4. Impulsive. Bed alarm and chair alarm ON. C/o sore throat d/t NGT - Chloraseptic spray given PRN. NGT to LIS. +belching, + flatus. Up and walking with min assist. Hypoactive BS. NPO with ice chips allowed.  NGT clamed post admin of medrol x 30 mi

## 2019-10-09 NOTE — PLAN OF CARE
NG tube discontinued this afternoon after patient tolerating clamping for 4 hours and start of clear liquid diet. Denies nausea or vomiting a this time. Reports minimal tenderness to RUQ when assessed.  Bowel sounds present primarily to [address] present but

## 2019-10-09 NOTE — PROGRESS NOTES
[facility]  Progress Note    Latoya Brian Patient Status:  Inpatient    7/3/1934 MRN [id number]   [facility] 3SW-A Attending Romi Barker MD   Hosp Day # 3 PCP Krystyna Vallecillo MD     Subjective:  Pt states she has passed flatus.   Mi

## 2019-10-10 VITALS
SYSTOLIC BLOOD PRESSURE: 155 MMHG | HEIGHT: 62 IN | OXYGEN SATURATION: 94 % | RESPIRATION RATE: 15 BRPM | WEIGHT: 120 LBS | BODY MASS INDEX: 22.08 KG/M2 | TEMPERATURE: 98 F | DIASTOLIC BLOOD PRESSURE: 73 MMHG | HEART RATE: 71 BPM

## 2019-10-10 DIAGNOSIS — G31.84 MILD COGNITIVE IMPAIRMENT: ICD-10-CM

## 2019-10-10 PROCEDURE — 99232 SBSQ HOSP IP/OBS MODERATE 35: CPT | Performed by: SURGERY

## 2019-10-10 PROCEDURE — 99238 HOSP IP/OBS DSCHRG MGMT 30/<: CPT | Performed by: HOSPITALIST

## 2019-10-10 RX ORDER — GARLIC EXTRACT 500 MG
1 CAPSULE ORAL DAILY
Status: DISCONTINUED | OUTPATIENT
Start: 2019-10-10 | End: 2019-10-10

## 2019-10-10 RX ORDER — ALBUTEROL SULFATE 90 UG/1
2 AEROSOL, METERED RESPIRATORY (INHALATION) EVERY 6 HOURS PRN
Status: DISCONTINUED | OUTPATIENT
Start: 2019-10-10 | End: 2019-10-10

## 2019-10-10 RX ORDER — DONEPEZIL HYDROCHLORIDE 10 MG/1
10 TABLET, FILM COATED ORAL NIGHTLY
Qty: 90 TABLET | Refills: 0 | Status: SHIPPED | OUTPATIENT
Start: 2019-10-10 | End: 2019-11-15

## 2019-10-10 RX ORDER — DILTIAZEM HYDROCHLORIDE 300 MG/1
300 CAPSULE, COATED, EXTENDED RELEASE ORAL DAILY
Status: DISCONTINUED | OUTPATIENT
Start: 2019-10-10 | End: 2019-10-10

## 2019-10-10 RX ORDER — METHYLPREDNISOLONE 4 MG/1
TABLET ORAL
Qty: 7 TABLET | Refills: 0 | Status: SHIPPED | OUTPATIENT
Start: 2019-10-10 | End: 2019-10-13

## 2019-10-10 NOTE — PLAN OF CARE
Pt is impulsive. AOX4. Denies any abdominal pain. Bowel sounds present. + flatus. + BM - pt was instructed to not flush so RN can assess. Up with min assist using the walker. Tele - NSR. RA maintained. Lopressor given PRN.  Tolerating CLD without any proble

## 2019-10-10 NOTE — PROGRESS NOTES
[facility]PITAL[facility]  Progress Note     Terry Wilkerson Patient Status:  Inpatient    7/3/1934 MRN [id number]   [facility] 3SW-A Attending Js Ruiz MD   Hosp Day # 4 PCP Christiano Templeton MD     Chief Complaint: abd pain    S: Patient denie results for input(s): TROP, CK in the last 168 hours. Imaging: Imaging data reviewed in Epic.     Medications:   • methylPREDNISolone  4 mg Oral TID CC and HS   • [START ON 10/11/2019] methylPREDNISolone  4 mg Oral Daily with breakfast   • [START ON

## 2019-10-10 NOTE — DISCHARGE SUMMARY
[facility] [place] HOSPITALIST  DISCHARGE SUMMARY     Wei Stringer Patient Status:  Inpatient    7/3/1934 MRN [id number]   [facility] 3SW-A Attending Dina Rodriguez MD   [facility] Day # 4 PCP León Barahona MD     Date of Admission: 10/6/2019  Date of Dis 81 MG Tabs      Take 81 mg by mouth daily. Refills:  0     CALCIUM + D OR      Take 1 tablet by mouth daily. Refills:  0     CRANBERRY OR      Take 1 tablet by mouth daily.    Refills:  0     dilTIAZem HCl ER Coated Beads 300 MG Cp24  Commonly known as: Provider     10/15/2019 10:15 AM  PT VISIT - BY THERAPIST [5464] 45 min. [facility] in [place], [place]    Patient Instructions:                     10/16/2019 10:20 AM  NEW PT -SPECIALTY CONSULT [2654] 20 min.  THE SPINE MARIUSZ Moves all extremities. Extremities: No edema.   -----------------------------------------------------------------------------------------------  PATIENT DISCHARGE INSTRUCTIONS: See electronic chart    Divine Montero MD    Time spent:  > 30 minutes

## 2019-10-10 NOTE — PROGRESS NOTES
PIV removed. Pt has all belongings. Pt taken via w/c to lobby by pt transporter. Pt's son picking her up. Script given to pt for medrol dose paul.

## 2019-10-10 NOTE — TELEPHONE ENCOUNTER
Patient scheduled for 11/13/19.       Medication: DONEPEZIL HCL 10 MG Oral Tab    Date of last refill: 07/15/19 (#90/0)  [place]    Last office visit: 01/10/19  Due back to clinic per last office note:  Around 07/10/1

## 2019-10-10 NOTE — PLAN OF CARE
Pt A & O x3, impulsive. Up x sba. On RA. /IS. Refuses SCds. Lovenox. BM last night. Active bowel sounds, pt is passing gas. Full liquids this AM, advance to soft for lunch if tolerates. Ok to DC after lunch if tolerates diet per Dr. Shelagh Bernheim.   Pt c/o debby

## 2019-10-10 NOTE — PROGRESS NOTES
[facility]  Progress Note    Zhane Byrnes Patient Status:  Inpatient    7/3/1934 MRN [id number]   [facility] 3SW-A Attending Ace García MD   Hosp Day # 4 PCP Amanda Kaur MD     Subjective:  Pt with a BM. Passing flatus.   Tole

## 2019-10-11 ENCOUNTER — TELEPHONE (OUTPATIENT)
Dept: INTERNAL MEDICINE CLINIC | Facility: CLINIC | Age: 84
End: 2019-10-11

## 2019-10-11 ENCOUNTER — PATIENT OUTREACH (OUTPATIENT)
Dept: CASE MANAGEMENT | Age: 84
End: 2019-10-11

## 2019-10-11 DIAGNOSIS — M54.2 NECK PAIN: ICD-10-CM

## 2019-10-11 DIAGNOSIS — Z02.9 ENCOUNTERS FOR UNSPECIFIED ADMINISTRATIVE PURPOSE: ICD-10-CM

## 2019-10-11 DIAGNOSIS — K56.609 SMALL BOWEL OBSTRUCTION (HCC): ICD-10-CM

## 2019-10-11 PROCEDURE — 1111F DSCHRG MED/CURRENT MED MERGE: CPT

## 2019-10-11 NOTE — TELEPHONE ENCOUNTER
[phone]   Called pt stating is doing ok. Scheduled HFU on 10/14 as requested. Pt verbalized understanding and agreed with POC.

## 2019-10-11 NOTE — PROGRESS NOTES
Initial Post Discharge Follow Up   Discharge Date: 10/10/19  Contact Date: 10/11/2019    Consent Verification:  Assessment Completed With: Patient  HIPAA Verified?   Yes    Discharge Dx:    Recurrent SBO  HX: A-Fib, HTN, COPD, DL, Colon resection 2010, a mouth 4 (four) times daily with meals and nightly for 1 day, THEN 1 tablet (4 mg total) 2 (two) times daily for 1 day, THEN 1 tablet (4 mg total) daily for 1 day.  Disp: 7 tablet Rfl: 0   SIMVASTATIN 10 MG Oral Tab TAKE ONE TABLET BY MOUTH ONCE DAILY  Disp: mouth daily.  Disp:  Rfl:      • When you were leaving the hospital were any medication changes discussed with you? yes  • If you were prescribed a new medication:   o Was the new medication’s purpose explained? yes  o Was the new medication’s side effects MEDICINE (Sarah at [address] [place] [address] )        Oct 29, 2019 10:40 AM CDT Follow Up with Aidan Marcos MD [facility] PULMONARY MEDICINE [place]ad at [address] [place] [address] )        Nov 13, 2019  3:20 PM CST Follow Up Visit with DO Porfirio Sow Specialist Appt with patient     Yes, NCM confirmed with patient she has an appointment with MICHAEL Lao on 10/16/19, and with Dr. Sam Leiva the same day.  Patient reports she has a HFU on 10/17/19 with HALLIE Dozier and an appointment on 10/29/19 wit

## 2019-10-11 NOTE — TELEPHONE ENCOUNTER
Patient discharged home from [facility] on 10/10/19 and is at High risk for readmission and recommended to follow up with [facility] clinic or PCP by 10/17/19 preferred or no later than 10/24/19 or sooner.  SLAVA attempted to schedule a TCM HFU at [facility] clinic, p

## 2019-10-14 ENCOUNTER — OFFICE VISIT (OUTPATIENT)
Dept: INTERNAL MEDICINE CLINIC | Facility: CLINIC | Age: 84
End: 2019-10-14
Payer: MEDICARE

## 2019-10-14 VITALS
WEIGHT: 118.63 LBS | SYSTOLIC BLOOD PRESSURE: 132 MMHG | HEART RATE: 72 BPM | RESPIRATION RATE: 16 BRPM | HEIGHT: 62 IN | DIASTOLIC BLOOD PRESSURE: 70 MMHG | BODY MASS INDEX: 21.83 KG/M2

## 2019-10-14 DIAGNOSIS — J44.9 CHRONIC OBSTRUCTIVE PULMONARY DISEASE, UNSPECIFIED COPD TYPE (HCC): ICD-10-CM

## 2019-10-14 DIAGNOSIS — K56.609 SBO (SMALL BOWEL OBSTRUCTION) (HCC): Primary | ICD-10-CM

## 2019-10-14 DIAGNOSIS — M47.22 OSTEOARTHRITIS OF SPINE WITH RADICULOPATHY, CERVICAL REGION: ICD-10-CM

## 2019-10-14 DIAGNOSIS — D50.8 OTHER IRON DEFICIENCY ANEMIA: ICD-10-CM

## 2019-10-14 PROCEDURE — 1111F DSCHRG MED/CURRENT MED MERGE: CPT | Performed by: INTERNAL MEDICINE

## 2019-10-14 PROCEDURE — 99214 OFFICE O/P EST MOD 30 MIN: CPT | Performed by: INTERNAL MEDICINE

## 2019-10-14 RX ORDER — ALENDRONATE SODIUM 70 MG/1
TABLET ORAL
Qty: 12 TABLET | Refills: 0 | Status: SHIPPED | OUTPATIENT
Start: 2019-10-14 | End: 2019-11-15

## 2019-10-14 RX ORDER — METHYLPREDNISOLONE 4 MG/1
TABLET ORAL
COMMUNITY
Start: 2019-10-10 | End: 2019-10-16

## 2019-10-14 NOTE — PROGRESS NOTES
Rigoberto Feliz is a 80year old female. Patient presents with:  Hospital F/U: cn room 4: hospital follow up for intestines blockage       HPI:     Patient with COPD, HTN, SBO s/p surgeries in the past, MCI, dyslipidemia here for HFU.  She was admitted 10 12 tablet, Rfl: 0  DONEPEZIL HCL 10 MG Oral Tab, Take 1 tablet (10 mg total) by mouth nightly., Disp: 90 tablet, Rfl: 0  SIMVASTATIN 10 MG Oral Tab, TAKE ONE TABLET BY MOUTH ONCE DAILY , Disp: 90 tablet, Rfl: 0  traMADol HCl 50 MG Oral Tab, Take 1 tablet ( History:   Diagnosis Date   • Back problem    • Cataract    • COPD (chronic obstructive pulmonary disease) (HCC)    • Diverticulitis of colon    • Diverticulitis of colon (without mention of hemorrhage)(562.11)    • Diverticulosis of large intestine    • D developed, NAD  HEENT: atraumatic, normocephalic  NECK: posterior TTP, +radiation of pain to right shoulder  LUNGS: normal rate without respiratory distress, lungs clear to auscultation  CARDIO: RRR nl S1 S2  GI: normal bowel sounds, soft, NT/ND  EXTREMITI

## 2019-10-17 PROBLEM — R93.3 ABNORMAL FINDING ON GI TRACT IMAGING: Status: ACTIVE | Noted: 2019-10-17

## 2019-10-30 ENCOUNTER — PATIENT OUTREACH (OUTPATIENT)
Dept: CASE MANAGEMENT | Age: 84
End: 2019-10-30

## 2019-10-30 NOTE — PROGRESS NOTES
S/w pt to introduce CCM program pt stated she will discuss w/ daughter. I have mailed letter and will reach back out to patient to f/u.

## 2019-11-04 ENCOUNTER — OFFICE VISIT (OUTPATIENT)
Dept: PHYSICAL THERAPY | Age: 84
End: 2019-11-04
Attending: NURSE PRACTITIONER
Payer: MEDICARE

## 2019-11-04 PROCEDURE — 97112 NEUROMUSCULAR REEDUCATION: CPT

## 2019-11-04 PROCEDURE — 97110 THERAPEUTIC EXERCISES: CPT

## 2019-11-04 NOTE — PROGRESS NOTES
Dx: Closed fracture of multiple ribs of right side with routine healing, subsequent encounter (S22.41XD) following fall  Musculoskeletal disorder involving upper trapezius muscle              Insurance (Authorized # of Visits):  10 (medicare)            Au TX#: 3/ Date: 9/30/2019                TX#: 4/ Date: 11/4/2019                TX#: 5/ Date:    Tx#: 6/   UBE L2 hills 5 min   UBE L2 hills 5 min  Nu step L3 5 min  Re-assessment of c-spine 5 min     RTB rows 2x10  GTB rows 2x15  2x15  Balance screen

## 2019-11-07 ENCOUNTER — TELEPHONE (OUTPATIENT)
Dept: INTERNAL MEDICINE CLINIC | Facility: CLINIC | Age: 84
End: 2019-11-07

## 2019-11-07 NOTE — TELEPHONE ENCOUNTER
Patient states that she wants these medications from now on to go to the [facility] but at present time does not need refills;      DONEPEZIL HCL 10 MG Oral Tab 90 tablet 0 10/10/2019    Sig:   Take 1 tablet (10 mg total) by mouth nightly.      Route:   Oral

## 2019-11-11 ENCOUNTER — PATIENT OUTREACH (OUTPATIENT)
Dept: CASE MANAGEMENT | Age: 84
End: 2019-11-11

## 2019-11-11 ENCOUNTER — APPOINTMENT (OUTPATIENT)
Dept: CT IMAGING | Facility: HOSPITAL | Age: 84
DRG: 390 | End: 2019-11-11
Attending: EMERGENCY MEDICINE
Payer: MEDICARE

## 2019-11-11 ENCOUNTER — APPOINTMENT (OUTPATIENT)
Dept: GENERAL RADIOLOGY | Facility: HOSPITAL | Age: 84
DRG: 390 | End: 2019-11-11
Attending: EMERGENCY MEDICINE
Payer: MEDICARE

## 2019-11-11 ENCOUNTER — HOSPITAL ENCOUNTER (INPATIENT)
Facility: HOSPITAL | Age: 84
LOS: 3 days | Discharge: HOME | DRG: 390 | End: 2019-11-14
Attending: EMERGENCY MEDICINE | Admitting: INTERNAL MEDICINE
Payer: MEDICARE

## 2019-11-11 DIAGNOSIS — E87.6 HYPOKALEMIA: ICD-10-CM

## 2019-11-11 DIAGNOSIS — K56.609 SBO (SMALL BOWEL OBSTRUCTION) (HCC): Primary | ICD-10-CM

## 2019-11-11 PROCEDURE — 99223 1ST HOSP IP/OBS HIGH 75: CPT | Performed by: INTERNAL MEDICINE

## 2019-11-11 PROCEDURE — 74176 CT ABD & PELVIS W/O CONTRAST: CPT | Performed by: EMERGENCY MEDICINE

## 2019-11-11 PROCEDURE — 71045 X-RAY EXAM CHEST 1 VIEW: CPT | Performed by: EMERGENCY MEDICINE

## 2019-11-11 PROCEDURE — 99223 1ST HOSP IP/OBS HIGH 75: CPT | Performed by: SURGERY

## 2019-11-11 PROCEDURE — 0D9670Z DRAINAGE OF STOMACH WITH DRAINAGE DEVICE, VIA NATURAL OR ARTIFICIAL OPENING: ICD-10-PCS | Performed by: EMERGENCY MEDICINE

## 2019-11-11 RX ORDER — ONDANSETRON 2 MG/ML
4 INJECTION INTRAMUSCULAR; INTRAVENOUS EVERY 6 HOURS PRN
Status: DISCONTINUED | OUTPATIENT
Start: 2019-11-11 | End: 2019-11-14

## 2019-11-11 RX ORDER — IPRATROPIUM BROMIDE AND ALBUTEROL SULFATE 2.5; .5 MG/3ML; MG/3ML
3 SOLUTION RESPIRATORY (INHALATION) EVERY 6 HOURS PRN
Status: DISCONTINUED | OUTPATIENT
Start: 2019-11-11 | End: 2019-11-14

## 2019-11-11 RX ORDER — HYDRALAZINE HYDROCHLORIDE 20 MG/ML
5 INJECTION INTRAMUSCULAR; INTRAVENOUS EVERY 6 HOURS PRN
Status: DISCONTINUED | OUTPATIENT
Start: 2019-11-11 | End: 2019-11-14

## 2019-11-11 RX ORDER — HYDROMORPHONE HYDROCHLORIDE 1 MG/ML
0.5 INJECTION, SOLUTION INTRAMUSCULAR; INTRAVENOUS; SUBCUTANEOUS EVERY 2 HOUR PRN
Status: DISCONTINUED | OUTPATIENT
Start: 2019-11-11 | End: 2019-11-14

## 2019-11-11 RX ORDER — PRAVASTATIN SODIUM 20 MG
20 TABLET ORAL NIGHTLY
Status: DISCONTINUED | OUTPATIENT
Start: 2019-11-11 | End: 2019-11-14

## 2019-11-11 RX ORDER — ONDANSETRON 2 MG/ML
4 INJECTION INTRAMUSCULAR; INTRAVENOUS ONCE
Status: COMPLETED | OUTPATIENT
Start: 2019-11-11 | End: 2019-11-11

## 2019-11-11 RX ORDER — DILTIAZEM HYDROCHLORIDE 300 MG/1
300 CAPSULE, COATED, EXTENDED RELEASE ORAL EVERY 24 HOURS
Status: DISCONTINUED | OUTPATIENT
Start: 2019-11-11 | End: 2019-11-14

## 2019-11-11 RX ORDER — LIDOCAINE HYDROCHLORIDE 20 MG/ML
10 JELLY TOPICAL ONCE
Status: COMPLETED | OUTPATIENT
Start: 2019-11-11 | End: 2019-11-11

## 2019-11-11 RX ORDER — ACETAMINOPHEN 325 MG/1
650 TABLET ORAL EVERY 6 HOURS PRN
Status: DISCONTINUED | OUTPATIENT
Start: 2019-11-11 | End: 2019-11-14

## 2019-11-11 RX ORDER — SODIUM CHLORIDE 9 MG/ML
1000 INJECTION, SOLUTION INTRAVENOUS ONCE
Status: COMPLETED | OUTPATIENT
Start: 2019-11-11 | End: 2019-11-11

## 2019-11-11 RX ORDER — HEPARIN SODIUM 5000 [USP'U]/ML
5000 INJECTION, SOLUTION INTRAVENOUS; SUBCUTANEOUS EVERY 12 HOURS SCHEDULED
Status: DISCONTINUED | OUTPATIENT
Start: 2019-11-11 | End: 2019-11-14

## 2019-11-11 RX ORDER — HYDROMORPHONE HYDROCHLORIDE 1 MG/ML
0.5 INJECTION, SOLUTION INTRAMUSCULAR; INTRAVENOUS; SUBCUTANEOUS ONCE
Status: COMPLETED | OUTPATIENT
Start: 2019-11-11 | End: 2019-11-11

## 2019-11-11 RX ORDER — DONEPEZIL HYDROCHLORIDE 10 MG/1
10 TABLET, FILM COATED ORAL NIGHTLY
Status: DISCONTINUED | OUTPATIENT
Start: 2019-11-11 | End: 2019-11-14

## 2019-11-11 RX ORDER — HYDROMORPHONE HYDROCHLORIDE 1 MG/ML
1 INJECTION, SOLUTION INTRAMUSCULAR; INTRAVENOUS; SUBCUTANEOUS EVERY 2 HOUR PRN
Status: DISCONTINUED | OUTPATIENT
Start: 2019-11-11 | End: 2019-11-14

## 2019-11-11 RX ORDER — DEXTROSE, SODIUM CHLORIDE, AND POTASSIUM CHLORIDE 5; .9; .15 G/100ML; G/100ML; G/100ML
INJECTION INTRAVENOUS CONTINUOUS
Status: DISCONTINUED | OUTPATIENT
Start: 2019-11-11 | End: 2019-11-14

## 2019-11-11 RX ORDER — MELATONIN
325
Status: DISCONTINUED | OUTPATIENT
Start: 2019-11-12 | End: 2019-11-14

## 2019-11-11 NOTE — PLAN OF CARE
Patient admitted to 366 via cart from emergency department. Patient ambulated from cart to bed independently. NG to LIS. On oxygen 2 liters per nasal cannula. NPO status maintained. Oriented to room and safety protocols. IVF infusing as ordered.  Plan of ca

## 2019-11-11 NOTE — ED PROVIDER NOTES
Patient Seen in: [facility] Emergency Department      History   Patient presents with:  Abdomen/Flank Pain (GI/)    Stated Complaint: Abominal pain     HPI    77-year-old female past medical history of COPD, diverticulitis, history of SBO now pres Selina Chiu MD at [address]  08/20/2017    expl lap   • PAULINO LOCALIZATION WIRE 1 SITE RIGHT (CTK=[id number]) Right 2006    benign.    • OTHER Bilateral     bilateral breast implants   • OTHER  07/11/2017    Kyphoplasty, Dr. Pradip Pavon HPI.  Constitutional and vital signs reviewed. All other systems reviewed and negative except as noted above.     Physical Exam     ED Triage Vitals [11/11/19 0231]   /56   Pulse 59   Resp 18   Temp 98.3 °F (36.8 °C)   Temp src Temporal   SpO2 97 PLATELET.   Procedure                               Abnormality         Status                     ---------                               -----------         ------                     CBC W/ DIFFERENTIAL[[id number]]                              [facility] M53.041 4/16/2019 Unknown

## 2019-11-11 NOTE — ED NOTES
Report to [facility], Patient awaiting transport  Patient placed on bed pan at this time, clean and dried

## 2019-11-11 NOTE — CONSULTS
[facility]  Report of Consultation    Jaminelhermelinda Salvadorl Patient Status:  Inpatient    7/3/1934 MRN [id number]   [facility] 3SW-A Attending Tamara Tyson MD   Hosp Day # 0 PCP Katalina Kwok MD     Reason for Consultation:  Small bowel ob bruising    • Frequent use of laxatives    • Hearing impairment    • Hearing loss    • High blood pressure    • High cholesterol    • HYPERLIPIDEMIA    • HYPERTENSION    • Increased urinary frequency    • Lower back pain    • Lumbago    • Osteoarthritis excision suture granuloma    • PART REMOVAL COLON W END COLOSTOMY     • REMOVAL GALLBLADDER     • SKIN SURGERY Left 03/09/2018    exc - L mid forearm - atypical squamous proliferation   • THORACIC FACET JOINT INJECTION DIAGNOSTIC Bilateral 1/29/2018 (36.8 °C), temperature source Oral, resp. rate 18, height 62\", weight 118 lb (53.5 kg), SpO2 93 %, not currently breastfeeding. General: Alert, orientated x3. Cooperative. No apparent distress. HEENT: Exam is unremarkable. Without scleral icterus. cognitive impairment     Closed compression fracture of thoracic vertebra (HCC)     Arthropathy of thoracic facet joint     Osteoporosis     Hyperglycemia     SBO (small bowel obstruction) (HCC)     Small bowel obstruction (HCC)     Small bowel obstruction

## 2019-11-11 NOTE — ED INITIAL ASSESSMENT (HPI)
A/O x 4. Patient from home. Patient with history of bowel obstruction last month. Presents with right quadrant abdominal pain x 24 hours, accompanied by nausea. States that the pain feels similar to the pain last month.   Reports that her last bowel move

## 2019-11-11 NOTE — H&P
[facility] HOSPITALIST                                                               History & Physical         Corby Boast Patient Status:  Emergency    7/3/1934 MRN [id number]   Location [facility] [facility] Attending Romario Davila hypertension    • Vertigo    • Wears glasses        Past Surgical History:   Procedure Laterality Date   • BOWEL RESECTION      small bowel resection   • BREAST RECONSTRUCTION SECOND STAGE/ REVISION Bilateral 3/16/2016    Performed by Vivi Acuña MD at Other (suicide completion) Father    • Other (Other) Mother         Cerebral hemorrhage   • Other (Other) Son         asthma  2 sons   • Other (alzheimer's disease) Sister         x2   • Other (stroke syndrome) Other         family hx      Reviewed    Soci CO2 30.0 11/11/2019     11/11/2019    CA 9.8 11/11/2019    ALB 3.5 11/11/2019    ALKPHO 83 11/11/2019    BILT 0.6 11/11/2019    TP 6.1 11/11/2019    AST 19 11/11/2019    ALT 24 11/11/2019     11/11/2019       Imaging:  CT abdomen and pelvis p

## 2019-11-11 NOTE — PLAN OF CARE
Plan of care explained and updated with patient input. Progressing per plan of care. Patient is alert and oriented to person, place, time and situation. On oxygen 2 liters per nasal cannula with continuous pulse oximetry monitoring. SCDs bilaterally.  NPO s

## 2019-11-12 PROCEDURE — 99232 SBSQ HOSP IP/OBS MODERATE 35: CPT | Performed by: HOSPITALIST

## 2019-11-12 PROCEDURE — 99232 SBSQ HOSP IP/OBS MODERATE 35: CPT | Performed by: PHYSICIAN ASSISTANT

## 2019-11-12 NOTE — PLAN OF CARE
Pt rated pain this morning to RUQ 8 out of 10, declined pain meds and bowel sounds hypoactive. NG to LIS. Pt stated that she just needs to sleep that she was up in ER all night. NG output dark green.  Later in day pt rating pain less, up walking in halls fe

## 2019-11-12 NOTE — PROGRESS NOTES
[facility] H[facility]PITAL[facility]  Progress Note     Adrian Anderson Patient Status:  Inpatient    7/3/1934 MRN [id number]   [facility] 3SW-A Attending Tracy Mcmanus MD   Hosp Day # 1 PCP Aric Lee MD     Chief Complaint: recurrent SBO  S:  NG clam hypertension  1. Hold home blood pressure medications while n.p.o.  2. IV hydralazine PRN  5.  Hyperlipidemia     PLAN  NG clamped   Pain meds as needed   Poss dc later of NG      Quality:  · DVT Prophylaxis: SCD, subcutaneous heparin  · CODE status: full

## 2019-11-12 NOTE — PROGRESS NOTES
[facility]  Progress Note    Adrian Anderson Patient Status:  Inpatient    7/3/1934 MRN [id number]   [facility] 3SW-A Attending Tracy Mcmanus MD   Hosp Day # 1 PCP Aric Lee MD     Subjective:  Pt with BM earlier this AM. During m BM this AM     Plan:  1. Continue NGT - will clamp at 1pm for 6 hours, if pt tolerates well with residual <100cc may DC NGT and begin clears   2. If pt has further loose stools will need to rule out C dif   3. DVT prophylaxis  4.  GI prophylaxis    Nixon March

## 2019-11-12 NOTE — PLAN OF CARE
B/P eleveted and treated as needed,NGT intact,draining greenish secretions. Anxious at times,complains abut everything. Reassurancem given Safety measures reinforvedpppp

## 2019-11-13 PROCEDURE — 99232 SBSQ HOSP IP/OBS MODERATE 35: CPT | Performed by: PHYSICIAN ASSISTANT

## 2019-11-13 PROCEDURE — 99232 SBSQ HOSP IP/OBS MODERATE 35: CPT | Performed by: HOSPITALIST

## 2019-11-13 RX ORDER — DEXTROSE AND SODIUM CHLORIDE 5; .45 G/100ML; G/100ML
INJECTION, SOLUTION INTRAVENOUS CONTINUOUS
Status: DISCONTINUED | OUTPATIENT
Start: 2019-11-13 | End: 2019-11-14

## 2019-11-13 NOTE — PLAN OF CARE
Patient alert and oriented but can be forgetful at night. Impulsive at times. On RA. On heparin BID, SCDs. Blood pressure elevated and treated with IV hydralazine as ordered. Pt c/o tenderness to RUQ, bowel sounds present in all four quadrants. Tolerating cl

## 2019-11-13 NOTE — PLAN OF CARE
Pt Aox4. R.A. c/o mild RUQ tenderness, improving t/o shift. Pt up ambulating halls frequently standby. NG clamped at 1300. No residual after 6hrs, denies nausea. NG removed. Pt started on clears, tolerating well. Pt had two soft Bm's.   Pt and family up

## 2019-11-13 NOTE — PLAN OF CARE
Pt very impulsive, bed alarm in place. Pt had multiple watery bms this am, cdiff test came back negative, isolation precautions clotilde, judy lan & gen surg pa informed. Pt still have some tenderness to RUQ of abmn, \"better\" then yesterday per pt.  Pt now order

## 2019-11-13 NOTE — PROGRESS NOTES
11/13/19 1022   Christian Encounters   Spiritual Requests During Visit / Hospitalization   ([facility])

## 2019-11-13 NOTE — PROGRESS NOTES
[facility]  Progress Note    Zoe Apple Patient Status:  Inpatient    7/3/1934 MRN [id number]   [facility] 3SW-A Attending Sissy Childers MD   Hosp Day # 2 PCP Lord Mela MD     Subjective:  Pt reports loose stools.  Denies N/V. Abnormal finding on GI tract imaging      Recurrent SBO, resolving    Plan:  1.  We will advance the patient to full liquids for breakfast, she tolerates well she may have a soft diet for lunch, the patient continues to tolerate a diet there is potential

## 2019-11-13 NOTE — PROGRESS NOTES
[facility] H[facility]PITAL[facility]  Progress Note     Curry Castro Patient Status:  Inpatient    7/3/1934 MRN [id number]   [facility] 3SW-A Attending Fabrizio Poole MD   Hosp Day # 2 PCP Margaret Nichole MD     Chief Complaint: recurrent SBO  S:  Sick PRN  5. Hyperlipidemia  6. Diarrhea stool neg c diff      PLAN  Stool neg c diff  gen diet to buld up stool   Up w/ assistance  Will assess her tolerance of solid foods ?  If able to dc today  Stop IVF   Left vm for daughter      Quality:  · DVT Prophylaxis

## 2019-11-13 NOTE — CM/SW NOTE
11/13/19 1400   CM/SW Screening   Referral Source Social Work (self-referral)   Information Source Chart review;Nursing rounds   Patient's Mental Status Alert;Oriented;Memory Impairments   Patient lives with Children   Patient Status Prior to Admission

## 2019-11-14 ENCOUNTER — TELEPHONE (OUTPATIENT)
Dept: INTERNAL MEDICINE CLINIC | Facility: CLINIC | Age: 84
End: 2019-11-14

## 2019-11-14 VITALS
BODY MASS INDEX: 21.71 KG/M2 | WEIGHT: 118 LBS | HEART RATE: 80 BPM | OXYGEN SATURATION: 99 % | RESPIRATION RATE: 18 BRPM | TEMPERATURE: 98 F | HEIGHT: 62 IN | DIASTOLIC BLOOD PRESSURE: 53 MMHG | SYSTOLIC BLOOD PRESSURE: 143 MMHG

## 2019-11-14 PROCEDURE — 99239 HOSP IP/OBS DSCHRG MGMT >30: CPT | Performed by: HOSPITALIST

## 2019-11-14 PROCEDURE — 99232 SBSQ HOSP IP/OBS MODERATE 35: CPT | Performed by: PHYSICIAN ASSISTANT

## 2019-11-14 NOTE — PLAN OF CARE
Pt Aox4. R.A.  denies abdominal pain. Denies nausea. BM yesterday, more formed since regular diet began. pt voiding. Up adlib, does call for assistance. Saline locked. Tolerating diet well. Plan for discharge this afternoon.   Pt updated on plan of care,

## 2019-11-14 NOTE — PROGRESS NOTES
Pt cleared by all MD's for discharge. PIV removed, discharge education completed with pt and friend at bedside, all questions answered. Pt to follow-up with general surgeon in 2 weeks. List for low fiber diet given.   Pt left floor via wheelchair to disch

## 2019-11-14 NOTE — PROGRESS NOTES
[facility]  Progress Note    Nigel Maldonado Patient Status:  Inpatient    7/3/1934 MRN [id number]   [facility] 3SW-A Attending Javi Fnin MD   Hosp Day # 3 PCP Cadence Jay MD     Subjective:  Patient kept overnight due to rec ([address]     Small bowel obstruction ([address]     Small bowel obstruction due to adhesions (HCC)     PAF (paroxysmal atrial fibrillation) (HCC)     Neck pain     Abnormal finding on GI tract imaging      SBO - resolved    Plan:  1. Home today  2.  Follow up in ~2

## 2019-11-14 NOTE — PLAN OF CARE
Patient remained safe and free from injury througjhhout shift, but continues to be impulsive and does not call for help to go to bathroom. VS elevated throughout shift, but normalize with admintrationn of IV medication.  Continues to complain of small amoun

## 2019-11-15 ENCOUNTER — PATIENT OUTREACH (OUTPATIENT)
Dept: CASE MANAGEMENT | Age: 84
End: 2019-11-15

## 2019-11-15 DIAGNOSIS — K56.609 SMALL BOWEL OBSTRUCTION (HCC): ICD-10-CM

## 2019-11-15 DIAGNOSIS — Z02.9 ENCOUNTERS FOR UNSPECIFIED ADMINISTRATIVE PURPOSE: ICD-10-CM

## 2019-11-15 DIAGNOSIS — G31.84 MILD COGNITIVE IMPAIRMENT: ICD-10-CM

## 2019-11-15 PROCEDURE — 1111F DSCHRG MED/CURRENT MED MERGE: CPT

## 2019-11-15 RX ORDER — DONEPEZIL HYDROCHLORIDE 10 MG/1
10 TABLET, FILM COATED ORAL NIGHTLY
Qty: 90 TABLET | Refills: 0 | Status: SHIPPED | OUTPATIENT
Start: 2019-11-15 | End: 2020-01-06

## 2019-11-15 RX ORDER — ALENDRONATE SODIUM 70 MG/1
70 TABLET ORAL WEEKLY
Qty: 12 TABLET | Refills: 0 | Status: SHIPPED | OUTPATIENT
Start: 2019-11-15 | End: 2020-01-03

## 2019-11-15 RX ORDER — DILTIAZEM HYDROCHLORIDE 300 MG/1
300 CAPSULE, COATED, EXTENDED RELEASE ORAL DAILY
Qty: 90 CAPSULE | Refills: 0 | Status: SHIPPED | OUTPATIENT
Start: 2019-11-15 | End: 2020-01-21

## 2019-11-15 RX ORDER — SIMVASTATIN 10 MG
10 TABLET ORAL DAILY
Qty: 90 TABLET | Refills: 0 | Status: SHIPPED | OUTPATIENT
Start: 2019-11-15 | End: 2019-12-09

## 2019-11-15 NOTE — TELEPHONE ENCOUNTER
Prescription Refill Request - Patient advised can take 48-72 hours.     Name of Medication (strength, dose, qty requested:   SIMVASTATIN 10 MG Oral Tab 90 tablet 0 9/9/2019     Sig: TAKE ONE TABLET BY MOUTH ONCE DAILY     Sent to pharmacy as: Simvastatin 10

## 2019-11-15 NOTE — PROGRESS NOTES
Initial Post Discharge Follow Up   Discharge Date: 11/14/19  Contact Date: 11/15/2019    Consent Verification:  Assessment Completed With: Patient  HIPAA Verified? Yes    Discharge Dx:  1. Recurrent SBO resolved with medical management NG  2.  Diarrhea Capsule SR 24 Hr Take 1 capsule (300 mg total) by mouth daily. 90 capsule 0   • alendronate 70 MG Oral Tab Take 1 tablet (70 mg total) by mouth once a week. 12 tablet 0   • Donepezil HCl 10 MG Oral Tab Take 1 tablet (10 mg total) by mouth nightly.  90 table Olga Lidia Mendiola)        Nov 19, 2019 11:00 AM [facility] Follow Up with Cinthia Ferrara MD [address] [place] ([facility] IM/[facility] [place])        Nov 20, 2019  3:00 PM CST Exam - New Patient with MD Reyna Bella Yes      Have you made all of your follow up appointments? no, patient aware to schedule with Dr. Tracee Pacheco in 2 weeks    Is there any reason as to why you cannot make your appointments?    No     NCM Reviewed upcoming Specialist Appt with patient     Yes

## 2019-11-15 NOTE — DISCHARGE SUMMARY
[facility] HOSPITALIST  DISCHARGE SUMMARY     Katina Kwong Patient Status:  Inpatient    7/3/1934 MRN [id number]   [facility] 3SW-A Attending No att. providers found   Hosp Day # 3 PCP Clelia Soulier, MD     Date of Admission: 2019  Da consult    Brief Synopsis:   54-year-old female presented with nausea vomiting abdominal pain. Was admitted had CT of the abdomen showed small bowel obstruction. Distention. NG was inserted consult with general surgery.   Pain medications bowel rest IV f · None    Consultants:  Surgery  Discharge Medication List:     Discharge Medications      CONTINUE taking these medications      Instructions Prescription details   Acidophilus/Pectin Caps  Commonly known as:  PROBIOTIC  Notes to patient:  Tomorrow socorro appointment:   Layton Davenport MD  [address] [facility],Fourth [address] [place] [address] Rd  [phone]    In 2 weeks      Leesa Palacios MD  [facility]  [address]  [phone]    In 1 week        Vital signs:  Temp:  [97.8 °F (36.6

## 2019-11-18 ENCOUNTER — OFFICE VISIT (OUTPATIENT)
Dept: PHYSICAL THERAPY | Age: 84
End: 2019-11-18
Attending: INTERNAL MEDICINE
Payer: MEDICARE

## 2019-11-18 PROCEDURE — 97110 THERAPEUTIC EXERCISES: CPT

## 2019-11-18 PROCEDURE — 97112 NEUROMUSCULAR REEDUCATION: CPT

## 2019-11-18 NOTE — PROGRESS NOTES
Dx: Closed fracture of multiple ribs of right side with routine healing, subsequent encounter (S22.41XD) following fall  Musculoskeletal disorder involving upper trapezius muscle              Insurance (Authorized # of Visits):  10 (medicare)            Au 2x20    Corner pec stretch 3x30 sec  5x20 sec  3x30 sec  Tandem balance in bars 3x30 B  Alt tap up 8 inch step 2x20    Upslope C3-4, C4-5 gr 3-4 2x10 bouts on left  Right C5-6, C6-7 gr 3, 3x10 bouts  Manual cerv distraction, int 5 min  Int 5 min  SL balanc

## 2019-11-19 ENCOUNTER — OFFICE VISIT (OUTPATIENT)
Dept: INTERNAL MEDICINE CLINIC | Facility: CLINIC | Age: 84
End: 2019-11-19
Payer: MEDICARE

## 2019-11-19 VITALS
HEIGHT: 62 IN | RESPIRATION RATE: 18 BRPM | HEART RATE: 68 BPM | BODY MASS INDEX: 21.79 KG/M2 | SYSTOLIC BLOOD PRESSURE: 136 MMHG | WEIGHT: 118.38 LBS | DIASTOLIC BLOOD PRESSURE: 66 MMHG

## 2019-11-19 DIAGNOSIS — K56.609 SBO (SMALL BOWEL OBSTRUCTION) (HCC): Primary | ICD-10-CM

## 2019-11-19 DIAGNOSIS — I10 ESSENTIAL HYPERTENSION: ICD-10-CM

## 2019-11-19 DIAGNOSIS — J44.9 CHRONIC OBSTRUCTIVE PULMONARY DISEASE, UNSPECIFIED COPD TYPE (HCC): ICD-10-CM

## 2019-11-19 DIAGNOSIS — E87.6 HYPOKALEMIA: ICD-10-CM

## 2019-11-19 DIAGNOSIS — Z87.898 HISTORY OF DIARRHEA: ICD-10-CM

## 2019-11-19 PROCEDURE — 1111F DSCHRG MED/CURRENT MED MERGE: CPT | Performed by: INTERNAL MEDICINE

## 2019-11-19 PROCEDURE — 99495 TRANSJ CARE MGMT MOD F2F 14D: CPT | Performed by: INTERNAL MEDICINE

## 2019-11-19 NOTE — PROGRESS NOTES
HPI:    Daniel Hernandez is a 80year old female here today for hospital follow up.    She was discharged from Inpatient hospital, [facility] to Home   Admission Date: 11/11/19   Discharge Date: 11/14/19  Hospital Discharge Diagnoses (since 10/20/201 potassium level was normal. She is taking po without any issues. She denies any nausea, vomiting, abdominal pain today. Allergies:  She has No Known Allergies.     Current Meds:  ANORO ELLIPTA 62.5-25 MCG/INH Inhalation Aerosol Powder, Breath Activate Pneumonia, organism unspecified(486), Sputum production, Uncomfortable fullness after meals, Unspecified essential hypertension, Vertigo, and Wears glasses.     She  has a past surgical history that includes cholecystectomy (1993, 1998); tonsillectomy (1947 of anemia, or bruising, denies bleeding  ENDOCRINE: denies thyroid history  ALL/ASTHMA: denies hx of allergy or asthma    PHYSICAL EXAM:   No LMP recorded (lmp unknown).  Patient is postmenopausal.  Estimated body mass index is 21.66 kg/m² as calculated fro period of discharge to 30 days:   · Number of Possible Diagnoses and/or Management Options: moderate  · Amount and/or Complexity of Data to Be Reviewed: moderate  · Risk of Significant Complications, Morbidity, and/or Mortality: moderate    Overall Risk:

## 2019-11-20 ENCOUNTER — OFFICE VISIT (OUTPATIENT)
Dept: SURGERY | Facility: CLINIC | Age: 84
End: 2019-11-20
Payer: MEDICARE

## 2019-11-20 VITALS
TEMPERATURE: 98 F | WEIGHT: 118 LBS | BODY MASS INDEX: 22 KG/M2 | DIASTOLIC BLOOD PRESSURE: 80 MMHG | HEART RATE: 69 BPM | SYSTOLIC BLOOD PRESSURE: 160 MMHG

## 2019-11-20 DIAGNOSIS — K56.609 SBO (SMALL BOWEL OBSTRUCTION) (HCC): Primary | ICD-10-CM

## 2019-11-20 DIAGNOSIS — K56.50 SMALL BOWEL OBSTRUCTION DUE TO ADHESIONS (HCC): ICD-10-CM

## 2019-11-20 PROCEDURE — 99213 OFFICE O/P EST LOW 20 MIN: CPT | Performed by: SURGERY

## 2019-11-20 NOTE — H&P
New Patient Visit Note       Active Problems      1. SBO (small bowel obstruction) ([facility]    2.  Small bowel obstruction due to adhesions [facility])        Chief Complaint   Patient presents with:  Hospital F/U: NP pt following up from hospitalization for small b Cataract    • COPD (chronic obstructive pulmonary disease) (HCC)    • Diverticulitis of colon    • Diverticulitis of colon (without mention of hemorrhage)(562.11)    • Diverticulosis of large intestine    • Dizziness and giddiness    • Easy bruising    • F SURGICAL HISTORY  2015    intestinal blockage   • OTHER SURGICAL HISTORY  2015    hole in intestine   • OTHER SURGICAL HISTORY Bilateral 3/16/2016    Bilateral capsulectomy and removal of silicone implants   • OTHER SURGICAL HISTORY  01/05/2018    excision 62.5-25 MCG/INH Inhalation Aerosol Powder, Breath Activated, INHALE 1 PUFF INTO THE LUNGS DAILY, Disp: 3 each, Rfl: 3  •  simvastatin 10 MG Oral Tab, Take 1 tablet (10 mg total) by mouth daily. , Disp: 90 tablet, Rfl: 0  •  dilTIAZem HCl ER Coated Beads 300 Genitourinary: Negative for difficulty urinating, dysuria, frequency and urgency. Musculoskeletal: Negative for arthralgias and myalgias. Skin: Negative for color change and rash. Neurological: Negative for tremors, syncope and weakness.    Hematolo encounter diagnosis)  Small bowel obstruction due to adhesions [facility])      Plan     · The patient is doing well following recent hospitalization for small bowel obstruction which was managed nonoperatively.   · I discussed the possibility of exploratory lapa

## 2019-12-02 ENCOUNTER — OFFICE VISIT (OUTPATIENT)
Dept: PHYSICAL THERAPY | Age: 84
End: 2019-12-02
Attending: INTERNAL MEDICINE
Payer: MEDICARE

## 2019-12-02 PROCEDURE — 97110 THERAPEUTIC EXERCISES: CPT

## 2019-12-02 PROCEDURE — 97112 NEUROMUSCULAR REEDUCATION: CPT

## 2019-12-02 NOTE — PROGRESS NOTES
Dx: Closed fracture of multiple ribs of right side with routine healing, subsequent encounter (S22.41XD) following fall  Musculoskeletal disorder involving upper trapezius muscle              Insurance (Authorized # of Visits):  10 (medicare)            Au Balance screen 5 min  Alt tap up 6 inch step 2x20  2x20    Corner pec stretch 3x30 sec  5x20 sec  3x30 sec  Tandem balance in bars 3x30 B  Alt tap up 8 inch step 2x20  Step up 6 inch box, 2x20 B    Upslope C3-4, C4-5 gr 3-4 2x10 bouts on left  Right C5-6,

## 2019-12-05 ENCOUNTER — OFFICE VISIT (OUTPATIENT)
Dept: FAMILY MEDICINE CLINIC | Facility: CLINIC | Age: 84
End: 2019-12-05
Payer: MEDICARE

## 2019-12-05 ENCOUNTER — TELEPHONE (OUTPATIENT)
Dept: INTERNAL MEDICINE CLINIC | Facility: CLINIC | Age: 84
End: 2019-12-05

## 2019-12-05 VITALS
HEIGHT: 62 IN | SYSTOLIC BLOOD PRESSURE: 172 MMHG | WEIGHT: 117 LBS | TEMPERATURE: 98 F | HEART RATE: 76 BPM | BODY MASS INDEX: 21.53 KG/M2 | RESPIRATION RATE: 18 BRPM | DIASTOLIC BLOOD PRESSURE: 74 MMHG | OXYGEN SATURATION: 96 %

## 2019-12-05 DIAGNOSIS — R05.9 COUGH: Primary | ICD-10-CM

## 2019-12-05 PROCEDURE — 99213 OFFICE O/P EST LOW 20 MIN: CPT | Performed by: NURSE PRACTITIONER

## 2019-12-05 RX ORDER — ALBUTEROL SULFATE 90 UG/1
2 AEROSOL, METERED RESPIRATORY (INHALATION) EVERY 4 HOURS PRN
Qty: 1 INHALER | Refills: 0 | Status: SHIPPED | OUTPATIENT
Start: 2019-12-05 | End: 2020-10-16

## 2019-12-05 RX ORDER — SIMVASTATIN 10 MG
TABLET ORAL
Qty: 90 TABLET | Refills: 0 | Status: SHIPPED | OUTPATIENT
Start: 2019-12-05 | End: 2019-12-09

## 2019-12-05 RX ORDER — SIMVASTATIN 10 MG
TABLET ORAL
Qty: 90 TABLET | Refills: 0 | Status: SHIPPED | OUTPATIENT
Start: 2019-12-05 | End: 2019-12-05

## 2019-12-05 NOTE — PROGRESS NOTES
CHIEF COMPLAINT:   Patient presents with:  Cough    HPI:   Moo Fisher is a 80year old female who presents for cough for  4  days. Cough has improved since onset. Taking robitussin which is helping. No history of bronchitis or pna.  Reports coughing • CRANBERRY OR Take 1 tablet by mouth daily. • CALCIUM + D OR Take 1 tablet by mouth daily. • MULTIVITAMIN OR Take 1 tablet by mouth daily.         Past Medical History:   Diagnosis Date   • Arthritis    • Back pain    • Back problem    • Body pie BP (!) 172/74   Pulse 76   Temp 97.7 °F (36.5 °C)   Resp 18   Ht 62\"   Wt 117 lb (53.1 kg)   LMP  (LMP Unknown)   SpO2 96%   BMI 21.40 kg/m²   GENERAL: well developed, well nourished,in no apparent distress  SKIN: no rashes, no suspicious lesions  EYES: C Your healthcare provider has told you that you have acute bronchitis. Bronchitis is infection or inflammation of the bronchial tubes (airways in the lungs). Normally, air moves easily in and out of the airways.  Bronchitis narrows the airways, making it mark · Drink plenty of fluids, such as water, juice, or warm soup. Fluids loosen mucus so that you can cough it up. This helps you breathe more easily. Fluids also prevent dehydration. · Make sure you get plenty of rest.  · Do not smoke.  Do not allow anyone el

## 2019-12-05 NOTE — TELEPHONE ENCOUNTER
Pt called and needs this script to go to [facility] pharmacy in [place], it went to the wrong pharmacy. Please advise.

## 2019-12-06 ENCOUNTER — TELEPHONE (OUTPATIENT)
Dept: SURGERY | Facility: CLINIC | Age: 84
End: 2019-12-06

## 2019-12-06 DIAGNOSIS — K56.609 SBO (SMALL BOWEL OBSTRUCTION) (HCC): Primary | ICD-10-CM

## 2019-12-06 RX ORDER — SIMVASTATIN 10 MG
TABLET ORAL
Qty: 90 TABLET | Refills: 0 | OUTPATIENT
Start: 2019-12-06

## 2019-12-09 ENCOUNTER — TELEPHONE (OUTPATIENT)
Dept: PHYSICAL THERAPY | Age: 84
End: 2019-12-09

## 2019-12-09 RX ORDER — SIMVASTATIN 10 MG
TABLET ORAL
Qty: 90 TABLET | Refills: 0 | Status: SHIPPED | OUTPATIENT
Start: 2019-12-09 | End: 2020-05-20

## 2019-12-12 ENCOUNTER — OFFICE VISIT (OUTPATIENT)
Dept: INTERNAL MEDICINE CLINIC | Facility: CLINIC | Age: 84
End: 2019-12-12
Payer: MEDICARE

## 2019-12-12 DIAGNOSIS — J06.9 URI, ACUTE: ICD-10-CM

## 2019-12-12 DIAGNOSIS — K56.50 SMALL BOWEL OBSTRUCTION DUE TO ADHESIONS (HCC): ICD-10-CM

## 2019-12-12 DIAGNOSIS — I10 ESSENTIAL HYPERTENSION: Primary | ICD-10-CM

## 2019-12-12 PROCEDURE — 99214 OFFICE O/P EST MOD 30 MIN: CPT | Performed by: INTERNAL MEDICINE

## 2019-12-12 RX ORDER — LOSARTAN POTASSIUM 25 MG/1
25 TABLET ORAL DAILY
Qty: 30 TABLET | Refills: 2 | Status: SHIPPED | OUTPATIENT
Start: 2019-12-12 | End: 2020-01-14 | Stop reason: DRUGHIGH

## 2019-12-12 RX ORDER — AZITHROMYCIN 250 MG/1
TABLET, FILM COATED ORAL
Qty: 6 TABLET | Refills: 0 | Status: SHIPPED | OUTPATIENT
Start: 2019-12-12 | End: 2020-01-14

## 2019-12-12 NOTE — PROGRESS NOTES
Paris Santamaria is a 80year old female. Patient presents with: Follow - Up: cn room 4: follow up       HPI:   Patient here for f/u-  C/o 2 weeks of cough and congestion. Hard to sleep at night due to cough. Her family is getting concerned.  She has unde Wheezing or Shortness of Breath.  1 Inhaler 0   • ANORO ELLIPTA 62.5-25 MCG/INH Inhalation Aerosol Powder, Breath Activated INHALE 1 PUFF INTO THE LUNGS DAILY 3 each 3   • dilTIAZem HCl ER Coated Beads 300 MG Oral Capsule SR 24 Hr Take 1 capsule (300 mg tot Packs/day: 0.50        Years: 15.00        Pack years: 7.5        Quit date: 1969        Years since quittin.9      Smokeless tobacco: Never Used    Alcohol use:  Yes      Alcohol/week: 1.0 standard drinks      Types: 1 Standard drinks or equivalen advised to postpone it to next month        Meds & Refills for this Visit:  Requested Prescriptions     Signed Prescriptions Disp Refills   • azithromycin (ZITHROMAX Z-BEAN) 250 MG Oral Tab 6 tablet 0     Si tabs po the first day and then one daily unti

## 2019-12-13 ENCOUNTER — TELEPHONE (OUTPATIENT)
Dept: SURGERY | Facility: CLINIC | Age: 84
End: 2019-12-13

## 2019-12-13 VITALS
BODY MASS INDEX: 21.16 KG/M2 | HEIGHT: 62 IN | WEIGHT: 115 LBS | RESPIRATION RATE: 18 BRPM | HEART RATE: 80 BPM | SYSTOLIC BLOOD PRESSURE: 146 MMHG | DIASTOLIC BLOOD PRESSURE: 74 MMHG

## 2019-12-13 DIAGNOSIS — K56.609 SBO (SMALL BOWEL OBSTRUCTION) (HCC): Primary | ICD-10-CM

## 2019-12-13 NOTE — TELEPHONE ENCOUNTER
Called patient regarding her upcoming surgery. Per PCP office, \"Small bowel obstruction due to adhesions (hcc)- exploratory laparotomy planned next week but due to above issues, advised to postpone it to next month\". No answer from patient. LMTCB.

## 2020-01-03 RX ORDER — ALENDRONATE SODIUM 70 MG/1
TABLET ORAL
Qty: 12 TABLET | Refills: 0 | Status: SHIPPED | OUTPATIENT
Start: 2020-01-03 | End: 2020-03-06

## 2020-01-05 DIAGNOSIS — G31.84 MILD COGNITIVE IMPAIRMENT: ICD-10-CM

## 2020-01-06 RX ORDER — DONEPEZIL HYDROCHLORIDE 10 MG/1
10 TABLET, FILM COATED ORAL NIGHTLY
Qty: 30 TABLET | Refills: 0 | Status: SHIPPED | OUTPATIENT
Start: 2020-01-06 | End: 2020-06-08

## 2020-01-06 NOTE — TELEPHONE ENCOUNTER
Medication: DONEPEZIL HCL 10 MG Oral Tab    Date of last refill: 11/15/19 (#90/0)  [place]    Last office visit: 01/10/19  Due back to clinic per last office note:  Around 07/10/19  Date next office visit scheduled:

## 2020-01-14 ENCOUNTER — OFFICE VISIT (OUTPATIENT)
Dept: INTERNAL MEDICINE CLINIC | Facility: CLINIC | Age: 85
End: 2020-01-14
Payer: MEDICARE

## 2020-01-14 ENCOUNTER — TELEPHONE (OUTPATIENT)
Dept: SURGERY | Facility: CLINIC | Age: 85
End: 2020-01-14

## 2020-01-14 VITALS
BODY MASS INDEX: 21.28 KG/M2 | WEIGHT: 115.63 LBS | OXYGEN SATURATION: 98 % | SYSTOLIC BLOOD PRESSURE: 144 MMHG | HEIGHT: 62 IN | HEART RATE: 68 BPM | DIASTOLIC BLOOD PRESSURE: 72 MMHG | RESPIRATION RATE: 18 BRPM

## 2020-01-14 DIAGNOSIS — E78.00 PURE HYPERCHOLESTEROLEMIA: ICD-10-CM

## 2020-01-14 DIAGNOSIS — Z87.19 HISTORY OF SMALL BOWEL OBSTRUCTION: ICD-10-CM

## 2020-01-14 DIAGNOSIS — I10 ESSENTIAL HYPERTENSION: Primary | ICD-10-CM

## 2020-01-14 PROCEDURE — 99214 OFFICE O/P EST MOD 30 MIN: CPT | Performed by: INTERNAL MEDICINE

## 2020-01-14 RX ORDER — LOSARTAN POTASSIUM 50 MG/1
50 TABLET ORAL DAILY
Qty: 90 TABLET | Refills: 0 | Status: SHIPPED | OUTPATIENT
Start: 2020-01-14 | End: 2020-01-16 | Stop reason: RX

## 2020-01-14 NOTE — PROGRESS NOTES
Brianna Wiggins is a 80year old female.   Patient presents with:  Blood Pressure: cn room 4; b/p check   Follow - Up: SBO, cholesterol      HPI:     Patient with HTN, recurrent SBO, HL here for f/u  HTN- added losartan last OV, tolerating it without any s (VENTOLIN HFA) 108 (90 Base) MCG/ACT Inhalation Aero Soln Inhale 2 puffs into the lungs every 4 (four) hours as needed for Wheezing or Shortness of Breath.  1 Inhaler 0   • ANORO ELLIPTA 62.5-25 MCG/INH Inhalation Aerosol Powder, Breath Activated INHALE 1 P Quit date: 1969        Years since quittin.0      Smokeless tobacco: Never Used    Alcohol use:  Yes      Alcohol/week: 1.0 standard drinks      Types: 1 Standard drinks or equivalent per week      Frequency: 2-4 times a month      Drinks per sessi Refills   • losartan Potassium 50 MG Oral Tab 90 tablet 0     Sig: Take 1 tablet (50 mg total) by mouth daily. Imaging & Consults:  None    Return in about 6 weeks (around 2/25/2020) for f/u on BP and cholesterol.   There are no Patient Instructions o

## 2020-01-15 ENCOUNTER — TELEPHONE (OUTPATIENT)
Dept: SURGERY | Facility: CLINIC | Age: 85
End: 2020-01-15

## 2020-01-15 DIAGNOSIS — K56.609 SBO (SMALL BOWEL OBSTRUCTION) (HCC): Primary | ICD-10-CM

## 2020-01-16 ENCOUNTER — APPOINTMENT (OUTPATIENT)
Dept: LAB | Age: 85
End: 2020-01-16
Attending: INTERNAL MEDICINE
Payer: MEDICARE

## 2020-01-16 ENCOUNTER — TELEPHONE (OUTPATIENT)
Dept: INTERNAL MEDICINE CLINIC | Facility: CLINIC | Age: 85
End: 2020-01-16

## 2020-01-16 DIAGNOSIS — I10 ESSENTIAL HYPERTENSION: ICD-10-CM

## 2020-01-16 DIAGNOSIS — E78.00 PURE HYPERCHOLESTEROLEMIA: ICD-10-CM

## 2020-01-16 LAB
ALBUMIN SERPL-MCNC: 4 G/DL (ref 3.4–5)
ALBUMIN/GLOB SERPL: 1.2 {RATIO} (ref 1–2)
ALP LIVER SERPL-CCNC: 90 U/L (ref 55–142)
ALT SERPL-CCNC: 41 U/L (ref 13–56)
ANION GAP SERPL CALC-SCNC: 9 MMOL/L (ref 0–18)
AST SERPL-CCNC: 27 U/L (ref 15–37)
BILIRUB SERPL-MCNC: 1.2 MG/DL (ref 0.1–2)
BUN BLD-MCNC: 10 MG/DL (ref 7–18)
BUN/CREAT SERPL: 12.3 (ref 10–20)
CALCIUM BLD-MCNC: 9.5 MG/DL (ref 8.5–10.1)
CHLORIDE SERPL-SCNC: 107 MMOL/L (ref 98–112)
CHOLEST SMN-MCNC: 183 MG/DL (ref ?–200)
CO2 SERPL-SCNC: 29 MMOL/L (ref 21–32)
CREAT BLD-MCNC: 0.81 MG/DL (ref 0.55–1.02)
GLOBULIN PLAS-MCNC: 3.3 G/DL (ref 2.8–4.4)
GLUCOSE BLD-MCNC: 96 MG/DL (ref 70–99)
HDLC SERPL-MCNC: 97 MG/DL (ref 40–59)
LDLC SERPL CALC-MCNC: 68 MG/DL (ref ?–100)
M PROTEIN MFR SERPL ELPH: 7.3 G/DL (ref 6.4–8.2)
NONHDLC SERPL-MCNC: 86 MG/DL (ref ?–130)
OSMOLALITY SERPL CALC.SUM OF ELEC: 299 MOSM/KG (ref 275–295)
PATIENT FASTING Y/N/NP: YES
PATIENT FASTING Y/N/NP: YES
POTASSIUM SERPL-SCNC: 4 MMOL/L (ref 3.5–5.1)
SODIUM SERPL-SCNC: 145 MMOL/L (ref 136–145)
TRIGL SERPL-MCNC: 89 MG/DL (ref 30–149)
VLDLC SERPL CALC-MCNC: 18 MG/DL (ref 0–30)

## 2020-01-16 PROCEDURE — 80061 LIPID PANEL: CPT

## 2020-01-16 PROCEDURE — 36415 COLL VENOUS BLD VENIPUNCTURE: CPT

## 2020-01-16 PROCEDURE — 80053 COMPREHEN METABOLIC PANEL: CPT

## 2020-01-16 RX ORDER — LOSARTAN POTASSIUM 25 MG/1
50 TABLET ORAL DAILY
Qty: 180 TABLET | Refills: 0 | Status: SHIPPED | OUTPATIENT
Start: 2020-01-16 | End: 2020-02-25 | Stop reason: ALTCHOICE

## 2020-01-16 NOTE — TELEPHONE ENCOUNTER
Fax received from pharmacy stating that Losartan 50 mg is currently on back order and would like to change script to 25 mg or 100 mg     Please advise,

## 2020-01-21 RX ORDER — DILTIAZEM HYDROCHLORIDE 300 MG/1
CAPSULE, COATED, EXTENDED RELEASE ORAL
Qty: 90 CAPSULE | Refills: 0 | Status: ON HOLD | OUTPATIENT
Start: 2020-01-21 | End: 2020-06-01

## 2020-01-22 ENCOUNTER — OFFICE VISIT (OUTPATIENT)
Dept: INTERNAL MEDICINE CLINIC | Facility: CLINIC | Age: 85
End: 2020-01-22
Payer: MEDICARE

## 2020-01-22 VITALS
SYSTOLIC BLOOD PRESSURE: 140 MMHG | DIASTOLIC BLOOD PRESSURE: 70 MMHG | WEIGHT: 115.38 LBS | HEIGHT: 62 IN | HEART RATE: 68 BPM | BODY MASS INDEX: 21.23 KG/M2 | RESPIRATION RATE: 18 BRPM

## 2020-01-22 DIAGNOSIS — I10 ESSENTIAL HYPERTENSION: Primary | ICD-10-CM

## 2020-01-22 NOTE — PROGRESS NOTES
Edward Shafer is a 80year old female. Patient presents with: Follow - Up: cn room 4: follow up       HPI:     Has f/u next month, came by mistake today.  Did not increase losartan to 50mg yet (still doing 25mg) but will start tomorrow    Patient Activ Breath. 1 Inhaler 0   • ANORO ELLIPTA 62.5-25 MCG/INH Inhalation Aerosol Powder, Breath Activated INHALE 1 PUFF INTO THE LUNGS DAILY 3 each 3   • mupirocin 2 % External Ointment Apply 1 Application topically 3 (three) times daily.  22 g 2   • Cholecalcifero Binge frequency: Never      Comment: 2-4 DRINKS MONTH    Drug use: No    Family History   Problem Relation Age of Onset   • Psychiatric Father    • Other (suicide completion) Father    • Other (Other) Mother         Cerebral hemorrhage   • Other (Other) So

## 2020-01-24 NOTE — PLAN OF CARE
GASTROINTESTINAL - ADULT    • Minimal or absence of nausea and vomiting Progressing    • Maintains or returns to baseline bowel function Progressing        METABOLIC/FLUID AND ELECTROLYTES - ADULT    • Glucose maintained within prescribed range Progressing Performing Laboratory: -889 Billing Type: Third-Party Bill Expected Date Of Service: 01/24/2020 Bill For Surgical Tray: no

## 2020-02-01 PROBLEM — E78.2 MIXED HYPERLIPIDEMIA: Status: ACTIVE | Noted: 2017-08-10

## 2020-02-01 ASSESSMENT — ENCOUNTER SYMPTOMS
ALLERGIC/IMMUNOLOGIC COMMENTS: NO NEW FOOD ALLERGIES
WEIGHT LOSS: 0
COUGH: 0
HEMATOCHEZIA: 0
SUSPICIOUS LESIONS: 0
CHILLS: 0
HEMOPTYSIS: 0
BRUISES/BLEEDS EASILY: 0
WEIGHT GAIN: 0
FEVER: 0

## 2020-02-03 ENCOUNTER — OFFICE VISIT (OUTPATIENT)
Dept: CARDIOLOGY | Age: 85
End: 2020-02-03

## 2020-02-03 VITALS
DIASTOLIC BLOOD PRESSURE: 70 MMHG | BODY MASS INDEX: 20.98 KG/M2 | SYSTOLIC BLOOD PRESSURE: 118 MMHG | HEART RATE: 66 BPM | WEIGHT: 114 LBS | HEIGHT: 62 IN

## 2020-02-03 DIAGNOSIS — E78.2 MIXED HYPERLIPIDEMIA: ICD-10-CM

## 2020-02-03 DIAGNOSIS — I48.0 PAROXYSMAL ATRIAL FIBRILLATION (CMD): ICD-10-CM

## 2020-02-03 DIAGNOSIS — I65.23 ASYMPTOMATIC CAROTID ARTERY STENOSIS, BILATERAL: Primary | ICD-10-CM

## 2020-02-03 DIAGNOSIS — I10 ESSENTIAL HYPERTENSION, BENIGN: ICD-10-CM

## 2020-02-03 PROCEDURE — 99214 OFFICE O/P EST MOD 30 MIN: CPT | Performed by: INTERNAL MEDICINE

## 2020-02-03 RX ORDER — UMECLIDINIUM BROMIDE AND VILANTEROL TRIFENATATE 62.5; 25 UG/1; UG/1
POWDER RESPIRATORY (INHALATION)
Refills: 3 | COMMUNITY
Start: 2019-12-09 | End: 2021-01-01

## 2020-02-03 RX ORDER — MULTIVITAMIN
CAPSULE ORAL
COMMUNITY
Start: 2009-08-13

## 2020-02-03 RX ORDER — DICLOFENAC SODIUM 75 MG/1
75 TABLET, DELAYED RELEASE ORAL
COMMUNITY
Start: 2019-12-19 | End: 2021-01-01

## 2020-02-03 RX ORDER — LOSARTAN POTASSIUM 50 MG/1
50 TABLET ORAL DAILY
Refills: 0 | COMMUNITY
Start: 2020-01-16 | End: 2021-01-01 | Stop reason: ALTCHOICE

## 2020-02-03 RX ORDER — DILTIAZEM HYDROCHLORIDE 300 MG/1
CAPSULE, COATED, EXTENDED RELEASE ORAL
COMMUNITY
Start: 2020-01-21

## 2020-02-03 RX ORDER — VITAMIN B COMPLEX
CAPSULE ORAL
COMMUNITY
Start: 2009-08-13

## 2020-02-03 RX ORDER — SIMVASTATIN 10 MG
10 TABLET ORAL DAILY
Refills: 0 | COMMUNITY
Start: 2019-11-29

## 2020-02-03 RX ORDER — ALENDRONATE SODIUM 70 MG/1
TABLET ORAL
COMMUNITY
Start: 2020-01-03 | End: 2021-01-01

## 2020-02-03 RX ORDER — DONEPEZIL HYDROCHLORIDE 10 MG/1
10 TABLET, FILM COATED ORAL NIGHTLY
COMMUNITY
Start: 2020-01-06

## 2020-02-03 RX ORDER — HYDROCHLOROTHIAZIDE 12.5 MG/1
12.5 TABLET ORAL DAILY
COMMUNITY
Start: 2011-05-11 | End: 2021-01-01 | Stop reason: ALTCHOICE

## 2020-02-03 RX ORDER — BUDESONIDE AND FORMOTEROL FUMARATE DIHYDRATE 160; 4.5 UG/1; UG/1
AEROSOL RESPIRATORY (INHALATION)
COMMUNITY
Start: 2016-01-18 | End: 2020-02-03

## 2020-02-03 RX ORDER — LANOLIN ALCOHOL/MO/W.PET/CERES
1000 CREAM (GRAM) TOPICAL
COMMUNITY

## 2020-02-03 RX ORDER — PHENOL 1.4 %
600 AEROSOL, SPRAY (ML) MUCOUS MEMBRANE
COMMUNITY
Start: 2009-08-13

## 2020-02-03 RX ORDER — GARLIC EXTRACT 500 MG
1 CAPSULE ORAL
COMMUNITY

## 2020-02-03 RX ORDER — ALBUTEROL SULFATE 90 UG/1
2 AEROSOL, METERED RESPIRATORY (INHALATION)
COMMUNITY
Start: 2019-12-05

## 2020-02-03 SDOH — HEALTH STABILITY: PHYSICAL HEALTH: ON AVERAGE, HOW MANY MINUTES DO YOU ENGAGE IN EXERCISE AT THIS LEVEL?: 0 MIN

## 2020-02-03 SDOH — HEALTH STABILITY: PHYSICAL HEALTH: ON AVERAGE, HOW MANY DAYS PER WEEK DO YOU ENGAGE IN MODERATE TO STRENUOUS EXERCISE (LIKE A BRISK WALK)?: 0 DAYS

## 2020-02-03 ASSESSMENT — PATIENT HEALTH QUESTIONNAIRE - PHQ9
1. LITTLE INTEREST OR PLEASURE IN DOING THINGS: NOT AT ALL
2. FEELING DOWN, DEPRESSED OR HOPELESS: NOT AT ALL
SUM OF ALL RESPONSES TO PHQ9 QUESTIONS 1 AND 2: 0
SUM OF ALL RESPONSES TO PHQ9 QUESTIONS 1 AND 2: 0

## 2020-02-03 ASSESSMENT — ENCOUNTER SYMPTOMS: MEMORY LOSS: 1

## 2020-02-12 ENCOUNTER — MED REC SCAN ONLY (OUTPATIENT)
Dept: INTERNAL MEDICINE CLINIC | Facility: CLINIC | Age: 85
End: 2020-02-12

## 2020-02-25 ENCOUNTER — OFFICE VISIT (OUTPATIENT)
Dept: INTERNAL MEDICINE CLINIC | Facility: CLINIC | Age: 85
End: 2020-02-25
Payer: MEDICARE

## 2020-02-25 ENCOUNTER — TELEPHONE (OUTPATIENT)
Dept: INTERNAL MEDICINE CLINIC | Facility: CLINIC | Age: 85
End: 2020-02-25

## 2020-02-25 VITALS
WEIGHT: 116.38 LBS | HEIGHT: 62 IN | BODY MASS INDEX: 21.42 KG/M2 | DIASTOLIC BLOOD PRESSURE: 70 MMHG | RESPIRATION RATE: 18 BRPM | HEART RATE: 80 BPM | OXYGEN SATURATION: 98 % | SYSTOLIC BLOOD PRESSURE: 150 MMHG

## 2020-02-25 DIAGNOSIS — I10 BENIGN ESSENTIAL HTN: Primary | ICD-10-CM

## 2020-02-25 DIAGNOSIS — Z00.00 ROUTINE GENERAL MEDICAL EXAMINATION AT A HEALTH CARE FACILITY: Primary | ICD-10-CM

## 2020-02-25 DIAGNOSIS — E78.00 PURE HYPERCHOLESTEROLEMIA: ICD-10-CM

## 2020-02-25 DIAGNOSIS — E87.6 HYPOKALEMIA: ICD-10-CM

## 2020-02-25 DIAGNOSIS — D50.8 OTHER IRON DEFICIENCY ANEMIA: ICD-10-CM

## 2020-02-25 DIAGNOSIS — I10 ESSENTIAL HYPERTENSION: ICD-10-CM

## 2020-02-25 PROCEDURE — 99213 OFFICE O/P EST LOW 20 MIN: CPT | Performed by: INTERNAL MEDICINE

## 2020-02-25 RX ORDER — LOSARTAN POTASSIUM AND HYDROCHLOROTHIAZIDE 12.5; 5 MG/1; MG/1
1 TABLET ORAL DAILY
Qty: 90 TABLET | Refills: 1 | Status: SHIPPED | OUTPATIENT
Start: 2020-02-25 | End: 2020-03-05 | Stop reason: RX

## 2020-02-25 RX ORDER — HYDROCHLOROTHIAZIDE 12.5 MG/1
12.5 TABLET ORAL
COMMUNITY
Start: 2011-05-11 | End: 2020-02-26

## 2020-02-25 NOTE — TELEPHONE ENCOUNTER
Wellness   Future Appointments   Date Time Provider Eric Campbell   4/8/2020 10:40 AM Shakir Hidalgo MD [facility]     Orders to [facility] aware must fast no call back required

## 2020-02-25 NOTE — PROGRESS NOTES
Morena Faria is a 80year old female. Patient presents with:  Blood Pressure: cn room 4: b/p check       HPI:     Patient here for BP follow up.  Forgot to bring his home readings but says some days BP is good around 130/70 and other days can be up to MCG/ACT Inhalation Aero Soln Inhale 2 puffs into the lungs every 4 (four) hours as needed for Wheezing or Shortness of Breath.  1 Inhaler 0   • ANORO ELLIPTA 62.5-25 MCG/INH Inhalation Aerosol Powder, Breath Activated INHALE 1 PUFF INTO THE LUNGS DAILY 3 ea Alcohol use:  Yes      Alcohol/week: 1.0 standard drinks      Types: 1 Standard drinks or equivalent per week      Frequency: 2-4 times a month      Drinks per session: 1 or 2      Binge frequency: Never      Comment: cage done 2/25/20    Drug use: No    Fa

## 2020-02-26 ENCOUNTER — TELEPHONE (OUTPATIENT)
Dept: INTERNAL MEDICINE CLINIC | Facility: CLINIC | Age: 85
End: 2020-02-26

## 2020-02-26 RX ORDER — HYDROCHLOROTHIAZIDE 12.5 MG/1
12.5 TABLET ORAL DAILY
Qty: 90 TABLET | Refills: 1 | Status: SHIPPED | OUTPATIENT
Start: 2020-02-26 | End: 2020-08-20

## 2020-02-26 RX ORDER — LOSARTAN POTASSIUM 50 MG/1
50 TABLET ORAL DAILY
Qty: 90 TABLET | Refills: 1 | Status: SHIPPED | OUTPATIENT
Start: 2020-02-26 | End: 2021-01-01

## 2020-02-26 NOTE — TELEPHONE ENCOUNTER
Last VISIT 2/25/20 TB    Last REFILL  2/25/20 Losartan-HCTZ 90T 1R, Medication Backordered    Last LABS 1/16/20 CMP, Lipid    Future Appointments   Date Time Provider Eric Campbell   4/3/2020 10:10 AM Alida Beltran MD [facility] NP [facility]   4/8/

## 2020-03-04 RX ORDER — LOSARTAN POTASSIUM 25 MG/1
TABLET ORAL
Qty: 90 TABLET | Refills: 0 | Status: SHIPPED | OUTPATIENT
Start: 2020-03-04 | End: 2020-03-05 | Stop reason: CLARIF

## 2020-03-05 ENCOUNTER — TELEPHONE (OUTPATIENT)
Dept: INTERNAL MEDICINE CLINIC | Facility: CLINIC | Age: 85
End: 2020-03-05

## 2020-03-05 ENCOUNTER — OFFICE VISIT (OUTPATIENT)
Dept: PHYSICAL THERAPY | Age: 85
End: 2020-03-05
Attending: ORTHOPAEDIC SURGERY
Payer: MEDICARE

## 2020-03-05 DIAGNOSIS — M75.81 TENDINITIS OF RIGHT ROTATOR CUFF: ICD-10-CM

## 2020-03-05 DIAGNOSIS — M54.2 CERVICALGIA: ICD-10-CM

## 2020-03-05 PROCEDURE — 97162 PT EVAL MOD COMPLEX 30 MIN: CPT

## 2020-03-05 PROCEDURE — 97140 MANUAL THERAPY 1/> REGIONS: CPT

## 2020-03-05 NOTE — PROGRESS NOTES
SPINE EVALUATION:   Referring Physician: Dr. Anthony Lenz  Diagnosis: right shoulder pain and chronic neck pain     Date of Service: 3/5/2020     PATIENT Jeff Her is a 80year old female who presents to therapy today with complaints of chron Chronic pain following surgery or procedure   Noted: 12/10/2015   Hypokalemia   Noted: 8/5/2015   History of colon resection, for diverticulitis, 2010, with colostomy   Noted: 8/5/2015   Degenerative arthritis of cervical spine   Noted: 11/24/2013   Hist stiffness at Ct junction and upper thoracic spine. Palpation: mild increase in upper trap and paraspinal muscle tension bilaterally    Shoulder AROM in standing is WNL, however pain noted at all end ranges of her right arm, without loss of motion.   Mild strength (mid/low trap) to 4/5 to promote improved upright posturing   · Pt will be independent and compliant with comprehensive HEP to maintain progress achieved in PT       Frequency / Duration: Patient will be seen for 1-2 x/week or a total of 10 visits

## 2020-03-05 NOTE — TELEPHONE ENCOUNTER
LOSARTAN POTASSIUM 25 MG and hydrochlorothiazide 12.5 MG Oral Tab    Pt would like a call back to discuss which dose she should be taking. Pt will be gone until after noon.

## 2020-03-05 NOTE — TELEPHONE ENCOUNTER
Per OV on 2/25/20,   \"Benign essential htn  (primary encounter diagnosis)   -not controlled still, will change losartan 50mg to losartan hctz 50/12.5mg daily. \"    Pt should be on Losartan 50mg and HCTZ 12.5mg QD.   Called and spoke with pharmacist at [facility],

## 2020-03-06 ENCOUNTER — TELEPHONE (OUTPATIENT)
Dept: INTERNAL MEDICINE CLINIC | Facility: CLINIC | Age: 85
End: 2020-03-06

## 2020-03-06 RX ORDER — ALENDRONATE SODIUM 70 MG/1
TABLET ORAL
Qty: 12 TABLET | Refills: 1 | Status: SHIPPED | OUTPATIENT
Start: 2020-03-06 | End: 2020-08-11

## 2020-03-11 ENCOUNTER — APPOINTMENT (OUTPATIENT)
Dept: PHYSICAL THERAPY | Age: 85
End: 2020-03-11
Attending: ORTHOPAEDIC SURGERY
Payer: MEDICARE

## 2020-03-23 ENCOUNTER — APPOINTMENT (OUTPATIENT)
Dept: PHYSICAL THERAPY | Age: 85
End: 2020-03-23
Attending: ORTHOPAEDIC SURGERY
Payer: MEDICARE

## 2020-03-26 ENCOUNTER — TELEPHONE (OUTPATIENT)
Dept: PHYSICAL THERAPY | Age: 85
End: 2020-03-26

## 2020-03-26 ENCOUNTER — APPOINTMENT (OUTPATIENT)
Dept: PHYSICAL THERAPY | Age: 85
End: 2020-03-26
Attending: ORTHOPAEDIC SURGERY
Payer: MEDICARE

## 2020-03-30 ENCOUNTER — APPOINTMENT (OUTPATIENT)
Dept: PHYSICAL THERAPY | Age: 85
End: 2020-03-30
Attending: ORTHOPAEDIC SURGERY
Payer: MEDICARE

## 2020-04-01 ENCOUNTER — APPOINTMENT (OUTPATIENT)
Dept: PHYSICAL THERAPY | Age: 85
End: 2020-04-01
Attending: ORTHOPAEDIC SURGERY
Payer: MEDICARE

## 2020-04-02 ENCOUNTER — TELEPHONE (OUTPATIENT)
Dept: INTERNAL MEDICINE CLINIC | Facility: CLINIC | Age: 85
End: 2020-04-02

## 2020-04-02 DIAGNOSIS — R06.9 BREATHING PROBLEM: ICD-10-CM

## 2020-04-02 DIAGNOSIS — J44.9 CHRONIC OBSTRUCTIVE PULMONARY DISEASE, UNSPECIFIED COPD TYPE (HCC): ICD-10-CM

## 2020-04-02 PROCEDURE — G2012 BRIEF CHECK IN BY MD/QHP: HCPCS | Performed by: INTERNAL MEDICINE

## 2020-04-02 NOTE — TELEPHONE ENCOUNTER
Virtual/Telephone Check-In    Milagro Khan verbally consents to a [employer] on 04/02/20. Patient understands and accepts financial responsibility for any deductible, co-insurance and/or co-pays associated with this service.

## 2020-04-02 NOTE — TELEPHONE ENCOUNTER
Patient calling and is experiencing heavier breathing than normal  Patient is aware of the virtulal visits being submitted to insurance  Travel screening is complete    Please call [phone]

## 2020-04-06 ENCOUNTER — APPOINTMENT (OUTPATIENT)
Dept: PHYSICAL THERAPY | Age: 85
End: 2020-04-06
Attending: ORTHOPAEDIC SURGERY
Payer: MEDICARE

## 2020-04-09 ENCOUNTER — APPOINTMENT (OUTPATIENT)
Dept: PHYSICAL THERAPY | Age: 85
End: 2020-04-09
Attending: ORTHOPAEDIC SURGERY
Payer: MEDICARE

## 2020-04-14 ENCOUNTER — TELEPHONE (OUTPATIENT)
Dept: PHYSICAL THERAPY | Age: 85
End: 2020-04-14

## 2020-04-15 ENCOUNTER — TELEPHONE (OUTPATIENT)
Dept: PHYSICAL THERAPY | Age: 85
End: 2020-04-15

## 2020-04-23 ENCOUNTER — TELEPHONE (OUTPATIENT)
Dept: INTERNAL MEDICINE CLINIC | Facility: CLINIC | Age: 85
End: 2020-04-23

## 2020-04-23 NOTE — TELEPHONE ENCOUNTER
TB, pt sent a [facility] message with a picture of her wrist and a phone message. Do you want a video or telephone visit scheduled with her?

## 2020-04-23 NOTE — TELEPHONE ENCOUNTER
Patient states yesterday she noticed her L wrist is black and blue and hand is tingling.   Please advise

## 2020-05-11 RX ORDER — LOSARTAN POTASSIUM 25 MG/1
TABLET ORAL
Qty: 90 TABLET | Refills: 0 | Status: ON HOLD | OUTPATIENT
Start: 2020-05-11 | End: 2020-06-01

## 2020-05-14 ENCOUNTER — OFFICE VISIT (OUTPATIENT)
Dept: PHYSICAL THERAPY | Age: 85
End: 2020-05-14
Attending: ORTHOPAEDIC SURGERY
Payer: MEDICARE

## 2020-05-14 PROCEDURE — 97110 THERAPEUTIC EXERCISES: CPT

## 2020-05-14 PROCEDURE — 97140 MANUAL THERAPY 1/> REGIONS: CPT

## 2020-05-14 NOTE — PROGRESS NOTES
Dx: right shoulder pain and chronic neck pain             Insurance (Authorized # of Visits):  [address] [employer] Physician: Dr. Jules   Next MD visit: none scheduled  Fall Risk: standard         Precautions: n/a           Subjective: she Date: 5/14/2020  TX#: 2/10 Date:                 TX#: 3/ Date:                 TX#: 4/ Date:                 TX#: 5/ Date:    Tx#: 6/   Re-assessment/subjective 10 min        Standing scap pinches 2x10        RTB rows 2x10        Supine cervical AROM 2x10

## 2020-05-18 ENCOUNTER — OFFICE VISIT (OUTPATIENT)
Dept: PHYSICAL THERAPY | Age: 85
End: 2020-05-18
Attending: ORTHOPAEDIC SURGERY
Payer: MEDICARE

## 2020-05-18 PROCEDURE — 97140 MANUAL THERAPY 1/> REGIONS: CPT

## 2020-05-18 PROCEDURE — 97110 THERAPEUTIC EXERCISES: CPT

## 2020-05-18 NOTE — PROGRESS NOTES
Dx: right shoulder pain and chronic neck pain             Insurance (Authorized # of Visits):  [address] [employer] Physician: Dr. Ewelina Montes     Next MD visit: none scheduled  Fall Risk: standard         Precautions: n/a           Subjective: s Manual distraction to c-spine with PROM rot, 2x10 bouts B  Int cerv dist aimed at upper c-spine, 5 min          Left C2-3 upslope, gr 3, 3x10 bouts mob Upper Cspine, upslope on right, 3x10 bouts gr 3          cerv dist, with sideglide mob B, C5-6, C6-7,

## 2020-05-18 NOTE — PROGRESS NOTES
Dx: right shoulder pain and chronic neck pain             Insurance (Authorized # of Visits):  [employer] Physician: Dr. Yuniel Walton MD visit: none scheduled  Fall Risk: standard         Precautions: n/a           Subjective: stil c-spine with PROM rot, 2x10 bouts B  Int cerv dist aimed at upper c-spine, 5 min       Left C2-3 upslope, gr 3, 3x10 bouts mob Upper Cspine, upslope on right, 3x10 bouts gr 3       cerv dist, with sideglide mob B, C5-6, C6-7, 3x10 bouts  C2-3, C3-4 3x10 B

## 2020-05-20 ENCOUNTER — APPOINTMENT (OUTPATIENT)
Dept: PHYSICAL THERAPY | Age: 85
End: 2020-05-20
Attending: ORTHOPAEDIC SURGERY
Payer: MEDICARE

## 2020-05-20 RX ORDER — SIMVASTATIN 10 MG
TABLET ORAL
Qty: 90 TABLET | Refills: 0 | Status: SHIPPED | OUTPATIENT
Start: 2020-05-20 | End: 2020-08-11

## 2020-05-26 RX ORDER — DILTIAZEM HYDROCHLORIDE 300 MG/1
CAPSULE, EXTENDED RELEASE ORAL
Qty: 90 CAPSULE | Refills: 0 | Status: SHIPPED | OUTPATIENT
Start: 2020-05-26 | End: 2020-08-17

## 2020-05-28 ENCOUNTER — OFFICE VISIT (OUTPATIENT)
Dept: PHYSICAL THERAPY | Age: 85
End: 2020-05-28
Attending: ORTHOPAEDIC SURGERY
Payer: MEDICARE

## 2020-05-28 PROCEDURE — 97014 ELECTRIC STIMULATION THERAPY: CPT

## 2020-05-28 PROCEDURE — 97110 THERAPEUTIC EXERCISES: CPT

## 2020-05-28 PROCEDURE — 97140 MANUAL THERAPY 1/> REGIONS: CPT

## 2020-05-28 NOTE — PROGRESS NOTES
Dx: right shoulder pain and chronic neck pain             Insurance (Authorized # of Visits):  [address] [employer] Physician: Dr. Arian Walton MD visit: none scheduled  Fall Risk: standard         Precautions: n/a           Subjective: s gr 3, 3x10 bouts mob Upper Cspine, upslope on right, 3x10 bouts gr 3   C2-3, upslope, gr 3, 3x10 bouts   Upper cerv distraction, int, gr 3-4, 3x10 bouts        cerv dist, with sideglide mob B, C5-6, C6-7, 3x10 bouts  C2-3, C3-4 3x10 B  Lower cervical side

## 2020-05-29 ENCOUNTER — LAB ENCOUNTER (OUTPATIENT)
Dept: LAB | Age: 85
DRG: 389 | End: 2020-05-29
Attending: INTERNAL MEDICINE
Payer: MEDICARE

## 2020-05-29 DIAGNOSIS — Z00.00 ROUTINE GENERAL MEDICAL EXAMINATION AT A HEALTH CARE FACILITY: ICD-10-CM

## 2020-05-29 DIAGNOSIS — I10 ESSENTIAL HYPERTENSION: ICD-10-CM

## 2020-05-29 DIAGNOSIS — E78.00 PURE HYPERCHOLESTEROLEMIA: ICD-10-CM

## 2020-05-29 DIAGNOSIS — D50.8 OTHER IRON DEFICIENCY ANEMIA: ICD-10-CM

## 2020-05-29 DIAGNOSIS — E87.6 HYPOKALEMIA: ICD-10-CM

## 2020-05-29 PROCEDURE — 84443 ASSAY THYROID STIM HORMONE: CPT

## 2020-05-29 PROCEDURE — 85025 COMPLETE CBC W/AUTO DIFF WBC: CPT

## 2020-05-29 PROCEDURE — 36415 COLL VENOUS BLD VENIPUNCTURE: CPT

## 2020-05-29 PROCEDURE — 80053 COMPREHEN METABOLIC PANEL: CPT

## 2020-05-29 PROCEDURE — 80061 LIPID PANEL: CPT

## 2020-05-30 ENCOUNTER — HOSPITAL ENCOUNTER (INPATIENT)
Facility: HOSPITAL | Age: 85
LOS: 2 days | Discharge: HOME | DRG: 389 | End: 2020-06-01
Attending: EMERGENCY MEDICINE | Admitting: HOSPITALIST
Payer: MEDICARE

## 2020-05-30 ENCOUNTER — APPOINTMENT (OUTPATIENT)
Dept: CT IMAGING | Facility: HOSPITAL | Age: 85
DRG: 389 | End: 2020-05-30
Attending: EMERGENCY MEDICINE
Payer: MEDICARE

## 2020-05-30 DIAGNOSIS — R10.9 ABDOMINAL PAIN OF UNKNOWN ETIOLOGY: ICD-10-CM

## 2020-05-30 DIAGNOSIS — K56.609 SMALL BOWEL OBSTRUCTION (HCC): Primary | ICD-10-CM

## 2020-05-30 PROBLEM — N17.9 ACUTE KIDNEY INJURY (HCC): Status: ACTIVE | Noted: 2020-05-30

## 2020-05-30 PROBLEM — R79.89 AZOTEMIA: Status: ACTIVE | Noted: 2020-05-30

## 2020-05-30 PROCEDURE — 74177 CT ABD & PELVIS W/CONTRAST: CPT | Performed by: EMERGENCY MEDICINE

## 2020-05-30 PROCEDURE — 99223 1ST HOSP IP/OBS HIGH 75: CPT | Performed by: SURGERY

## 2020-05-30 PROCEDURE — 99223 1ST HOSP IP/OBS HIGH 75: CPT | Performed by: HOSPITALIST

## 2020-05-30 RX ORDER — BIOTIN 1 MG
1 TABLET ORAL DAILY
COMMUNITY
End: 2022-01-01

## 2020-05-30 RX ORDER — FAMOTIDINE 10 MG/ML
20 INJECTION, SOLUTION INTRAVENOUS DAILY
Status: DISCONTINUED | OUTPATIENT
Start: 2020-05-30 | End: 2020-06-01

## 2020-05-30 RX ORDER — ONDANSETRON 2 MG/ML
4 INJECTION INTRAMUSCULAR; INTRAVENOUS ONCE
Status: COMPLETED | OUTPATIENT
Start: 2020-05-30 | End: 2020-05-30

## 2020-05-30 RX ORDER — CHOLECALCIFEROL (VITAMIN D3) 125 MCG
CAPSULE ORAL
COMMUNITY
End: 2021-01-01

## 2020-05-30 RX ORDER — MORPHINE SULFATE 4 MG/ML
2 INJECTION, SOLUTION INTRAMUSCULAR; INTRAVENOUS EVERY 30 MIN PRN
Status: DISCONTINUED | OUTPATIENT
Start: 2020-05-30 | End: 2020-05-30

## 2020-05-30 RX ORDER — DEXTROSE AND SODIUM CHLORIDE 5; .45 G/100ML; G/100ML
INJECTION, SOLUTION INTRAVENOUS CONTINUOUS
Status: DISCONTINUED | OUTPATIENT
Start: 2020-05-30 | End: 2020-05-31

## 2020-05-30 RX ORDER — MORPHINE SULFATE 4 MG/ML
1 INJECTION, SOLUTION INTRAMUSCULAR; INTRAVENOUS EVERY 2 HOUR PRN
Status: DISCONTINUED | OUTPATIENT
Start: 2020-05-30 | End: 2020-05-31

## 2020-05-30 RX ORDER — ONDANSETRON 2 MG/ML
4 INJECTION INTRAMUSCULAR; INTRAVENOUS EVERY 4 HOURS PRN
Status: DISCONTINUED | OUTPATIENT
Start: 2020-05-30 | End: 2020-05-31

## 2020-05-30 RX ORDER — MORPHINE SULFATE 4 MG/ML
4 INJECTION, SOLUTION INTRAMUSCULAR; INTRAVENOUS EVERY 2 HOUR PRN
Status: DISCONTINUED | OUTPATIENT
Start: 2020-05-30 | End: 2020-05-31

## 2020-05-30 RX ORDER — ENOXAPARIN SODIUM 100 MG/ML
30 INJECTION SUBCUTANEOUS DAILY
Status: DISCONTINUED | OUTPATIENT
Start: 2020-05-31 | End: 2020-06-01

## 2020-05-30 RX ORDER — SODIUM CHLORIDE 9 MG/ML
125 INJECTION, SOLUTION INTRAVENOUS CONTINUOUS
Status: DISCONTINUED | OUTPATIENT
Start: 2020-05-30 | End: 2020-05-30

## 2020-05-30 RX ORDER — HYDROMORPHONE HYDROCHLORIDE 1 MG/ML
0.5 INJECTION, SOLUTION INTRAMUSCULAR; INTRAVENOUS; SUBCUTANEOUS EVERY 30 MIN PRN
Status: ACTIVE | OUTPATIENT
Start: 2020-05-30 | End: 2020-05-30

## 2020-05-30 RX ORDER — ONDANSETRON 2 MG/ML
4 INJECTION INTRAMUSCULAR; INTRAVENOUS EVERY 6 HOURS PRN
Status: DISCONTINUED | OUTPATIENT
Start: 2020-05-30 | End: 2020-06-01

## 2020-05-30 RX ORDER — CHLORAL HYDRATE 500 MG
1000 CAPSULE ORAL DAILY
COMMUNITY
End: 2021-01-01

## 2020-05-30 RX ORDER — SODIUM CHLORIDE 9 MG/ML
INJECTION, SOLUTION INTRAVENOUS CONTINUOUS
Status: ACTIVE | OUTPATIENT
Start: 2020-05-30 | End: 2020-05-30

## 2020-05-30 RX ORDER — MORPHINE SULFATE 4 MG/ML
2 INJECTION, SOLUTION INTRAMUSCULAR; INTRAVENOUS EVERY 2 HOUR PRN
Status: DISCONTINUED | OUTPATIENT
Start: 2020-05-30 | End: 2020-05-31

## 2020-05-30 RX ORDER — METOPROLOL TARTRATE 5 MG/5ML
5 INJECTION INTRAVENOUS EVERY 6 HOURS SCHEDULED
Status: DISCONTINUED | OUTPATIENT
Start: 2020-05-30 | End: 2020-05-31

## 2020-05-30 NOTE — ED NOTES
MD at bedside. Pt denies pain at this time. Pt asking why she is being admitted.  Pt refuses NG tube

## 2020-05-30 NOTE — PROGRESS NOTES
05/30/20 1725   Clinical Encounter Type   Visited With Patient   Routine Visit Introduction   Continue Visiting No   Patient's Supportive Strategies/Resources  provided emotional support   Mormon Encounters   Mormon Needs Prayer   Patient

## 2020-05-30 NOTE — CONSULTS
[facility] Note:  Renal Dose Adjustment    Davis Mckeon has been prescribed famotidine (PEPCID) 20 mg intravenously every 12 hours. Estimated Creatinine Clearance: 26.9 mL/min (A) (based on SCr of 1.21 mg/dL (H)).     Her calculated creatinine russ

## 2020-05-30 NOTE — H&P
[facility] HOSPITALIST  History and Physical     Daniel Hernandez Patient Status:  Emergency    7/3/1934 MRN [id number]   Location [facility] [facility] Attending Boni Egan MD   Hosp Day # 0 PCP Trini Edwards MD     Chief Complaint: CHOLECYSTECTOMY  1993, 1998    x2   • COLECTOMY     • COLONOSCOPY  2004, 5/8/2012    x2, in 2012 w/ forceps biopsy   • COLOSTOMY      and reversal   • EXPLORATORY LAPAROTOMY N/A 8/20/2017    Performed by Jannell Schirmer, MD at [address] [place] [address]   • LYSIS OF ADH Mother         Cerebral hemorrhage   • Other (Other) Son         asthma  2 sons   • Other (alzheimer's disease) Sister         x2   • Other (stroke syndrome) Other         family hx        Allergies: No Known Allergies    Medications:  No current facility- by mouth daily. , Disp: , Rfl:   CRANBERRY OR, Take 1 tablet by mouth daily. , Disp: , Rfl:   CALCIUM + D OR, Take 1 tablet by mouth daily. , Disp: , Rfl:   MULTIVITAMIN OR, Take 1 tablet by mouth daily. , Disp: , Rfl:         Review of Systems:   A comprehe antihypertensives  Metoprolol IV with hold parameters  Monitor hemodynamics  Telemetry    Quality:  · DVT Prophylaxis: Lovenox  · CODE status: Full  · [place]: No    Plan of care discussed with patient and ER.     China Dillon MD  5/30/2020

## 2020-05-30 NOTE — ED PROVIDER NOTES
Patient Seen in: [facility] Emergency Department      History   Patient presents with:  Abdomen/Flank Pain    Stated Complaint: Abdominal Pain    HPI  This is a 35-year-old female who presents with right-sided abdominal pain, diarrhea x2 this [place]n COLONOSCOPY  2004, 5/8/2012    x2, in 2012 w/ forceps biopsy   • COLOSTOMY      and reversal   • EXPLORATORY LAPAROTOMY N/A 8/20/2017    Performed by Erin Gill MD at [facility]  08/20/2017    expl lap   • PAULINO LOCALIZATION [place] 2/25/20    Drug use: No             Review of Systems    Positive for stated complaint: Abdominal Pain  Other systems are as noted in HPI. Constitutional and vital signs reviewed. All other systems reviewed and negative except as noted above.     Phys DIFFERENTIAL - Abnormal; Notable for the following components:    WBC 11.3 (*)     Neutrophil Absolute Prelim 9.84 (*)     Neutrophil Absolute 9.84 (*)     Lymphocyte Absolute 0.54 (*)     All other components within normal limits   LIPASE - Normal   CBC W Registry) which includes the Dose Index Registry.   PATIENT STATED HISTORY:(As transcribed by Technologist)  Patient has right upper and lower quadrant pain with diarrhea since this am.   CONTRAST USED:  63cc of Omnipaque 350  FINDINGS:   BONES:  No acute a OTHER:                  None. CONCLUSION:  Findings consistent with small-bowel obstruction with dilated loops of small bowel involving portions of jejunum and ileum, but not the distal or terminal ileum.   Specific transition point is difficult to deline

## 2020-05-30 NOTE — PLAN OF CARE
Denies c/o pain upon admission assessment  Maintain strict npo  A&o x 4, forgetful @ times.   Ivf's & lopressor given as ordered

## 2020-05-30 NOTE — CONSULTS
[facility]  Report of  Surgical Consultation with History and Physical Exam    Ilene Hauser Patient Status:  Emergency    7/3/1934 MRN [id number]   Location [address] [facility] Attending Keenan Blankenship MD   Hosp Day # 0 PCP T Dizziness and giddiness    • Easy bruising    • Frequent use of laxatives    • Hearing impairment    • Hearing loss    • High blood pressure    • High cholesterol    • HYPERLIPIDEMIA    • HYPERTENSION    • Increased urinary frequency    • Lower back pain OTHER SURGICAL HISTORY  01/05/2018    excision suture granuloma    • PART REMOVAL COLON W END COLOSTOMY     • REMOVAL GALLBLADDER     • SKIN SURGERY Left 03/09/2018    exc - L mid forearm - atypical squamous proliferation   • THORACIC FACET JOINT INJECTION rash  Musculoskeletal:  Negative for bone/joint symptoms  Neurological:  Negative for gait disturbance  Psychiatric:  Negative for inappropriate interaction and psychiatric symptoms  Respiratory:  Negative for cough, dyspnea and wheezing    Physical Exam: 6.9 6.2*     CT Abdomen and Pelvis 5/30/2020:  (Dr. Steve Alexandra reviewed the report.)  FINDINGS:       BONES:  No acute abnormality. Note is made of degenerative changes present in the spine.      LUNG BASES:  Small nonspecific medial right lower lobe opacities, small-bowel obstruction with dilated loops of small bowel involving portions of jejunum and ileum, but not the distal or terminal ileum. Specific transition point is difficult to delineate.   Some mesenteric edema   seen, but no free air, ascites, bowel wa bowel obstruction. Plan:  1. N.p.o.  2. Await return of bowel function. The patient is currently without an NG tube. She denies any nausea or vomiting.   If the patient does develop any nausea or vomiting, we will discuss placing NG tube at that time

## 2020-05-30 NOTE — ED INITIAL ASSESSMENT (HPI)
Pt to ED c/o Right-sided abdominal pain and diarrhea x 2 since this AM. Pt denies fever, SOB, N/V. Hx of Bowel Resection & multiple bowel obstructions.

## 2020-05-31 PROCEDURE — 99232 SBSQ HOSP IP/OBS MODERATE 35: CPT | Performed by: SURGERY

## 2020-05-31 PROCEDURE — 99233 SBSQ HOSP IP/OBS HIGH 50: CPT | Performed by: HOSPITALIST

## 2020-05-31 RX ORDER — HALOPERIDOL 5 MG/ML
2 INJECTION INTRAMUSCULAR EVERY 6 HOURS PRN
Status: DISCONTINUED | OUTPATIENT
Start: 2020-05-31 | End: 2020-06-01

## 2020-05-31 RX ORDER — HALOPERIDOL 5 MG/ML
4 INJECTION INTRAMUSCULAR EVERY 6 HOURS PRN
Status: DISCONTINUED | OUTPATIENT
Start: 2020-05-31 | End: 2020-06-01

## 2020-05-31 RX ORDER — HALOPERIDOL 5 MG/ML
1 INJECTION INTRAMUSCULAR EVERY 4 HOURS PRN
Status: DISCONTINUED | OUTPATIENT
Start: 2020-05-31 | End: 2020-05-31

## 2020-05-31 RX ORDER — HALOPERIDOL 2 MG/1
2 TABLET ORAL EVERY 6 HOURS PRN
Status: DISCONTINUED | OUTPATIENT
Start: 2020-05-31 | End: 2020-06-01

## 2020-05-31 RX ORDER — HALOPERIDOL 2 MG/1
4 TABLET ORAL EVERY 6 HOURS PRN
Status: DISCONTINUED | OUTPATIENT
Start: 2020-05-31 | End: 2020-06-01

## 2020-05-31 RX ORDER — HALOPERIDOL 1 MG/1
1 TABLET ORAL EVERY 4 HOURS PRN
Status: DISCONTINUED | OUTPATIENT
Start: 2020-05-31 | End: 2020-05-31

## 2020-05-31 NOTE — PLAN OF CARE
Denies c/o pain upon am assessment  Cld initiated by dr Leonides Prater. Pt currently looking thru menu  Small bm early this am.  States she does not know if she is passing flatus  Up w/ sba. Gait slow & steady. Bed/chair alarm. Voiding w/out difficulty.   Usi

## 2020-05-31 NOTE — PROGRESS NOTES
[facility]  Progress Note    Milagro Khan Patient Status:  Inpatient    7/3/1934 MRN [id number]   [facility] 3NW-A Attending Hannah Ceballos MD   Hosp Day # 1 PCP Megan Hernandez MD     Subjective:   The patient continues to have sli Assessment:  SBO--resolving    Plan:  1. We will start a clear liquid diet today. Possibly advance as tolerated. 2. If the patient is able to tolerate a soft diet, she may be discharged home. 3. Follow-up with Dr. Michelle Montero in 2 to 3 weeks.     Jessenia Gonzalez

## 2020-05-31 NOTE — PROGRESS NOTES
Pt had nonsustained burst of PAT HR up to 138. Pt sleeping at this time. HR now in 70s NSR. Hospitalist notified. Will continue to monitor.

## 2020-05-31 NOTE — PROGRESS NOTES
Pt anxious & irritable. Pt states she wants to discharge today. Pt forgetful. Asking repeative questions. Pt called her daughter on phone & yelled at daughter stating 'why won't you let me leave?!  I can get my own place!'. Comfort measures provided.

## 2020-05-31 NOTE — PROGRESS NOTES
[facility]  Progress Note     Zana Whitlock Patient Status:  Inpatient    7/3/1934 MRN [id number]   [facility] 3NW-A Attending Amelia Dean MD   Hosp Day # 1 PCP Oswald Kumar MD     Chief Complaint: SBO    S: Patient states hours.         Imaging: Imaging data reviewed in Epic.     Medications:   • umeclidinium-vilanterol  1 puff Inhalation Daily   • metoprolol Tartrate  5 mg Intravenous 4 times per day   • enoxaparin  30 mg Subcutaneous Daily   • famoTIDine  20 mg Intravenous

## 2020-05-31 NOTE — PROGRESS NOTES
Pt remains anxious & agitated. Yelling at family on phone & raising voice to staff stating she's leaving. Making multiple phone calls trying to find someone to pick her up.

## 2020-05-31 NOTE — PLAN OF CARE
Problem: PAIN - ADULT  Goal: Verbalizes/displays adequate comfort level or patient's stated pain goal  Description  INTERVENTIONS:  - Encourage pt to monitor pain and request assistance  - Assess pain using appropriate pain scale  - Administer analgesics and lab values  - Obtain nutritional consult as needed  - Optimize oral hygiene and moisture  - Encourage food from home; allow for food preferences  - Enhance eating environment  Outcome: Progressing  Goal: Achieves appropriate nutritional intake (bariatr

## 2020-05-31 NOTE — PROGRESS NOTES
Pt cont to ask to leave. Explained medical necessity for hospitalization. Pt also calling daughter & demanding daughter to pick her up. Daughter asked this Devoria Quirk if pt can take something to help her 'calm down'.   Dr arnulfo taylor, per daughter's Stonyford Putty

## 2020-06-01 ENCOUNTER — TELEPHONE (OUTPATIENT)
Dept: INTERNAL MEDICINE CLINIC | Facility: CLINIC | Age: 85
End: 2020-06-01

## 2020-06-01 VITALS
HEIGHT: 62 IN | BODY MASS INDEX: 22.08 KG/M2 | WEIGHT: 120 LBS | RESPIRATION RATE: 16 BRPM | DIASTOLIC BLOOD PRESSURE: 44 MMHG | OXYGEN SATURATION: 94 % | SYSTOLIC BLOOD PRESSURE: 138 MMHG | HEART RATE: 93 BPM | TEMPERATURE: 98 F

## 2020-06-01 PROCEDURE — 99239 HOSP IP/OBS DSCHRG MGMT >30: CPT | Performed by: HOSPITALIST

## 2020-06-01 RX ORDER — POTASSIUM CHLORIDE 20 MEQ/1
40 TABLET, EXTENDED RELEASE ORAL EVERY 4 HOURS
Status: DISCONTINUED | OUTPATIENT
Start: 2020-06-01 | End: 2020-06-01

## 2020-06-01 NOTE — PLAN OF CARE
Patient is A/Ox2-3, confused, agitated, impulsive, Haldol given x2 overnight. RA. Tele- NSR/SB. SCDs. Voids. Advanced to soft diet for breakfast. Passing gas. Denies N/V, Denies pain. Up with assist. IV saline locked. POC dicussed with patient.        2300- effectiveness of GI medications  - Encourage mobilization and activity  - Obtain nutritional consult as needed  - Establish a toileting routine/schedule  - Consider collaborating with pharmacy to review patient's medication profile  Outcome: Progressing  G

## 2020-06-01 NOTE — PROGRESS NOTES
[facility]  Progress Note     [facility] Patient Status:  Inpatient    7/3/1934 MRN [id number]   [facility] 3NW-A Attending Corinne Alves MD   Hosp Day # 2 PCP Nai Amado MD     Chief Complaint: SBO    S: Patient denies dilTIAZem HCl ER Coated Beads  300 mg Oral Daily   • umeclidinium-vilanterol  1 puff Inhalation Daily   • enoxaparin  30 mg Subcutaneous Daily   • famoTIDine  20 mg Intravenous Daily       ASSESSMENT / PLAN:     1. SBO, resolved  1. Diet as tolerated  2.  Erven Virginies

## 2020-06-01 NOTE — PROGRESS NOTES
[facility]  Progress Note    Morena Faria Patient Status:  Inpatient    7/3/1934 MRN [id number]   [facility] 3NW-A Attending Luciana Epley, MD   Hosp Day # 2 PCP Breanna Espinoza MD     Subjective:   The patient states that she is do (urinary tract infection)     Degenerative arthritis of cervical spine     Hypokalemia     History of colon resection, for diverticulitis, 2010, with colostomy     Chronic pain following surgery or procedure     Iron deficiency anemia     Chronic obstructi

## 2020-06-01 NOTE — CM/SW NOTE
[facility] 33 care with referral.  Liaison will meet with the patient/familyto provide choice, explanation of services, and financial disclosure. Janis Ambriz spoke with pt's dtr. Family agreed to [facility].      Leidy Costa MSN RN, [facility]  P: [phone]

## 2020-06-01 NOTE — HOME CARE LIAISON
MET WITH PTNT AND OFFERED CHOICE  OF AGENCIES. PTNT AGREEABLE TO [facility]. MET WITH PTNT TO DISCUSS HOME HEALTH SERVICES AND COVERAGE CRITERIA. PTNT AGREEABLE TO [facility]. PTNT GIVEN RESIDENTIAL BROCHURE.  RESIDENTIAL WITH PROVIDE SN/PT ON DISC

## 2020-06-01 NOTE — PROGRESS NOTES
Reports feels ready to go home. Written and verbal discharge instructions given to patient and dtr,  verbalize understanding. IV discontinued in RLA, angio-cath tip intact, site free from redness, swelling, or drainage, patient denies pain at site.  Dressing

## 2020-06-01 NOTE — DISCHARGE SUMMARY
[facility] [place] [facility]PITAL[facility]  DISCHARGE SUMMARY     Edward End Patient Status:  Inpatient    7/3/1934 MRN [id number]   [facility] 3NW-A Attending Miryam Frausto MD   Hosp Day # 2 PCP Josselin Singletary MD     Date of Admission: 2020  Date of 90 tablet  Refills:  1        CONTINUE taking these medications      Instructions Prescription details   Acidophilus/Pectin Caps  Commonly known as:  PROBIOTIC      Take 1 capsule by mouth daily.    Refills:  0     Albuterol Sulfate  (90 Base) MCG/AC tablet  Refills:  0     Vitamin B-12 1000 MCG Tabs  Commonly known as:  VITAMIN B12      Take 1,000 mcg by mouth daily. Refills:  0     Vitamin D3 25 MCG (1000 UT) Caps      Take 1 tablet by mouth daily.    Refills:  0        STOP taking these medications

## 2020-06-02 ENCOUNTER — PATIENT OUTREACH (OUTPATIENT)
Dept: CASE MANAGEMENT | Age: 85
End: 2020-06-02

## 2020-06-02 ENCOUNTER — TELEPHONE (OUTPATIENT)
Dept: INTERNAL MEDICINE CLINIC | Facility: CLINIC | Age: 85
End: 2020-06-02

## 2020-06-02 DIAGNOSIS — Z02.9 ENCOUNTERS FOR UNSPECIFIED ADMINISTRATIVE PURPOSE: ICD-10-CM

## 2020-06-02 NOTE — TELEPHONE ENCOUNTER
Spoke to pt for TCM today. Pt is scheduled for Medicare AWV on 6/4/2020. TCM/HFU appt recommended by 6/8/2020 as pt is a high risk for readmission. Please advise.      Book by date:  6/15/2020    TRIAGE:  Please notify PCP of the above information and ch

## 2020-06-02 NOTE — PROGRESS NOTES
Initial Post Discharge Follow Up   Discharge Date: 6/1/20  Contact Date: 6/2/2020    Consent Verification:  Assessment Completed With: Patient  HIPAA Verified? Yes    Discharge Dx:     1. SBO, resolved  2.  Transient transaminitis, etiology unclear, ramirez 24 Hr TAKE 1 CAPSULE BY MOUTH EVERY DAY 90 capsule 0   • SIMVASTATIN 10 MG Oral Tab TAKE 1 TABLET BY MOUTH EVERY DAY 90 tablet 0   • ALENDRONATE 70 MG Oral Tab TAKE 1 TABLET BY MOUTH ONE TIME PER WEEK 12 tablet 1   • hydrochlorothiazide 12.5 MG Oral Tab Ta Speech, Other:  HH RN/PT/OT  (NCM) (If HH was ordered) Has HH been set up? No, pt states she hasn't received a call yet but is aware [facility] has 24-48 hours to contact her now that she is home.  Advised pt to call me back if she does not hear from [facility] tomorrow [place] [address]  [facility] [address], [address] [facility] [address]ия Mariscal, [address] [place] [phone]          PCP TCM/HFU appointment

## 2020-06-03 ENCOUNTER — OFFICE VISIT (OUTPATIENT)
Dept: PHYSICAL THERAPY | Age: 85
End: 2020-06-03
Attending: ORTHOPAEDIC SURGERY
Payer: MEDICARE

## 2020-06-03 ENCOUNTER — TELEPHONE (OUTPATIENT)
Dept: INTERNAL MEDICINE CLINIC | Facility: CLINIC | Age: 85
End: 2020-06-03

## 2020-06-03 PROCEDURE — 97110 THERAPEUTIC EXERCISES: CPT

## 2020-06-03 PROCEDURE — 97140 MANUAL THERAPY 1/> REGIONS: CPT

## 2020-06-03 PROCEDURE — 97014 ELECTRIC STIMULATION THERAPY: CPT

## 2020-06-03 NOTE — PROGRESS NOTES
Dx: right shoulder pain and chronic neck pain             Insurance (Authorized # of Visits):  [address] Physician: Dr. Joseph Murray     Next MD visit: none scheduled  Fall Risk: standard         Precautions: n/a           Subjective: f min      Left C2-3 upslope, gr 3, 3x10 bouts mob Upper Cspine, upslope on right, 3x10 bouts gr 3   C2-3, upslope, gr 3, 3x10 bouts   Upper cerv distraction, int, gr 3-4, 3x10 bouts   STM bilateral upper trap, paraspinals, 10 min total        cerv dist, wit

## 2020-06-04 ENCOUNTER — OFFICE VISIT (OUTPATIENT)
Dept: INTERNAL MEDICINE CLINIC | Facility: CLINIC | Age: 85
End: 2020-06-04
Payer: MEDICARE

## 2020-06-04 VITALS
BODY MASS INDEX: 22.23 KG/M2 | WEIGHT: 120.81 LBS | HEIGHT: 62 IN | DIASTOLIC BLOOD PRESSURE: 44 MMHG | SYSTOLIC BLOOD PRESSURE: 98 MMHG | HEART RATE: 68 BPM | TEMPERATURE: 98 F

## 2020-06-04 DIAGNOSIS — F41.9 ANXIETY: ICD-10-CM

## 2020-06-04 DIAGNOSIS — K56.609 SBO (SMALL BOWEL OBSTRUCTION) (HCC): Primary | ICD-10-CM

## 2020-06-04 DIAGNOSIS — I48.0 PAROXYSMAL ATRIAL FIBRILLATION (HCC): ICD-10-CM

## 2020-06-04 DIAGNOSIS — R74.01 TRANSAMINITIS: ICD-10-CM

## 2020-06-04 PROCEDURE — 1111F DSCHRG MED/CURRENT MED MERGE: CPT | Performed by: INTERNAL MEDICINE

## 2020-06-04 PROCEDURE — 99214 OFFICE O/P EST MOD 30 MIN: CPT | Performed by: INTERNAL MEDICINE

## 2020-06-04 RX ORDER — SERTRALINE HYDROCHLORIDE 25 MG/1
25 TABLET, FILM COATED ORAL DAILY
Qty: 30 TABLET | Refills: 2 | Status: SHIPPED | OUTPATIENT
Start: 2020-06-04 | End: 2020-08-24

## 2020-06-04 NOTE — PROGRESS NOTES
Christie Wu is a 80year old female. Patient presents with:  Hospital F/U:  rm 3 f/u for stomach blockage      HPI:     Patient with PAT, HTN, COPD admitted 5/30-6/1 for recurrent SBO. She had symptoms of abdomina pain and diarrhea.  She was given I Neck pain     Abnormal finding on GI tract imaging     Acute kidney injury (HCC)     Azotemia     Abdominal pain of unknown etiology     Transaminitis    Current Outpatient Medications   Medication Sig Dispense Refill   • Sertraline HCl 25 MG Oral Tab Ta History:   Diagnosis Date   • Arthritis    • Back pain    • Back problem    • Body piercing    • Cataract    • COPD (chronic obstructive pulmonary disease) (HCC)    • Diverticulitis of colon    • Diverticulitis of colon (without mention of hemorrhage)(562. anxiety  HEME: No adenopathy, easy bruising      EXAM:   BP 98/44 (BP Location: Right arm, Patient Position: Sitting, Cuff Size: adult)   Pulse 68   Temp 98.3 °F (36.8 °C) (Oral)   Ht 62\"   Wt 120 lb 12.8 oz (54.8 kg)   LMP  (LMP Unknown)   BMI 22.09 kg/m

## 2020-06-05 ENCOUNTER — TELEPHONE (OUTPATIENT)
Dept: INTERNAL MEDICINE CLINIC | Facility: CLINIC | Age: 85
End: 2020-06-05

## 2020-06-05 DIAGNOSIS — I10 ESSENTIAL HYPERTENSION: ICD-10-CM

## 2020-06-05 DIAGNOSIS — D50.8 OTHER IRON DEFICIENCY ANEMIA: ICD-10-CM

## 2020-06-05 DIAGNOSIS — I10 BENIGN ESSENTIAL HTN: ICD-10-CM

## 2020-06-05 DIAGNOSIS — E78.00 PURE HYPERCHOLESTEROLEMIA: ICD-10-CM

## 2020-06-05 DIAGNOSIS — Z00.00 ROUTINE GENERAL MEDICAL EXAMINATION AT A HEALTH CARE FACILITY: Primary | ICD-10-CM

## 2020-06-05 NOTE — TELEPHONE ENCOUNTER
FYI to TB;  Patient will be a non-admit to [facility] care. Patient prefers to do physical therapy on a out-pt basis.

## 2020-06-05 NOTE — TELEPHONE ENCOUNTER
Future Appointments   Date Time Provider Eric Campbell   8/3/2020  3:20 PM Franchesca Paulson MD [facility]     Orders to edward- Pt informed that labs need to be completed no sooner than 2 weeks prior to the appt.  Pt aware to fast-no call back

## 2020-06-06 DIAGNOSIS — G31.84 MILD COGNITIVE IMPAIRMENT: ICD-10-CM

## 2020-06-08 RX ORDER — DONEPEZIL HYDROCHLORIDE 10 MG/1
TABLET, FILM COATED ORAL
Qty: 90 TABLET | Refills: 0 | Status: SHIPPED | OUTPATIENT
Start: 2020-06-08 | End: 2020-08-25

## 2020-06-08 NOTE — TELEPHONE ENCOUNTER
Last Ov: 6/4/20, TB, Hosp f/u  Last labs: Pro BNP, CMP, CBC 6/1/20  Last Rx: donepezil 10mg, #30, 0R 1/6/20    Future Appointments   Date Time Provider Eric Campbell   6/10/2020  1:30 PM Vance Garciaja [address]. Na   6/18/2020 11:30 AM Hussein Millard

## 2020-06-10 ENCOUNTER — APPOINTMENT (OUTPATIENT)
Dept: PHYSICAL THERAPY | Age: 85
End: 2020-06-10
Attending: ORTHOPAEDIC SURGERY
Payer: MEDICARE

## 2020-06-11 ENCOUNTER — OFFICE VISIT (OUTPATIENT)
Dept: PHYSICAL THERAPY | Age: 85
End: 2020-06-11
Attending: ORTHOPAEDIC SURGERY
Payer: MEDICARE

## 2020-06-11 PROCEDURE — 97110 THERAPEUTIC EXERCISES: CPT

## 2020-06-11 PROCEDURE — 97140 MANUAL THERAPY 1/> REGIONS: CPT

## 2020-06-11 NOTE — PROGRESS NOTES
Dx: right shoulder pain and chronic neck pain             Insurance (Authorized # of Visits):  [address] Physician: Dr. Sumit Dixon     Next MD visit: none scheduled  Fall Risk: standard         Precautions: n/a           Subjective: f 3x10 bouts B  Distraction mob, gr 3, 3 min   supine cervical AROM 2x10 B    Left C2-3 upslope, gr 3, 3x10 bouts mob Upper Cspine, upslope on right, 3x10 bouts gr 3   C2-3, upslope, gr 3, 3x10 bouts   Upper cerv distraction, int, gr 3-4, 3x10 bouts   STM bi

## 2020-06-17 ENCOUNTER — APPOINTMENT (OUTPATIENT)
Dept: LAB | Age: 85
End: 2020-06-17
Attending: INTERNAL MEDICINE
Payer: MEDICARE

## 2020-06-17 DIAGNOSIS — R74.01 TRANSAMINITIS: ICD-10-CM

## 2020-06-17 DIAGNOSIS — K56.609 SBO (SMALL BOWEL OBSTRUCTION) (HCC): ICD-10-CM

## 2020-06-17 PROCEDURE — 36415 COLL VENOUS BLD VENIPUNCTURE: CPT

## 2020-06-17 PROCEDURE — 80053 COMPREHEN METABOLIC PANEL: CPT

## 2020-06-18 ENCOUNTER — OFFICE VISIT (OUTPATIENT)
Dept: PHYSICAL THERAPY | Age: 85
End: 2020-06-18
Attending: ORTHOPAEDIC SURGERY
Payer: MEDICARE

## 2020-06-18 ENCOUNTER — TELEPHONE (OUTPATIENT)
Dept: INTERNAL MEDICINE CLINIC | Facility: CLINIC | Age: 85
End: 2020-06-18

## 2020-06-18 DIAGNOSIS — N28.9 RENAL INSUFFICIENCY: Primary | ICD-10-CM

## 2020-06-18 PROCEDURE — 97140 MANUAL THERAPY 1/> REGIONS: CPT

## 2020-06-18 PROCEDURE — 97110 THERAPEUTIC EXERCISES: CPT

## 2020-06-18 NOTE — PROGRESS NOTES
Dx: right shoulder pain and chronic neck pain             Insurance (Authorized # of Visits):  [address] [employer] Physician: Dr. Judit Roberts     Next MD visit: none scheduled  Fall Risk: standard         Precautions: n/a           Subjective: f Date: 6/11/2020  Tx#: 6/ 6/18/2020  tx 7    Re-assessment/subjective 10 min  Nu step 5 min during subjective   nu step 5 min L3  nu step L3 5 min during subjective   5 min  Subjective/fall discussion 5 min    Standing scap pinches 2x10  x20   against wall

## 2020-06-24 ENCOUNTER — TELEPHONE (OUTPATIENT)
Dept: INTERNAL MEDICINE CLINIC | Facility: CLINIC | Age: 85
End: 2020-06-24

## 2020-06-25 ENCOUNTER — OFFICE VISIT (OUTPATIENT)
Dept: PHYSICAL THERAPY | Age: 85
End: 2020-06-25
Attending: ORTHOPAEDIC SURGERY
Payer: MEDICARE

## 2020-06-25 PROCEDURE — 97110 THERAPEUTIC EXERCISES: CPT

## 2020-06-25 NOTE — PROGRESS NOTES
Dx: right shoulder pain and chronic neck pain             Insurance (Authorized # of Visits):  [address] [employer] Physician: Dr. Lendell Cowden     Next MD visit: none scheduled  Fall Risk: standard         Precautions: n/a        Progress Summary discharge to home program.     Patient/Family/Caregiver was advised of these findings, precautions, and treatment options and has agreed to actively participate in planning and for this course of care.     Thank you for your referral. If you have any questi suboccipitals 5 min    Supine cervical PROm with distraction, 2x10 bouts B      estim quadpolar 15 min with MHP   estim quadpolar 15 min w/ MHP to c spine   MHP 10 min  MHP 10 min  MHP 10 min    HEP: scap pinches, RTB rows, supine cerv rot AROM, MHP daily

## 2020-07-20 ENCOUNTER — PATIENT OUTREACH (OUTPATIENT)
Dept: CASE MANAGEMENT | Age: 85
End: 2020-07-20

## 2020-07-20 NOTE — PROGRESS NOTES
Attempted contacting patient for intro to [facility] services with no answer left voicemail to call back. Will continue contacting to discuss.

## 2020-07-23 NOTE — TELEPHONE ENCOUNTER
Bloodwork orders placed to THE [facility] lab for upcoming physical per protocol.

## 2020-07-24 ENCOUNTER — LAB ENCOUNTER (OUTPATIENT)
Dept: LAB | Age: 85
End: 2020-07-24
Attending: INTERNAL MEDICINE
Payer: MEDICARE

## 2020-07-24 DIAGNOSIS — D50.8 OTHER IRON DEFICIENCY ANEMIA: ICD-10-CM

## 2020-07-24 DIAGNOSIS — E78.00 PURE HYPERCHOLESTEROLEMIA: ICD-10-CM

## 2020-07-24 DIAGNOSIS — I10 ESSENTIAL HYPERTENSION: ICD-10-CM

## 2020-07-24 DIAGNOSIS — Z00.00 ROUTINE GENERAL MEDICAL EXAMINATION AT A HEALTH CARE FACILITY: ICD-10-CM

## 2020-07-24 LAB
ALBUMIN SERPL-MCNC: 3.7 G/DL (ref 3.4–5)
ALBUMIN/GLOB SERPL: 1.2 {RATIO} (ref 1–2)
ALP LIVER SERPL-CCNC: 85 U/L (ref 55–142)
ALT SERPL-CCNC: 21 U/L (ref 13–56)
ANION GAP SERPL CALC-SCNC: 4 MMOL/L (ref 0–18)
AST SERPL-CCNC: 19 U/L (ref 15–37)
BASOPHILS # BLD AUTO: 0.05 X10(3) UL (ref 0–0.2)
BASOPHILS NFR BLD AUTO: 0.9 %
BILIRUB SERPL-MCNC: 0.5 MG/DL (ref 0.1–2)
BUN BLD-MCNC: 32 MG/DL (ref 7–18)
BUN/CREAT SERPL: 23.7 (ref 10–20)
CALCIUM BLD-MCNC: 9.5 MG/DL (ref 8.5–10.1)
CHLORIDE SERPL-SCNC: 109 MMOL/L (ref 98–112)
CHOLEST SMN-MCNC: 144 MG/DL (ref ?–200)
CO2 SERPL-SCNC: 26 MMOL/L (ref 21–32)
CREAT BLD-MCNC: 1.35 MG/DL (ref 0.55–1.02)
DEPRECATED RDW RBC AUTO: 45 FL (ref 35.1–46.3)
EOSINOPHIL # BLD AUTO: 0.19 X10(3) UL (ref 0–0.7)
EOSINOPHIL NFR BLD AUTO: 3.6 %
ERYTHROCYTE [DISTWIDTH] IN BLOOD BY AUTOMATED COUNT: 12.2 % (ref 11–15)
GLOBULIN PLAS-MCNC: 3.1 G/DL (ref 2.8–4.4)
GLUCOSE BLD-MCNC: 74 MG/DL (ref 70–99)
HCT VFR BLD AUTO: 40.9 % (ref 35–48)
HDLC SERPL-MCNC: 83 MG/DL (ref 40–59)
HGB BLD-MCNC: 12.9 G/DL (ref 12–16)
IMM GRANULOCYTES # BLD AUTO: 0.01 X10(3) UL (ref 0–1)
IMM GRANULOCYTES NFR BLD: 0.2 %
LDLC SERPL CALC-MCNC: 50 MG/DL (ref ?–100)
LYMPHOCYTES # BLD AUTO: 0.59 X10(3) UL (ref 1–4)
LYMPHOCYTES NFR BLD AUTO: 11 %
M PROTEIN MFR SERPL ELPH: 6.8 G/DL (ref 6.4–8.2)
MCH RBC QN AUTO: 31.4 PG (ref 26–34)
MCHC RBC AUTO-ENTMCNC: 31.5 G/DL (ref 31–37)
MCV RBC AUTO: 99.5 FL (ref 80–100)
MONOCYTES # BLD AUTO: 0.56 X10(3) UL (ref 0.1–1)
MONOCYTES NFR BLD AUTO: 10.5 %
NEUTROPHILS # BLD AUTO: 3.95 X10 (3) UL (ref 1.5–7.7)
NEUTROPHILS # BLD AUTO: 3.95 X10(3) UL (ref 1.5–7.7)
NEUTROPHILS NFR BLD AUTO: 73.8 %
NONHDLC SERPL-MCNC: 61 MG/DL (ref ?–130)
OSMOLALITY SERPL CALC.SUM OF ELEC: 294 MOSM/KG (ref 275–295)
PATIENT FASTING Y/N/NP: NO
PATIENT FASTING Y/N/NP: NO
PLATELET # BLD AUTO: 255 10(3)UL (ref 150–450)
POTASSIUM SERPL-SCNC: 4.8 MMOL/L (ref 3.5–5.1)
RBC # BLD AUTO: 4.11 X10(6)UL (ref 3.8–5.3)
SODIUM SERPL-SCNC: 139 MMOL/L (ref 136–145)
TRIGL SERPL-MCNC: 54 MG/DL (ref 30–149)
TSI SER-ACNC: 0.42 MIU/ML (ref 0.36–3.74)
VLDLC SERPL CALC-MCNC: 11 MG/DL (ref 0–30)
WBC # BLD AUTO: 5.4 X10(3) UL (ref 4–11)

## 2020-07-24 PROCEDURE — 80061 LIPID PANEL: CPT

## 2020-07-24 PROCEDURE — 84443 ASSAY THYROID STIM HORMONE: CPT

## 2020-07-24 PROCEDURE — 80053 COMPREHEN METABOLIC PANEL: CPT

## 2020-07-24 PROCEDURE — 36415 COLL VENOUS BLD VENIPUNCTURE: CPT

## 2020-07-24 PROCEDURE — 85025 COMPLETE CBC W/AUTO DIFF WBC: CPT

## 2020-08-11 RX ORDER — ALENDRONATE SODIUM 70 MG/1
TABLET ORAL
Qty: 12 TABLET | Refills: 1 | Status: SHIPPED | OUTPATIENT
Start: 2020-08-11 | End: 2020-11-09

## 2020-08-11 RX ORDER — SIMVASTATIN 10 MG
TABLET ORAL
Qty: 90 TABLET | Refills: 0 | Status: SHIPPED | OUTPATIENT
Start: 2020-08-11 | End: 2020-08-24

## 2020-08-11 NOTE — TELEPHONE ENCOUNTER
Last OV: 6/4/20 hospital f/u    Future Appointments   Date Time Provider Eric Shannan   8/12/2020  1:40 PM Javier Fowler, APRN [facility] [facility]   11/6/2020 10:40 AM Jorge L Biggs MD [facility] DOLLY  SPAL   2/10/2021 10:30 AM Chino Jackson,

## 2020-08-12 ENCOUNTER — OFFICE VISIT (OUTPATIENT)
Dept: INTERNAL MEDICINE CLINIC | Facility: CLINIC | Age: 85
End: 2020-08-12
Payer: MEDICARE

## 2020-08-12 VITALS
WEIGHT: 125 LBS | RESPIRATION RATE: 16 BRPM | DIASTOLIC BLOOD PRESSURE: 46 MMHG | HEART RATE: 68 BPM | BODY MASS INDEX: 21.87 KG/M2 | TEMPERATURE: 97 F | HEIGHT: 63.39 IN | SYSTOLIC BLOOD PRESSURE: 110 MMHG

## 2020-08-12 DIAGNOSIS — N18.30 CKD (CHRONIC KIDNEY DISEASE) STAGE 3, GFR 30-59 ML/MIN (HCC): ICD-10-CM

## 2020-08-12 DIAGNOSIS — Z78.0 POSTMENOPAUSAL: ICD-10-CM

## 2020-08-12 DIAGNOSIS — E78.00 PURE HYPERCHOLESTEROLEMIA: ICD-10-CM

## 2020-08-12 DIAGNOSIS — L98.9 SKIN LESION: ICD-10-CM

## 2020-08-12 DIAGNOSIS — Z87.440 HISTORY OF RECURRENT UTI (URINARY TRACT INFECTION): ICD-10-CM

## 2020-08-12 DIAGNOSIS — M81.0 AGE-RELATED OSTEOPOROSIS WITHOUT CURRENT PATHOLOGICAL FRACTURE: ICD-10-CM

## 2020-08-12 DIAGNOSIS — M47.812 CERVICAL ARTHRITIS: ICD-10-CM

## 2020-08-12 DIAGNOSIS — Z00.00 ENCOUNTER FOR ANNUAL HEALTH EXAMINATION: Primary | ICD-10-CM

## 2020-08-12 DIAGNOSIS — M54.2 NECK PAIN: ICD-10-CM

## 2020-08-12 DIAGNOSIS — G31.84 MILD COGNITIVE IMPAIRMENT: ICD-10-CM

## 2020-08-12 DIAGNOSIS — E78.5 DYSLIPIDEMIA: ICD-10-CM

## 2020-08-12 DIAGNOSIS — M48.061 SPINAL STENOSIS, LUMBAR REGION, WITHOUT NEUROGENIC CLAUDICATION: ICD-10-CM

## 2020-08-12 DIAGNOSIS — S22.000D CLOSED COMPRESSION FRACTURE OF THORACIC VERTEBRA WITH ROUTINE HEALING, SUBSEQUENT ENCOUNTER: ICD-10-CM

## 2020-08-12 DIAGNOSIS — Z86.79 HISTORY OF ATRIAL FIBRILLATION: ICD-10-CM

## 2020-08-12 DIAGNOSIS — Z12.31 ENCOUNTER FOR SCREENING MAMMOGRAM FOR MALIGNANT NEOPLASM OF BREAST: ICD-10-CM

## 2020-08-12 DIAGNOSIS — I10 ESSENTIAL HYPERTENSION: ICD-10-CM

## 2020-08-12 DIAGNOSIS — M47.22 OSTEOARTHRITIS OF SPINE WITH RADICULOPATHY, CERVICAL REGION: ICD-10-CM

## 2020-08-12 DIAGNOSIS — I47.1 PAT (PAROXYSMAL ATRIAL TACHYCARDIA) (HCC): ICD-10-CM

## 2020-08-12 DIAGNOSIS — Z87.19 HISTORY OF SMALL BOWEL OBSTRUCTION: ICD-10-CM

## 2020-08-12 DIAGNOSIS — Z12.39 ENCOUNTER FOR OTHER SCREENING FOR MALIGNANT NEOPLASM OF BREAST: ICD-10-CM

## 2020-08-12 DIAGNOSIS — J43.1 PANLOBULAR EMPHYSEMA (HCC): ICD-10-CM

## 2020-08-12 PROBLEM — K56.609 SMALL BOWEL OBSTRUCTION (HCC): Status: RESOLVED | Noted: 2019-04-20 | Resolved: 2020-08-12

## 2020-08-12 PROBLEM — N17.9 ACUTE KIDNEY INJURY (HCC): Status: RESOLVED | Noted: 2020-05-30 | Resolved: 2020-08-12

## 2020-08-12 PROBLEM — K56.609 SBO (SMALL BOWEL OBSTRUCTION) (HCC): Status: RESOLVED | Noted: 2019-04-16 | Resolved: 2020-08-12

## 2020-08-12 PROBLEM — I47.19 PAT (PAROXYSMAL ATRIAL TACHYCARDIA): Status: ACTIVE | Noted: 2020-08-12

## 2020-08-12 PROBLEM — R79.89 AZOTEMIA: Status: RESOLVED | Noted: 2020-05-30 | Resolved: 2020-08-12

## 2020-08-12 PROBLEM — R93.3 ABNORMAL FINDING ON GI TRACT IMAGING: Status: RESOLVED | Noted: 2019-10-17 | Resolved: 2020-08-12

## 2020-08-12 PROBLEM — R10.9 ABDOMINAL PAIN OF UNKNOWN ETIOLOGY: Status: RESOLVED | Noted: 2020-05-30 | Resolved: 2020-08-12

## 2020-08-12 PROCEDURE — G0439 PPPS, SUBSEQ VISIT: HCPCS | Performed by: NURSE PRACTITIONER

## 2020-08-12 PROCEDURE — 99214 OFFICE O/P EST MOD 30 MIN: CPT | Performed by: NURSE PRACTITIONER

## 2020-08-12 NOTE — PROGRESS NOTES
HPI:   Terry Wilkerson is a 80year old female who presents for a Medicare Subsequent Annual Wellness visit (Pt already had Initial Annual Wellness).   Age-related osteoporosis without current pathological fracture  Weekly alendronate  Due for dexa   - 2D+3D SCREENING BILAT (CPT=77067/27008); Future    PAT (paroxysmal atrial tachycardia) (HCC)  Stable   Diltiazem.        Her last annual assessment has been over 1 year: Annual Physical due on 03/28/2020         Fall/Risk Assessment   She has been screened MD as PCP - General  Camacho Sexton MD as PCP - Jacklyn Muse MD as Surgeon (Jaem Ballard)  Mariam Glynn MD (PULMONARY DISEASES)  Agus Tijerina MD (SURGERY, PLASTIC)  Savannah Irene MD (Surgical Oncology)  Mame Mccartney MD (S 07/24/2020    CREATSERUM 1.35 (H) 07/24/2020    GLU 74 07/24/2020        CBC  (most recent labs)   Lab Results   Component Value Date    WBC 5.4 07/24/2020    HGB 12.9 07/24/2020    .0 07/24/2020        ALLERGIES:   She has No Known Allergies.     CU medical history of Arthritis, Back pain, Back problem, Body piercing, Cataract, COPD (chronic obstructive pulmonary disease) ([address] Diverticulitis of colon, Diverticulitis of colon (without mention of hemorrhage)(562.11), Diverticulosis of large intestine, current alcohol use of about 2.0 standard drinks of alcohol per week. She reports that she does not use drugs. REVIEW OF SYSTEMS:   Review of Systems   Constitutional: Negative for fever, chills and fatigue. No distress.   HENT: Negative for hearing los all four quadrants,  no masses, no organomegaly   Pelvic: Deferred   Extremities: Extremities normal, atraumatic, no edema   Pulses: symmetric   Skin: Skin color, texture, turgor normal, no rashes or lesions   Lymph nodes: Cervical, supraclavicular, and ax infection)    Dyslipidemia  Stable  Simvastatin. History of atrial fibrillation  Monitor. Diltiazem. Essential hypertension  Stable diltiazem, losartan hct. Panlobular emphysema (HCC)  Stable  Anoro w PRN Proair.   Dr Mita Caslte    CKD (chronic Interaction;Puzzles;Games; Visiting Friends; Visiting Family      This section provided for quick review of chart, separate sheet to patient  [address] Internal Lab or Procedure External Lab or Procedure   Diabetes Scree then as discussed There are no preventive care reminders to display for this patient.  Update Health Maintenance if applicable     Immunizations (Update Immunization Activity if applicable)     Influenza  Covered Annually 10/10/2019 Please get every year

## 2020-08-12 NOTE — PATIENT INSTRUCTIONS
[address] SCHEDULE   Tests on this list are recommended by your physician but may not be covered, or covered at this frequency, by your insurer. Please check with your insurance carrier before scheduling to verify coverage.    NORBERTO a complete set of results.  Limited to patients who meet one of the following criteria:   • Men who are 73-68 years old and have smoked more than 100 cigarettes in their lifetime   • Anyone with a family history    Colorectal Cancer Screening  Covered up to for: CHLAMYDIA No flowsheet data found. Screening Mammogram      Mammogram    Recommend Annually to at least age 76, and as needed after 76 There are no preventive care reminders to display for this patient.  Please get this Mammogram regularly   Grant Gomez requested time period, some results have not been displayed. A complete set of results can be found in Results Review.     This may be covered with your prescription benefits, but Medicare does not cover unless Medically needed    Zoster (Not covered by Med

## 2020-08-17 RX ORDER — DILTIAZEM HYDROCHLORIDE 300 MG/1
CAPSULE, EXTENDED RELEASE ORAL
Qty: 90 CAPSULE | Refills: 1 | Status: SHIPPED | OUTPATIENT
Start: 2020-08-17 | End: 2021-01-19

## 2020-08-20 RX ORDER — HYDROCHLOROTHIAZIDE 12.5 MG/1
TABLET ORAL
Qty: 90 TABLET | Refills: 1 | Status: SHIPPED | OUTPATIENT
Start: 2020-08-20 | End: 2020-12-03

## 2020-08-23 DIAGNOSIS — F41.9 ANXIETY: ICD-10-CM

## 2020-08-24 RX ORDER — SIMVASTATIN 10 MG
TABLET ORAL
Qty: 90 TABLET | Refills: 1 | Status: SHIPPED | OUTPATIENT
Start: 2020-08-24 | End: 2021-01-01

## 2020-08-24 RX ORDER — SERTRALINE HYDROCHLORIDE 25 MG/1
TABLET, FILM COATED ORAL
Qty: 90 TABLET | Refills: 3 | Status: SHIPPED | OUTPATIENT
Start: 2020-08-24 | End: 2021-01-01

## 2020-08-24 NOTE — TELEPHONE ENCOUNTER
Last Ov: 8/12/20, SD, CPE  Last labs: CBC, CMP, Lipid, TSH w Ref 7/24/20  Last Rx: sertraline 25mg, #30, 2R, 6/4/20    Future Appointments   Date Time Provider Eric Shannan   9/15/2020 10:40 AM PF DEXA RM1 PF DEXA [place]   9/15/2020 11:00 AM PF PAULINO RM1 PF MAMMO [place]   11/6/2020 10:40 AM Jorge L Elizabeth MD SPPULM  SPAL   2/10/2021 10:30 AM Rodney Go MD [address]       Per Protocol - not on protocol, Rx pending.

## 2020-08-25 DIAGNOSIS — G31.84 MILD COGNITIVE IMPAIRMENT: ICD-10-CM

## 2020-08-25 RX ORDER — DONEPEZIL HYDROCHLORIDE 10 MG/1
TABLET, FILM COATED ORAL
Qty: 90 TABLET | Refills: 0 | Status: SHIPPED | OUTPATIENT
Start: 2020-08-25 | End: 2020-09-22

## 2020-08-25 NOTE — TELEPHONE ENCOUNTER
Last OV 8.12.20 w/ SD (annual pe)   Last PE 8.12.20   Last REFILL 6.8.20 Donepezil 10mg #90 0R  Last LABS 7.24.20 TSH w/reflex, Lipid, CMP, CBC    Future Appointments   Date Time Provider Eric Campbell   9/15/2020 10:40 AM PF DEXA RM1 PF DEXA [place]

## 2020-09-01 ENCOUNTER — TELEPHONE (OUTPATIENT)
Dept: CARDIOLOGY | Age: 85
End: 2020-09-01

## 2020-09-10 ENCOUNTER — TELEPHONE (OUTPATIENT)
Dept: PHYSICAL THERAPY | Age: 85
End: 2020-09-10

## 2020-09-14 ENCOUNTER — APPOINTMENT (OUTPATIENT)
Dept: PHYSICAL THERAPY | Age: 85
End: 2020-09-14
Attending: NURSE PRACTITIONER
Payer: MEDICARE

## 2020-09-15 ENCOUNTER — HOSPITAL ENCOUNTER (OUTPATIENT)
Dept: MAMMOGRAPHY | Age: 85
Discharge: HOME | End: 2020-09-15
Attending: NURSE PRACTITIONER
Payer: MEDICARE

## 2020-09-15 ENCOUNTER — HOSPITAL ENCOUNTER (OUTPATIENT)
Dept: BONE DENSITY | Age: 85
Discharge: HOME | End: 2020-09-15
Attending: NURSE PRACTITIONER
Payer: MEDICARE

## 2020-09-15 DIAGNOSIS — M81.0 AGE-RELATED OSTEOPOROSIS WITHOUT CURRENT PATHOLOGICAL FRACTURE: ICD-10-CM

## 2020-09-15 DIAGNOSIS — Z12.39 ENCOUNTER FOR OTHER SCREENING FOR MALIGNANT NEOPLASM OF BREAST: ICD-10-CM

## 2020-09-15 DIAGNOSIS — Z78.0 POSTMENOPAUSAL: ICD-10-CM

## 2020-09-15 DIAGNOSIS — Z12.31 ENCOUNTER FOR SCREENING MAMMOGRAM FOR MALIGNANT NEOPLASM OF BREAST: ICD-10-CM

## 2020-09-15 PROCEDURE — 77067 SCR MAMMO BI INCL CAD: CPT | Performed by: NURSE PRACTITIONER

## 2020-09-15 PROCEDURE — 77080 DXA BONE DENSITY AXIAL: CPT | Performed by: NURSE PRACTITIONER

## 2020-09-15 PROCEDURE — 77063 BREAST TOMOSYNTHESIS BI: CPT | Performed by: NURSE PRACTITIONER

## 2020-09-16 ENCOUNTER — TELEPHONE (OUTPATIENT)
Dept: PHYSICAL THERAPY | Age: 85
End: 2020-09-16

## 2020-09-16 ENCOUNTER — APPOINTMENT (OUTPATIENT)
Dept: PHYSICAL THERAPY | Age: 85
End: 2020-09-16
Attending: NURSE PRACTITIONER
Payer: MEDICARE

## 2020-09-21 ENCOUNTER — OFFICE VISIT (OUTPATIENT)
Dept: PHYSICAL THERAPY | Age: 85
End: 2020-09-21
Attending: NURSE PRACTITIONER
Payer: MEDICARE

## 2020-09-21 DIAGNOSIS — G31.84 MILD COGNITIVE IMPAIRMENT: ICD-10-CM

## 2020-09-21 PROCEDURE — 97161 PT EVAL LOW COMPLEX 20 MIN: CPT

## 2020-09-21 PROCEDURE — 97140 MANUAL THERAPY 1/> REGIONS: CPT

## 2020-09-21 NOTE — PROGRESS NOTES
SPINE EVALUATION:   Referring Physician: Dr. Peter Del Rosario  Diagnosis: chronic neck pain, cervicalgia     Date of Service: 9/21/2020     PATIENT SUMMARY   Zoe Apple is a 80year old female who presents to therapy today with complaints of chronic neck pain. dizziness, drop attacks, bowel/bladder changes, saddle anesthesia, and DANNY LE N/T. She denies symptoms going down either of her arms. Denies current shoulder pain.      [address] presents to physical therapy evaluation with primary c/o chronic ne expectations, and prognosis. Pt was also provided recommendations for modalities as needed [ice/heat] and postural corrections  Patient was instructed in and issued a HEP for: none today, heat PRN for pain.  Supine cerv PROM rot with dist, int cerv dist, ST [phone]    Sincerely,  Electronically signed by therapist: Maciej Cuellar, PT    [de-identified] certification required: Yes  I certify the need for these services furnished under this plan of treatment and while under my care.     X______________________

## 2020-09-22 RX ORDER — DONEPEZIL HYDROCHLORIDE 10 MG/1
10 TABLET, FILM COATED ORAL NIGHTLY
Qty: 90 TABLET | Refills: 2 | Status: SHIPPED | OUTPATIENT
Start: 2020-09-22 | End: 2021-01-01

## 2020-09-22 NOTE — TELEPHONE ENCOUNTER
Last Ov: 8/12/20, SD, CPE  Last labs: TSH w Ref, Lipid, CMP, CBC 7/24/20  Last Rx: donepezil 10mg, #90, 0R 8/25/20    Future Appointments   Date Time Provider Eric Campbell   9/23/2020  2:15 PM Susan Lo 27 Na   9/28/2020 11:00 AM Vance Escalona 45. Na   10/1/2020 10:45 AM Susan Lo 27 Na   10/5/2020 11:00 AM Vance [address] 45. Na   10/7/2020  1:30 PM Vance [address] 45. Na   11/6/2020 10:40 AM Jorge L Corona MD SPPULM  SPAL   2/10/2021 10:30 AM Selena Pascual MD [address]       Per Protocol - not on protocol, Rx pending.

## 2020-09-23 ENCOUNTER — APPOINTMENT (OUTPATIENT)
Dept: PHYSICAL THERAPY | Age: 85
End: 2020-09-23
Attending: NURSE PRACTITIONER
Payer: MEDICARE

## 2020-09-23 ENCOUNTER — TELEPHONE (OUTPATIENT)
Dept: PHYSICAL THERAPY | Age: 85
End: 2020-09-23

## 2020-09-28 ENCOUNTER — OFFICE VISIT (OUTPATIENT)
Dept: PHYSICAL THERAPY | Age: 85
End: 2020-09-28
Attending: NURSE PRACTITIONER
Payer: MEDICARE

## 2020-09-28 PROCEDURE — 97110 THERAPEUTIC EXERCISES: CPT

## 2020-09-28 PROCEDURE — 97140 MANUAL THERAPY 1/> REGIONS: CPT

## 2020-10-01 ENCOUNTER — OFFICE VISIT (OUTPATIENT)
Dept: PHYSICAL THERAPY | Age: 85
End: 2020-10-01
Attending: NURSE PRACTITIONER
Payer: MEDICARE

## 2020-10-01 PROCEDURE — 97110 THERAPEUTIC EXERCISES: CPT

## 2020-10-01 PROCEDURE — 97140 MANUAL THERAPY 1/> REGIONS: CPT

## 2020-10-01 NOTE — PROGRESS NOTES
Dx: chronic neck pain, OA         Insurance (Authorized # of Visits):  Medicare, 8           Authorizing Physician: Dr. Cherie Ruiz  Next MD visit: none scheduled  Fall Risk: standard         Precautions: n/a           Subjective: she reports neck does not feel in corner, half tandem in corner  3x30 each leg         --- MHP to c s-spine and left glut region 10 min          HEP: supine cerv AROM rotation, half and full tandem balance in corner     Charges: mt 2, there ex        Total Timed Treatment: 40 min  Total

## 2020-10-05 ENCOUNTER — OFFICE VISIT (OUTPATIENT)
Dept: PHYSICAL THERAPY | Age: 85
End: 2020-10-05
Attending: NURSE PRACTITIONER
Payer: MEDICARE

## 2020-10-05 PROCEDURE — 97140 MANUAL THERAPY 1/> REGIONS: CPT

## 2020-10-05 PROCEDURE — 97014 ELECTRIC STIMULATION THERAPY: CPT

## 2020-10-05 PROCEDURE — 97110 THERAPEUTIC EXERCISES: CPT

## 2020-10-05 NOTE — PROGRESS NOTES
Dx: chronic neck pain, OA         Insurance (Authorized # of Visits):  Medicare, 8           Authorizing Physician: Dr. Adebayo Zuñiga  Next MD visit: none scheduled  Fall Risk: standard         Precautions: n/a           Subjective: she reports neck does not feel gr 3-4, int 5 min         Supine left rot, 2x10  MHP 10 min  Tandem balance in corner, half tandem in corner  3x30 each leg IFC with heat 10 min         --- MHP to c s-spine and left glut region 10 min  ---        HEP: supine cerv AROM rotation, half and f

## 2020-10-07 ENCOUNTER — OFFICE VISIT (OUTPATIENT)
Dept: PHYSICAL THERAPY | Age: 85
End: 2020-10-07
Attending: NURSE PRACTITIONER
Payer: MEDICARE

## 2020-10-07 PROCEDURE — 97140 MANUAL THERAPY 1/> REGIONS: CPT

## 2020-10-07 PROCEDURE — 97014 ELECTRIC STIMULATION THERAPY: CPT

## 2020-10-07 PROCEDURE — 97110 THERAPEUTIC EXERCISES: CPT

## 2020-10-07 NOTE — PROGRESS NOTES
Dx: chronic neck pain, OA         Insurance (Authorized # of Visits):  Medicare, 8           Authorizing Physician: Dr. Osiris Granados  Next MD visit: none scheduled  Fall Risk: standard         Precautions: n/a           Subjective: she reports neck does not feel half tandem in corner  3x30 each leg IFC with heat 10 min  IFC with heat 15 min        --- MHP to c s-spine and left glut region 10 min  --- Manual chin tuck with rotation x10 with tactile cues        HEP: supine cerv AROM rotation, half and full tandem ba Satisfactory

## 2020-10-13 ENCOUNTER — OFFICE VISIT (OUTPATIENT)
Dept: INTERNAL MEDICINE CLINIC | Facility: CLINIC | Age: 85
End: 2020-10-13
Payer: MEDICARE

## 2020-10-13 VITALS
DIASTOLIC BLOOD PRESSURE: 80 MMHG | SYSTOLIC BLOOD PRESSURE: 142 MMHG | HEART RATE: 62 BPM | BODY MASS INDEX: 22 KG/M2 | TEMPERATURE: 97 F | WEIGHT: 126 LBS | OXYGEN SATURATION: 99 %

## 2020-10-13 DIAGNOSIS — M25.532 LEFT WRIST PAIN: Primary | ICD-10-CM

## 2020-10-13 DIAGNOSIS — I10 ESSENTIAL HYPERTENSION: ICD-10-CM

## 2020-10-13 PROCEDURE — 99214 OFFICE O/P EST MOD 30 MIN: CPT | Performed by: NURSE PRACTITIONER

## 2020-10-13 NOTE — PROGRESS NOTES
Seun Elder is a 80year old female. Patient presents with:  Wrist Pain: LM rm 11      HPI:   Here for eval of chronic left wrist pain. Started quite some time ago. Uncertain when it bothers her the most.  Soreness of her left wrist and forearm.   Kandy Downing Hr TAKE 1 CAPSULE BY MOUTH EVERY DAY 90 capsule 1   • ALENDRONATE 70 MG Oral Tab TAKE 1 TABLET BY MOUTH ONE TIME PER WEEK 12 tablet 1   • omega-3 fatty acids 1000 MG Oral Cap Take 1,000 mg by mouth daily. • Melatonin 5 MG Oral Tab Take by mouth.      • unspecified(486)    • Sputum production    • Uncomfortable fullness after meals    • Unspecified essential hypertension    • Vertigo    • Wears glasses       Social History:  Social History    Tobacco Use      Smoking status: Former Smoker        Packs/day Avoid NSAIDS if possible  Splinting at night and during the day if possible   Call with unresolved symptoms. Essential hypertension  montior bp  Return in a few weeks for bp check  Take meds as prescribed.      No orders of the defined types were placed

## 2020-10-27 ENCOUNTER — TELEPHONE (OUTPATIENT)
Dept: PHYSICAL THERAPY | Age: 85
End: 2020-10-27

## 2020-11-02 ENCOUNTER — OFFICE VISIT (OUTPATIENT)
Dept: PHYSICAL THERAPY | Age: 85
End: 2020-11-02
Attending: NURSE PRACTITIONER
Payer: MEDICARE

## 2020-11-02 PROCEDURE — 97110 THERAPEUTIC EXERCISES: CPT

## 2020-11-02 NOTE — PROGRESS NOTES
Dx: chronic neck pain, OA         Insurance (Authorized # of Visits):  Medicare, 8           Authorizing Physician: Dr. Tammy Tarango  Next MD visit: none scheduled  Fall Risk: standard         Precautions: n/a        Discharge Summary  Pt has attended 6 visits i as possible to [phone]. I certify the need for these services furnished under this plan of treatment and while under my care.     X___________________________________________________ Date____________________    Certification From: 70/5/6097  To:1/31/

## 2020-11-09 RX ORDER — ALENDRONATE SODIUM 70 MG/1
TABLET ORAL
Qty: 12 TABLET | Refills: 1 | Status: SHIPPED | OUTPATIENT
Start: 2020-11-09 | End: 2021-01-01

## 2020-11-13 PROBLEM — F02.80 ALZHEIMER'S DISEASE (HCC): Status: ACTIVE | Noted: 2020-11-13

## 2020-11-13 PROBLEM — G30.9 ALZHEIMER'S DISEASE (HCC): Status: ACTIVE | Noted: 2020-11-13

## 2020-11-13 NOTE — PROGRESS NOTES
Neurology H&P    [facility] Patient Status:  No patient class for patient encounter    7/3/1934 MRN [id number]   Location [address] [place] [address], [address] [place] [address] Drive, [address] [facility] Attending No att. providers found   [facility] Day # 0 PCP MD Vic Bowens Sig Dispense Refill   • ALENDRONATE 70 MG Oral Tab TAKE 1 TABLET BY MOUTH ONE TIME PER WEEK 12 tablet 1   • Albuterol Sulfate HFA (VENTOLIN HFA) 108 (90 Base) MCG/ACT Inhalation Aero Soln Inhale 2 puffs into the lungs every 4 (four) hours as needed for [facility] hypercholesterolemia     History of recurrent UTI (urinary tract infection)     History of small bowel obstruction     Degenerative arthritis of cervical spine     History of colon resection, for diverticulitis, 2010, with colostomy     Iron deficiency ane • COLONOSCOPY  2004, 5/8/2012    x2, in 2012 w/ forceps biopsy   • COLOSTOMY      and reversal   • EXPLORATORY LAPAROTOMY N/A 8/20/2017    Performed by Shannon Kim MD at [facility]  08/20/2017    expl lap   • PAULINO LOCALIZATIO No      Family History:  Family History   Problem Relation Age of Onset   • Psychiatric Father    • Other (suicide completion) Father    • Other (Other) Mother         Cerebral hemorrhage   • Other (Other) Son         asthma  2 sons   • Other (alzheimer's Normal muscle bulk    Sens:        UE: Intact to light touch       LE: Inact to light touch    Reflexes:        UE: 2+ biceps, 2+ brachioradialis       LE: 2+ patellae    Gait: normal        Coordination:       FTN: intact with action tremor, also resting

## 2020-11-16 RX ORDER — HYDROCHLOROTHIAZIDE 12.5 MG/1
TABLET ORAL
Qty: 90 TABLET | Refills: 1 | OUTPATIENT
Start: 2020-11-16

## 2020-11-16 NOTE — TELEPHONE ENCOUNTER
Last VISIT  10/13/20    Last REFILL 08/20/20 qty 90 w/1 refill    Last LABS 07/24/20 CBC, CMP, Lipid, TSH done    No Future Appointments      Per PROTOCOL? Passed, but requesting refill too soon    Please Approve or Deny.

## 2020-11-23 ENCOUNTER — HOSPITAL ENCOUNTER (EMERGENCY)
Age: 85
Discharge: HOME | End: 2020-11-23
Attending: EMERGENCY MEDICINE
Payer: MEDICARE

## 2020-11-23 ENCOUNTER — APPOINTMENT (OUTPATIENT)
Dept: GENERAL RADIOLOGY | Age: 85
End: 2020-11-23
Attending: PHYSICIAN ASSISTANT
Payer: MEDICARE

## 2020-11-23 ENCOUNTER — APPOINTMENT (OUTPATIENT)
Dept: CT IMAGING | Age: 85
End: 2020-11-23
Attending: EMERGENCY MEDICINE
Payer: MEDICARE

## 2020-11-23 ENCOUNTER — APPOINTMENT (OUTPATIENT)
Dept: CT IMAGING | Age: 85
End: 2020-11-23
Attending: PHYSICIAN ASSISTANT
Payer: MEDICARE

## 2020-11-23 VITALS
HEIGHT: 62 IN | HEART RATE: 78 BPM | TEMPERATURE: 98 F | OXYGEN SATURATION: 99 % | SYSTOLIC BLOOD PRESSURE: 158 MMHG | WEIGHT: 126 LBS | DIASTOLIC BLOOD PRESSURE: 90 MMHG | BODY MASS INDEX: 23.19 KG/M2 | RESPIRATION RATE: 16 BRPM

## 2020-11-23 DIAGNOSIS — S92.352A CLOSED DISPLACED FRACTURE OF FIFTH METATARSAL BONE OF LEFT FOOT, INITIAL ENCOUNTER: Primary | ICD-10-CM

## 2020-11-23 DIAGNOSIS — S30.0XXA CONTUSION OF LOWER BACK, INITIAL ENCOUNTER: ICD-10-CM

## 2020-11-23 DIAGNOSIS — S09.8XXA BLUNT HEAD TRAUMA, INITIAL ENCOUNTER: ICD-10-CM
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
## 2020-11-23 PROCEDURE — 29515 APPLICATION SHORT LEG SPLINT: CPT

## 2020-11-23 PROCEDURE — 36415 COLL VENOUS BLD VENIPUNCTURE: CPT

## 2020-11-23 PROCEDURE — 80053 COMPREHEN METABOLIC PANEL: CPT | Performed by: EMERGENCY MEDICINE

## 2020-11-23 PROCEDURE — 70450 CT HEAD/BRAIN W/O DYE: CPT | Performed by: PHYSICIAN ASSISTANT

## 2020-11-23 PROCEDURE — 85025 COMPLETE CBC W/AUTO DIFF WBC: CPT | Performed by: EMERGENCY MEDICINE

## 2020-11-23 PROCEDURE — 73630 X-RAY EXAM OF FOOT: CPT | Performed by: PHYSICIAN ASSISTANT

## 2020-11-23 PROCEDURE — 99285 EMERGENCY DEPT VISIT HI MDM: CPT

## 2020-11-23 PROCEDURE — 74177 CT ABD & PELVIS W/CONTRAST: CPT | Performed by: EMERGENCY MEDICINE

## 2020-11-23 RX ORDER — DILTIAZEM HYDROCHLORIDE 300 MG/1
300 CAPSULE, COATED, EXTENDED RELEASE ORAL DAILY
COMMUNITY
Start: 2020-09-21 | End: 2021-01-01

## 2020-11-23 RX ORDER — ACETAMINOPHEN AND CODEINE PHOSPHATE 300; 30 MG/1; MG/1
1 TABLET ORAL EVERY 6 HOURS PRN
Qty: 10 TABLET | Refills: 0 | Status: SHIPPED | OUTPATIENT
Start: 2020-11-23 | End: 2020-12-08 | Stop reason: ALTCHOICE

## 2020-11-24 ENCOUNTER — APPOINTMENT (OUTPATIENT)
Dept: ULTRASOUND IMAGING | Facility: HOSPITAL | Age: 85
DRG: 440 | End: 2020-11-24
Attending: EMERGENCY MEDICINE
Payer: MEDICARE

## 2020-11-24 ENCOUNTER — TELEPHONE (OUTPATIENT)
Dept: ORTHOPEDICS CLINIC | Facility: CLINIC | Age: 85
End: 2020-11-24

## 2020-11-24 ENCOUNTER — HOSPITAL ENCOUNTER (INPATIENT)
Facility: HOSPITAL | Age: 85
LOS: 2 days | Discharge: HOME | DRG: 440 | End: 2020-11-26
Attending: EMERGENCY MEDICINE | Admitting: HOSPITALIST
Payer: MEDICARE

## 2020-11-24 ENCOUNTER — APPOINTMENT (OUTPATIENT)
Dept: GENERAL RADIOLOGY | Facility: HOSPITAL | Age: 85
DRG: 440 | End: 2020-11-24
Attending: EMERGENCY MEDICINE
Payer: MEDICARE

## 2020-11-24 DIAGNOSIS — K85.91 ACUTE PANCREATITIS WITH UNINFECTED NECROSIS, UNSPECIFIED PANCREATITIS TYPE: Primary | ICD-10-CM

## 2020-11-24 PROCEDURE — 99223 1ST HOSP IP/OBS HIGH 75: CPT | Performed by: HOSPITALIST

## 2020-11-24 PROCEDURE — 71045 X-RAY EXAM CHEST 1 VIEW: CPT | Performed by: EMERGENCY MEDICINE

## 2020-11-24 PROCEDURE — 76700 US EXAM ABDOM COMPLETE: CPT | Performed by: EMERGENCY MEDICINE

## 2020-11-24 RX ORDER — SODIUM CHLORIDE, SODIUM LACTATE, POTASSIUM CHLORIDE, CALCIUM CHLORIDE 600; 310; 30; 20 MG/100ML; MG/100ML; MG/100ML; MG/100ML
INJECTION, SOLUTION INTRAVENOUS ONCE
Status: COMPLETED | OUTPATIENT
Start: 2020-11-24 | End: 2020-11-24

## 2020-11-24 NOTE — TELEPHONE ENCOUNTER
Patient's daugher (Gayla)called and made appt w/ ARLIN 11.04.2020 for 5th MT FX.    Pt's daughter wanting to confirm if pt can apply pressure in the meantime.  Pt was placed in soft cast and advised to use a walker but wondering if to support lt foot or stay

## 2020-11-24 NOTE — ED NOTES
I reviewed that chart and discussed the case.   I have examined the patient and noted patient is is 80-year-old female acting appropriately as per patient's family at the bedside presents emergency room with a history of slipping and falling landing on her throughout the upper or lower extremities appreciated. There are some small skin tears noted to the anterior aspect of the left and right arms without evidence of any bony tenderness palpation through these areas.   NEURO: Patient is awake, alert and orien normally on repeat examination. Patient with no other new complaints at this time. Will discharge to home at this time. I agree with the following clinical impression(s):  Acute blood head trauma status post fall.   Acute low back contusion status

## 2020-11-24 NOTE — ED INITIAL ASSESSMENT (HPI)
Pt states she was prescribed tylenol with codeine for recent foot fx due to fall. Pt states 30 min after taking 1st dose she began to have chest pain.

## 2020-11-24 NOTE — ED PROVIDER NOTES
Patient Seen in: THE [facility] Emergency Department In [place]      History   Patient presents with:  Fall    Stated Complaint: fall at 1600. wounds to bilateral arms back pain    HPI    Niger is a pleasant 45-year-old female who arrives with her daughter. RECONSTRUCTION SECOND STAGE/ REVISION Bilateral 3/16/2016    Performed by Sharad Callaway MD at [facility] MAIN OR   • BREAST SURGERY Bilateral 3/2016    implant removal   • CHOLECYSTECTOMY  1993, 1998    x2   • COLECTOMY     • COLONOSCOPY  2004, 5/8/2012    x2, in use: Yes      Alcohol/week: 2.0 standard drinks      Types: 2 Glasses of wine per week      Frequency: 2-3 times a week      Drinks per session: 1 or 2      Binge frequency: Never      Comment: CAGE 8/12/20    Drug use:  No             Review of Systems Foot, Complete (min 3 Views), Left (cpt=73630)    Result Date: 11/23/2020  PROCEDURE:  XR FOOT, COMPLETE (MIN 3 VIEWS), LEFT (CPT=73630)  TECHNIQUE:  AP, oblique, and lateral views were obtained. COMPARISON:  None. INDICATIONS:  fall at 1600.  wounds to b 11/23/2020 at 8:28 PM     Finalized by (CST): Kenyon Zamora MD on 11/23/2020 at 8:32 PM       X-ray as above. Short leg splint ordered. Family has a walker at home. Patient signed out to my supervising physician at end of shift. CT pending.   Disposition

## 2020-11-24 NOTE — ED INITIAL ASSESSMENT (HPI)
PT to the ED for evaluation of injuries from a fall at 1600 today. Pt has pain, swelling and bruising to L foot, pain to lower back and skin tears to bilateral arms.

## 2020-11-25 ENCOUNTER — APPOINTMENT (OUTPATIENT)
Dept: MRI IMAGING | Facility: HOSPITAL | Age: 85
DRG: 440 | End: 2020-11-25
Attending: NURSE PRACTITIONER
Payer: MEDICARE

## 2020-11-25 PROBLEM — K85.91 ACUTE PANCREATITIS WITH UNINFECTED NECROSIS, UNSPECIFIED PANCREATITIS TYPE: Status: ACTIVE | Noted: 2020-11-25

## 2020-11-25 PROCEDURE — 76376 3D RENDER W/INTRP POSTPROCES: CPT | Performed by: NURSE PRACTITIONER

## 2020-11-25 PROCEDURE — 74181 MRI ABDOMEN W/O CONTRAST: CPT | Performed by: NURSE PRACTITIONER

## 2020-11-25 PROCEDURE — 99232 SBSQ HOSP IP/OBS MODERATE 35: CPT | Performed by: INTERNAL MEDICINE

## 2020-11-25 RX ORDER — DONEPEZIL HYDROCHLORIDE 10 MG/1
10 TABLET, FILM COATED ORAL NIGHTLY
Status: DISCONTINUED | OUTPATIENT
Start: 2020-11-25 | End: 2020-11-26

## 2020-11-25 RX ORDER — CHLORAL HYDRATE 500 MG
1000 CAPSULE ORAL DAILY
Status: DISCONTINUED | OUTPATIENT
Start: 2020-11-25 | End: 2020-11-25 | Stop reason: RX

## 2020-11-25 RX ORDER — DEXTROSE AND SODIUM CHLORIDE 5; .45 G/100ML; G/100ML
INJECTION, SOLUTION INTRAVENOUS CONTINUOUS
Status: ACTIVE | OUTPATIENT
Start: 2020-11-25 | End: 2020-11-25

## 2020-11-25 RX ORDER — MEMANTINE HYDROCHLORIDE 10 MG/1
10 TABLET ORAL 2 TIMES DAILY
Status: DISCONTINUED | OUTPATIENT
Start: 2020-11-25 | End: 2020-11-26

## 2020-11-25 RX ORDER — ONDANSETRON 2 MG/ML
4 INJECTION INTRAMUSCULAR; INTRAVENOUS EVERY 6 HOURS PRN
Status: DISCONTINUED | OUTPATIENT
Start: 2020-11-25 | End: 2020-11-26

## 2020-11-25 RX ORDER — HYDROMORPHONE HYDROCHLORIDE 1 MG/ML
0.2 INJECTION, SOLUTION INTRAMUSCULAR; INTRAVENOUS; SUBCUTANEOUS EVERY 2 HOUR PRN
Status: DISCONTINUED | OUTPATIENT
Start: 2020-11-25 | End: 2020-11-26

## 2020-11-25 RX ORDER — ENOXAPARIN SODIUM 100 MG/ML
30 INJECTION SUBCUTANEOUS DAILY
Status: DISCONTINUED | OUTPATIENT
Start: 2020-11-25 | End: 2020-11-25

## 2020-11-25 RX ORDER — DILTIAZEM HYDROCHLORIDE 300 MG/1
300 CAPSULE, COATED, EXTENDED RELEASE ORAL DAILY
Status: DISCONTINUED | OUTPATIENT
Start: 2020-11-25 | End: 2020-11-26

## 2020-11-25 RX ORDER — FLUTICASONE PROPIONATE 50 MCG
2 SPRAY, SUSPENSION (ML) NASAL DAILY
Status: DISCONTINUED | OUTPATIENT
Start: 2020-11-25 | End: 2020-11-26

## 2020-11-25 RX ORDER — ASPIRIN 81 MG/1
81 TABLET, CHEWABLE ORAL DAILY
Status: DISCONTINUED | OUTPATIENT
Start: 2020-11-25 | End: 2020-11-26

## 2020-11-25 RX ORDER — HALOPERIDOL 5 MG/ML
1 INJECTION INTRAMUSCULAR EVERY 2 HOUR PRN
Status: DISCONTINUED | OUTPATIENT
Start: 2020-11-25 | End: 2020-11-26

## 2020-11-25 RX ORDER — ALBUTEROL SULFATE 90 UG/1
2 AEROSOL, METERED RESPIRATORY (INHALATION) EVERY 4 HOURS PRN
Status: DISCONTINUED | OUTPATIENT
Start: 2020-11-25 | End: 2020-11-26

## 2020-11-25 RX ORDER — HYDROMORPHONE HYDROCHLORIDE 1 MG/ML
0.4 INJECTION, SOLUTION INTRAMUSCULAR; INTRAVENOUS; SUBCUTANEOUS EVERY 2 HOUR PRN
Status: DISCONTINUED | OUTPATIENT
Start: 2020-11-25 | End: 2020-11-26

## 2020-11-25 RX ORDER — ENOXAPARIN SODIUM 100 MG/ML
40 INJECTION SUBCUTANEOUS DAILY
Status: DISCONTINUED | OUTPATIENT
Start: 2020-11-26 | End: 2020-11-26

## 2020-11-25 RX ORDER — SODIUM CHLORIDE, SODIUM LACTATE, POTASSIUM CHLORIDE, CALCIUM CHLORIDE 600; 310; 30; 20 MG/100ML; MG/100ML; MG/100ML; MG/100ML
INJECTION, SOLUTION INTRAVENOUS CONTINUOUS
Status: DISCONTINUED | OUTPATIENT
Start: 2020-11-25 | End: 2020-11-26

## 2020-11-25 RX ORDER — LOSARTAN POTASSIUM 50 MG/1
50 TABLET ORAL DAILY
Status: DISCONTINUED | OUTPATIENT
Start: 2020-11-25 | End: 2020-11-26

## 2020-11-25 RX ORDER — HYDROMORPHONE HYDROCHLORIDE 1 MG/ML
0.8 INJECTION, SOLUTION INTRAMUSCULAR; INTRAVENOUS; SUBCUTANEOUS EVERY 2 HOUR PRN
Status: DISCONTINUED | OUTPATIENT
Start: 2020-11-25 | End: 2020-11-26

## 2020-11-25 RX ORDER — SERTRALINE HYDROCHLORIDE 25 MG/1
25 TABLET, FILM COATED ORAL DAILY
Status: DISCONTINUED | OUTPATIENT
Start: 2020-11-25 | End: 2020-11-26

## 2020-11-25 NOTE — PROGRESS NOTES
[facility] Pharmacy Note:  Renal Dose Adjustment for Enoxaparin (LOVENOX)    Paris Santamaria has been prescribed Enoxaparin (LOVENOX) 40 mg subcutaneously every 24 hours. Estimated Creatinine Clearance: 26.4 mL/min (A) (based on SCr of 1.21 mg/dL (H)).     Elias

## 2020-11-25 NOTE — PLAN OF CARE
Assumed pt care at 0730. NPO with ice chips, sips with meds. A/O x3, forgetful of current events. On RA. NSR on tele. Bathroom with SBA/walker. Denies pain. GI consulted for elevated liver enzymes. MRCP this afternoon. Will continue to monitor.      Problem

## 2020-11-25 NOTE — CONSULTS
[facility]                       Gastroenterology [address] [facility] Gastroenterology    Zhane Byrnes Patient Status:  Inpatient    7/3/1934 MRN [id number]   [facility] 3NE-A Attending Melba Baker, [address] [facility] Day # 1 PC Incontinence     occasional incont @ noc   • Increased urinary frequency    • Lower back pain    • Lumbago    • Osteoarthritis    • Other and unspecified hyperlipidemia    • Pain in joints    • Pneumonia, organism unspecified(486)    • Sputum production atypical squamous proliferation   • THORACIC FACET JOINT INJECTION DIAGNOSTIC Bilateral 2018    Performed by Anibal Barnhart MD at [place] [address]        Medications:   •  [] dextrose 5 %-0.45 % NaCl inf disease    ROS:  In addition to the pertinent positives described above:             Infectious Disease:  No chronic infections or recent fevers prior to the acute illness            Cardiovascular: + HTN, dyslipidemia             Respiratory: + COPD 11/25/2020    HGB 11.3 11/25/2020    HCT 33.4 11/25/2020    .0 11/25/2020    CREATSERUM 0.92 11/25/2020    BUN 24 11/25/2020     11/25/2020    K 3.8 11/25/2020     11/25/2020    CO2 25.0 11/25/2020    GLU 91 11/25/2020    CA 9.0 11/25/20 normal limits given the patient's age. 2. Previously described subcentimeter hypoechoic focus adjacent to the pancreas is not appreciated on today's study.      Dictated by (CST): Lashell Frost MD on 11/24/2020 at 9:05 PM       Finalized by (CST): Kody RESTREPO, fracture. Degenerated L1-L2 disc. Collapsed L5-S1 disc. LUNG BASES:  Tiny scars right lower lobe. Probable scar right middle lobe measuring about 5 mm. OTHER:  Negative.    =====  CONCLUSION:  No acute process.      Dictated by (CST): AMINA Conley Unremarkable. KIDNEYS:  Unremarkable. AORTA/VASCULAR:  Moderate mixed plaque in the aorta and iliac arteries. RETROPERITONEUM:  Unremarkable. BOWEL/MESENTERY:  Widely patent anastomosis along the rectosigmoid junction. Moderate feces in the colon.   Loletta Campbellton Dictated by: Brad Palacios MD on 10/06/2019 at 8:36       Approved by: Brad Palacios MD   __________________________________________________________________    Impression: 80year old female with hx of HTN, dyslipidemia, COPD, dementia, s/p cholecyst

## 2020-11-25 NOTE — CONSULTS
[facility] Pharmacy Note:  Renal Dose Adjustment for Enoxaparin (LOVENOX)    Rigoberto Feliz has been prescribed renally adjusted Enoxaparin (LOVENOX) 30 mg subcutaneously every 24 hours.     Estimated Creatinine Clearance: 34.7 mL/min (based on SCr of 0.92 mg/dL

## 2020-11-25 NOTE — ED PROVIDER NOTES
Patient Seen in: [facility] Emergency Department      History   Patient presents with:  Chest Pain Angina    Stated Complaint: cp    HPI    This is a very pleasant 70-year-old female with past medical history of COPD, hyperlipidemia, hypertension re BOWEL RESECTION      small bowel resection   • BREAST RECONSTRUCTION SECOND STAGE/ REVISION Bilateral 3/16/2016    Performed by Rey Gunderson MD at [facility] MAIN OR   • BREAST SURGERY Bilateral 3/2016    implant removal   • CHOLECYSTECTOMY  1993, 1998    x2   • 51.9      Smokeless tobacco: Never Used    Alcohol use:  Yes      Alcohol/week: 2.0 standard drinks      Types: 2 Glasses of wine per week      Frequency: 2-3 times a week      Drinks per session: 1 or 2      Binge frequency: Never      Comment: CAGE 8/12/2 Neutrophil Absolute 10.58 (*)     Lymphocyte Absolute 0.53 (*)     All other components within normal limits   TROPONIN I - Normal   CBC WITH DIFFERENTIAL WITH PLATELET    Narrative:      The following orders were created for panel order CBC WITH DIFFERE subcentimeter hypoechoic focus adjacent to the pancreas is not appreciated on today's study.     Dictated by (CST): Marychuy Motta MD on 11/24/2020 at 9:05 PM     Finalized by (CST): Marychuy Motta MD on 11/24/2020 at 9:07 PM       Xr Chest Ap Portable  (cpt=71045) for sleep which can be ordered by the hospitalist.  Case discussed with [facility] hospitalist Dr. Gwen Coleman. Patient was admitted to medical floor for further management.        covid currently pending    Disposition and Plan     Clinical Impression:  Acute panc

## 2020-11-25 NOTE — ED NOTES
This PCT at bedside to redress right arm bandage. Pt removed bandage and was concerned stating, \"there isn't a pad on my arm, I don't know where it is\". This PCT removed the old bandage and placed a new one on.

## 2020-11-25 NOTE — H&P
[facility] HOSPITAL[facility]  History and Physical     [facility] Patient Status:  Emergency    7/3/1934 MRN [id number]   Location [address] Attending Naomie Noe, [address] [facility] Day # 0 PCP Soha Salazar MD     Chief Complaint: • COLECTOMY     • COLONOSCOPY  2004, 5/8/2012    x2, in 2012 w/ forceps biopsy   • COLOSTOMY      and reversal   • EXPLORATORY LAPAROTOMY N/A 8/20/2017    Performed by Sid Sosa MD at [address]   • [address]  08/20/2017    expl lap   • hemorrhage   • Other (Other) Son         asthma  2 sons   • Other (alzheimer's disease) Sister         x2   • Other (stroke syndrome) Other         family hx       Allergies: No Known Allergies    Medications:  No current facility-administered medications Take 1,000 mg by mouth daily. , Disp: , Rfl:     •  Melatonin 5 MG Oral Tab, Take by mouth., Disp: , Rfl:     •  Apoaequorin (PREVAGEN OR), Take by mouth.  On med list from home, dose not on list, Disp: , Rfl:     •  Glucosamine HCl (GLUCOSAMINE OR), Take by cyanosis. Integument: No rashes or lesions. Psychiatric: Appropriate mood and affect.       Diagnostic Data:      Labs:  Recent Labs   Lab 11/23/20 1952 11/24/20  1823   WBC 11.4* 12.1*   HGB 13.4 12.5   MCV 94.4 92.4   .0 231.0       Recent Labs

## 2020-11-25 NOTE — PROGRESS NOTES
NURSING ADMISSION NOTE      Patient admitted via Cart  Oriented to room. Safety precautions initiated. Bed in low position. Call light in reach. Admission navigator completed. Denies any pain. A&Ox3-4. Yuhaaviatam.  BUE skin tears cleaned and dressed wi

## 2020-11-25 NOTE — PROGRESS NOTES
[facility] HOSPITALIST  Progress Note     Henrik Espinoza Patient Status:  Inpatient    7/3/1934 MRN [id number]   [facility] 3NE-A Attending Elder Bonilla, [address] Day # 1 PCP Katalina Kwok MD     Chief Complaint: Abdominal pain    S: Patie mg/dL). No results for input(s): PTP, INR in the last 168 hours. Recent Labs   Lab 11/24/20  1823   TROP <0.045      Imaging: Imaging data reviewed in Epic.     Medications:   • dilTIAZem HCl ER Coated Beads  300 mg Oral Daily   • Donepezil HCl  10 mg

## 2020-11-26 VITALS
RESPIRATION RATE: 18 BRPM | BODY MASS INDEX: 24.91 KG/M2 | HEART RATE: 77 BPM | DIASTOLIC BLOOD PRESSURE: 96 MMHG | OXYGEN SATURATION: 91 % | HEIGHT: 62 IN | WEIGHT: 135.38 LBS | TEMPERATURE: 98 F | SYSTOLIC BLOOD PRESSURE: 141 MMHG

## 2020-11-26 PROCEDURE — 99239 HOSP IP/OBS DSCHRG MGMT >30: CPT | Performed by: INTERNAL MEDICINE

## 2020-11-26 RX ORDER — POTASSIUM CHLORIDE 14.9 MG/ML
20 INJECTION INTRAVENOUS ONCE
Status: COMPLETED | OUTPATIENT
Start: 2020-11-26 | End: 2020-11-26

## 2020-11-26 NOTE — PLAN OF CARE
Patient is A&O x4. Forgetful. Confused at times during night. VSS. On tele-NSR. On RA. IV- ml/hr. Denies any pain or discomfort. NPO. Pt went to [facility], haldol given prior to going. Dressing to LLE in place, C/D/I.   Video monitoring in plac

## 2020-11-26 NOTE — DISCHARGE SUMMARY
[facility] [place] HOSPITALIST  DISCHARGE SUMMARY     Morena Faria Patient Status:  Inpatient    7/3/1934 MRN [id number]   [facility] 3NE-A Attending Raisa Brewer, [address] [facility] Day # 2 PCP Breanna Espinoza MD     Date of Admission: 2020  Date of Prescription details   Acetaminophen-Codeine #3 300-30 MG Tabs  Commonly known as: TYLENOL #3      Take 1 tablet by mouth every 6 (six) hours as needed for Pain.    Quantity: 10 tablet  Refills: 0     Acidophilus/Pectin Caps  Commonly known as: PROBIOTIC 2 tabs nightly, Week 4: 2 tabs twice daily   Quantity: 70 tablet  Refills: 0     MULTIVITAMIN OR      Take 1 tablet by mouth daily.    Refills: 0     naproxen 500 MG Tabs  Commonly known as: NAPROSYN      TAKE 1 TABLET BY MOUTH TWICE A DAY WITH MEALS   Ludy Rodriguez annual Medicare Benefit Plan review. This will help make sure you are on the best Medicare plan to fit your healthcare needs. Please call [phone] to speak to a licensed agent who can review your Medicare benefits with you.   For the safety of our pat

## 2020-11-26 NOTE — PROGRESS NOTES
D[facility] HOSPITALIST  Progress Note     Edward End Patient Status:  Inpatient    7/3/1934 MRN [id number]   [facility] 3NE-A Attending Cristopher Lester, [address] [facility] Day # 2 PCP Josselin Singletary MD     Chief Complaint: Abdominal pain    S: Patie mg/dL). No results for input(s): PTP, INR in the last 168 hours. Recent Labs   Lab 11/24/20  1823 11/25/20  0638   TROP <0.045  --    CK  --  42      Imaging: Imaging data reviewed in Epic.     Medications:   • potassium chloride  20 mEq Intravenous O

## 2020-11-26 NOTE — PROGRESS NOTES
Gastroenterology Follow-Up Note      Sonny Butler Patient Status:  Inpatient    7/3/1934 MRN [id number]   [facility] 3NE-A Attending Deepak Landin, [address] [facility] Day # 2 PCP Jennifer Zhang MD     Chief Complaint/Reason for Follow Up: discharge from GI standpoint  -recommend follow up with PCP to ensure complete normalization of lft's, if remains elevated refer back to gi for further work up  -will sign off, please call with questions      RUTH Camacho  Gastroenterology/Advanced Endoscop

## 2020-11-26 NOTE — PROGRESS NOTES
Resumed care at 0700  Pt A&Ox4 forgetful at times  Up with 1 assist and walker, steady gait  Advanced diet per GI, pt tolerated soft/low fiber , no nausea or pain  Received d/c orders from GI and hospitalist  Removed extended iv from right upper arm, nancy

## 2020-11-27 ENCOUNTER — PATIENT OUTREACH (OUTPATIENT)
Dept: CASE MANAGEMENT | Age: 85
End: 2020-11-27

## 2020-11-27 DIAGNOSIS — Z02.9 ENCOUNTERS FOR UNSPECIFIED ADMINISTRATIVE PURPOSE: ICD-10-CM

## 2020-11-27 PROCEDURE — 1111F DSCHRG MED/CURRENT MED MERGE: CPT

## 2020-11-30 NOTE — PROGRESS NOTES
LM for pt to call [facility] for TCM since discharge. [facility] phone number was provided for pt to call back. [facility] and [facility] contact information left for pt to call back.

## 2020-12-03 ENCOUNTER — TELEPHONE (OUTPATIENT)
Dept: INTERNAL MEDICINE CLINIC | Facility: CLINIC | Age: 85
End: 2020-12-03

## 2020-12-03 DIAGNOSIS — K85.80 OTHER ACUTE PANCREATITIS, UNSPECIFIED COMPLICATION STATUS: Primary | ICD-10-CM

## 2020-12-03 RX ORDER — HYDROCHLOROTHIAZIDE 12.5 MG/1
TABLET ORAL
Qty: 90 TABLET | Refills: 1 | Status: SHIPPED | OUTPATIENT
Start: 2020-12-03 | End: 2021-01-01 | Stop reason: ALTCHOICE

## 2020-12-03 NOTE — TELEPHONE ENCOUNTER
Spoke to pt's dtr Feli Gar per [facility] today for TCM. Dtr scheduled HFU appt with PCP for 12/8/2020 at 2:20pm.  Pt needs to have a repeat CMP. Dtr would like to know if PCP can place this order and if she prefers pt to have CMP drawn prior to appt?   Please ad

## 2020-12-03 NOTE — TELEPHONE ENCOUNTER
Trista Lora, pt's dtr notified TB ordered a CMP to complete prior to her appt on 12/8/20. [facility] CS info given. Monae verbalizes understanding.

## 2020-12-03 NOTE — PROGRESS NOTES
Initial Post Discharge Follow Up   Discharge Date: 11/26/20  Contact Date: 11/27/2020    Consent Verification:  Assessment Completed With: Caregiver: Prince howard received per patient?  written  HIPAA Verified?   Yes    Discharge Dx:    Acute pa tablet 0   • Fluticasone Propionate 50 MCG/ACT Nasal Suspension 2 sprays by Each Nare route daily.  1 Bottle 6   • PROAIR  (90 Base) MCG/ACT Inhalation Aero Soln INHALE 2 PUFFS INTO THE LUNGS EVERY 4 (FOUR) HOURS AS NEEDED FOR WHEEZING OR SHORTNESS O • CALCIUM + D OR Take 1 tablet by mouth daily. • MULTIVITAMIN OR Take 1 tablet by mouth daily. • (NCM)  Were there any medication changes noted on AVS?  no, no med changes per dtr.   • (NCM) If a new medication was prescribed:  No new meds per d You must be in the state of [place] during the virtual visit. Please use the [employer] and launch the video visit 10 minutes prior to your scheduled appointment time to ensure your camera and microphone are working properly.  [facility] - [address]  [address] [facility] Eusebio Mariscal, [address], [place] [address] Ru [facility] Leve  [address]  [phone]

## 2020-12-04 ENCOUNTER — OFFICE VISIT (OUTPATIENT)
Dept: ORTHOPEDICS CLINIC | Facility: CLINIC | Age: 85
End: 2020-12-04
Payer: MEDICARE

## 2020-12-04 DIAGNOSIS — S92.352D CLOSED DISPLACED FRACTURE OF FIFTH METATARSAL BONE OF LEFT FOOT WITH ROUTINE HEALING, SUBSEQUENT ENCOUNTER: Primary | ICD-10-CM

## 2020-12-04 PROCEDURE — L3260 AMBULATORY SURGICAL BOOT EAC: HCPCS | Performed by: PODIATRIST

## 2020-12-04 PROCEDURE — 99203 OFFICE O/P NEW LOW 30 MIN: CPT | Performed by: PODIATRIST
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
## 2020-12-07 ENCOUNTER — HOSPITAL ENCOUNTER (OUTPATIENT)
Dept: GENERAL RADIOLOGY | Facility: HOSPITAL | Age: 85
Discharge: HOME | End: 2020-12-07
Attending: PHYSICIAN ASSISTANT
Payer: MEDICARE

## 2020-12-07 ENCOUNTER — OFFICE VISIT (OUTPATIENT)
Dept: ORTHOPEDICS CLINIC | Facility: CLINIC | Age: 85
End: 2020-12-07
Payer: MEDICARE

## 2020-12-07 ENCOUNTER — LAB ENCOUNTER (OUTPATIENT)
Dept: LAB | Age: 85
End: 2020-12-07
Attending: INTERNAL MEDICINE
Payer: MEDICARE

## 2020-12-07 DIAGNOSIS — M53.3 TRAUMATIC COCCYDYNIA: ICD-10-CM

## 2020-12-07 DIAGNOSIS — K85.80 OTHER ACUTE PANCREATITIS, UNSPECIFIED COMPLICATION STATUS: ICD-10-CM

## 2020-12-07 DIAGNOSIS — S30.0XXA SACRAL CONTUSION, INITIAL ENCOUNTER: ICD-10-CM

## 2020-12-07 DIAGNOSIS — N18.30 CKD (CHRONIC KIDNEY DISEASE) STAGE 3, GFR 30-59 ML/MIN (HCC): ICD-10-CM

## 2020-12-07 DIAGNOSIS — M54.50 ACUTE LOW BACK PAIN, UNSPECIFIED BACK PAIN LATERALITY, UNSPECIFIED WHETHER SCIATICA PRESENT: ICD-10-CM

## 2020-12-07 DIAGNOSIS — M54.50 ACUTE LOW BACK PAIN, UNSPECIFIED BACK PAIN LATERALITY, UNSPECIFIED WHETHER SCIATICA PRESENT: Primary | ICD-10-CM

## 2020-12-07 DIAGNOSIS — N28.9 RENAL INSUFFICIENCY: ICD-10-CM

## 2020-12-07 PROCEDURE — 72220 X-RAY EXAM SACRUM TAILBONE: CPT | Performed by: PHYSICIAN ASSISTANT

## 2020-12-07 PROCEDURE — 72110 X-RAY EXAM L-2 SPINE 4/>VWS: CPT | Performed by: PHYSICIAN ASSISTANT

## 2020-12-07 PROCEDURE — 99213 OFFICE O/P EST LOW 20 MIN: CPT | Performed by: PHYSICIAN ASSISTANT

## 2020-12-07 PROCEDURE — 36415 COLL VENOUS BLD VENIPUNCTURE: CPT

## 2020-12-07 PROCEDURE — 83690 ASSAY OF LIPASE: CPT

## 2020-12-07 PROCEDURE — 80053 COMPREHEN METABOLIC PANEL: CPT

## 2020-12-07 NOTE — PROGRESS NOTES
[facility] New Patient Note    CC: Right low back and buttock pain    HPI: This 80year old female presents today with a family member with complaints right low back and buttock pain.   Onset of symptoms occurred 2 weeks ago on 11/23 at which time she Bilateral 3/16/2016    Performed by Delphine Barron MD at [facility] MAIN OR   • BREAST SURGERY Bilateral 3/2016    implant removal   • Taya    x2   • COLECTOMY     • COLONOSCOPY  2004, 5/8/2012    x2, in 2012 w/ forceps biopsy   • COLOSTOMY Acetaminophen-Codeine #3 300-30 MG Oral Tab Take 1 tablet by mouth every 6 (six) hours as needed for Pain. 10 tablet 0   • Fluticasone Propionate 50 MCG/ACT Nasal Suspension 2 sprays by Each Nare route daily.  1 Bottle 6   • PROAIR  (90 Base) MCG/ACT MULTIVITAMIN OR Take 1 tablet by mouth daily.        No Known Allergies  Family History   Problem Relation Age of Onset   • Psychiatric Father    • Other (suicide completion) Father    • Other (Other) Mother         Cerebral hemorrhage   • Other (Other) Son surgical changes. Assessment: Right sided sacral contusion, rule out occult fracture       Plan: I discussed x-ray findings and exam findings with the patient and her daughter over the phone.   I explained that Dr. Irma Raymond and I reviewed her x-rays and

## 2020-12-08 ENCOUNTER — TELEPHONE (OUTPATIENT)
Dept: ORTHOPEDICS CLINIC | Facility: CLINIC | Age: 85
End: 2020-12-08

## 2020-12-08 ENCOUNTER — OFFICE VISIT (OUTPATIENT)
Dept: INTERNAL MEDICINE CLINIC | Facility: CLINIC | Age: 85
End: 2020-12-08
Payer: MEDICARE

## 2020-12-08 VITALS
TEMPERATURE: 97 F | SYSTOLIC BLOOD PRESSURE: 158 MMHG | RESPIRATION RATE: 18 BRPM | BODY MASS INDEX: 23.37 KG/M2 | HEART RATE: 86 BPM | OXYGEN SATURATION: 98 % | DIASTOLIC BLOOD PRESSURE: 82 MMHG | HEIGHT: 62 IN | WEIGHT: 127 LBS

## 2020-12-08 DIAGNOSIS — N18.32 STAGE 3B CHRONIC KIDNEY DISEASE (HCC): ICD-10-CM

## 2020-12-08 DIAGNOSIS — Z87.19 HISTORY OF PANCREATITIS: Primary | ICD-10-CM

## 2020-12-08 DIAGNOSIS — M54.50 ACUTE RIGHT-SIDED LOW BACK PAIN, UNSPECIFIED WHETHER SCIATICA PRESENT: ICD-10-CM

## 2020-12-08 DIAGNOSIS — R74.01 TRANSAMINITIS: ICD-10-CM

## 2020-12-08 PROCEDURE — 99214 OFFICE O/P EST MOD 30 MIN: CPT | Performed by: INTERNAL MEDICINE

## 2020-12-08 NOTE — TELEPHONE ENCOUNTER
Patient daughter is calling to see if she can get a pill for mri to calm her nerves and they need clarification on the order for the mri and where and when can patient be seen by [facility]

## 2020-12-08 NOTE — TELEPHONE ENCOUNTER
I called and s/w Gayla advising her that her mothers MRI has been authorized and to call central scheduling @ [phone]

## 2020-12-08 NOTE — PROGRESS NOTES
Michael Joseph is a 80year old female. Patient presents with:  Hospital F/U:  rm 3 Pancreatitis 11/24/20       HPI:     Patient here for HFU.  Admitted 11/24-11/26 for  acute pancreatitis and elevated LFTs, Patient says she had severe abdominal pain t type    Current Outpatient Medications   Medication Sig Dispense Refill   • HYDROCHLOROTHIAZIDE 12.5 MG Oral Tab TAKE 1 TABLET BY MOUTH EVERY DAY 90 tablet 1   • NAPROXEN 500 MG Oral Tab TAKE 1 TABLET BY MOUTH TWICE A DAY WITH MEALS 60 tablet 2   • dilTIAZ Acidophilus/Pectin Oral Cap Take 1 capsule by mouth daily. • aspirin 81 MG Oral Tab Take 81 mg by mouth daily. • CRANBERRY OR Take 1 tablet by mouth daily. • CALCIUM + D OR Take 1 tablet by mouth daily.      • MULTIVITAMIN OR Take 1 tablet by Allergies  No Known Allergies      REVIEW OF SYSTEMS:   GENERAL HEALTH:  no fevers or chills  RESPIRATORY: no cough  CARDIOVASCULAR: denies chest pain  GI: denies abdominal pain  : no dysuria  NEURO: denies headaches  PSYCH: No reported depression

## 2020-12-08 NOTE — TELEPHONE ENCOUNTER
Returned patient's daughters call. I explained that I talked to Dr. Teresa Folres and we are both hesitant to prescribe something like Valium at her age as it may have a paradoxical effect and cause agitation. We recommend Benadryl prior to MRI.   She also sta

## 2020-12-11 ENCOUNTER — HOSPITAL ENCOUNTER (OUTPATIENT)
Dept: MRI IMAGING | Age: 85
Discharge: HOME | End: 2020-12-11
Attending: PHYSICIAN ASSISTANT
Payer: MEDICARE

## 2020-12-11 DIAGNOSIS — M54.50 ACUTE LOW BACK PAIN, UNSPECIFIED BACK PAIN LATERALITY, UNSPECIFIED WHETHER SCIATICA PRESENT: ICD-10-CM

## 2020-12-11 DIAGNOSIS — S30.0XXA SACRAL CONTUSION, INITIAL ENCOUNTER: ICD-10-CM

## 2020-12-11 PROCEDURE — 72195 MRI PELVIS W/O DYE: CPT | Performed by: PHYSICIAN ASSISTANT

## 2020-12-14 ENCOUNTER — PATIENT MESSAGE (OUTPATIENT)
Dept: ORTHOPEDICS CLINIC | Facility: CLINIC | Age: 85
End: 2020-12-14

## 2020-12-14 DIAGNOSIS — S92.302S CLOSED NONDISPLACED FRACTURE OF METATARSAL BONE OF LEFT FOOT, UNSPECIFIED METATARSAL, SEQUELA: Primary | ICD-10-CM

## 2020-12-14 DIAGNOSIS — G30.1 LATE ONSET ALZHEIMER'S DISEASE WITHOUT BEHAVIORAL DISTURBANCE (HCC): ICD-10-CM

## 2020-12-14 DIAGNOSIS — F02.80 LATE ONSET ALZHEIMER'S DISEASE WITHOUT BEHAVIORAL DISTURBANCE (HCC): ICD-10-CM

## 2020-12-14 RX ORDER — MEMANTINE HYDROCHLORIDE 5 MG/1
10 TABLET ORAL 2 TIMES DAILY
Qty: 120 TABLET | Refills: 1 | Status: SHIPPED | OUTPATIENT
Start: 2020-12-14 | End: 2021-01-01 | Stop reason: DRUGHIGH

## 2020-12-14 NOTE — TELEPHONE ENCOUNTER
Per PSR: daughter ashley says pt is out of Memantine, please refill at [facility] or call to advise        Medication: Memantine HCl 5 MG Oral Tab    Date of last refill: 11/16/2020 (#70/0)  [place]    Last office visit: 11/1

## 2020-12-14 NOTE — TELEPHONE ENCOUNTER
I called and spoke with Kacey Coleman regarding her moms back and foot issues. I gave Dr. Denny Crabtree her MRI results to review with Gayla. I also made an appointment for Friday 1-8-21 to come in to see Dr. Bing Baumann regarding her foot.  She will have Niger go to [facility]

## 2020-12-14 NOTE — TELEPHONE ENCOUNTER
From: Taj Mejia  To: Юлия Solorio PA-C  Sent: 12/14/2020 10:51 AM CST  Subject: Non-Urgent Medical Question    Scott Lott Crys, this is Gayla Newell (Aaliyah Pizarro's daughter).  When is my mom supposed to have her foot re-x-rayed and then fol

## 2020-12-16 ENCOUNTER — TELEPHONE (OUTPATIENT)
Dept: ORTHOPEDICS CLINIC | Facility: CLINIC | Age: 85
End: 2020-12-16

## 2020-12-16 DIAGNOSIS — S32.120D CLOSED NONDISPLACED ZONE II FRACTURE OF SACRUM WITH ROUTINE HEALING, SUBSEQUENT ENCOUNTER: Primary | ICD-10-CM

## 2020-12-16 PROCEDURE — 99441: CPT | Performed by: ORTHOPAEDIC SURGERY

## 2020-12-16 NOTE — TELEPHONE ENCOUNTER
I had a telephone conversation with Feli Aislinnchristiano the patient's daughter. The patient underwent an MRI of the pelvis after continued pain in the buttock and pelvic region from a fall.   I reviewed the MRI findings which detailed motion artifact but a clear abnorma

## 2020-12-24 ENCOUNTER — PATIENT MESSAGE (OUTPATIENT)
Dept: NEUROLOGY | Facility: CLINIC | Age: 85
End: 2020-12-24

## 2020-12-24 NOTE — TELEPHONE ENCOUNTER
Spoke with [facility], [facility] pharmacist who states they did not receive the Memantine order sent on 12/14. Verbal order given with read back confirmation received. Patient notified.

## 2020-12-24 NOTE — TELEPHONE ENCOUNTER
From: Laura Perry  To: Bijan Barron DO  Sent: 12/24/2020 8:43 AM CST  Subject: Prescription Question    This is Gayla, Nimisha daughter, can you please tell me what's going on with my mom's Namenda prescription.  It still says it hasn't

## 2020-12-29 DIAGNOSIS — G30.1 LATE ONSET ALZHEIMER'S DISEASE WITHOUT BEHAVIORAL DISTURBANCE (HCC): ICD-10-CM

## 2020-12-29 DIAGNOSIS — F02.80 LATE ONSET ALZHEIMER'S DISEASE WITHOUT BEHAVIORAL DISTURBANCE (HCC): ICD-10-CM

## 2020-12-29 NOTE — TELEPHONE ENCOUNTER
Refill request received from [facility] for Memantine. JEVON on patient VM to verify she would like Rx sent to [facility] as last Rx Memantine was sent to [facility] on 12/14.

## 2021-01-01 ENCOUNTER — TELEPHONE (OUTPATIENT)
Dept: INTERNAL MEDICINE CLINIC | Facility: CLINIC | Age: 86
End: 2021-01-01

## 2021-01-01 ENCOUNTER — INITIAL APN SNF VISIT (OUTPATIENT)
Dept: INTERNAL MEDICINE CLINIC | Age: 86
End: 2021-01-01

## 2021-01-01 ENCOUNTER — LAB ENCOUNTER (OUTPATIENT)
Dept: LAB | Age: 86
End: 2021-01-01
Attending: INTERNAL MEDICINE
Payer: MEDICARE

## 2021-01-01 ENCOUNTER — APPOINTMENT (OUTPATIENT)
Dept: CARDIAC REHAB | Facility: HOSPITAL | Age: 86
End: 2021-01-01
Attending: INTERNAL MEDICINE
Payer: MEDICARE

## 2021-01-01 ENCOUNTER — OFFICE VISIT (OUTPATIENT)
Dept: INTERNAL MEDICINE CLINIC | Facility: CLINIC | Age: 86
End: 2021-01-01
Payer: MEDICARE

## 2021-01-01 ENCOUNTER — PATIENT OUTREACH (OUTPATIENT)
Dept: CASE MANAGEMENT | Age: 86
End: 2021-01-01

## 2021-01-01 ENCOUNTER — APPOINTMENT (OUTPATIENT)
Dept: GENERAL RADIOLOGY | Age: 86
End: 2021-01-01
Attending: EMERGENCY MEDICINE
Payer: MEDICARE

## 2021-01-01 ENCOUNTER — ORDER TRANSCRIPTION (OUTPATIENT)
Dept: ADMINISTRATIVE | Facility: HOSPITAL | Age: 86
End: 2021-01-01

## 2021-01-01 ENCOUNTER — OFFICE VISIT (OUTPATIENT)
Dept: CARDIOLOGY | Age: 86
End: 2021-01-01

## 2021-01-01 ENCOUNTER — HOSPITAL ENCOUNTER (INPATIENT)
Facility: HOSPITAL | Age: 86
LOS: 1 days | Discharge: NURSING, REHAB OR LONG-TERM CARE | DRG: 310 | End: 2021-01-01
Attending: EMERGENCY MEDICINE | Admitting: HOSPITALIST
Payer: MEDICARE

## 2021-01-01 ENCOUNTER — HOSPITAL ENCOUNTER (OUTPATIENT)
Dept: MRI IMAGING | Facility: HOSPITAL | Age: 86
Discharge: HOME | End: 2021-01-01
Attending: PAIN MEDICINE
Payer: MEDICARE

## 2021-01-01 ENCOUNTER — IMMUNIZATION (OUTPATIENT)
Dept: LAB | Age: 86
End: 2021-01-01
Attending: HOSPITALIST
Payer: MEDICARE

## 2021-01-01 ENCOUNTER — HOSPITAL ENCOUNTER (EMERGENCY)
Age: 86
Discharge: HOME | End: 2021-01-01
Attending: EMERGENCY MEDICINE
Payer: MEDICARE

## 2021-01-01 ENCOUNTER — HOSPITAL ENCOUNTER (EMERGENCY)
Facility: HOSPITAL | Age: 86
Discharge: HOME | End: 2021-01-01
Attending: EMERGENCY MEDICINE
Payer: MEDICARE

## 2021-01-01 ENCOUNTER — APPOINTMENT (OUTPATIENT)
Dept: GENERAL RADIOLOGY | Facility: HOSPITAL | Age: 86
End: 2021-01-01
Attending: PHYSICIAN ASSISTANT
Payer: MEDICARE

## 2021-01-01 ENCOUNTER — APPOINTMENT (OUTPATIENT)
Dept: GENERAL RADIOLOGY | Facility: HOSPITAL | Age: 86
DRG: 310 | End: 2021-01-01
Attending: EMERGENCY MEDICINE
Payer: MEDICARE

## 2021-01-01 ENCOUNTER — APPOINTMENT (OUTPATIENT)
Dept: MRI IMAGING | Facility: HOSPITAL | Age: 86
End: 2021-01-01
Attending: EMERGENCY MEDICINE
Payer: MEDICARE

## 2021-01-01 ENCOUNTER — PATIENT MESSAGE (OUTPATIENT)
Dept: INTERNAL MEDICINE CLINIC | Facility: CLINIC | Age: 86
End: 2021-01-01

## 2021-01-01 ENCOUNTER — APPOINTMENT (OUTPATIENT)
Dept: GENERAL RADIOLOGY | Facility: HOSPITAL | Age: 86
DRG: 309 | End: 2021-01-01
Attending: EMERGENCY MEDICINE
Payer: MEDICARE

## 2021-01-01 ENCOUNTER — ANCILLARY PROCEDURE (OUTPATIENT)
Dept: CARDIOLOGY | Age: 86
End: 2021-01-01
Attending: INTERNAL MEDICINE

## 2021-01-01 ENCOUNTER — TELEMEDICINE (OUTPATIENT)
Dept: NEUROLOGY | Facility: CLINIC | Age: 86
End: 2021-01-01

## 2021-01-01 ENCOUNTER — APPOINTMENT (OUTPATIENT)
Dept: CT IMAGING | Age: 86
End: 2021-01-01
Attending: EMERGENCY MEDICINE
Payer: MEDICARE

## 2021-01-01 ENCOUNTER — SNF VISIT (OUTPATIENT)
Dept: INTERNAL MEDICINE CLINIC | Age: 86
End: 2021-01-01

## 2021-01-01 ENCOUNTER — APPOINTMENT (OUTPATIENT)
Dept: CV DIAGNOSTICS | Facility: HOSPITAL | Age: 86
DRG: 310 | End: 2021-01-01
Attending: HOSPITALIST
Payer: MEDICARE

## 2021-01-01 ENCOUNTER — E-ADVICE (OUTPATIENT)
Dept: CARDIOLOGY | Age: 86
End: 2021-01-01

## 2021-01-01 ENCOUNTER — TELEPHONE (OUTPATIENT)
Dept: CARDIOLOGY | Age: 86
End: 2021-01-01

## 2021-01-01 ENCOUNTER — MED REC SCAN ONLY (OUTPATIENT)
Dept: FAMILY MEDICINE CLINIC | Facility: CLINIC | Age: 86
End: 2021-01-01

## 2021-01-01 ENCOUNTER — APPOINTMENT (OUTPATIENT)
Dept: MRI IMAGING | Facility: HOSPITAL | Age: 86
End: 2021-01-01
Attending: PAIN MEDICINE
Payer: MEDICARE

## 2021-01-01 ENCOUNTER — HOSPITAL ENCOUNTER (OUTPATIENT)
Dept: GENERAL RADIOLOGY | Age: 86
Discharge: HOME | End: 2021-01-01
Attending: PODIATRIST
Payer: MEDICARE

## 2021-01-01 ENCOUNTER — MED REC SCAN ONLY (OUTPATIENT)
Dept: INTERNAL MEDICINE CLINIC | Facility: CLINIC | Age: 86
End: 2021-01-01

## 2021-01-01 ENCOUNTER — HOSPITAL ENCOUNTER (OUTPATIENT)
Dept: GENERAL RADIOLOGY | Facility: HOSPITAL | Age: 86
Discharge: HOME | End: 2021-01-01
Attending: NURSE PRACTITIONER
Payer: MEDICARE

## 2021-01-01 ENCOUNTER — EXTERNAL FACILITY (OUTPATIENT)
Dept: FAMILY MEDICINE CLINIC | Facility: CLINIC | Age: 86
End: 2021-01-01

## 2021-01-01 ENCOUNTER — APPOINTMENT (OUTPATIENT)
Dept: CARDIOLOGY | Age: 86
End: 2021-01-01

## 2021-01-01 ENCOUNTER — HOSPITAL ENCOUNTER (INPATIENT)
Facility: HOSPITAL | Age: 86
LOS: 1 days | Discharge: HOME HEALTH | DRG: 309 | End: 2021-01-01
Attending: EMERGENCY MEDICINE | Admitting: HOSPITALIST
Payer: MEDICARE

## 2021-01-01 ENCOUNTER — TELEPHONE (OUTPATIENT)
Dept: CASE MANAGEMENT | Age: 86
End: 2021-01-01

## 2021-01-01 VITALS
BODY MASS INDEX: 20 KG/M2 | TEMPERATURE: 98 F | OXYGEN SATURATION: 98 % | WEIGHT: 115.06 LBS | SYSTOLIC BLOOD PRESSURE: 112 MMHG | RESPIRATION RATE: 16 BRPM | DIASTOLIC BLOOD PRESSURE: 78 MMHG | HEART RATE: 65 BPM

## 2021-01-01 VITALS
DIASTOLIC BLOOD PRESSURE: 56 MMHG | HEART RATE: 86 BPM | BODY MASS INDEX: 20.65 KG/M2 | SYSTOLIC BLOOD PRESSURE: 126 MMHG | RESPIRATION RATE: 16 BRPM | HEIGHT: 62 IN | TEMPERATURE: 98 F | WEIGHT: 112.19 LBS | OXYGEN SATURATION: 97 %

## 2021-01-01 VITALS
OXYGEN SATURATION: 97 % | BODY MASS INDEX: 22 KG/M2 | RESPIRATION RATE: 18 BRPM | DIASTOLIC BLOOD PRESSURE: 73 MMHG | HEART RATE: 69 BPM | WEIGHT: 121.94 LBS | TEMPERATURE: 98 F | SYSTOLIC BLOOD PRESSURE: 160 MMHG

## 2021-01-01 VITALS
TEMPERATURE: 99 F | WEIGHT: 121.94 LBS | SYSTOLIC BLOOD PRESSURE: 150 MMHG | DIASTOLIC BLOOD PRESSURE: 64 MMHG | OXYGEN SATURATION: 98 % | HEART RATE: 64 BPM | BODY MASS INDEX: 22 KG/M2 | RESPIRATION RATE: 16 BRPM

## 2021-01-01 VITALS
WEIGHT: 111 LBS | SYSTOLIC BLOOD PRESSURE: 148 MMHG | HEART RATE: 97 BPM | BODY MASS INDEX: 20.3 KG/M2 | DIASTOLIC BLOOD PRESSURE: 86 MMHG

## 2021-01-01 VITALS
SYSTOLIC BLOOD PRESSURE: 169 MMHG | DIASTOLIC BLOOD PRESSURE: 71 MMHG | WEIGHT: 120.5 LBS | BODY MASS INDEX: 22.18 KG/M2 | HEART RATE: 67 BPM | HEIGHT: 62 IN | RESPIRATION RATE: 16 BRPM

## 2021-01-01 VITALS — HEIGHT: 62 IN | BODY MASS INDEX: 21.9 KG/M2 | WEIGHT: 119 LBS

## 2021-01-01 VITALS
DIASTOLIC BLOOD PRESSURE: 98 MMHG | TEMPERATURE: 98 F | HEART RATE: 77 BPM | SYSTOLIC BLOOD PRESSURE: 117 MMHG | OXYGEN SATURATION: 100 % | BODY MASS INDEX: 20 KG/M2 | RESPIRATION RATE: 15 BRPM | WEIGHT: 110 LBS

## 2021-01-01 VITALS
HEART RATE: 86 BPM | DIASTOLIC BLOOD PRESSURE: 47 MMHG | TEMPERATURE: 98 F | WEIGHT: 106.31 LBS | OXYGEN SATURATION: 100 % | SYSTOLIC BLOOD PRESSURE: 118 MMHG | BODY MASS INDEX: 18 KG/M2 | RESPIRATION RATE: 18 BRPM

## 2021-01-01 VITALS
SYSTOLIC BLOOD PRESSURE: 98 MMHG | WEIGHT: 122 LBS | HEART RATE: 72 BPM | DIASTOLIC BLOOD PRESSURE: 40 MMHG | TEMPERATURE: 97 F | OXYGEN SATURATION: 98 % | RESPIRATION RATE: 16 BRPM | BODY MASS INDEX: 22.45 KG/M2 | HEIGHT: 62 IN

## 2021-01-01 VITALS
DIASTOLIC BLOOD PRESSURE: 84 MMHG | SYSTOLIC BLOOD PRESSURE: 157 MMHG | TEMPERATURE: 97 F | HEART RATE: 98 BPM | OXYGEN SATURATION: 94 % | RESPIRATION RATE: 20 BRPM

## 2021-01-01 VITALS
OXYGEN SATURATION: 96 % | SYSTOLIC BLOOD PRESSURE: 141 MMHG | RESPIRATION RATE: 20 BRPM | TEMPERATURE: 97 F | HEART RATE: 100 BPM | DIASTOLIC BLOOD PRESSURE: 76 MMHG

## 2021-01-01 VITALS
HEART RATE: 90 BPM | WEIGHT: 121 LBS | DIASTOLIC BLOOD PRESSURE: 67 MMHG | BODY MASS INDEX: 22.13 KG/M2 | SYSTOLIC BLOOD PRESSURE: 118 MMHG

## 2021-01-01 VITALS
WEIGHT: 129 LBS | HEIGHT: 62 IN | SYSTOLIC BLOOD PRESSURE: 138 MMHG | BODY MASS INDEX: 23.74 KG/M2 | DIASTOLIC BLOOD PRESSURE: 58 MMHG | HEART RATE: 62 BPM

## 2021-01-01 VITALS
RESPIRATION RATE: 16 BRPM | HEART RATE: 71 BPM | TEMPERATURE: 97 F | SYSTOLIC BLOOD PRESSURE: 138 MMHG | DIASTOLIC BLOOD PRESSURE: 62 MMHG | OXYGEN SATURATION: 97 %

## 2021-01-01 VITALS
SYSTOLIC BLOOD PRESSURE: 153 MMHG | OXYGEN SATURATION: 94 % | RESPIRATION RATE: 20 BRPM | DIASTOLIC BLOOD PRESSURE: 89 MMHG | BODY MASS INDEX: 18 KG/M2 | HEART RATE: 94 BPM | TEMPERATURE: 98 F | WEIGHT: 105.19 LBS

## 2021-01-01 DIAGNOSIS — S32.050A COMPRESSION FRACTURE OF L5 LUMBAR VERTEBRA, CLOSED, INITIAL ENCOUNTER (HCC): Primary | ICD-10-CM

## 2021-01-01 DIAGNOSIS — E78.00 PURE HYPERCHOLESTEROLEMIA: ICD-10-CM

## 2021-01-01 DIAGNOSIS — F41.9 ANXIETY: ICD-10-CM

## 2021-01-01 DIAGNOSIS — D64.9 ANEMIA, UNSPECIFIED TYPE: ICD-10-CM

## 2021-01-01 DIAGNOSIS — Z11.52 ENCOUNTER FOR SCREENING FOR COVID-19: Primary | ICD-10-CM

## 2021-01-01 DIAGNOSIS — R62.7 FAILURE TO THRIVE IN ADULT: Primary | ICD-10-CM

## 2021-01-01 DIAGNOSIS — N18.32 STAGE 3B CHRONIC KIDNEY DISEASE (HCC): ICD-10-CM

## 2021-01-01 DIAGNOSIS — M48.061 SPINAL STENOSIS, LUMBAR REGION, WITHOUT NEUROGENIC CLAUDICATION: ICD-10-CM

## 2021-01-01 DIAGNOSIS — J43.1 PANLOBULAR EMPHYSEMA (HCC): ICD-10-CM

## 2021-01-01 DIAGNOSIS — I10 ESSENTIAL HYPERTENSION, BENIGN: Primary | ICD-10-CM

## 2021-01-01 DIAGNOSIS — M48.061 SPINAL STENOSIS OF LUMBAR REGION, UNSPECIFIED WHETHER NEUROGENIC CLAUDICATION PRESENT: ICD-10-CM

## 2021-01-01 DIAGNOSIS — F03.90 ADVANCING DEMENTIA (HCC): ICD-10-CM

## 2021-01-01 DIAGNOSIS — I10 ESSENTIAL HYPERTENSION: ICD-10-CM

## 2021-01-01 DIAGNOSIS — D72.829 LEUKOCYTOSIS, UNSPECIFIED TYPE: ICD-10-CM

## 2021-01-01 DIAGNOSIS — R79.89 AZOTEMIA: ICD-10-CM

## 2021-01-01 DIAGNOSIS — I48.0 PAROXYSMAL ATRIAL FIBRILLATION (CMD): ICD-10-CM

## 2021-01-01 DIAGNOSIS — M54.50 LOW BACK PAIN WITH RADIATION: ICD-10-CM

## 2021-01-01 DIAGNOSIS — R05.9 COUGH IN ADULT: ICD-10-CM

## 2021-01-01 DIAGNOSIS — R53.81 MALAISE AND FATIGUE: Primary | ICD-10-CM

## 2021-01-01 DIAGNOSIS — I10 ESSENTIAL (PRIMARY) HYPERTENSION: ICD-10-CM

## 2021-01-01 DIAGNOSIS — R06.02 SHORTNESS OF BREATH: Primary | ICD-10-CM

## 2021-01-01 DIAGNOSIS — I65.23 ASYMPTOMATIC CAROTID ARTERY STENOSIS, BILATERAL: ICD-10-CM

## 2021-01-01 DIAGNOSIS — E78.2 MIXED HYPERLIPIDEMIA: Primary | ICD-10-CM

## 2021-01-01 DIAGNOSIS — Z87.19 HISTORY OF PANCREATITIS: ICD-10-CM

## 2021-01-01 DIAGNOSIS — R73.9 HYPERGLYCEMIA: ICD-10-CM

## 2021-01-01 DIAGNOSIS — E78.5 HYPERLIPIDEMIA, UNSPECIFIED HYPERLIPIDEMIA TYPE: ICD-10-CM

## 2021-01-01 DIAGNOSIS — M48.061 SPINAL STENOSIS, LUMBAR REGION, WITHOUT NEUROGENIC CLAUDICATION: Primary | ICD-10-CM

## 2021-01-01 DIAGNOSIS — R05.9 COUGH: ICD-10-CM

## 2021-01-01 DIAGNOSIS — E78.2 MIXED HYPERLIPIDEMIA: ICD-10-CM

## 2021-01-01 DIAGNOSIS — Z23 NEED FOR VACCINATION: Primary | ICD-10-CM

## 2021-01-01 DIAGNOSIS — I10 BENIGN ESSENTIAL HTN: ICD-10-CM

## 2021-01-01 DIAGNOSIS — R06.02 SHORTNESS OF BREATH: ICD-10-CM

## 2021-01-01 DIAGNOSIS — R82.90 ABNORMAL URINALYSIS: ICD-10-CM

## 2021-01-01 DIAGNOSIS — G30.1 LATE ONSET ALZHEIMER'S DISEASE WITHOUT BEHAVIORAL DISTURBANCE (HCC): ICD-10-CM

## 2021-01-01 DIAGNOSIS — E78.5 DYSLIPIDEMIA: ICD-10-CM

## 2021-01-01 DIAGNOSIS — R53.1 WEAKNESS: ICD-10-CM

## 2021-01-01 DIAGNOSIS — I48.91 ATRIAL FIBRILLATION WITH RVR (HCC): Primary | ICD-10-CM

## 2021-01-01 DIAGNOSIS — I50.9 ACUTE ON CHRONIC CONGESTIVE HEART FAILURE, UNSPECIFIED HEART FAILURE TYPE (HCC): ICD-10-CM

## 2021-01-01 DIAGNOSIS — G31.84 MILD COGNITIVE IMPAIRMENT: ICD-10-CM

## 2021-01-01 DIAGNOSIS — N18.31 STAGE 3A CHRONIC KIDNEY DISEASE (HCC): ICD-10-CM

## 2021-01-01 DIAGNOSIS — D50.8 OTHER IRON DEFICIENCY ANEMIA: ICD-10-CM

## 2021-01-01 DIAGNOSIS — E86.0 MILD DEHYDRATION: ICD-10-CM

## 2021-01-01 DIAGNOSIS — F02.80 LATE ONSET ALZHEIMER'S DISEASE WITHOUT BEHAVIORAL DISTURBANCE (HCC): ICD-10-CM

## 2021-01-01 DIAGNOSIS — M47.22 OSTEOARTHRITIS OF SPINE WITH RADICULOPATHY, CERVICAL REGION: ICD-10-CM

## 2021-01-01 DIAGNOSIS — I10 ESSENTIAL (PRIMARY) HYPERTENSION: Primary | ICD-10-CM

## 2021-01-01 DIAGNOSIS — I10 ESSENTIAL HYPERTENSION, BENIGN: ICD-10-CM

## 2021-01-01 DIAGNOSIS — R53.81 PHYSICAL DECONDITIONING: Primary | ICD-10-CM

## 2021-01-01 DIAGNOSIS — I49.3 VENTRICULAR PREMATURE DEPOLARIZATION: ICD-10-CM

## 2021-01-01 DIAGNOSIS — R05.9 COUGH IN ADULT: Primary | ICD-10-CM

## 2021-01-01 DIAGNOSIS — R53.83 MALAISE AND FATIGUE: Primary | ICD-10-CM
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
## 2021-01-01 DIAGNOSIS — R53.81 PHYSICAL DECONDITIONING: ICD-10-CM

## 2021-01-01 DIAGNOSIS — M47.812 CERVICAL ARTHRITIS: ICD-10-CM

## 2021-01-01 DIAGNOSIS — M81.0 AGE-RELATED OSTEOPOROSIS WITHOUT CURRENT PATHOLOGICAL FRACTURE: ICD-10-CM

## 2021-01-01 DIAGNOSIS — I48.91 ATRIAL FIBRILLATION WITH RVR (HCC): ICD-10-CM

## 2021-01-01 DIAGNOSIS — F03.90 MILD DEMENTIA (HCC): ICD-10-CM

## 2021-01-01 DIAGNOSIS — M84.48XA SACRAL INSUFFICIENCY FRACTURE, INITIAL ENCOUNTER: ICD-10-CM

## 2021-01-01 DIAGNOSIS — M54.42 LOW BACK PAIN WITH LEFT-SIDED SCIATICA, UNSPECIFIED BACK PAIN LATERALITY, UNSPECIFIED CHRONICITY: Primary | ICD-10-CM

## 2021-01-01 DIAGNOSIS — R53.83 MALAISE AND FATIGUE: ICD-10-CM

## 2021-01-01 DIAGNOSIS — R29.898 WEAKNESS OF BOTH LOWER EXTREMITIES: Primary | ICD-10-CM

## 2021-01-01 DIAGNOSIS — S92.352D CLOSED DISPLACED FRACTURE OF FIFTH METATARSAL BONE OF LEFT FOOT WITH ROUTINE HEALING, SUBSEQUENT ENCOUNTER: ICD-10-CM

## 2021-01-01 DIAGNOSIS — R41.3 MEMORY LOSS: ICD-10-CM

## 2021-01-01 DIAGNOSIS — M25.552 LEFT HIP PAIN: ICD-10-CM

## 2021-01-01 DIAGNOSIS — F02.80 ALZHEIMER'S DISEASE (HCC): ICD-10-CM
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
## 2021-01-01 DIAGNOSIS — D50.9 IRON DEFICIENCY ANEMIA, UNSPECIFIED IRON DEFICIENCY ANEMIA TYPE: ICD-10-CM

## 2021-01-01 DIAGNOSIS — N30.00 ACUTE CYSTITIS WITHOUT HEMATURIA: Primary | ICD-10-CM

## 2021-01-01 DIAGNOSIS — R53.81 MALAISE AND FATIGUE: ICD-10-CM

## 2021-01-01 DIAGNOSIS — Z00.00 ROUTINE GENERAL MEDICAL EXAMINATION AT A HEALTH CARE FACILITY: Primary | ICD-10-CM

## 2021-01-01 DIAGNOSIS — S32.000A COMPRESSION FRACTURE OF LUMBAR VERTEBRA, INITIAL ENCOUNTER, UNSPECIFIED LUMBAR VERTEBRAL LEVEL: ICD-10-CM

## 2021-01-01 DIAGNOSIS — E86.0 DEHYDRATION: ICD-10-CM

## 2021-01-01 DIAGNOSIS — Z23 NEED FOR VACCINATION: ICD-10-CM

## 2021-01-01 DIAGNOSIS — G30.9 ALZHEIMER'S DISEASE (HCC): ICD-10-CM

## 2021-01-01 DIAGNOSIS — R74.01 TRANSAMINITIS: ICD-10-CM

## 2021-01-01 DIAGNOSIS — S70.02XA CONTUSION OF LEFT HIP, INITIAL ENCOUNTER: ICD-10-CM
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
## 2021-01-01 DIAGNOSIS — Z01.812 PRE-PROCEDURE LAB EXAM: ICD-10-CM

## 2021-01-01 LAB
ALBUMIN SERPL-MCNC: 2.6 G/DL (ref 3.4–5)
ALBUMIN SERPL-MCNC: 2.8 G/DL (ref 3.4–5)
ALBUMIN SERPL-MCNC: 2.9 G/DL (ref 3.4–5)
ALBUMIN SERPL-MCNC: 3.6 G/DL (ref 3.4–5)
ALBUMIN/GLOB SERPL: 0.7 {RATIO} (ref 1–2)
ALBUMIN/GLOB SERPL: 1.1 {RATIO} (ref 1–2)
ALP LIVER SERPL-CCNC: 102 U/L
ALP LIVER SERPL-CCNC: 151 U/L
ALP LIVER SERPL-CCNC: 80 U/L
ALP LIVER SERPL-CCNC: 94 U/L
ALT SERPL-CCNC: 15 U/L
ALT SERPL-CCNC: 16 U/L
ALT SERPL-CCNC: 16 U/L
ALT SERPL-CCNC: 19 U/L
ANION GAP SERPL CALC-SCNC: 5 MMOL/L (ref 0–18)
ANION GAP SERPL CALC-SCNC: 6 MMOL/L (ref 0–18)
ANION GAP SERPL CALC-SCNC: 7 MMOL/L (ref 0–18)
ANION GAP SERPL CALC-SCNC: 7 MMOL/L (ref 0–18)
ANION GAP SERPL CALC-SCNC: 8 MMOL/L (ref 0–18)
AST SERPL-CCNC: 12 U/L (ref 15–37)
AST SERPL-CCNC: 13 U/L (ref 15–37)
AST SERPL-CCNC: 14 U/L (ref 15–37)
AST SERPL-CCNC: 20 U/L (ref 15–37)
ATRIAL RATE: 131 BPM
ATRIAL RATE: 61 BPM
ATRIAL RATE: 84 BPM
BASOPHILS # BLD AUTO: 0.02 X10(3) UL (ref 0–0.2)
BASOPHILS # BLD AUTO: 0.04 X10(3) UL (ref 0–0.2)
BASOPHILS # BLD AUTO: 0.05 X10(3) UL (ref 0–0.2)
BASOPHILS NFR BLD AUTO: 0.3 %
BASOPHILS NFR BLD AUTO: 0.4 %
BASOPHILS NFR BLD AUTO: 0.7 %
BILIRUB SERPL-MCNC: 0.4 MG/DL (ref 0.1–2)
BILIRUB SERPL-MCNC: 0.5 MG/DL (ref 0.1–2)
BILIRUB SERPL-MCNC: 0.5 MG/DL (ref 0.1–2)
BILIRUB SERPL-MCNC: 0.6 MG/DL (ref 0.1–2)
BILIRUB UR QL STRIP.AUTO: NEGATIVE
BILIRUB UR QL STRIP.AUTO: NEGATIVE
BUN BLD-MCNC: 21 MG/DL (ref 7–18)
BUN BLD-MCNC: 26 MG/DL (ref 7–18)
BUN BLD-MCNC: 29 MG/DL (ref 7–18)
BUN BLD-MCNC: 31 MG/DL (ref 7–18)
BUN BLD-MCNC: 36 MG/DL (ref 7–18)
BUN/CREAT SERPL: 17.4 (ref 10–20)
BUN/CREAT SERPL: 22.9 (ref 10–20)
CALCIUM BLD-MCNC: 10.2 MG/DL (ref 8.5–10.1)
CALCIUM BLD-MCNC: 10.3 MG/DL (ref 8.5–10.1)
CALCIUM BLD-MCNC: 9.3 MG/DL (ref 8.5–10.1)
CALCIUM BLD-MCNC: 9.6 MG/DL (ref 8.5–10.1)
CALCIUM BLD-MCNC: 9.8 MG/DL (ref 8.5–10.1)
CHLORIDE SERPL-SCNC: 107 MMOL/L (ref 98–112)
CHLORIDE SERPL-SCNC: 107 MMOL/L (ref 98–112)
CHLORIDE SERPL-SCNC: 108 MMOL/L (ref 98–112)
CHLORIDE SERPL-SCNC: 109 MMOL/L (ref 98–112)
CHLORIDE SERPL-SCNC: 111 MMOL/L (ref 98–112)
CLARITY UR REFRACT.AUTO: CLEAR
CLARITY UR REFRACT.AUTO: CLEAR
CO2 SERPL-SCNC: 22 MMOL/L (ref 21–32)
CO2 SERPL-SCNC: 23 MMOL/L (ref 21–32)
CO2 SERPL-SCNC: 24 MMOL/L (ref 21–32)
CO2 SERPL-SCNC: 25 MMOL/L (ref 21–32)
CO2 SERPL-SCNC: 26 MMOL/L (ref 21–32)
COLOR UR AUTO: YELLOW
COLOR UR AUTO: YELLOW
CREAT BLD-MCNC: 1.03 MG/DL
CREAT BLD-MCNC: 1.18 MG/DL
CREAT BLD-MCNC: 1.21 MG/DL
CREAT BLD-MCNC: 1.23 MG/DL
CREAT BLD-MCNC: 1.57 MG/DL
DEPRECATED RDW RBC AUTO: 43.8 FL (ref 35.1–46.3)
EOSINOPHIL # BLD AUTO: 0.2 X10(3) UL (ref 0–0.7)
EOSINOPHIL # BLD AUTO: 0.24 X10(3) UL (ref 0–0.7)
EOSINOPHIL # BLD AUTO: 0.26 X10(3) UL (ref 0–0.7)
EOSINOPHIL NFR BLD AUTO: 2.3 %
EOSINOPHIL NFR BLD AUTO: 2.9 %
EOSINOPHIL NFR BLD AUTO: 3.6 %
ERYTHROCYTE [DISTWIDTH] IN BLOOD BY AUTOMATED COUNT: 13.3 % (ref 11–15)
ERYTHROCYTE [DISTWIDTH] IN BLOOD BY AUTOMATED COUNT: 15.2 %
ERYTHROCYTE [DISTWIDTH] IN BLOOD BY AUTOMATED COUNT: 15.5 %
ERYTHROCYTE [DISTWIDTH] IN BLOOD BY AUTOMATED COUNT: 15.6 %
GLOBULIN PLAS-MCNC: 3.2 G/DL (ref 2.8–4.4)
GLOBULIN PLAS-MCNC: 3.5 G/DL (ref 2.8–4.4)
GLOBULIN PLAS-MCNC: 3.9 G/DL (ref 2.8–4.4)
GLOBULIN PLAS-MCNC: 4.3 G/DL (ref 2.8–4.4)
GLUCOSE BLD-MCNC: 107 MG/DL (ref 70–99)
GLUCOSE BLD-MCNC: 118 MG/DL (ref 70–99)
GLUCOSE BLD-MCNC: 84 MG/DL (ref 70–99)
GLUCOSE BLD-MCNC: 93 MG/DL (ref 70–99)
GLUCOSE BLD-MCNC: 99 MG/DL (ref 70–99)
GLUCOSE UR STRIP.AUTO-MCNC: NEGATIVE MG/DL
GLUCOSE UR STRIP.AUTO-MCNC: NEGATIVE MG/DL
HCT VFR BLD AUTO: 25.6 %
HCT VFR BLD AUTO: 27.4 %
HCT VFR BLD AUTO: 35.9 %
HCT VFR BLD AUTO: 37.6 %
HGB BLD-MCNC: 11.7 G/DL
HGB BLD-MCNC: 12.4 G/DL
HGB BLD-MCNC: 8.1 G/DL
HGB BLD-MCNC: 8.1 G/DL
IMM GRANULOCYTES # BLD AUTO: 0.01 X10(3) UL (ref 0–1)
IMM GRANULOCYTES # BLD AUTO: 0.02 X10(3) UL (ref 0–1)
IMM GRANULOCYTES # BLD AUTO: 0.05 X10(3) UL (ref 0–1)
IMM GRANULOCYTES NFR BLD: 0.1 %
IMM GRANULOCYTES NFR BLD: 0.3 %
IMM GRANULOCYTES NFR BLD: 0.5 %
KETONES UR STRIP.AUTO-MCNC: 15 MG/DL
LEUKOCYTE ESTERASE UR QL STRIP.AUTO: NEGATIVE
LYMPHOCYTES # BLD AUTO: 0.25 X10(3) UL (ref 1–4)
LYMPHOCYTES # BLD AUTO: 0.54 X10(3) UL (ref 1–4)
LYMPHOCYTES # BLD AUTO: 0.6 X10(3) UL (ref 1–4)
LYMPHOCYTES NFR BLD AUTO: 3.6 %
LYMPHOCYTES NFR BLD AUTO: 5.8 %
LYMPHOCYTES NFR BLD AUTO: 7.5 %
M PROTEIN MFR SERPL ELPH: 6.7 G/DL (ref 6.4–8.2)
M PROTEIN MFR SERPL ELPH: 7.2 G/DL (ref 6.4–8.2)
MCH RBC QN AUTO: 28.2 PG (ref 26–34)
MCH RBC QN AUTO: 28.7 PG (ref 26–34)
MCH RBC QN AUTO: 29.5 PG (ref 26–34)
MCH RBC QN AUTO: 30.1 PG (ref 26–34)
MCHC RBC AUTO-ENTMCNC: 29.6 G/DL (ref 31–37)
MCHC RBC AUTO-ENTMCNC: 31.6 G/DL (ref 31–37)
MCHC RBC AUTO-ENTMCNC: 32.6 G/DL (ref 31–37)
MCHC RBC AUTO-ENTMCNC: 33 G/DL (ref 31–37)
MCV RBC AUTO: 89.3 FL
MCV RBC AUTO: 90.8 FL
MCV RBC AUTO: 92.3 FL
MCV RBC AUTO: 95.5 FL
MONOCYTES # BLD AUTO: 0.41 X10(3) UL (ref 0.1–1)
MONOCYTES # BLD AUTO: 0.47 X10(3) UL (ref 0.1–1)
MONOCYTES # BLD AUTO: 0.6 X10(3) UL (ref 0.1–1)
MONOCYTES NFR BLD AUTO: 5.8 %
MONOCYTES NFR BLD AUTO: 6 %
MONOCYTES NFR BLD AUTO: 6.5 %
NEUTROPHILS # BLD AUTO: 5.91 X10 (3) UL (ref 1.5–7.7)
NEUTROPHILS # BLD AUTO: 5.91 X10(3) UL (ref 1.5–7.7)
NEUTROPHILS # BLD AUTO: 5.96 X10 (3) UL (ref 1.5–7.7)
NEUTROPHILS # BLD AUTO: 5.96 X10(3) UL (ref 1.5–7.7)
NEUTROPHILS # BLD AUTO: 8.86 X10 (3) UL (ref 1.5–7.7)
NEUTROPHILS # BLD AUTO: 8.86 X10(3) UL (ref 1.5–7.7)
NEUTROPHILS NFR BLD AUTO: 81.6 %
NEUTROPHILS NFR BLD AUTO: 85.2 %
NEUTROPHILS NFR BLD AUTO: 86.9 %
NITRITE UR QL STRIP.AUTO: POSITIVE
NITRITE UR QL STRIP.AUTO: POSITIVE
NT-PROBNP SERPL-MCNC: 6193 PG/ML (ref ?–450)
OSMOLALITY SERPL CALC.SUM OF ELEC: 289 MOSM/KG (ref 275–295)
OSMOLALITY SERPL CALC.SUM OF ELEC: 293 MOSM/KG (ref 275–295)
OSMOLALITY SERPL CALC.SUM OF ELEC: 295 MOSM/KG (ref 275–295)
OSMOLALITY SERPL CALC.SUM OF ELEC: 296 MOSM/KG (ref 275–295)
OSMOLALITY SERPL CALC.SUM OF ELEC: 297 MOSM/KG (ref 275–295)
P AXIS: 85 DEGREES
P AXIS: 93 DEGREES
P-R INTERVAL: 158 MS
P-R INTERVAL: 172 MS
PATIENT FASTING Y/N/NP: NO
PH UR STRIP.AUTO: 5.5 [PH] (ref 5–8)
PH UR STRIP.AUTO: 5.5 [PH] (ref 5–8)
PLATELET # BLD AUTO: 248 10(3)UL (ref 150–450)
PLATELET # BLD AUTO: 312 10(3)UL (ref 150–450)
PLATELET # BLD AUTO: 363 10(3)UL (ref 150–450)
PLATELET # BLD AUTO: 389 10(3)UL (ref 150–450)
POTASSIUM SERPL-SCNC: 3.6 MMOL/L (ref 3.5–5.1)
POTASSIUM SERPL-SCNC: 3.9 MMOL/L (ref 3.5–5.1)
POTASSIUM SERPL-SCNC: 3.9 MMOL/L (ref 3.5–5.1)
POTASSIUM SERPL-SCNC: 4.1 MMOL/L (ref 3.5–5.1)
POTASSIUM SERPL-SCNC: 4.7 MMOL/L (ref 3.5–5.1)
PROT SERPL-MCNC: 6.1 G/DL (ref 6.4–8.2)
PROT SERPL-MCNC: 6.8 G/DL (ref 6.4–8.2)
Q-T INTERVAL: 308 MS
Q-T INTERVAL: 372 MS
Q-T INTERVAL: 406 MS
QRS DURATION: 76 MS
QRS DURATION: 80 MS
QRS DURATION: 84 MS
QTC CALCULATION (BEZET): 408 MS
QTC CALCULATION (BEZET): 439 MS
QTC CALCULATION (BEZET): 456 MS
R AXIS: 54 DEGREES
R AXIS: 59 DEGREES
R AXIS: 69 DEGREES
RBC # BLD AUTO: 2.82 X10(6)UL
RBC # BLD AUTO: 2.87 X10(6)UL
RBC # BLD AUTO: 3.89 X10(6)UL
RBC # BLD AUTO: 4.21 X10(6)UL
RBC UR QL AUTO: NEGATIVE
RBC UR QL AUTO: NEGATIVE
SARS-COV-2 RNA RESP QL NAA+PROBE: NOT DETECTED
SARS-COV-2 RNA RESP QL NAA+PROBE: NOT DETECTED
SODIUM SERPL-SCNC: 135 MMOL/L (ref 136–145)
SODIUM SERPL-SCNC: 139 MMOL/L (ref 136–145)
SODIUM SERPL-SCNC: 139 MMOL/L (ref 136–145)
SODIUM SERPL-SCNC: 140 MMOL/L (ref 136–145)
SODIUM SERPL-SCNC: 142 MMOL/L (ref 136–145)
SP GR UR STRIP.AUTO: 1.02 (ref 1–1.03)
SP GR UR STRIP.AUTO: >=1.03 (ref 1–1.03)
T AXIS: 60 DEGREES
T AXIS: 63 DEGREES
T AXIS: 66 DEGREES
T4 FREE SERPL-MCNC: 1.1 NG/DL (ref 0.8–1.7)
TROPONIN I HIGH SENSITIVITY: 10 NG/L
TROPONIN I SERPL-MCNC: <0.045 NG/ML (ref ?–0.04)
TROPONIN I SERPL-MCNC: <0.045 NG/ML (ref ?–0.04)
TSI SER-ACNC: 0.41 MIU/ML (ref 0.36–3.74)
TSI SER-ACNC: 0.46 MIU/ML (ref 0.36–3.74)
UROBILINOGEN UR STRIP.AUTO-MCNC: 0.2 MG/DL
UROBILINOGEN UR STRIP.AUTO-MCNC: 0.2 MG/DL
VENTRICULAR RATE: 132 BPM
VENTRICULAR RATE: 61 BPM
VENTRICULAR RATE: 84 BPM
WBC # BLD AUTO: 10.4 X10(3) UL (ref 4–11)
WBC # BLD AUTO: 6.9 X10(3) UL (ref 4–11)
WBC # BLD AUTO: 7.2 X10(3) UL (ref 4–11)
WBC # BLD AUTO: 7.8 X10(3) UL (ref 4–11)

## 2021-01-01 PROCEDURE — 99223 1ST HOSP IP/OBS HIGH 75: CPT | Performed by: HOSPITALIST

## 2021-01-01 PROCEDURE — 0002A SARSCOV2 VAC 30MCG/0.3ML IM: CPT

## 2021-01-01 PROCEDURE — 99308 SBSQ NF CARE LOW MDM 20: CPT | Performed by: NURSE PRACTITIONER

## 2021-01-01 PROCEDURE — 99283 EMERGENCY DEPT VISIT LOW MDM: CPT

## 2021-01-01 PROCEDURE — 99213 OFFICE O/P EST LOW 20 MIN: CPT | Performed by: OTHER

## 2021-01-01 PROCEDURE — 81001 URINALYSIS AUTO W/SCOPE: CPT | Performed by: EMERGENCY MEDICINE

## 2021-01-01 PROCEDURE — 84484 ASSAY OF TROPONIN QUANT: CPT | Performed by: EMERGENCY MEDICINE

## 2021-01-01 PROCEDURE — 73700 CT LOWER EXTREMITY W/O DYE: CPT | Performed by: EMERGENCY MEDICINE

## 2021-01-01 PROCEDURE — 96365 THER/PROPH/DIAG IV INF INIT: CPT

## 2021-01-01 PROCEDURE — 93005 ELECTROCARDIOGRAM TRACING: CPT

## 2021-01-01 PROCEDURE — 99285 EMERGENCY DEPT VISIT HI MDM: CPT

## 2021-01-01 PROCEDURE — 99232 SBSQ HOSP IP/OBS MODERATE 35: CPT | Performed by: INTERNAL MEDICINE

## 2021-01-01 PROCEDURE — 80053 COMPREHEN METABOLIC PANEL: CPT | Performed by: EMERGENCY MEDICINE

## 2021-01-01 PROCEDURE — 99212 OFFICE O/P EST SF 10 MIN: CPT | Performed by: INTERNAL MEDICINE

## 2021-01-01 PROCEDURE — 72131 CT LUMBAR SPINE W/O DYE: CPT | Performed by: EMERGENCY MEDICINE

## 2021-01-01 PROCEDURE — 36415 COLL VENOUS BLD VENIPUNCTURE: CPT

## 2021-01-01 PROCEDURE — 85025 COMPLETE CBC W/AUTO DIFF WBC: CPT | Performed by: EMERGENCY MEDICINE

## 2021-01-01 PROCEDURE — 76377 3D RENDER W/INTRP POSTPROCES: CPT | Performed by: EMERGENCY MEDICINE

## 2021-01-01 PROCEDURE — 99306 1ST NF CARE HIGH MDM 50: CPT | Performed by: FAMILY MEDICINE

## 2021-01-01 PROCEDURE — 93010 ELECTROCARDIOGRAM REPORT: CPT

## 2021-01-01 PROCEDURE — 99490 CHRNC CARE MGMT STAFF 1ST 20: CPT

## 2021-01-01 PROCEDURE — 72110 X-RAY EXAM L-2 SPINE 4/>VWS: CPT | Performed by: PHYSICIAN ASSISTANT

## 2021-01-01 PROCEDURE — 99284 EMERGENCY DEPT VISIT MOD MDM: CPT

## 2021-01-01 PROCEDURE — 85610 PROTHROMBIN TIME: CPT | Performed by: EMERGENCY MEDICINE

## 2021-01-01 PROCEDURE — 1111F DSCHRG MED/CURRENT MED MERGE: CPT | Performed by: FAMILY MEDICINE

## 2021-01-01 PROCEDURE — 99214 OFFICE O/P EST MOD 30 MIN: CPT | Performed by: INTERNAL MEDICINE

## 2021-01-01 PROCEDURE — 99309 SBSQ NF CARE MODERATE MDM 30: CPT | Performed by: NURSE PRACTITIONER

## 2021-01-01 PROCEDURE — 80053 COMPREHEN METABOLIC PANEL: CPT

## 2021-01-01 PROCEDURE — 99310 SBSQ NF CARE HIGH MDM 45: CPT | Performed by: NURSE PRACTITIONER

## 2021-01-01 PROCEDURE — 96360 HYDRATION IV INFUSION INIT: CPT

## 2021-01-01 PROCEDURE — 1111F DSCHRG MED/CURRENT MED MERGE: CPT | Performed by: INTERNAL MEDICINE

## 2021-01-01 PROCEDURE — 73502 X-RAY EXAM HIP UNI 2-3 VIEWS: CPT | Performed by: PHYSICIAN ASSISTANT

## 2021-01-01 PROCEDURE — 93306 TTE W/DOPPLER COMPLETE: CPT | Performed by: HOSPITALIST

## 2021-01-01 PROCEDURE — 73630 X-RAY EXAM OF FOOT: CPT | Performed by: PODIATRIST

## 2021-01-01 PROCEDURE — 93306 TTE W/DOPPLER COMPLETE: CPT | Performed by: INTERNAL MEDICINE

## 2021-01-01 PROCEDURE — 71045 X-RAY EXAM CHEST 1 VIEW: CPT | Performed by: EMERGENCY MEDICINE

## 2021-01-01 PROCEDURE — 84443 ASSAY THYROID STIM HORMONE: CPT

## 2021-01-01 PROCEDURE — 99239 HOSP IP/OBS DSCHRG MGMT >30: CPT | Performed by: HOSPITALIST

## 2021-01-01 PROCEDURE — 0001A SARSCOV2 VAC 30MCG/0.3ML IM: CPT

## 2021-01-01 PROCEDURE — 96374 THER/PROPH/DIAG INJ IV PUSH: CPT

## 2021-01-01 PROCEDURE — 72195 MRI PELVIS W/O DYE: CPT | Performed by: EMERGENCY MEDICINE

## 2021-01-01 PROCEDURE — 85730 THROMBOPLASTIN TIME PARTIAL: CPT | Performed by: EMERGENCY MEDICINE

## 2021-01-01 PROCEDURE — 76376 3D RENDER W/INTRP POSTPROCES: CPT | Performed by: INTERNAL MEDICINE

## 2021-01-01 PROCEDURE — 71046 X-RAY EXAM CHEST 2 VIEWS: CPT | Performed by: NURSE PRACTITIONER

## 2021-01-01 PROCEDURE — 72148 MRI LUMBAR SPINE W/O DYE: CPT | Performed by: EMERGENCY MEDICINE

## 2021-01-01 PROCEDURE — 96361 HYDRATE IV INFUSION ADD-ON: CPT

## 2021-01-01 PROCEDURE — 84439 ASSAY OF FREE THYROXINE: CPT

## 2021-01-01 RX ORDER — CEPHALEXIN 250 MG/1
250 CAPSULE ORAL ONCE
Status: COMPLETED | OUTPATIENT
Start: 2021-01-01 | End: 2021-01-01

## 2021-01-01 RX ORDER — DONEPEZIL HYDROCHLORIDE 10 MG/1
10 TABLET, FILM COATED ORAL NIGHTLY
Status: DISCONTINUED | OUTPATIENT
Start: 2021-01-01 | End: 2021-01-01

## 2021-01-01 RX ORDER — MEMANTINE HYDROCHLORIDE 10 MG/1
TABLET ORAL
Qty: 180 TABLET | Refills: 0 | Status: SHIPPED | OUTPATIENT
Start: 2021-01-01 | End: 2022-01-01

## 2021-01-01 RX ORDER — ENOXAPARIN SODIUM 100 MG/ML
40 INJECTION SUBCUTANEOUS NIGHTLY
Status: DISCONTINUED | OUTPATIENT
Start: 2021-01-01 | End: 2021-01-01

## 2021-01-01 RX ORDER — METOCLOPRAMIDE HYDROCHLORIDE 5 MG/ML
5 INJECTION INTRAMUSCULAR; INTRAVENOUS EVERY 8 HOURS PRN
Status: DISCONTINUED | OUTPATIENT
Start: 2021-01-01 | End: 2021-01-01

## 2021-01-01 RX ORDER — HYDROCHLOROTHIAZIDE 12.5 MG/1
TABLET ORAL
Qty: 90 TABLET | Refills: 1 | OUTPATIENT
Start: 2021-01-01

## 2021-01-01 RX ORDER — CEPHALEXIN 500 MG/1
500 CAPSULE ORAL 3 TIMES DAILY
Qty: 21 CAPSULE | Refills: 0 | Status: SHIPPED | OUTPATIENT
Start: 2021-01-01 | End: 2021-01-01

## 2021-01-01 RX ORDER — SERTRALINE HYDROCHLORIDE 25 MG/1
TABLET, FILM COATED ORAL
Qty: 90 TABLET | Refills: 0 | Status: SHIPPED | OUTPATIENT
Start: 2021-01-01 | End: 2022-01-01

## 2021-01-01 RX ORDER — ACETAMINOPHEN 325 MG/1
650 TABLET ORAL EVERY 6 HOURS PRN
Status: DISCONTINUED | OUTPATIENT
Start: 2021-01-01 | End: 2021-01-01

## 2021-01-01 RX ORDER — CEPHALEXIN 500 MG/1
500 CAPSULE ORAL 4 TIMES DAILY
Qty: 40 CAPSULE | Refills: 0 | Status: SHIPPED | OUTPATIENT
Start: 2021-01-01 | End: 2021-01-01

## 2021-01-01 RX ORDER — DONEPEZIL HYDROCHLORIDE 5 MG/1
10 TABLET, FILM COATED ORAL NIGHTLY
Status: DISCONTINUED | OUTPATIENT
Start: 2021-01-01 | End: 2021-01-01

## 2021-01-01 RX ORDER — SODIUM CHLORIDE 9 MG/ML
125 INJECTION, SOLUTION INTRAVENOUS CONTINUOUS
Status: DISCONTINUED | OUTPATIENT
Start: 2021-01-01 | End: 2021-01-01

## 2021-01-01 RX ORDER — MORPHINE SULFATE 2 MG/ML
2 INJECTION, SOLUTION INTRAMUSCULAR; INTRAVENOUS ONCE
Status: COMPLETED | OUTPATIENT
Start: 2021-01-01 | End: 2021-01-01

## 2021-01-01 RX ORDER — TRAMADOL HYDROCHLORIDE 50 MG/1
50 TABLET ORAL EVERY 8 HOURS PRN
Qty: 10 TABLET | Refills: 1 | Status: SHIPPED | OUTPATIENT
Start: 2021-01-01 | End: 2021-01-01

## 2021-01-01 RX ORDER — DONEPEZIL HYDROCHLORIDE 10 MG/1
TABLET, FILM COATED ORAL
Qty: 90 TABLET | Refills: 0 | Status: SHIPPED | OUTPATIENT
Start: 2021-01-01 | End: 2022-01-01

## 2021-01-01 RX ORDER — MEMANTINE HYDROCHLORIDE 10 MG/1
10 TABLET ORAL 2 TIMES DAILY
Qty: 180 TABLET | Refills: 1 | Status: SHIPPED | OUTPATIENT
Start: 2021-01-01 | End: 2021-01-01

## 2021-01-01 RX ORDER — SODIUM CHLORIDE, SODIUM LACTATE, POTASSIUM CHLORIDE, CALCIUM CHLORIDE 600; 310; 30; 20 MG/100ML; MG/100ML; MG/100ML; MG/100ML
INJECTION, SOLUTION INTRAVENOUS CONTINUOUS
Status: ACTIVE | OUTPATIENT
Start: 2021-01-01 | End: 2021-01-01

## 2021-01-01 RX ORDER — ALENDRONATE SODIUM 70 MG/1
TABLET ORAL
COMMUNITY
Start: 2021-01-01

## 2021-01-01 RX ORDER — TRAMADOL HYDROCHLORIDE 50 MG/1
50 TABLET ORAL EVERY 8 HOURS PRN
Qty: 60 TABLET | Refills: 1 | Status: SHIPPED | OUTPATIENT
Start: 2021-01-01 | End: 2021-01-01

## 2021-01-01 RX ORDER — DONEPEZIL HYDROCHLORIDE 10 MG/1
10 TABLET, FILM COATED ORAL NIGHTLY
Qty: 90 TABLET | Refills: 1 | Status: SHIPPED | OUTPATIENT
Start: 2021-01-01 | End: 2021-01-01

## 2021-01-01 RX ORDER — ALBUTEROL SULFATE 90 UG/1
2 AEROSOL, METERED RESPIRATORY (INHALATION) EVERY 4 HOURS PRN
Status: DISCONTINUED | OUTPATIENT
Start: 2021-01-01 | End: 2021-01-01

## 2021-01-01 RX ORDER — DILTIAZEM HYDROCHLORIDE 5 MG/ML
10 INJECTION INTRAVENOUS ONCE
Status: DISCONTINUED | OUTPATIENT
Start: 2021-01-01 | End: 2021-01-01

## 2021-01-01 RX ORDER — PRAVASTATIN SODIUM 20 MG
20 TABLET ORAL NIGHTLY
Refills: 1 | Status: DISCONTINUED | OUTPATIENT
Start: 2021-01-01 | End: 2021-01-01

## 2021-01-01 RX ORDER — ASPIRIN 81 MG/1
81 TABLET, CHEWABLE ORAL DAILY
Status: DISCONTINUED | OUTPATIENT
Start: 2021-01-01 | End: 2021-01-01

## 2021-01-01 RX ORDER — GARLIC EXTRACT 500 MG
1 CAPSULE ORAL DAILY
Status: DISCONTINUED | OUTPATIENT
Start: 2021-01-01 | End: 2021-01-01

## 2021-01-01 RX ORDER — SERTRALINE HYDROCHLORIDE 25 MG/1
25 TABLET, FILM COATED ORAL DAILY
Status: DISCONTINUED | OUTPATIENT
Start: 2021-01-01 | End: 2021-01-01

## 2021-01-01 RX ORDER — ACETAMINOPHEN 325 MG/1
650 TABLET ORAL EVERY 6 HOURS PRN
COMMUNITY
End: 2022-01-01

## 2021-01-01 RX ORDER — SERTRALINE HYDROCHLORIDE 25 MG/1
25 TABLET, FILM COATED ORAL DAILY
COMMUNITY

## 2021-01-01 RX ORDER — HYDROCODONE BITARTRATE AND ACETAMINOPHEN 5; 325 MG/1; MG/1
1-2 TABLET ORAL EVERY 6 HOURS PRN
Qty: 15 TABLET | Refills: 0 | Status: SHIPPED | OUTPATIENT
Start: 2021-01-01 | End: 2021-01-01

## 2021-01-01 RX ORDER — PRAVASTATIN SODIUM 20 MG
20 TABLET ORAL NIGHTLY
Status: DISCONTINUED | OUTPATIENT
Start: 2021-01-01 | End: 2021-01-01

## 2021-01-01 RX ORDER — ONDANSETRON 2 MG/ML
4 INJECTION INTRAMUSCULAR; INTRAVENOUS EVERY 4 HOURS PRN
Status: DISCONTINUED | OUTPATIENT
Start: 2021-01-01 | End: 2021-01-01 | Stop reason: ALTCHOICE

## 2021-01-01 RX ORDER — MELATONIN
3 NIGHTLY PRN
Status: DISCONTINUED | OUTPATIENT
Start: 2021-01-01 | End: 2021-01-01

## 2021-01-01 RX ORDER — FLUTICASONE PROPIONATE 50 MCG
2 SPRAY, SUSPENSION (ML) NASAL DAILY
Status: DISCONTINUED | OUTPATIENT
Start: 2021-01-01 | End: 2021-01-01

## 2021-01-01 RX ORDER — HYDROCHLOROTHIAZIDE 12.5 MG/1
TABLET ORAL
Qty: 90 TABLET | Refills: 1 | Status: ON HOLD | OUTPATIENT
Start: 2021-01-01 | End: 2021-01-01

## 2021-01-01 RX ORDER — SODIUM CHLORIDE 9 MG/ML
1000 INJECTION, SOLUTION INTRAVENOUS ONCE
Status: DISCONTINUED | OUTPATIENT
Start: 2021-01-01 | End: 2021-01-01

## 2021-01-01 RX ORDER — MEMANTINE HYDROCHLORIDE 10 MG/1
10 TABLET ORAL 2 TIMES DAILY
Status: DISCONTINUED | OUTPATIENT
Start: 2021-01-01 | End: 2021-01-01

## 2021-01-01 RX ORDER — CEPHALEXIN 500 MG/1
500 CAPSULE ORAL 2 TIMES DAILY
Qty: 20 CAPSULE | Refills: 0 | COMMUNITY
Start: 2021-01-01 | End: 2021-01-01

## 2021-01-01 RX ORDER — MEMANTINE HYDROCHLORIDE 10 MG/1
10 TABLET ORAL 2 TIMES DAILY
COMMUNITY
Start: 2021-01-19

## 2021-01-01 RX ORDER — LOSARTAN POTASSIUM 50 MG/1
TABLET ORAL
Qty: 90 TABLET | Refills: 1 | Status: SHIPPED | OUTPATIENT
Start: 2021-01-01 | End: 2021-01-01

## 2021-01-01 RX ORDER — METOPROLOL TARTRATE 5 MG/5ML
5 INJECTION INTRAVENOUS ONCE
Status: COMPLETED | OUTPATIENT
Start: 2021-01-01 | End: 2021-01-01

## 2021-01-01 RX ORDER — SODIUM CHLORIDE, SODIUM LACTATE, POTASSIUM CHLORIDE, CALCIUM CHLORIDE 600; 310; 30; 20 MG/100ML; MG/100ML; MG/100ML; MG/100ML
INJECTION, SOLUTION INTRAVENOUS CONTINUOUS
Status: DISCONTINUED | OUTPATIENT
Start: 2021-01-01 | End: 2021-01-01

## 2021-01-01 RX ORDER — FUROSEMIDE 10 MG/ML
20 INJECTION INTRAMUSCULAR; INTRAVENOUS ONCE
Status: COMPLETED | OUTPATIENT
Start: 2021-01-01 | End: 2021-01-01

## 2021-01-01 RX ORDER — SODIUM CHLORIDE 9 MG/ML
INJECTION, SOLUTION INTRAVENOUS ONCE
Status: COMPLETED | OUTPATIENT
Start: 2021-01-01 | End: 2021-01-01

## 2021-01-01 RX ORDER — MEMANTINE HYDROCHLORIDE 10 MG/1
TABLET ORAL
Qty: 180 TABLET | Refills: 0 | Status: SHIPPED | OUTPATIENT
Start: 2021-01-01 | End: 2021-01-01

## 2021-01-01 RX ORDER — ONDANSETRON 2 MG/ML
4 INJECTION INTRAMUSCULAR; INTRAVENOUS EVERY 6 HOURS PRN
Status: DISCONTINUED | OUTPATIENT
Start: 2021-01-01 | End: 2021-01-01

## 2021-01-01 RX ORDER — FLUTICASONE PROPIONATE 50 MCG
SPRAY, SUSPENSION (ML) NASAL
COMMUNITY
Start: 2021-01-01

## 2021-01-01 RX ORDER — DILTIAZEM HYDROCHLORIDE 300 MG/1
300 CAPSULE, COATED, EXTENDED RELEASE ORAL DAILY
Qty: 30 CAPSULE | Refills: 1 | Status: SHIPPED | OUTPATIENT
Start: 2021-01-01 | End: 2022-01-01

## 2021-01-01 RX ORDER — SIMVASTATIN 10 MG
TABLET ORAL
Qty: 90 TABLET | Refills: 1 | Status: SHIPPED | OUTPATIENT
Start: 2021-01-01 | End: 2022-01-01

## 2021-01-01 RX ORDER — SIMVASTATIN 10 MG
TABLET ORAL
Qty: 90 TABLET | Refills: 1 | Status: SHIPPED | OUTPATIENT
Start: 2021-01-01 | End: 2021-01-01

## 2021-01-01 RX ORDER — ALENDRONATE SODIUM 70 MG/1
TABLET ORAL
Qty: 12 TABLET | Refills: 1 | Status: SHIPPED | OUTPATIENT
Start: 2021-01-01 | End: 2022-01-01

## 2021-01-01 RX ORDER — LOSARTAN POTASSIUM 50 MG/1
TABLET ORAL
Qty: 90 TABLET | Refills: 1 | Status: CANCELLED | OUTPATIENT
Start: 2021-01-01

## 2021-01-01 RX ORDER — MEMANTINE HYDROCHLORIDE 5 MG/1
10 TABLET ORAL 2 TIMES DAILY
Qty: 120 TABLET | Refills: 0 | OUTPATIENT
Start: 2021-01-01

## 2021-01-01 RX ORDER — TRAMADOL HYDROCHLORIDE 50 MG/1
50 TABLET ORAL EVERY 12 HOURS PRN
Status: DISCONTINUED | OUTPATIENT
Start: 2021-01-01 | End: 2021-01-01

## 2021-01-01 RX ORDER — SERTRALINE HYDROCHLORIDE 25 MG/1
TABLET, FILM COATED ORAL
Qty: 90 TABLET | Refills: 0 | Status: SHIPPED | OUTPATIENT
Start: 2021-01-01 | End: 2021-01-01

## 2021-01-01 RX ORDER — SODIUM CHLORIDE 9 MG/ML
INJECTION, SOLUTION INTRAVENOUS CONTINUOUS
Status: DISCONTINUED | OUTPATIENT
Start: 2021-01-01 | End: 2021-01-01

## 2021-01-01 RX ORDER — DILTIAZEM HYDROCHLORIDE 300 MG/1
300 CAPSULE, COATED, EXTENDED RELEASE ORAL DAILY
Qty: 30 CAPSULE | Refills: 1 | Status: SHIPPED | OUTPATIENT
Start: 2021-01-01 | End: 2021-01-01

## 2021-01-01 RX ORDER — DILTIAZEM HYDROCHLORIDE 300 MG/1
CAPSULE, COATED, EXTENDED RELEASE ORAL
Qty: 90 CAPSULE | Refills: 1 | Status: ON HOLD | OUTPATIENT
Start: 2021-01-01 | End: 2021-01-01

## 2021-01-01 RX ORDER — DONEPEZIL HYDROCHLORIDE 10 MG/1
TABLET, FILM COATED ORAL
Qty: 90 TABLET | Refills: 0 | Status: SHIPPED | OUTPATIENT
Start: 2021-01-01 | End: 2021-01-01

## 2021-01-01 RX ORDER — POLYETHYLENE GLYCOL 3350 17 G/17G
17 POWDER, FOR SOLUTION ORAL DAILY PRN
COMMUNITY
End: 2021-01-01

## 2021-01-01 SDOH — HEALTH STABILITY: PHYSICAL HEALTH: ON AVERAGE, HOW MANY DAYS PER WEEK DO YOU ENGAGE IN MODERATE TO STRENUOUS EXERCISE (LIKE A BRISK WALK)?: 3 DAYS

## 2021-01-01 SDOH — HEALTH STABILITY: PHYSICAL HEALTH: ON AVERAGE, HOW MANY DAYS PER WEEK DO YOU ENGAGE IN MODERATE TO STRENUOUS EXERCISE (LIKE A BRISK WALK)?: 0 DAYS

## 2021-01-01 SDOH — HEALTH STABILITY: PHYSICAL HEALTH: ON AVERAGE, HOW MANY MINUTES DO YOU ENGAGE IN EXERCISE AT THIS LEVEL?: 20 MIN

## 2021-01-01 ASSESSMENT — PATIENT HEALTH QUESTIONNAIRE - PHQ9
1. LITTLE INTEREST OR PLEASURE IN DOING THINGS: NOT AT ALL
2. FEELING DOWN, DEPRESSED OR HOPELESS: NOT AT ALL
CLINICAL INTERPRETATION OF PHQ2 SCORE: NO FURTHER SCREENING NEEDED
SUM OF ALL RESPONSES TO PHQ9 QUESTIONS 1 AND 2: 0
1. LITTLE INTEREST OR PLEASURE IN DOING THINGS: NOT AT ALL
1. LITTLE INTEREST OR PLEASURE IN DOING THINGS: NOT AT ALL
SUM OF ALL RESPONSES TO PHQ9 QUESTIONS 1 AND 2: 0
2. FEELING DOWN, DEPRESSED OR HOPELESS: NOT AT ALL
1. LITTLE INTEREST OR PLEASURE IN DOING THINGS: NOT AT ALL
2. FEELING DOWN, DEPRESSED OR HOPELESS: NOT AT ALL
CLINICAL INTERPRETATION OF PHQ2 SCORE: NO FURTHER SCREENING NEEDED
CLINICAL INTERPRETATION OF PHQ2 SCORE: NO FURTHER SCREENING NEEDED
SUM OF ALL RESPONSES TO PHQ9 QUESTIONS 1 AND 2: 0
CLINICAL INTERPRETATION OF PHQ2 SCORE: NO FURTHER SCREENING NEEDED
1. LITTLE INTEREST OR PLEASURE IN DOING THINGS: NOT AT ALL
SUM OF ALL RESPONSES TO PHQ9 QUESTIONS 1 AND 2: 0
2. FEELING DOWN, DEPRESSED OR HOPELESS: NOT AT ALL
SUM OF ALL RESPONSES TO PHQ9 QUESTIONS 1 AND 2: 0
CLINICAL INTERPRETATION OF PHQ9 SCORE: NO FURTHER SCREENING NEEDED
CLINICAL INTERPRETATION OF PHQ2 SCORE: NO FURTHER SCREENING NEEDED
CLINICAL INTERPRETATION OF PHQ9 SCORE: NO FURTHER SCREENING NEEDED
SUM OF ALL RESPONSES TO PHQ9 QUESTIONS 1 AND 2: 0
CLINICAL INTERPRETATION OF PHQ9 SCORE: NO FURTHER SCREENING NEEDED
2. FEELING DOWN, DEPRESSED OR HOPELESS: NOT AT ALL

## 2021-01-01 ASSESSMENT — ENCOUNTER SYMPTOMS
BRUISES/BLEEDS EASILY: 0
FEVER: 0
ALLERGIC/IMMUNOLOGIC COMMENTS: NO NEW FOOD ALLERGIES
EYES NEGATIVE: 1
CHILLS: 0
SUSPICIOUS LESIONS: 0
ALTERED MENTAL STATUS: 1
WEIGHT GAIN: 0
ENDOCRINE NEGATIVE: 1
MEMORY LOSS: 1
HEMOPTYSIS: 0
HEMATOCHEZIA: 0
COUGH: 0
WEIGHT LOSS: 0

## 2021-01-08 ENCOUNTER — HOSPITAL ENCOUNTER (OUTPATIENT)
Dept: GENERAL RADIOLOGY | Age: 86
Discharge: HOME | End: 2021-01-08
Attending: PHYSICIAN ASSISTANT
Payer: MEDICARE

## 2021-01-08 ENCOUNTER — OFFICE VISIT (OUTPATIENT)
Dept: ORTHOPEDICS CLINIC | Facility: CLINIC | Age: 86
End: 2021-01-08
Payer: MEDICARE

## 2021-01-08 DIAGNOSIS — S92.302S CLOSED NONDISPLACED FRACTURE OF METATARSAL BONE OF LEFT FOOT, UNSPECIFIED METATARSAL, SEQUELA: ICD-10-CM

## 2021-01-08 DIAGNOSIS — S92.352D CLOSED DISPLACED FRACTURE OF FIFTH METATARSAL BONE OF LEFT FOOT WITH ROUTINE HEALING, SUBSEQUENT ENCOUNTER: Primary | ICD-10-CM

## 2021-01-08 PROCEDURE — 73630 X-RAY EXAM OF FOOT: CPT | Performed by: PHYSICIAN ASSISTANT

## 2021-01-08 PROCEDURE — 99213 OFFICE O/P EST LOW 20 MIN: CPT | Performed by: PODIATRIST

## 2021-01-08 NOTE — PROGRESS NOTES
[facility] Progress Note    Subjective: Patient is here for follow-up with her daughter for left fifth metatarsal fracture that occurred back in November 23, she has been using the postop shoe and does not complain of any debby    Objective: Exam le

## 2021-01-19 RX ORDER — DILTIAZEM HYDROCHLORIDE 300 MG/1
CAPSULE, COATED, EXTENDED RELEASE ORAL
Qty: 90 CAPSULE | Refills: 1 | Status: SHIPPED | OUTPATIENT
Start: 2021-01-19 | End: 2021-01-01

## 2021-01-26 NOTE — TELEPHONE ENCOUNTER
Medication: MEMANTINE HCL 10 MG     Date of last refill: 12/14/20 (#120/1)  [place]    Last office visit: 11/13/20  Due back to clinic per last office note:  3 months  Date next office visit scheduled:    Future Appoi

## 2021-02-01 NOTE — TELEPHONE ENCOUNTER
Last visit-  08/12/2020 cpe    Last refill-  08/24/2020 simvastatin 10mg QTY90 1R    Last labs-  12/07/2020 lipase, cmp  Future Appointments   Date Time Provider Eric Campbell   2/12/2021 11:20 AM DO RO Em   5/26/2021

## 2021-02-01 NOTE — TELEPHONE ENCOUNTER
Per Epic review, has had subsequent refill sent to [facility] pharmacy. Did read [employer] as well.

## 2021-02-12 NOTE — PROGRESS NOTES
Neurology H&P    Katina  Patient Status:  No patient class for patient encounter    7/3/1934 MRN [id number]   Location [address] [place] [address], [address] [place] [address] Drive, [address] [place] [address] Attending No att. providers found   [facility] PCP Clelia Soulier, MD     [facility] BREATH. 8.5 Inhaler 5   • DILTIAZEM HCL ER COATED BEADS 300 MG Oral Capsule SR 24 Hr TAKE 1 CAPSULE BY MOUTH EVERY DAY 90 capsule 1   • Memantine HCl 5 MG Oral Tab Take 2 tablets (10 mg total) by mouth 2 (two) times daily.  120 tablet 1   • HYDROCHLOROTHIAZ neurogenic claudication     Pure hypercholesterolemia     History of recurrent UTI (urinary tract infection)     History of small bowel obstruction     Degenerative arthritis of cervical spine     History of colon resection, for diverticulitis, 2010, with RECONSTRUCTION SECOND STAGE/ REVISION Bilateral 3/16/2016    Performed by Archie Cid MD at [facility] MAIN OR   • BREAST SURGERY Bilateral 3/2016    implant removal   • CHOLECYSTECTOMY  1993, 1998    x2   • COLECTOMY     • COLONOSCOPY  2004, 5/8/2012    x2, in drinks      Types: 2 Glasses of wine per week      Frequency: Monthly or less      Drinks per session: 1 or 2      Binge frequency: Never    Drug use: No      Family History:  Family History   Problem Relation Age of Onset   • Psychiatric Father    • Other interactive audio and video communication.   This has been done in good sunil to provide continuity of care in the best interest of the provider-patient relationship, due to the ongoing public health crisis/national emergency and because of restrictions of

## 2021-02-18 NOTE — TELEPHONE ENCOUNTER
Last visit-  12/08/2020    Last refill-  02/26/2020 losartan potassium 50mg QTY90 1R    Last labs-  12/07/2020 cmp, lipase  Future Appointments   Date Time Provider Eric Campbell   2/25/2021  8:10 AM [facility] COVID VACCINE RESOURCE S[facility] COVIDVAC [address]

## 2021-02-24 NOTE — ED NOTES
General--no acute distress Eyes--anicteric HENT--normocephalic, atraumatic head Neck--no lymphadenopathy Chest--normal breath sounds Heart--regular rate and rhythm Abdomen--soft, non tender, non distended, bowel sounds present Musculoskeletal--normal gait and station Skin--no rashes Neurologic--Alert and oriented x 3 Psychiatric--stable mood, appropriate affect Pt updated on poc. No distress noted. Pt reminded to remain NPO.  Pt denies any needs

## 2021-02-24 NOTE — TELEPHONE ENCOUNTER
Pt daughter Terrance Slayton called stating pt has a bad cough and wanted appt-advised we can sched virtual visit but she will probably need someone to listen to her lungs-no virtual visit left today-offered WIC or UC-she agreed w/UC in [place]

## 2021-03-19 NOTE — PROGRESS NOTES
Attempted contacting pt to intro [facility] services with no answer. Will continue contacting to discuss.

## 2021-06-04 NOTE — PROGRESS NOTES
[facility]  Progress Note    Henrik Espinoza Patient Status:  Observation    7/3/1934 MRN [id number]   [facility] 3NE-A Attending Cruz Jeffries MD   Hosp Day # 0 PCP Katalina Kwok MD     CC:  Back pain, leg pain and weakness    SUBJ 07/16/2017    07/16/2017   CO2 26.0 07/16/2017   GLU 98 07/16/2017   CA 8.9 07/16/2017   ALB 3.0 07/15/2017   ALKPHO 111 07/15/2017   BILT 0.8 07/15/2017   TP 6.3 07/15/2017   AST 27 07/15/2017   ALT 25 07/15/2017   ESRML 28 07/15/2017   TROP <0.046 diarrhea  3. Hypokalemia–replace with electrolyte protocol, rpt BMP   4. COPD–continue home inhalers  5. Essential hypertension-continue home medications, follow blood pressure  6. Hyperlipidemia–continue statin  7. Dementia- continue Aricept  8.  Osteoporo negative...

## 2021-06-11 NOTE — PROGRESS NOTES
Attempted contacting pt to intro [facility] services with no answer. Will continue contacting to discuss.

## 2021-06-16 NOTE — PROGRESS NOTES
Attempted to Call daughter Cody Salas for Assessment call. Left message for Gayla to cb at more convenient time.

## 2021-07-09 NOTE — TELEPHONE ENCOUNTER
From: Edward Shafer  To: Josselin Singletary MD  Sent: 7/9/2021 11:31 AM CDT  Subject: Test Results Question    Good Morning Dr. Gurdeep Boyer,     This is Kacey Coleman Aaliyah Pizarro's daughter. The last time my mom saw you my daughter was with her.  My daughter told

## 2021-07-09 NOTE — TELEPHONE ENCOUNTER
Please advise how much water pt should be drinking daily. BUN   7 - 18 mg/dL 29High     Creatinine   0.55 - 1.02 mg/dL 1. 24High     BUN/CREA Ratio   10.0 - 20.0 23.4High       GFR, Non-   >=60 39Low

## 2021-07-12 NOTE — TELEPHONE ENCOUNTER
Gayla pt's dtr is taking pt to the ER for evaluation. See other [facility] message.
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
## 2021-07-12 NOTE — TELEPHONE ENCOUNTER
I would encourage her to drink 4-6 glasses of water a day.  Lot of factors determine how much water one needs including activity, how the weather is, what foods one eats, etc.  She has chronic kidney disease but she is not in acute kidney failure

## 2021-07-13 NOTE — TELEPHONE ENCOUNTER
Gayla, pt daughter on hippa - stated they took the nurses' advise and took pt to the hospital for the UTI. Pt stated they advised she follow up with pcp within 2-3 days to be sure it's going in the right direction. No appts available to schedule.   Hermann

## 2021-07-16 NOTE — PROGRESS NOTES
Taj Mejia is a 80year old female. Patient presents with:  ER F/U: SN Rm 4; Edward 7/12, UTI, denies ongoing symptoms  Fatigue: sleeping a lot      HPI:     Patient here with her family for ER f/u.  Family took her to ER 7/12 due to decreased appeti mouth 2 (two) times daily.  20 capsule 0   • NAPROXEN 500 MG Oral Tab TAKE 1 TABLET BY MOUTH TWICE A DAY WITH MEALS 60 tablet 2   • FLUTICASONE PROPIONATE 50 MCG/ACT Nasal Suspension SPRAY 2 SPRAYS INTO EACH NOSTRIL EVERY DAY 3 Bottle 1   • LOSARTAN JOE Back pain    • Back problem    • Body piercing    • Cataract    • COPD (chronic obstructive pulmonary disease) (HCC)    • Diverticulitis of colon    • Diverticulitis of colon (without mention of hemorrhage)(562.11)    • Diverticulosis of large intestine Temp 97 °F (36.1 °C) (Temporal)   Resp 16   Ht 5' 2\" (1.575 m)   Wt 122 lb (55.3 kg)   LMP  (LMP Unknown)   SpO2 98%   Breastfeeding No   BMI 22.31 kg/m²   GENERAL: well developed, well nourished,in no apparent distress  SKIN: no rashes,no suspicious les

## 2021-07-16 NOTE — TELEPHONE ENCOUNTER
Orders to THE [facility] Pt aware to get labs done no sooner than 2 weeks prior to the appt. Pt aware to fast.  No call back required.     Future Appointments   Date Time Provider Eric Campbell   9/17/2021 10:40 AM Gaby Cuenca MD [facility] [facility]

## 2021-08-11 NOTE — PROGRESS NOTES
Contacted pt daughter Yahaira Vang. Yahaira Vang is still interested having mom participate in CCM. She requested a callback around 10 am Friday morning.        Time with pt -  min  Chart review -  min  Total time -  min  Total Monthly time-  min

## 2021-08-13 NOTE — TELEPHONE ENCOUNTER
FYI    Daughter agreeable to covid order. Order placed. Confirmed echo testing center was notified of pt's cough, Gayla reports this was told to them. Aware will need to call and schedule covid testing with CS. Also advised to monitor for worsening or persistent sx. If continues, may need video visit. If worse, UC over weekend- warnings provided. Verbs understanding to all.

## 2021-08-13 NOTE — TELEPHONE ENCOUNTER
Daughter Joseph Aldridge calling on hipaa concerned pt  has had deep cough for couple of days pt does have copd. Pt was seen by cardiology and he listened to her chest and stated there is no rattling or wheezing.

## 2021-08-13 NOTE — TELEPHONE ENCOUNTER
Spoke to daughter with pt in background. Daughter reports pt has productive cough x 2 days, phlegm is whitish. States mostly happens in AM and again at bedtime. Denies all other symptoms (fever, sob, cp, etc). Saw Cards on Weds, normal exam. Pt vaccinated. Daughter reports maybe it's her COPD? Pt reports pt feeling her normal, other than a cough. Confirms this with pt while on phone. States pt having echo in 30 min. Aware will notify TB for advice.

## 2021-08-13 NOTE — TELEPHONE ENCOUNTER
With rise in covid 19 cases and breakthru infection, I would recommend to do a covid 19 test. Can place order if agreeable  Agree it may be related to COPD but want to test to be sure.

## 2021-08-16 NOTE — PROGRESS NOTES
Spoke with pt joel garcia. After considering CCM services pt joel garcia decided that at this time she does not want to incur any extra expenses for her mom regardless of how small.  Pt was encouraged to save CCM contact information to use if she re

## 2021-08-19 NOTE — TELEPHONE ENCOUNTER
Been Following TB  Last OV 7/16/21  Last CPE 8/12/20  Last Labs TSH+Free T4, CMP 7/23/21  Appt Due 2mon, 9/21    Last Rx fill Simvastatin 10mg, #90 1R 2/1/21    Future Appointments   Date Time Provider Eric Campbell   8/19/2021  1:30 PM CARDIAC MAKE UP 11/11/2021  1:30 PM CARDIAC MAKE UP RESOURCE [address]   11/16/2021  1:30 PM CARDIAC MAKE UP RESOURCE [address]   11/18/2021  1:30 PM CARDIAC MAKE UP RESOURCE [address]   11/23/2021  1:30 PM CARDIAC MAKE UP RESOURCE

## 2021-08-19 NOTE — TELEPHONE ENCOUNTER
Sent the patient a [facility] message to make an appointment for further medication refills.        Medication: MEMANTINE HCL 10 MG Oral Tab + DONEPEZIL 10 MG Oral Tab    Date of last refill: 02/12/2021 (#180/1) + 02/12/2021 (#90/1)  [place] [facility] [address] Malik   11/4/2021  1:30 PM CARDIAC MAKE UP RESOURCE [address]   11/9/2021  1:30 PM CARDIAC MAKE UP RESOURCE [address]   11/11/2021  1:30 PM CARDIAC MAKE UP RESOURCE [address]   11/16/2021  1:30 PM CAR

## 2021-08-20 NOTE — TELEPHONE ENCOUNTER
Pending fasting lab orders per protocol  Pt had CBC/CMP/TSH+Free T4 7/2021    Please sign if appropriate

## 2021-08-20 NOTE — TELEPHONE ENCOUNTER
Last OV: 7/16/21- ER f/u- TB  Last PE: 8/12/20- wellness visit-SD    Future Appointments   Date Time Provider Eric Campbell   8/24/2021  1:30 PM CARDIAC MAKE UP [facility] [address]   8/26/2021  1:30 PM CARDIAC MAKE UP RESOURCE [place] MAKE UP RESOURCE [address]   11/23/2021  1:30 PM CARDIAC MAKE UP RESOURCE [address]   11/25/2021  1:30 PM CARDIAC MAKE UP RESOURCE [address]   11/30/2021  1:30 PM CARDIAC MAKE UP RESOURCE [address]   12/2

## 2021-08-27 NOTE — PROGRESS NOTES
Spoke to Pt daughter in law and daughter. After declining CCM services a few weeks ago, daughter jose is reconsidering.      Her initial hesitancy was due to adding costs to her moms care and I reviewed with her how we bill medicare and that the cost i

## 2021-08-30 NOTE — PROGRESS NOTES
I want to know a little about you. • What do you do for fun? • Any hobbies? What Hobbies? • What do you do for work? Social support:  • Do you have someone you can turn to when you are very stressed and or need help?  Lives with daughter  o Who?   • any barriers to keeping with this diet?   o Can you tell me why you think you were put on this diet?   o Would you like more info   o What are concerns or things that keep you from following this diet?   o Do you want more information or resources to help mindcare and [facility] physical therapy, CCM compiling a list of local companies that provide companionship care including light housekeeping and grooming  Care gaps? :      Spoke to Niger at length about CCM, current care plan and performed CCM assessment wit Visit with Chandra Best MD [place] [address], [facility] ([facility] IM/FM [place])        Sep 21, 2021  1:30 PM CDT Cardiopulmonary Make Up with [facility] Cardiopulmonary Rehabilitation [facility] - Rehabilitation [facility])        Oct 28, 2021  1:30 PM CDT Cardiopulmonary Make Up with [address] Cardiopulmonary Rehabilitation [facility])        Nov 02, 2021  1:30 PM CD Dec 27, 2021  5:30 PM CST Established Patient Office Visit with Denis Kaminski MD Dermatology - [address] [place] [address]  ([address]    For the safety of our patients, visitors and care teams, all patients and visitors are req

## 2021-08-30 NOTE — TELEPHONE ENCOUNTER
Pt daughter concerned about mothers memory,gait and diminishing leg strength. Is it possible to place referral to Kathie Cespedes for [facility] program and Physical Therapy? Or would she need to make OV to discuss? Please advise.     Routed t

## 2021-08-31 NOTE — TELEPHONE ENCOUNTER
Received approval from patient to proceed with referral for mindcare/physical therapy from [facility].     Thank You    Routed to Dr Danial Leblanc

## 2021-08-31 NOTE — TELEPHONE ENCOUNTER
I can put it referral for [facility] if patient ok with it.  Need ok from patient first

## 2021-08-31 NOTE — TELEPHONE ENCOUNTER
Ida Ashley for start of care on 9/8/21 due to staffing. If  Approved by TB will need a new order to reflect 9/8/21 as the start of care.  Please fax# [phone]

## 2021-09-07 NOTE — ED PROVIDER NOTES
Patient Seen in: [facility] Emergency Department      History   Patient presents with:  Leg or Foot Injury    Stated Complaint: fall, lt hip pain    HPI/Subjective:   HPI    12-year-old female presents emerged department for fall that happened 2 day in 2012 w/ forceps biopsy   • COLOSTOMY      and reversal   • LYSIS OF ADHESIONS  08/20/2017    expl lap   • PAULINO LOCALIZATION WIRE 1 SITE RIGHT (CPT=19281) Right 2006    benign.    • OTHER Bilateral     bilateral breast implants   • OTHER  07/11/2017    Patricia for neck stiffness. Left hip/back pain   Allergic/Immunologic: Negative for food allergies. Neurological: Negative for headaches. Psychiatric/Behavioral: Negative for suicidal ideas. All other systems reviewed and are negative.       Positive f palpation. Patient points as area of discomfort there are no lesions. No tenderness palpation/full range of motion of the right leg/upper extremities   Skin:     General: Skin is warm. Capillary Refill: Capillary refill takes less than 2 seconds. views of the hip and pelvis if performed. COMPARISON:  None. INDICATIONS:  fall, lt hip pain  PATIENT STATED HISTORY: (As transcribed by Technologist)  Fall, left hip pain X 3 days. FINDINGS: No evidence of fracture or dislocation.  Bilateral hip joint [address]  [address] [id number]  [phone]          [facility] [phone]                Medications Prescribed:  Current Discharge Medication List

## 2021-09-07 NOTE — ED INITIAL ASSESSMENT (HPI)
Pt fell 3 days ago,  Doesn't remember what caused her to fall,   Has been ambulating since,  [place] daughter wanted her checked.   Denies other injuries

## 2021-09-08 NOTE — TELEPHONE ENCOUNTER
TC to daughter Meli Vazquez to schedule ER fu for Fall, Contusion of left hip with Dr. Osito Packer. Daughter declined and will come with patient to [facility] on 9/17/21.   Daughter wanted to let TB know the patient is refusing to use her walker or to be relocated to the

## 2021-09-14 NOTE — TELEPHONE ENCOUNTER
Pt daughter calling to ask what they actually x-rayed in ER. Pts mom in a lot pain and having a hard time walking.  Needs some guidance

## 2021-09-15 NOTE — TELEPHONE ENCOUNTER
Patient was in the ER on 9/7/21 for a fall, Xray Lumbar Spine and left hip show no fx for dislocations. Pt has been using Tylenol but not on a regular basis. Pt states her pain from her back down her left leg is rated at an 8.   Pt's dtr would like to kno

## 2021-09-15 NOTE — TELEPHONE ENCOUNTER
Due to CKD, I would avoid nsaids like advil or motrin. Tylenol is fine.   I can see her on 9/17 for ER follow up, not appropriate to do wellness when she is having acute issues and just was in the ER

## 2021-09-15 NOTE — TELEPHONE ENCOUNTER
Gayla, pt's dtr notified due to CKD, use only Tylenol 2 every 6 hours, avoid advil/motrin. Gayla notified 9/17/21 visit would be for ER f/u and would need to schedule another Annual Medicare Wellness for another time. Gayla verbalizes understanding.

## 2021-09-16 NOTE — TELEPHONE ENCOUNTER
TB put referral in system for home health dated 8/31 for diag codes of : weakness bilat lower extremities and memory loss-cannot use those-would need to use spinal stenosis/lumar-call to ok change
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
## 2021-09-17 PROBLEM — F03.90 MILD DEMENTIA (HCC): Status: ACTIVE | Noted: 2020-11-13

## 2021-09-17 PROBLEM — M54.50 LOW BACK PAIN WITH RADIATION: Status: ACTIVE | Noted: 2021-01-01

## 2021-09-17 PROBLEM — F03.A0 MILD DEMENTIA (HCC): Status: ACTIVE | Noted: 2020-11-13

## 2021-09-17 NOTE — PROGRESS NOTES
Morena Faria is a 80year old female. Patient presents with:  Hospital F/U: ES rm - 4 9/7 ER Fall Lower pack constant pain at a  8/10. HPI:     Patient with mild dementia, lumbar spinal stenosis, HL, CKD3 here for ER f/u.  She fell in the kitchen 8 (eight) hours as needed for Pain.  60 tablet 1   • SERTRALINE 25 MG Oral Tab TAKE 1 TABLET BY MOUTH EVERY DAY 90 tablet 0   • MEMANTINE HCL 10 MG Oral Tab TAKE 1 TABLET BY MOUTH TWICE A  tablet 0   • DONEPEZIL 10 MG Oral Tab TAKE 1 TABLET BY MOUTH disease) ([facility]    • Diverticulitis of colon    • Diverticulitis of colon (without mention of hemorrhage)(562.11)    • Diverticulosis of large intestine    • Dizziness and giddiness    • Easy bruising    • Frequent use of laxatives    • Hearing impairment Unknown)   SpO2 97%   GENERAL: well developed, NAD  HEENT: atraumatic, normocephalic  NECK: supple,no adenopathy  LUNGS: normal rate without respiratory distress, lungs clear to auscultation  CARDIO: RRR nl S1 S2  GI: normal bowel sounds, soft, NT/ND  EXTR

## 2021-09-17 NOTE — TELEPHONE ENCOUNTER
Spoke to Mely at [facility], new referral placed with Spinal Stenosis. Mely indicates she will pull the new order from [facility] [address]

## 2021-09-20 NOTE — TELEPHONE ENCOUNTER
Patient fell a week or two ago daughter states she is not eating or drinking and in a lot of pain.   Please advise

## 2021-09-20 NOTE — TELEPHONE ENCOUNTER
Pt's dtr Gayla indicates pt is no longer drinking or eating, has been in bed for 2 days, pt indicates that she cannot move due to the pain. Gayla notified pt will need to go to the ER for evaluation. Also TB was setting up [facility] for her however Dr Cristina Briceño had ordered Outpatient Pulmonary Rehab so [facility] indicates they cannot come out. Gayla verbalizes understanding.

## 2021-09-24 NOTE — ED PROVIDER NOTES
Signout to check imaging results, which show a sacral ala fracture likely responsible for patient's discomfort. Also demonstrated was a mild L5 compression fracture patient has no significant discomfort in this area.   Will discharge home according to prev includes the Dose Index Registry  3-D RENDERING:  Additional 3-D rendering was generated by the technologist.  PATIENT STATED HISTORY:(As transcribed by Technologist)  Patient fell on 9/7/21 and still c/o lt hip and low back pain    FINDINGS:  BONES:  No a degenerative disc space loss and endplate spurring throughout the lumbar spine, most pronounced at L5-S1. Vacuum disc phenomenon at L1-2, L4-5,  and L5-S1. Partially imaged T10 kyphoplasty changes.   Moderate spinal canal stenosis with bilateral subarticu 7/12/2021, 12:12 PM.  INDICATIONS:  Fell 2 1/2 weeks ago and c/o left lower back pain still.  She was seen at our main ER in [place] when the fall occurred and had xrays done at that time, per d  PATIENT STATED HISTORY: (As transcribed by Technologist)

## 2021-09-24 NOTE — ED PROVIDER NOTES
Patient Seen in: THE [facility] Emergency Department In [place]      History   Patient presents with:  Back Pain    Stated Complaint: Arlin Charles 2 1/2 weeks ago and c/o left lower back pain still.  She was seen at our ma*    Subjective:   HPI    Patient was seen by Arthritis    • Back pain    • Back problem    • Body piercing    • Cataract    • COPD (chronic obstructive pulmonary disease) (HCC)    • Diverticulitis of colon    • Diverticulitis of colon (without mention of hemorrhage)(562.11)    • Diverticulosis of lar 2015    hole in intestine   • OTHER SURGICAL HISTORY Bilateral 3/16/2016    Bilateral capsulectomy and removal of silicone implants   • OTHER SURGICAL HISTORY  01/05/2018    excision suture granuloma    • REMOVAL GALLBLADDER     • SKIN SURGERY Left 03/09/2 symmetric. Abdomen: Soft, nondistended. Completely nontender, even deep palpation  Back: No point tenderness in the lumbar back but some left-sided paraspinal tenderness and SI joint tenderness is noted. Also left sciatic notch tenderness is noted.   Extre DIFFERENTIAL[[id number]]          Abnormal            Final result                 Please view results for these tests on the individual orders. An EKG was performed. I agree with computerized EKG interval interpretations. EKG shows sinus rhythm. unspecified chronicity  (primary encounter diagnosis)  Left hip pain  Mild dehydration  Abnormal urinalysis     Disposition:  There is no disposition on file for this visit. There is no disposition time on file for this visit.     Follow-up:  Sinan Knott

## 2021-09-30 NOTE — TELEPHONE ENCOUNTER
Called Darrin Willoughby to clarify message. Pt's daughter initially declines [address] because she was going to go to [facility].. She is not longer going to go to [facility], [facility]. and now wanting Home Health, therefore [facility] will be seeing patient.  FYI only, orders from

## 2021-09-30 NOTE — TELEPHONE ENCOUNTER
[facility] called stating patients dtr declined [facility] as she was going to pulm rehab-now will start to see patient w/[facility]

## 2021-09-30 NOTE — PROGRESS NOTES
9/30/2021  Spoke to Niger for CCM.       Updates to patient care team/ comments: UTD  Patient reported changes in medications: UTD  Med Adherence  Comment: Pt taking medications as prescribed     Health Maintenance:     Annual Physical due on 08/12/2021 3:10 PM Lynn Corado DO [facility] [place]   12/27/2021  5:30 PM Rachelle Aguilar MD [facility]       • Active goal from previous outreach:                       Building strength    • Patient reported progress toward goal: pt has order for

## 2021-10-05 NOTE — TELEPHONE ENCOUNTER
pts daughter calling regarding mom and recent MRI order from Dr. Jesus Encarnacion. Daughter is frustrated as her mom is in so much pain and suffering. She can't her her in for the MRI until 10-21.  Dr. Jesus Encarnacion office told her they won't put a \"stat\" or \"cecille

## 2021-10-06 NOTE — TELEPHONE ENCOUNTER
Agree with your advise.  Let me know about the tramdol, if I need to increase the frequency for pain control

## 2021-10-06 NOTE — TELEPHONE ENCOUNTER
We can up the tramadol if it is helping with pain. MRI as a STAT will not be approved by insurance and is not indicated.   If unbearable pain, advise to go to hospital for admission for pain control

## 2021-10-06 NOTE — TELEPHONE ENCOUNTER
Patient daughter upset on phone asking if patient is seen in hospital for pain management if MRI could be done, advised that would be up to the hospitalist or specialist seen in hospital for the back pain she is experiencing.  She will discuss with patient

## 2021-10-07 NOTE — TELEPHONE ENCOUNTER
Last VISIT - 9/17/21 HFU    Last CPE - 8/12/20    Last REFILL - No recent refill    Last LABS - 9/23/21 CBC, CMP      Per PROTOCOL? PASSED    Please Approve or Deny.

## 2021-10-07 NOTE — ED INITIAL ASSESSMENT (HPI)
Pt from home, pt prev tail bone fracture from fall, pt family called angelic d/t pt family stated \" her pain is 10/10\", family believes she is in more pain , pt stated \" it hurts 2/10\", pt takes tramadol for pain control, pt took tramadol pta, pt suppose

## 2021-10-07 NOTE — CM/SW NOTE
CM met with pt and daughter at bedside. Daughter tearful because pt is in pain but MRI can't be ordered stat per Dr. Racheal Montanez office. Daughter is not interested in [address] for pt.  CM offered to reach out to Dr. Racheal Montanez office to discuss options but Na

## 2021-10-07 NOTE — ED PROVIDER NOTES
Patient Seen in: [facility] Emergency Department      History   Patient presents with:  Pain: tail bone pain from prv fracture, pt lives at home w/ family     Stated Complaint: Pt c/o tail bone pain from prev fracture, pt family called angelic d/t pt f cholesterol    • HYPERLIPIDEMIA    • HYPERTENSION    • Incontinence     occasional incont @ noc   • Increased urinary frequency    • Lower back pain    • Lumbago    • Osteoarthritis    • Other and unspecified hyperlipidemia    • Pain in joints    • Pneumon Smoker        Packs/day: 0.10        Years: 15.00        Pack years: 1.5        Quit date: 1969        Years since quittin.8      Smokeless tobacco: Never Used    Vaping Use      Vaping Use: Never used    Alcohol use:  Yes      Alcohol/week: 2.0 st created for panel order CBC With Differential With Platelet.   Procedure                               Abnormality         Status                     ---------                               -----------         ------                     CBC W/ DIFFERENTIAL[ canal stenosis. Mild bilateral neural foraminal stenosis. L3-L4:  Minimal disc bulge with facet hypertrophic changes. No spinal canal or neural foraminal stenosis.  L4-L5:  Broad-based disc bulge along with retropulsion of bone from compression deformity JOINTS:  No significant arthropathy or effusion. PARASACRAL TISSUES:  Normal presacral fat and pyriformis muscles. CONCLUSION:  High T2 low T1 signal in the right-sided sacrum may be due to a sacral insufficiency fracture.   Please correlate wi

## 2021-10-13 NOTE — PROGRESS NOTES
10/13/2021  Spoke to Niger for CCM.       Updates to patient care team/ comments: UTD  Patient reported changes in medications: UTD  Med Adherence  Comment: PT taking medications as prescribed     Health Maintenance:     Annual Physical due on 08/12/2021 previous outreach:                       Quality of life    • Patient reported progress toward goal: pt has started pt to work on strength in legs and has upcoming appointment with dr Otoniel Christianson to manage pain in order to allow for increased activity

## 2021-10-20 NOTE — TELEPHONE ENCOUNTER
[facility] called statig patients /89 pulse 68 when she got to patients home-re took now and was 163/81-No dizziness-has cold feeling and shivering-took losartan at 11:20 today

## 2021-10-20 NOTE — TELEPHONE ENCOUNTER
Tana Willoughby, [address] [facility], [place] notified of care plan- continue to monitor unless any changes or concerns than re-contact our office.

## 2021-11-09 NOTE — PROGRESS NOTES
Neurology H&P    Milagro Khan Patient Status:  No patient class for patient encounter    7/3/1934 MRN [id number]   Location [address] [facility], [address] [place] [address] Drive, [address] [facility] Attending No att. providers found   [facility] Day # 0 PCP MD Bull Warren TAKE 1 TABLET BY MOUTH TWICE A DAY WITH MEALS 60 tablet 2   • HYDROCHLOROTHIAZIDE 12.5 MG Oral Tab TAKE 1 TABLET BY MOUTH EVERY DAY 90 tablet 1   • traMADol 50 MG Oral Tab Take 1 tablet (50 mg total) by mouth every 8 (eight) hours as needed for Pain.  60 ta Problem List:     Spinal stenosis, lumbar region, without neurogenic claudication     Pure hypercholesterolemia     History of recurrent UTI (urinary tract infection)     History of small bowel obstruction     Degenerative arthritis of cervical spine     H Surgical History:   Procedure Laterality Date   • BOWEL RESECTION      small bowel resection   • BREAST SURGERY Bilateral 3/2016    implant removal   • CHOLECYSTECTOMY  1993, 1998    x2   • COLECTOMY     • COLONOSCOPY  2004, 5/8/2012    x2, in 2012 w/ forc completion) Father    • Other (Other) Mother         Cerebral hemorrhage   • Other (Other) Son         asthma  2 sons   • Other (alzheimer's disease) Sister         x2   • Other (stroke syndrome) Other         family hx       ROS:  10 point [place] Awkward

## 2021-11-09 NOTE — PATIENT INSTRUCTIONS
After your visit at the [facility] office today,  please direct any follow up questions or medication needs to the staff in our [place] office so that your concerns may be promptly addressed.   We are available through [employer] or at the numbers below: picked up in office. • Please allow the office 2-3 business days to fill the prescription. • Patient must present photo ID at time of . PLEASE NOTE: PRESCRIPTIONS MUST BE PICKED UP PRIOR TO 3:00PM MONDAY-FRIDAY    Scheduling Tests:     If your ph submitting forms to office staff. • Form completion may require an additional fee. • A signed Release of Information (ARIANA) must be on file before forms may be submitted. When dropping off forms, please ask the  for this paper.    • Failure

## 2021-11-09 NOTE — PROGRESS NOTES
Patient reports she does not feel she has had any decline in memory. Patient's daughter reports that patient fell in August and fractured a bone in her spine and in her lumbar area.   Patient's daughter reports that she believes some of patient's decline

## 2021-11-12 NOTE — TELEPHONE ENCOUNTER
Medication: MEMANTINE HCL 10 MG Oral Tab     Date of last refill: 08/202021 (#180/0)  [place]     Last office visit: 11/09/2021  Due back to clinic per last office note:  Around 05/09/2022  Date next office visit tone

## 2021-12-06 PROBLEM — I48.91 ATRIAL FIBRILLATION WITH RVR (HCC): Status: ACTIVE | Noted: 2021-01-01

## 2021-12-06 PROBLEM — D64.9 ANEMIA, UNSPECIFIED TYPE: Status: ACTIVE | Noted: 2021-01-01

## 2021-12-06 PROBLEM — E87.20 METABOLIC ACIDOSIS: Status: ACTIVE | Noted: 2021-01-01

## 2021-12-06 PROBLEM — D72.829 LEUKOCYTOSIS: Status: ACTIVE | Noted: 2021-01-01

## 2021-12-06 PROBLEM — D72.829 LEUKOCYTOSIS, UNSPECIFIED TYPE: Status: ACTIVE | Noted: 2021-01-01

## 2021-12-06 PROBLEM — R79.89 AZOTEMIA: Status: ACTIVE | Noted: 2021-01-01

## 2021-12-06 PROBLEM — E87.2 METABOLIC ACIDOSIS: Status: ACTIVE | Noted: 2021-01-01

## 2021-12-06 NOTE — TELEPHONE ENCOUNTER
Pt's daughter calling regarding her mother. She's having a really hard time with her, doesn't want to get out bed, doesn't eat or drink much. Real struggle to get her up and to eat.  Daughter Jen Alatorre would like call back to discuss as she doesn't know what t

## 2021-12-06 NOTE — TELEPHONE ENCOUNTER
Over last week, daughter indicates patient won't eat, wont get out of bed, she is refusing to take her medications. Patient lives with daughter. Patient seems like she doesn't want to be bothered and wants to stay in bed.  No confusion, patient is aware of

## 2021-12-07 NOTE — PROGRESS NOTES
12/06/21 2321 12/06/21 2322 12/06/21 2324   Vital Signs   Pulse 104 98 119   Heart Rate Source Monitor  --   --    Resp 20 21 24   Respiratory Quality Normal  --   --    /60 127/53 (!) 69/47   MAP (mmHg) 75 72 52   BP Location Left arm Left arm Le

## 2021-12-07 NOTE — PLAN OF CARE
Assumed care of patient at [phone]. Patient is alert and oriented x3-4. VSS. Admission navigator completed with patient. Going to call daughter in the AM to discuss her home meds because patient said she wasn't very familiar with them.  Endorsed to day shift b

## 2021-12-07 NOTE — CONSULTS
[address] Cardiology  Report of Consultation    Chantell Pabon Patient Status:  Observation    7/3/1934 MRN [id number]   [facility] 8NE-A Attending Brittany Montero MD   Hosp Day # 0 PCP Elina Kovacs MD     Reason for Consultation:  Atrial f • BOWEL RESECTION      small bowel resection   • BREAST SURGERY Bilateral 3/2016    implant removal   • CHOLECYSTECTOMY  1993, 1998    x2   • COLECTOMY     • COLONOSCOPY  2004, 5/8/2012    x2, in 2012 w/ forceps biopsy   • COLOSTOMY      and reversal   • drugs.     Allergies:  No Known Allergies    Medications:    Current Facility-Administered Medications:   •  aspirin chewable tab 81 mg, 81 mg, Oral, Daily  •  Acidophilus/Pectin (PROBIOTIC) CAPS 1 capsule, 1 capsule, Oral, Daily  •  Gabapentin (Once-Daily) Readings from Last 3 Encounters:  12/06/21 : 106 lb 4.8 oz (48.2 kg)  11/24/21 : 114 lb (51.7 kg)  11/09/21 : 111 lb (50.3 kg)      Telemetry: Afib  General: Alert and oriented in no apparent distress.   HEENT: EOMI, no scleral icterus, mucosa moist  Neck: that her risk benefit ratio of anticoagulation may not be in her favor. It would appear that this has been the case with her other clinicians that have seen her in the past.  Patient's family is in agreement with this.   Therefore, we will be conservative

## 2021-12-07 NOTE — PROGRESS NOTES
NURSING ADMISSION NOTE      Patient admitted via Cart   Oriented to room. Safety precautions initiated. Bed in low position. Call light in reach. Admission navigator completed with patient.  Patient does not know her medications very well, will ca

## 2021-12-07 NOTE — ED PROVIDER NOTES
Patient Seen in: [facility] Emergency Department      History   Patient presents with:  Fatigue    Stated Complaint: weakness more lethargic     Subjective:   HPI    Pardeep Almeida is a pleasant 80-year-old female presenting with fatigue.   Over the past wee breast implants   • OTHER  07/11/2017    Kyphoplasty, Dr. Dillan Morrell   • OTHER SURGICAL HISTORY  1949    Cyst in vagina   • OTHER SURGICAL HISTORY  1974    Breast implants   • OTHER SURGICAL HISTORY  2006    Right breast lump- benign   • OTHER SURGICAL HISTORY SpO2 97%         Physical Exam    Alert and oriented patient appears no distress HEENT exam dry mucous membranes otherwise normal lungs are clear cardiovascular exam irregular rhythm abdomen soft nontender extremities no calf cyanosis or edema skin is nond rapid ventricular response. This involved direct patient intervention, complex decision making, and/or extensive discussions with the patient, family, and clinical staff.            MDM      Patient was started on Cardizem and given IV fluids here in the e

## 2021-12-07 NOTE — H&P
[facility] HOSPITAL[facility]  History and Physical     Jennifer Gagnon Patient Status:  Emergency    7/3/1934 MRN [id number]   Location [address] [facility] Attending Shahnaz Cameron MD   Hosp Day # 0 PCP Michelle Garza MD     Chief Complaint: implant removal   • CHOLECYSTECTOMY  1993, 1998    x2   • COLECTOMY     • COLONOSCOPY  2004, 5/8/2012    x2, in 2012 w/ forceps biopsy   • COLOSTOMY      and reversal   • LYSIS OF ADHESIONS  08/20/2017    expl lap   • PAULINO LOCALIZATION WIRE 1 SITE RIGHT Allergies    Medications:  No current facility-administered medications on file prior to encounter.   FLUTICASONE PROPIONATE 50 MCG/ACT Nasal Suspension, SPRAY 2 SPRAYS INTO EACH NOSTRIL EVERY DAY, Disp: 48 mL, Rfl: 1  LOSARTAN 50 MG Oral Tab, TAKE 1 TABLET 11/9/2021), Disp: , Rfl:   Vitamin B-12 1000 MCG Oral Tab, Take 1,000 mcg by mouth daily. , Disp: , Rfl:   Acidophilus/Pectin Oral Cap, Take 1 capsule by mouth daily. , Disp: , Rfl:   aspirin 81 MG Oral Tab, Take 81 mg by mouth daily. , Disp: , Rfl:   Ivett Peralta INR 1. 10       No results for input(s): TROP, CK in the last 168 hours. Imaging: Imaging data reviewed in Epic. ASSESSMENT / PLAN:     1. RUTH martell with CHERRIE tavera.  She has a documented history of this though the patient and patient's family deny corin

## 2021-12-07 NOTE — TELEPHONE ENCOUNTER
Daughter, jose, contacted and notified appointment tomorrow to be cancelled and patient will need HFU. Appointment changed to Friday for HFU with TB.  Advised if patient is still in hospital or transferred to living facility to call us to reschedule if ne

## 2021-12-07 NOTE — PHYSICAL THERAPY NOTE
PHYSICAL THERAPY EVALUATION - INPATIENT     Room Number: 5762/1938-V  Evaluation Date: 12/7/2021  Type of Evaluation: Initial  Physician Order: PT Eval and Treat    Presenting Problem: Afib RVR  Co-Morbidities : non surgical vertebral fx L5 and sacru of impairment in mobility. Research supports that patients with this level of impairment may benefit from JAY JAY. Anticipate possible improvement in activity tolerance once HR is managed. Pt amicable as long as she is home by [place].    Based on this evalua need for safety    RANGE OF MOTION AND STRENGTH ASSESSMENT  Upper extremity ROM and strength are within functional limits     Lower extremity ROM is within functional limits     Lower extremity strength is within functional limits     BALANCE  Static Sitti training    Patient End of Session: In bed;Needs met;Call light within reach;RN aware of session/findings; All patient questions and concerns addressed; Alarm set    Patient/Family PPE: Mask not worn

## 2021-12-07 NOTE — ED QUICK NOTES
Orders for admission, patient is aware of plan and ready to go upstairs. Any questions, please call ED RN Chris Orellana   at extension [phone]    Vaccinated?  yes  Type of COVID test sent: rapid  COVID Suspicion level: low Low/High      Titratable drug(s) infusing: di

## 2021-12-07 NOTE — PROGRESS NOTES
[facility]     Hospitalist Progress Note     Davidhermelinda Salvadorl Patient Status:  Observation    7/3/1934 MRN [id number]   [facility] 8NE-A Attending Todd Galdamez MD   Hosp Day # 0 PCP Katalina Kwok MD     Chief Complaint: gen we hours. Cardiac  No results for input(s): TROP, PBNP in the last 168 hours. Creatinine Kinase  No results for input(s): CK in the last 168 hours. Inflammatory Markers  No results for input(s): CRP, ADOLFO, LDH, DDIMER in the last 168 hours.     Imaging discharge: tbd  Discharge is dependent on: progress  At this point Ms. Juan Pablo Dasilva is expected to be discharge to: tbd

## 2021-12-07 NOTE — CM/SW NOTE
Attempted to meet with daughter jose at bedside earlier, called her later on her mobile phone . Patient resides with daughter and son in law in their home in [place].   Since patient's fall earlier this year when she fractured her sacrum,

## 2021-12-07 NOTE — DIETARY NOTE
[facility]    NUTRITION ASSESSMENT    Pt does not meet malnutrition criteria. NUTRITION INTERVENTION:      1. Meal and Snacks - monitor patient po intake. Encourage adequate po of appropriate diet.   2. Medical Food Supplements - RD added Ensure E Addresses: Yes    GI SYSTEM REVIEW: WNL    NUTRITION RELATED PHYSICAL FINDINGS:     1. Body Fat/Muscle Mass: moderate depletion body fat orbital region and triceps region and moderate depletion muscle mass temple region, clavicle region and scapular region

## 2021-12-07 NOTE — PROGRESS NOTES
[facility] Note:  Renal Dose Adjustment for Tramadol Dread [place]    Nigel Maldonado has been prescribed Tramadol (ULTRAM) 50 mg orally every 8 hours as needed for pain. Estimated Creatinine Clearance: 20.8 mL/min (A) (based on SCr of 1.45 mg/dL (H)).

## 2021-12-07 NOTE — ED INITIAL ASSESSMENT (HPI)
Pt progressively weak for over 1 week and daughter who is poa wanted pt eval.but pt did not want to come

## 2021-12-08 NOTE — PLAN OF CARE
Assumed care of the patient at 1900. Pt with hx of dementia. Very forgetful and little confused. Adequate saturation on RA. Afib on cardiac monitor. Bed alarm is on. Bed at the lowest. Call light within reach.        Problem: Patient/Family Goals  Goal: Nya Leroy

## 2021-12-08 NOTE — PHYSICAL THERAPY NOTE
PHYSICAL THERAPY TREATMENT NOTE - INPATIENT    Room Number: 2757/0967-E     Session: 1   Number of Visits to Meet Established Goals: 5    Presenting Problem: Afib RVR  Co-Morbidities : non surgical vertebral fx L5 and sacrum from a fall 9/5 treated with s small bowel resection   • BREAST SURGERY Bilateral 3/2016    implant removal   • CHOLECYSTECTOMY  1993, 1998    x2   • COLECTOMY     • COLONOSCOPY  2004, 5/8/2012    x2, in 2012 w/ forceps biopsy   • COLOSTOMY      and reversal   • LYSIS OF ADHESIONS  08/2 adjusting bedclothes, sheets and blankets)?: None   Patient Difficulty: Sitting down on and standing up from a chair with arms (e.g., wheelchair, bedside commode, etc.): A Little   Patient Difficulty: Moving from lying on back to sitting on the side of the for safety and for verbal cues to stay close to RW. Pt is modified ind with bed mobility and supervision with transfers for safety. Pt continues to present with decrease balance, decrease safety awareness, and decrease functional mobility skills.   Pt may

## 2021-12-08 NOTE — PROGRESS NOTES
..    Progress Note  Adrian Anderson Patient Status:  Observation    7/3/1934 MRN [id number]   [facility] 8NE-A Attending Hilario Michel MD   Hosp Day # 0 PCP Aric Lee MD     Subjective:  Pt resting comfortably in bed.  No events ove 12/07/21  0331   RBC 3.63* 3.13*   HGB 11.1* 9.5*   HCT 34.0* 28.8*   MCV 93.7 92.0   MCH 30.6 30.4   MCHC 32.6 33.0   RDW 15.5 15.4   NEPRELIM 15.54* 13.82*   WBC 17.1* 15.1*   .0 339.0         No results for input(s): TROP, TROPHS, CK in the last term with Dr. Davis Hudson. Follow-up in 1-2 weeks. • Ok to discharge if rates remain controlled off diltiazem drip. Plan of care discussed with patient, RN.     Yared Rolle, APRN  12/8/2021  10:48 AM

## 2021-12-08 NOTE — PLAN OF CARE
AOx4. Short term memory loss, impulsive, poor judgement/safety awareness. Fall precautions in place. Patient ambulating in room with walker to bathroom. Patient also assisted for walk in hallway.  Afib on cardiac monitor, HR controlled at rest. Increases

## 2021-12-08 NOTE — OCCUPATIONAL THERAPY NOTE
OCCUPATIONAL THERAPY EVALUATION - INPATIENT     Room Number: 7671/7901-T  Evaluation Date: 12/8/2021  Type of Evaluation: Initial  Presenting Problem: increased weakness;  A Fib RVR    Physician Order: IP Consult to Occupational Therapy  Reason for Therapy: balance and endurance to complete handwashing routine   UB dressing: supervision while seated at EOB   LB dressing: CGA with donning and doffing brief  Toileting: CGA with clothing management, transfer, donning/doffing brief, and geeta-care    Functional Mo disoriented to time  Following Commands:  follows all commands and directions without difficulty  Problem Solving:  assistance required to identify errors made, assistance required to generate solutions and assistance required to implement solutions    ASS modified independent with bilateral AROM HEP (home exercise program). Additional Goals  Pt will identify 2-3 energy conservation strategies to use during ADL's by discharge for improved activity tolerance.   Pt will tolerate standing at sink for 5 minute

## 2021-12-08 NOTE — PROGRESS NOTES
[facility]     Hospitalist Progress Note     Michael Joseph Patient Status:  Observation    7/3/1934 MRN [id number]   [facility] 8NE-A Attending Rei Rubio MD   Hosp Day # 0 PCP Darrel Guerra MD     Chief Complaint: gen we input(s): CRP, ADOLFO, LDH, DDIMER in the last 168 hours. Imaging: Imaging data reviewed in Epic.     Medications:   • metoprolol tartrate  25 mg Oral BID   • aspirin  81 mg Oral Daily   • Acidophilus/Pectin  1 capsule Oral Daily   • umeclidinium-vilanterol

## 2021-12-08 NOTE — CM/SW NOTE
Attempted to update daughter jose by phone () of accepting facilities, including [facility]--unsuccessful--unable to leave message

## 2021-12-08 NOTE — DISCHARGE PLANNING
Discharge     Patient cleared for discharge by all services. Discharge education discussed with patient and daughter. handout provided to patients daughter at bedside. Follow-up appointments reviewed.    Medications reviewed  Report given to RN at [facility]

## 2021-12-08 NOTE — TELEPHONE ENCOUNTER
Called to travel screen patient for tomorrow   Appt. Talked to [facility] daughter ok hipaa  Patient is still in the hospital and will be going to re-hab next. Daughter said if Dr Phuong Jackson still wants to see her please call and she will try to work that out with re-hab.     Please advise

## 2021-12-08 NOTE — CM/SW NOTE
Spoke with daughter, accepting [facility] facilities emailed to her @ [email or web address]. Daughter is currently touring [facility]--to call this writer shortly with selected facility.     Per jose, they have [facility]--reserved in jose--await adam

## 2021-12-09 NOTE — DISCHARGE SUMMARY
[facility] HOSPITALIST  DISCHARGE SUMMARY     Beni Painter Patient Status:  Inpatient    7/3/1934 MRN [id number]   [facility] 8NE-A Attending No att. providers found   Hosp Day # 1 PCP Ita Blancas MD     Date of Admission: 2021  Bennie · none    Consultant:    cards    Discharge Medication List:     Discharge Medications      START taking these medications      Instructions Prescription details   cephalexin 500 MG Caps  Commonly known as: KEFLEX      Take 1 capsule (500 mg total) by mo Refills: 0     Memantine HCl 10 MG Tabs  Commonly known as: NAMENDA      TAKE 1 TABLET BY MOUTH TWICE A DAY   Quantity: 180 tablet  Refills: 0     MULTIVITAMIN OR      Take 1 tablet by mouth daily.    Refills: 0     naproxen 500 MG Tabs  Commonly known as: Benja 2484 10 min [facility]    Patient Instructions:     Once you click the schedule button below, your appointment will be at [address], [facility], [address] [place] [address] help make sure you are on the best Medicare plan to fit your healthcare needs. Please call [phone] to speak to a licensed agent who can review your Medicare benefits with you.       Location Instructions:     [address] [place] [address] Jaspreet Zuñiga 89 6

## 2021-12-09 NOTE — PROGRESS NOTES
[address]: Elaine Doyle MD     7/3/1934 MRN [id number]   [facility]  Admission 21      Last Hospital Discharge 21 PCP Jacob Bowens [facility]  [facility]        CC --admitted to [facility] from [facility] occasional incont @ noc   • Increased urinary frequency    • Lower back pain    • Lumbago    • Osteoarthritis    • Other and unspecified hyperlipidemia    • Pain in joints    • Pneumonia, organism unspecified(486)    • Sputum production    • Uncomfortable hemorrhage   • Other (Other) Son         asthma  2 sons   • Other (alzheimer's disease) Sister         x2   • Other (stroke syndrome) Other         family hx     Social History    Tobacco Use      Smoking status: Former Smoker        Packs/day: 0.10 ELLIPTA) 62.5-25 MCG/INH Inhalation Aerosol Powder, Breath Activated Inhale 1 puff into the lungs daily. 3 each 3   • Apoaequorin (PREVAGEN OR) Take by mouth. On med list from home, dose not on list     • Glucosamine HCl (GLUCOSAMINE OR) Take by mouth. --  15.1*   HGB 11.1*  --  9.5*   MCV 93.7  --  92.0   .0  --  339.0   INR  --  1.10  --               Recent Labs   Lab 12/06/21  1943 12/07/21  0331   GLU 95 114*   BUN 68* 67*   CREATSERUM 1.45* 1.30*   GFRAA 37* 43*   GFRNAA 32* 37*   CA 9.2 9.

## 2021-12-11 NOTE — PROGRESS NOTES
Jennifer Gagnon  : 7/3/1934  Age 80year old  female patient is admitted to Facility: [facility].     [address] [place] [address] Drive date:  21  Discharge date to [facility]:  21  ELOS:  14 days  Anticipated discharge 3/2016    implant removal   • CHOLECYSTECTOMY  1993, 1998    x2   • COLECTOMY     • COLONOSCOPY  2004, 5/8/2012    x2, in 2012 w/ forceps biopsy   • COLOSTOMY      and reversal   • LYSIS OF ADHESIONS  08/20/2017    expl lap   • PAULINO LOCALIZATION WIRE 1 SITE drinks      Types: 2 Glasses of wine per week    Drug use: No      ALLERGIES:  No Known Allergies    CODE STATUS:  Full Code    ADVANCED CARE PLANNING TEAM: None      CURRENT MEDICATIONS   Current Outpatient Medications   Medication Sig Dispense Refill   • • Cholecalciferol (VITAMIN D3) 25 MCG (1000 UT) Oral Cap Take 1 tablet by mouth daily. • Vitamin B-12 1000 MCG Oral Tab Take 1,000 mcg by mouth daily. • Acidophilus/Pectin Oral Cap Take 1 capsule by mouth daily.      • aspirin 81 MG Oral Tab Take cerumen.   NECK: supple; FROM; no JVD, no TMG, no carotid bruits  BREAST: ---deferred  RESPIRATORY:clear to percussion and auscultation  CARDIOVASCULAR: S1, S2 normal, RRR; no S3, no S4; , no click, no murmur  ABDOMEN:  normal active BS+, soft, nondistended 10:36 AM: P indicates partial results on a panel have been released. Additional results will follow.   1 of 3  [facility]  Lab Results Report    Laboratory: 12/09/2021 11:19 CBC W/DIFF / MAGNESIUM, SERUM / CMP(CLIF Landry [id number] Result Unit Ref.  Range Flag Status  Tony Sales ([id number]) Order #: [id number] SourceKey: [id number]  Legend:  Report contains critical results (results with red text)  Report contains abnormal results (results with orange text)  Perfo [5]  Length of hospital stay >or =to 5 days                                   [2]    Patient's total score: [place] 73 score=Risk level/Risk of potentially avoidable 30-day readmission:  0-4 points=Low risk/

## 2021-12-13 NOTE — TELEPHONE ENCOUNTER
Medication: DONEPEZIL 10 MG     Date of last refill: 8/20/21 (#90/0)  [place]      Last office visit: 11/9/2021  Due back to clinic per last office note:  6 months  Date next office visit scheduled:    Future Appointment

## 2021-12-13 NOTE — TELEPHONE ENCOUNTER
Last OV: 9/17/21 hosp f/u   Last PE: 8/12/20    Future Appointments   Date Time Provider Eric Stnafordi   12/14/2021  3:45 PM Aidee Alexandra MD [facility]  SPAL   12/27/2021  5:30 PM Rekha Traylor MD [facility]        Latest labs: 12/7/21

## 2021-12-14 NOTE — PROGRESS NOTES
12/14/2021  Spoke to [place] for CCM.       Updates to patient care team/ comments: UTD  Patient reported changes in medications: UTD  Med Adherence  Comment: Pt taking medications as prescribed     Health Maintenance:     Annual Physical due on 08/12/2021 straight medicaid card and she can take advantage of any benefits that accept straight medicaid. Pt will also be eligible for medicaid through marketplace and will be able to change plan at that time.  If application is not completed and approved during ope goals and action plan recreation/update. Provided acknowledgment and validation to patient's concerns.    Monthly Minute Total including today: 24    Physical assessment, complete health history, and care plan established by Krystyna Vallecillo MD, need for CCM

## 2021-12-17 NOTE — PROGRESS NOTES
Milagro Khan, 93/1934, 80year old, female    Chief Complaint:  Patient presents with: Follow - Up: RUTH Kim with RVR       Subjective:  81 y/o female with PMHx sig for back problem, dementia, HTN, HLD, COPD admitted for rapid afib, was hydrated and ad lymphedema  MUSCULOSKELETAL: no acute synovitis upper or lower extremity.   Weakness R/T recent hospitalization/diagnoses/sequelae; will undergo therapies to rehab and improve strength, endurance and independence w/ ADLs  EXTREMITIES/VASCULAR:no cyanosis, c date: 12/22/21- to assist with discharge planning     Bowel Regimen  -Miralax 17 gm every day prn     Supplements  -Probiotic every day  -Calcium/Vitamin D every day  -Cranberry Capsule every day  -Prevagen every day  -MVI every day  -B12 every day  -Glu

## 2021-12-17 NOTE — PROGRESS NOTES
Zhane Byrnes, 93/1934, 80year old, female    Chief Complaint:  Patient presents with: Follow - Up: RUTH Kim with RVR       Subjective:  79 y/o female with PMHx sig for back problem, dementia, HTN, HLD, COPD admitted for rapid afib, was hydrated and ad ADLs  EXTREMITIES/VASCULAR:no cyanosis, clubbing or edema, radial pulses 2+ and dorsalis pedal pulses 2+  NEUROLOGIC: intact; no sensorimotor deficit, reflexes normal, cranial nerves intact II-XII, follows commands  PSYCHIATRIC: ---Alert and oriented X 2 f expected.     CMP(COMPREHENSIVE METABOLIC PANEL) dated:  01/95/50  Sodium 139 mmol/L 133-146 Final  Potassium 4.0 mmol/L 3.5-5.1 Final  Chloride 109 mmol/L  H Final  Carbon Dioxide 22 mmol/L 21-31 Final  Anion Gap 8 mmol/L 4-13 Final  Blood Urea Nitro critical results (results with red text)  Report contains abnormal results (results with orange text)  Performing Laboratory Information: [facility]  Reviewed by Name Reviewed by Signature Date  1 of 1  Assessment and plan:  Leukocytosis-resolving  -WBCs:

## 2021-12-23 NOTE — TELEPHONE ENCOUNTER
[facility] request to add  and speech therapy-will add to plan of care-need verbal ok to add

## 2021-12-25 PROBLEM — I50.9 ACUTE ON CHRONIC CONGESTIVE HEART FAILURE, UNSPECIFIED HEART FAILURE TYPE (HCC): Status: ACTIVE | Noted: 2021-01-01

## 2021-12-25 PROBLEM — N18.9 CHRONIC KIDNEY DISEASE: Status: ACTIVE | Noted: 2020-08-12

## 2021-12-25 NOTE — H&P
[facility] HOSPITALIST  History and Physical     Cordell Dilling Patient Status:  Emergency    7/3/1934 MRN [id number]   Location [address] [facility] Attending Anuja Steele MD   Hosp Day # 0 PCP Joanna Mohr MD     Chief Complaint: • COLECTOMY     • COLONOSCOPY  2004, 5/8/2012    x2, in 2012 w/ forceps biopsy   • COLOSTOMY      and reversal   • LYSIS OF ADHESIONS  08/20/2017    expl lap   • PAULINO LOCALIZATION WIRE 1 SITE RIGHT (CPT=19281) Right 2006    benign.    • OTHER Bilateral on file prior to encounter.   DONEPEZIL 10 MG Oral Tab, TAKE 1 TABLET BY MOUTH EVERY DAY AT NIGHT, Disp: 90 tablet, Rfl: 0  SERTRALINE 25 MG Oral Tab, TAKE 1 TABLET BY MOUTH EVERY DAY, Disp: 90 tablet, Rfl: 0  acetaminophen 325 MG Oral Tab, Take 650 mg by m Ht 157.5 cm (5' 2\")   Wt 110 lb (49.9 kg)   LMP  (LMP Unknown)   SpO2 97%   BMI 20.12 kg/m²   General: No acute distress. Alert and oriented x 3. HEENT: Normocephalic atraumatic. Moist mucous membranes. Neck: o carotid bruits.   Respiratory: Clear to au ED  -Recent echo from 33/67 without systolic/diastolic dysfunction    #COPD  -Continue home inhalers    #CKD  -At baseline    #Normocytic anemia  -Value slightly lower than baseline  -Monitor    #Dementia    #Deconditioning  -PT eval     Quality:  · DVT Pr

## 2021-12-25 NOTE — ED QUICK NOTES
DAUGHTER REPORTS THAT LAST METOPROLOL DOSE WAS Wednesday NIGHT, RAN OUT OF MEDICATION AND HAS BEEN UNABLE TO GET THIS REFILLED.

## 2021-12-25 NOTE — ED QUICK NOTES
Orders for admission, patient is aware of plan and ready to go upstairs. Any questions, please call ED RN [address]  at extension [phone]. Vaccinated?  YES  Type of COVID test sent:Rapid SARS-CoV-2 by PCR   COVID Suspicion level: Low      Titratable drug(s)

## 2021-12-25 NOTE — ED PROVIDER NOTES
Patient Seen in: [facility] Emergency Department      History   Patient presents with:  Arrythmia/Palpitations  Fatigue    Stated Complaint: afib, weakness, shortness of breath    Subjective:   HPI    Patient is a pleasant 14-year-old female, with m biopsy   • COLOSTOMY      and reversal   • LYSIS OF ADHESIONS  08/20/2017    expl lap   • PAULINO LOCALIZATION WIRE 1 SITE RIGHT (CPT=19281) Right 2006    benign.    • OTHER Bilateral     bilateral breast implants   • OTHER  07/11/2017    Kyphoplasty, Dr. Goldie Balderas SpO2 92 %   O2 Device None (Room air)       Current:/55   Pulse 93   Temp 98 °F (36.7 °C) (Temporal)   Resp 22   Ht 157.5 cm (5' 2\")   Wt 49.9 kg   LMP  (LMP Unknown)   SpO2 97%   BMI 20.12 kg/m²       Physical Exam    Vital signs noted.    GENERAL for panel order CBC With Differential With Platelet.   Procedure                               Abnormality         Status                     ---------                               -----------         ------                     CBC W/ NEERAJ[[id number] cardiac monitor. An EKG was obtained and reviewed as noted above. Patient was observed while the laboratory and radiology studies were obtained. A. fib with RVR noted. Patient has not been taking her metoprolol. IV and oral metoprolol was ordered.

## 2021-12-26 NOTE — PROGRESS NOTES
[facility]     Hospitalist Progress Note     Iliana Nava Patient Status:  Inpatient    7/3/1934 MRN [id number]   [facility] 2NE-A Attending Kaylynn Mortensen, [address] Day # 1 PCP J Luis Trejo MD     Chief Complaint: SOB    Subject hours.    Imaging: Imaging data reviewed in Epic.     Medications:   • aspirin  81 mg Oral Daily   • dilTIAZem  300 mg Oral Daily   • donepezil  10 mg Oral Nightly   • Memantine HCl  10 mg Oral BID   • sertraline  25 mg Oral Daily   • pravastatin  20 mg Ora

## 2021-12-26 NOTE — PLAN OF CARE
Admitted to unit at 1712. Patent A0x2-3 (forgetful), according to daughter in early of dementia, however pleasant. Lives with daughter Joseph Aldridge. According to daughter patient was recently admitted at [facility] for afib rvr and went to [facility].  David

## 2021-12-26 NOTE — PLAN OF CARE
Patient tele D/C, IV discontinued with catheter intact. Patient denies CP, SOB, dizziness, or palpitations. Patient denies calf tenderness. Patient discharge instructions reviewed with patient and daughter, verbalize understanding.  Patient medication and t Progressing

## 2021-12-26 NOTE — PHYSICAL THERAPY NOTE
PHYSICAL THERAPY EVALUATION - INPATIENT     Room Number: 6262/7458-Q  Evaluation Date: 12/26/2021  Type of Evaluation: Initial  Physician Order: PT Eval and Treat    Presenting Problem: Afib with RVR     Reason for Therapy: Mobility Dysfunction and D ambulate 50 feet with assist device: walker - rolling at assistance level: supervision     Goal #4    Goal #5    Goal #6    Goal Comments: Goals established on 12/26/2021    HOME SITUATION  Type of Home: [address]: Two level; Able to live on main Help from Another: Climbing 3-5 steps with a railing?: A Lot       AM-PAC Score:  Raw Score: 17   Approx Degree of Impairment: 50.57%   Standardized Score (AM-PAC Scale): 42.13   CMS Modifier (G-Code): CK    FUNCTIONAL ABILITY STATUS  Gait Assessment   F

## 2021-12-26 NOTE — CONSULTS
[facility]  Cardiology Consultation    Tianna Tejeda Patient Status:  Inpatient    7/3/1934 MRN [id number]   [facility] 2NE-A Attending Pablo Doctor, [address] [facility] Day # 0 PCP Shalonda Quijano MD     Reason for Consultation: Date   • BOWEL RESECTION      small bowel resection   • BREAST SURGERY Bilateral 3/2016    implant removal   • CHOLECYSTECTOMY  1993, 1998    x2   • COLECTOMY     • COLONOSCOPY  2004, 5/8/2012    x2, in 2012 w/ forceps biopsy   • COLOSTOMY      and reversa not use drugs.     Allergies:  No Known Allergies    Medications:    Current Facility-Administered Medications:   •  aspirin tab 81 mg, 81 mg, Oral, Daily  •  dilTIAZem (DILACOR XR) 24 hr cap 300 mg, 300 mg, Oral, Daily  •  donepezil (ARICEPT) tab 10 mg, 10 excursions and effort. Abdomen: Soft, non-tender. Extremities: Without clubbing, cyanosis or edema. Neurologic: Alert and oriented, forgetful. Skin: Warm and dry. Laboratory Data:  Lab Results   Component Value Date    WBC 6.9 12/25/2021    HGB 8. assistance to make sure medications ordered and available upon discharge    D/w patient, daughter, son and bedside RN    Thank you for allowing me to participate in the care of your patient.     Ralph Yu MD  12/25/2021  6:11 PM

## 2021-12-26 NOTE — PROGRESS NOTES
Progress Note  [place] Plan Patient Status:  Inpatient    7/3/1934 MRN [id number]   [facility] 2NE-A Attending Hiral Barton, DO   Hosp Day # 1 PCP Samy Collins MD     Subjective:  No overnight events  Asleep, easy to wake.   Arianna Messier diastolic function. 2. Mitral valve: Mildly to moderately calcified annulus. Transvalvular      velocity was within the normal range. There was no evidence for stenosis.    There was mild regurgitation.    3. Left atrium: The left atrium was moderately BB.  Will hold ARB for now, can be resumed as outpatient if needed  • Dyslipidemia - statin  • dementia    Plan:    • Continuous telemetry  • Continue Diltiazem, metoprolol, ASA,  statin  • Strict I/O, daily weight  • Will eprescribe home cardiac medicatio

## 2021-12-26 NOTE — PLAN OF CARE
Assumed care of the patient at 2300. Pt Aox4 but can be forgetful, early stage dementia. Afib on tele, rates in 110's. Pt on room air, denies shortness of breath. Pt denies any pain. Upper and lower dentures. Lanre Alt in place for urinary frequency.  Call l schedule  12/26/2021 0635 by Martha Beauchamp RN  Outcome: Progressing  12/26/2021 0635 by Martha Beauchamp RN  Outcome: Progressing  12/26/2021 0128 by Martha Beauchamp RN  Outcome: Progressing  12/26/2021 0128 by Martha Beauchamp RN  Outcome: Progressing

## 2021-12-26 NOTE — DISCHARGE SUMMARY
[facility] HOSPITAL[facility]  DISCHARGE SUMMARY     Jony Child Patient Status:  Inpatient    7/3/1934 MRN [id number]   [facility] 2NE-A Attending Elmer Cazares, [address] [facility] Day # 1 PCP Rosa Maria Morgan MD     Date of Admission: 2021  Date of details   acetaminophen 325 MG Tabs  Commonly known as: TYLENOL      Take 650 mg by mouth every 6 (six) hours as needed for Pain. Refills: 0     Acidophilus/Pectin Caps  Commonly known as: PROBIOTIC      Take 1 capsule by mouth daily.    Refills: 0     al D3 25 MCG (1000 UT) Caps      Take 1 tablet by mouth daily. Refills: 0           Where to Get Your Medications      These medications were sent to [facility]/pharmacy #[id number]- [phone], [phone], [phone]

## 2021-12-26 NOTE — CM/SW NOTE
Received message from RN, patient to [place] today. Will need HHC at AL. Referrals sent to several [place] [phone] agencies via [phone] [employer]usion. Await responses. Addendum [phone]  Per Heena Pittman at [facility], patient is current with their services.

## 2021-12-26 NOTE — PLAN OF CARE
Patient laying in bed, denies chest pain, shortness of breath and dizziness. Afib on cardiac monitor. No edema noted. Plan for possible discharge today. Plan reviewed with patient, verbalized understanding. Call light with in reach.

## 2021-12-29 NOTE — TELEPHONE ENCOUNTER
Family would like Hospice eval and possible admission.  [facility] needs order and referral placed in Epic or faxed to them     Fax [phone]

## 2021-12-30 NOTE — TELEPHONE ENCOUNTER
Consult note from [address] [place] [address] Cardiology. Placed note in TB basket for review.

## 2022-01-01 ENCOUNTER — APPOINTMENT (OUTPATIENT)
Dept: CT IMAGING | Facility: HOSPITAL | Age: 87
DRG: 394 | End: 2022-01-01
Attending: EMERGENCY MEDICINE
Payer: MEDICARE

## 2022-01-01 ENCOUNTER — APPOINTMENT (OUTPATIENT)
Dept: GENERAL RADIOLOGY | Facility: HOSPITAL | Age: 87
DRG: 394 | End: 2022-01-01
Attending: EMERGENCY MEDICINE
Payer: MEDICARE

## 2022-01-01 ENCOUNTER — HOSPITAL ENCOUNTER (INPATIENT)
Facility: HOSPITAL | Age: 87
LOS: 1 days | DRG: 394 | End: 2022-01-01
Attending: HOSPITALIST | Admitting: HOSPITALIST
Payer: OTHER MISCELLANEOUS

## 2022-01-01 ENCOUNTER — PATIENT OUTREACH (OUTPATIENT)
Dept: CASE MANAGEMENT | Age: 87
End: 2022-01-01

## 2022-01-01 ENCOUNTER — HOSPITAL ENCOUNTER (INPATIENT)
Facility: HOSPITAL | Age: 87
LOS: 1 days | Discharge: HOSPICE | DRG: 394 | End: 2022-01-01
Attending: EMERGENCY MEDICINE | Admitting: INTERNAL MEDICINE
Payer: MEDICARE

## 2022-01-01 VITALS
WEIGHT: 97.38 LBS | SYSTOLIC BLOOD PRESSURE: 81 MMHG | RESPIRATION RATE: 16 BRPM | DIASTOLIC BLOOD PRESSURE: 61 MMHG | HEART RATE: 85 BPM | OXYGEN SATURATION: 96 % | HEIGHT: 62 IN | BODY MASS INDEX: 17.92 KG/M2 | TEMPERATURE: 98 F

## 2022-01-01 DIAGNOSIS — R79.89 ELEVATED BRAIN NATRIURETIC PEPTIDE (BNP) LEVEL: ICD-10-CM

## 2022-01-01 DIAGNOSIS — F03.90 DEMENTIA WITHOUT BEHAVIORAL DISTURBANCE, UNSPECIFIED DEMENTIA TYPE (HCC): ICD-10-CM

## 2022-01-01 DIAGNOSIS — A41.9 SEPSIS DUE TO UNDETERMINED ORGANISM (HCC): ICD-10-CM

## 2022-01-01 DIAGNOSIS — R19.8 PERFORATED VISCUS: Primary | ICD-10-CM

## 2022-01-01 DIAGNOSIS — I48.91 ATRIAL FIBRILLATION WITH RVR (HCC): ICD-10-CM

## 2022-01-01 LAB
ALBUMIN SERPL-MCNC: 2.4 G/DL (ref 3.4–5)
ALBUMIN/GLOB SERPL: 0.6 {RATIO} (ref 1–2)
ALP LIVER SERPL-CCNC: 93 U/L
ALT SERPL-CCNC: 12 U/L
ANION GAP SERPL CALC-SCNC: 8 MMOL/L (ref 0–18)
APTT PPP: 28.1 SECONDS (ref 23.3–35.6)
AST SERPL-CCNC: 16 U/L (ref 15–37)
ATRIAL RATE: 182 BPM
BASOPHILS # BLD AUTO: 0.02 X10(3) UL (ref 0–0.2)
BASOPHILS NFR BLD AUTO: 0.1 %
BILIRUB SERPL-MCNC: 0.7 MG/DL (ref 0.1–2)
BUN BLD-MCNC: 25 MG/DL (ref 7–18)
CALCIUM BLD-MCNC: 9.9 MG/DL (ref 8.5–10.1)
CHLORIDE SERPL-SCNC: 111 MMOL/L (ref 98–112)
CO2 SERPL-SCNC: 18 MMOL/L (ref 21–32)
CREAT BLD-MCNC: 1.75 MG/DL
EOSINOPHIL # BLD AUTO: 0.01 X10(3) UL (ref 0–0.7)
EOSINOPHIL NFR BLD AUTO: 0.1 %
ERYTHROCYTE [DISTWIDTH] IN BLOOD BY AUTOMATED COUNT: 18.5 %
GLOBULIN PLAS-MCNC: 4 G/DL (ref 2.8–4.4)
GLUCOSE BLD-MCNC: 170 MG/DL (ref 70–99)
HCT VFR BLD AUTO: 37.9 %
HGB BLD-MCNC: 11.3 G/DL
IMM GRANULOCYTES # BLD AUTO: 0.07 X10(3) UL (ref 0–1)
IMM GRANULOCYTES NFR BLD: 0.4 %
INR BLD: 1.31 (ref 0.8–1.2)
LACTATE SERPL-SCNC: 1 MMOL/L (ref 0.4–2)
LIPASE SERPL-CCNC: 391 U/L (ref 73–393)
LYMPHOCYTES # BLD AUTO: 0.53 X10(3) UL (ref 1–4)
LYMPHOCYTES NFR BLD AUTO: 3.2 %
MAGNESIUM SERPL-MCNC: 1.9 MG/DL (ref 1.6–2.6)
MCH RBC QN AUTO: 26.2 PG (ref 26–34)
MCHC RBC AUTO-ENTMCNC: 29.8 G/DL (ref 31–37)
MCV RBC AUTO: 87.9 FL
MONOCYTES # BLD AUTO: 0.78 X10(3) UL (ref 0.1–1)
MONOCYTES NFR BLD AUTO: 4.6 %
NEUTROPHILS # BLD AUTO: 15.38 X10 (3) UL (ref 1.5–7.7)
NEUTROPHILS # BLD AUTO: 15.38 X10(3) UL (ref 1.5–7.7)
NEUTROPHILS NFR BLD AUTO: 91.6 %
NT-PROBNP SERPL-MCNC: ABNORMAL PG/ML (ref ?–450)
OSMOLALITY SERPL CALC.SUM OF ELEC: 292 MOSM/KG (ref 275–295)
PLATELET # BLD AUTO: 604 10(3)UL (ref 150–450)
POTASSIUM SERPL-SCNC: 4.9 MMOL/L (ref 3.5–5.1)
PROT SERPL-MCNC: 6.4 G/DL (ref 6.4–8.2)
PROTHROMBIN TIME: 16.4 SECONDS (ref 11.6–14.8)
Q-T INTERVAL: 242 MS
QRS DURATION: 86 MS
QTC CALCULATION (BEZET): 420 MS
R AXIS: 67 DEGREES
RBC # BLD AUTO: 4.31 X10(6)UL
SARS-COV-2 RNA RESP QL NAA+PROBE: NOT DETECTED
SODIUM SERPL-SCNC: 137 MMOL/L (ref 136–145)
T AXIS: 252 DEGREES
TROPONIN I HIGH SENSITIVITY: 20 NG/L
TSI SER-ACNC: 1.46 MIU/ML (ref 0.36–3.74)
VENTRICULAR RATE: 181 BPM
WBC # BLD AUTO: 16.8 X10(3) UL (ref 4–11)

## 2022-01-01 PROCEDURE — 99235 HOSP IP/OBS SAME DATE MOD 70: CPT | Performed by: HOSPITALIST

## 2022-01-01 PROCEDURE — 74176 CT ABD & PELVIS W/O CONTRAST: CPT | Performed by: EMERGENCY MEDICINE

## 2022-01-01 PROCEDURE — 71045 X-RAY EXAM CHEST 1 VIEW: CPT | Performed by: EMERGENCY MEDICINE

## 2022-01-01 PROCEDURE — 99223 1ST HOSP IP/OBS HIGH 75: CPT | Performed by: INTERNAL MEDICINE

## 2022-01-01 RX ORDER — HALOPERIDOL 5 MG/ML
2 INJECTION INTRAMUSCULAR
Status: DISCONTINUED | OUTPATIENT
Start: 2022-01-01 | End: 2022-01-25

## 2022-01-01 RX ORDER — PROCHLORPERAZINE MALEATE 10 MG
10 TABLET ORAL EVERY 4 HOURS PRN
Status: DISCONTINUED | OUTPATIENT
Start: 2022-01-01 | End: 2022-01-01

## 2022-01-01 RX ORDER — LORAZEPAM 2 MG/ML
2 INJECTION INTRAMUSCULAR EVERY 4 HOURS PRN
Status: CANCELLED | OUTPATIENT
Start: 2022-01-01

## 2022-01-01 RX ORDER — ONDANSETRON 2 MG/ML
4 INJECTION INTRAMUSCULAR; INTRAVENOUS EVERY 6 HOURS PRN
Status: DISCONTINUED | OUTPATIENT
Start: 2022-01-01 | End: 2022-01-25

## 2022-01-01 RX ORDER — BISACODYL 10 MG
10 SUPPOSITORY, RECTAL RECTAL
Status: DISCONTINUED | OUTPATIENT
Start: 2022-01-01 | End: 2022-01-25

## 2022-01-01 RX ORDER — SCOLOPAMINE TRANSDERMAL SYSTEM 1 MG/1
1 PATCH, EXTENDED RELEASE TRANSDERMAL
Status: DISCONTINUED | OUTPATIENT
Start: 2022-01-01 | End: 2022-01-25

## 2022-01-01 RX ORDER — MORPHINE SULFATE 2 MG/ML
1 INJECTION, SOLUTION INTRAMUSCULAR; INTRAVENOUS
Status: DISCONTINUED | OUTPATIENT
Start: 2022-01-01 | End: 2022-01-25

## 2022-01-01 RX ORDER — GLYCOPYRROLATE 0.2 MG/ML
0.2 INJECTION, SOLUTION INTRAMUSCULAR; INTRAVENOUS
Status: DISCONTINUED | OUTPATIENT
Start: 2022-01-01 | End: 2022-01-01

## 2022-01-01 RX ORDER — ACETAMINOPHEN 325 MG/1
650 TABLET ORAL EVERY 6 HOURS PRN
Status: DISCONTINUED | OUTPATIENT
Start: 2022-01-01 | End: 2022-01-01

## 2022-01-01 RX ORDER — POLYETHYLENE GLYCOL 3350 17 G/17G
17 POWDER, FOR SOLUTION ORAL DAILY PRN
Status: DISCONTINUED | OUTPATIENT
Start: 2022-01-01 | End: 2022-01-01

## 2022-01-01 RX ORDER — LORAZEPAM 2 MG/ML
0.5 INJECTION INTRAMUSCULAR EVERY 4 HOURS PRN
Status: DISCONTINUED | OUTPATIENT
Start: 2022-01-01 | End: 2022-01-01

## 2022-01-01 RX ORDER — ACETAMINOPHEN 650 MG/1
650 SUPPOSITORY RECTAL EVERY 4 HOURS PRN
Status: DISCONTINUED | OUTPATIENT
Start: 2022-01-01 | End: 2022-01-25

## 2022-01-01 RX ORDER — HALOPERIDOL 5 MG/ML
1 INJECTION INTRAMUSCULAR
Status: DISCONTINUED | OUTPATIENT
Start: 2022-01-01 | End: 2022-01-25

## 2022-01-01 RX ORDER — LORAZEPAM 2 MG/ML
0.5 INJECTION INTRAMUSCULAR EVERY 4 HOURS PRN
Status: DISCONTINUED | OUTPATIENT
Start: 2022-01-01 | End: 2022-01-25

## 2022-01-01 RX ORDER — LORAZEPAM 2 MG/ML
1 INJECTION INTRAMUSCULAR EVERY 4 HOURS PRN
Status: DISCONTINUED | OUTPATIENT
Start: 2022-01-01 | End: 2022-01-25

## 2022-01-01 RX ORDER — BISACODYL 10 MG
10 SUPPOSITORY, RECTAL RECTAL
Status: DISCONTINUED | OUTPATIENT
Start: 2022-01-01 | End: 2022-01-01

## 2022-01-01 RX ORDER — LORAZEPAM 2 MG/ML
2 INJECTION INTRAMUSCULAR EVERY 4 HOURS PRN
Status: DISCONTINUED | OUTPATIENT
Start: 2022-01-01 | End: 2022-01-01

## 2022-01-01 RX ORDER — MORPHINE SULFATE 4 MG/ML
4 INJECTION, SOLUTION INTRAMUSCULAR; INTRAVENOUS ONCE
Status: COMPLETED | OUTPATIENT
Start: 2022-01-01 | End: 2022-01-01

## 2022-01-01 RX ORDER — LORAZEPAM 2 MG/ML
2 INJECTION INTRAMUSCULAR EVERY 4 HOURS PRN
Status: DISCONTINUED | OUTPATIENT
Start: 2022-01-01 | End: 2022-01-25

## 2022-01-01 RX ORDER — LORAZEPAM 2 MG/ML
1 INJECTION INTRAMUSCULAR EVERY 4 HOURS PRN
Status: CANCELLED | OUTPATIENT
Start: 2022-01-01

## 2022-01-01 RX ORDER — LORAZEPAM 2 MG/ML
1 INJECTION INTRAMUSCULAR EVERY 4 HOURS PRN
Status: DISCONTINUED | OUTPATIENT
Start: 2022-01-01 | End: 2022-01-01

## 2022-01-01 RX ORDER — GLYCOPYRROLATE 0.2 MG/ML
0.4 INJECTION, SOLUTION INTRAMUSCULAR; INTRAVENOUS
Status: DISCONTINUED | OUTPATIENT
Start: 2022-01-01 | End: 2022-01-25

## 2022-01-01 RX ORDER — MELATONIN
3 NIGHTLY PRN
Status: DISCONTINUED | OUTPATIENT
Start: 2022-01-01 | End: 2022-01-01

## 2022-01-01 RX ORDER — SCOLOPAMINE TRANSDERMAL SYSTEM 1 MG/1
1 PATCH, EXTENDED RELEASE TRANSDERMAL
Status: DISCONTINUED | OUTPATIENT
Start: 2022-01-01 | End: 2022-01-01

## 2022-01-01 RX ORDER — LORAZEPAM 2 MG/ML
0.5 INJECTION INTRAMUSCULAR EVERY 4 HOURS PRN
Status: CANCELLED | OUTPATIENT
Start: 2022-01-01

## 2022-01-01 RX ORDER — GLYCOPYRROLATE 0.2 MG/ML
0.2 INJECTION, SOLUTION INTRAMUSCULAR; INTRAVENOUS
Status: CANCELLED | OUTPATIENT
Start: 2022-01-01

## 2022-01-01 RX ORDER — ACETAMINOPHEN 160 MG/5ML
650 SOLUTION ORAL EVERY 4 HOURS PRN
Status: DISCONTINUED | OUTPATIENT
Start: 2022-01-01 | End: 2022-01-25

## 2022-01-01 RX ORDER — SENNOSIDES 8.6 MG
17.2 TABLET ORAL NIGHTLY PRN
Status: DISCONTINUED | OUTPATIENT
Start: 2022-01-01 | End: 2022-01-01

## 2022-01-01 RX ORDER — ONDANSETRON 2 MG/ML
4 INJECTION INTRAMUSCULAR; INTRAVENOUS EVERY 6 HOURS PRN
Status: DISCONTINUED | OUTPATIENT
Start: 2022-01-01 | End: 2022-01-01

## 2022-01-01 RX ORDER — ONDANSETRON 4 MG/1
4 TABLET, ORALLY DISINTEGRATING ORAL EVERY 6 HOURS PRN
Status: DISCONTINUED | OUTPATIENT
Start: 2022-01-01 | End: 2022-01-25

## 2022-01-01 RX ORDER — PROCHLORPERAZINE 25 MG
25 SUPPOSITORY, RECTAL RECTAL EVERY 6 HOURS PRN
Status: DISCONTINUED | OUTPATIENT
Start: 2022-01-01 | End: 2022-01-01

## 2022-01-23 PROBLEM — A41.9 SEPSIS DUE TO UNDETERMINED ORGANISM (HCC): Status: ACTIVE | Noted: 2022-01-01

## 2022-01-23 PROBLEM — R79.89 ELEVATED BRAIN NATRIURETIC PEPTIDE (BNP) LEVEL: Status: ACTIVE | Noted: 2022-01-01

## 2022-01-23 PROBLEM — R19.8 PERFORATED VISCUS: Status: ACTIVE | Noted: 2022-01-01

## 2022-01-23 PROBLEM — F03.90 DEMENTIA WITHOUT BEHAVIORAL DISTURBANCE, UNSPECIFIED DEMENTIA TYPE: Status: ACTIVE | Noted: 2022-01-01

## 2022-01-23 PROBLEM — F03.90 DEMENTIA WITHOUT BEHAVIORAL DISTURBANCE, UNSPECIFIED DEMENTIA TYPE (HCC): Status: ACTIVE | Noted: 2022-01-01

## 2022-01-24 PROBLEM — I67.2 CEREBRAL ATHEROSCLEROSIS: Status: ACTIVE | Noted: 2022-01-01

## 2022-01-24 NOTE — ED QUICK NOTES
Orders for admission, patient is aware of plan and ready to go upstairs. Any questions, please call ED KODI Schultz Apgar at extension [phone]    Vaccinated?  Yes  Type of COVID test sent: Rapid  COVID Suspicion level: Low      Titratable drug(s) infusing:   Rate:

## 2022-01-24 NOTE — CM/SW NOTE
Order for hospice called to residential [facility]. Patient was on their service prior to admit but then revoked. / to remain available for any further discharge planning needs.     Lynnette William RN,   Phone [phone]

## 2022-01-24 NOTE — H&P
[facility] HOSPITALIST  History and Physical     Ivonne Chapman Patient Status:  Emergency    7/3/1934 MRN [id number]   Location [address] [facility] Attending Marie Ferrer, [address] Day # 0 PCP Janna Onofre MD     [facility] 11/13/2020   • Osteoarthritis    • Other and unspecified hyperlipidemia    • Pain in joints    • Pneumonia, organism unspecified(486)    • Sputum production    • Uncomfortable fullness after meals    • Unspecified essential hypertension    • 2.0 standard drinks of alcohol per week. She reports that she does not use drugs.     Family History:   Family History   Problem Relation Age of Onset   • Psychiatric Father    • Other (suicide completion) Father    • Other (Other) Mother         Cerebral h Tab, Take 1,000 mcg by mouth daily. , Disp: , Rfl:   Acidophilus/Pectin Oral Cap, Take 1 capsule by mouth daily. , Disp: , Rfl:   aspirin 81 MG Oral Tab, Take 81 mg by mouth daily. , Disp: , Rfl:   CRANBERRY OR, Take 1 tablet by mouth daily.   , Disp: , Rfl: COVID-19 Lab Results    COVID-19  Lab Results   Component Value Date    COVID19 Not Detected 01/23/2022    COVID19 Not Detected 12/25/2021    COVID19 Not Detected 12/06/2021       Pro-Calcitonin  No results for input(s): PCT in the last 168 hours.

## 2022-01-24 NOTE — SEPSIS REASSESSMENT
[facility]    Sepsis Reassessment Note    BP (!) 134/97   Pulse (!) 140   Temp 96.7 °F (35.9 °C) (Temporal)   Resp 24   Ht 157.5 cm (5' 2\")   Wt 45.4 kg   LMP  (LMP Unknown)   SpO2 94%   BMI 18.29 kg/m²      I completed the sepsis reassessment at 2
3

## 2022-01-24 NOTE — ED PROVIDER NOTES
Patient Seen in: [facility] Emergency Department      History   Patient presents with:  Abdomen/Flank Pain    Stated Complaint: Abdominal pain    Subjective:   45-year-old female, history of atrial fibrillation, chronic back pain, COPD, diverticulit Pneumonia, organism unspecified(486)    • Sputum production    • Uncomfortable fullness after meals    • Unspecified essential hypertension    • Vertigo    • Wears glasses               Past Surgical History:   Procedure Laterality Date   • BOWEL RESECTION standard drinks      Types: 2 Glasses of wine per week    Drug use: No             Review of Systems   Unable to perform ROS: Acuity of condition       Positive for stated complaint: Abdominal pain  Other systems are as noted in HPI.   Constitutional and vi -American 30 (*)     ALT 12 (*)     Albumin 2.4 (*)     A/G Ratio 0.6 (*)     All other components within normal limits   PROTHROMBIN TIME (PT) - Abnormal; Notable for the following components:    PT 16.4 (*)     INR 1.31 (*)     All other component Interpretation at bedside by me is RUTH martell with RVR.          ED Course as of 01/23/22 2216  ------------------------------------------------------------  Time: 01/23 2104  Comment: Daughter at bedside who is power of  has been here throughout her st medication administration. Several peripheral attempts were unsuccessful. Using the linear probe covered and sterile technique, a short axis view of the right brachial vein was obtained.   This was noted to be completely compressible and was identifie however, explained that that is my personal belief and in no way am I giving them any sort of advice or trying to persuade their decision either way. They verbalized understanding of that. She has a very poor prognosis.   Dr. Connie Boyd, who has been in the ED,

## 2022-01-24 NOTE — PLAN OF CARE
Assumed care of pt today at 0730. Pt has admitted to inpatient hospice. Morphine gtt currently at 3mg/hr, orders to titrate up to 8mg/hr prn pain to keep pain score < or = to 3 and RR < or =20 per orders. Accessory muscle use lying on side.  Family at be

## 2022-01-24 NOTE — PROGRESS NOTES
Received pt from ed oriented x2-3, lethargic  comfort measures orders, denies pain   With dtr from ed at bedside  Oriented to room and safety precautions initiated  o2 4l baseline at home    Prn zofran given for n/v   Started on morphine gtt pca at 1mg/hr

## 2022-01-24 NOTE — ED QUICK NOTES
Pt is wincing in pain, but unable to tell us exact location of pain. Dr Andreea Chadwick is at [facility]. Pt appears to be in Afbi RVR with HR in the 180's. IV's and blood draw being established.

## 2022-01-24 NOTE — ED INITIAL ASSESSMENT (HPI)
Pt arrives via EMS from home, lives with daughter. Per family, patient developed nausea and vomiting today in addition to abdominal pain. Has hx of bowel obstruction. A&O x2 per norm.

## 2022-01-24 NOTE — ED QUICK NOTES
After receiving fluids, pt has increased blood pressure. Pt still appears to be in pain, 4mg of morphine has been ordered. Pt will be going to CT shortly.

## 2022-01-24 NOTE — ED QUICK NOTES
Pt's daughter has arrived, states she \"received a lot of morphine today\" and she's been out of it more than usual.

## 2022-01-24 NOTE — HOSPICE RN NOTE
Pt ; hospice RNs visited and support provided. Family will use Renetta Mauro on [phone]  [facility] updated on  planning.

## 2022-01-24 NOTE — PROGRESS NOTES
01/24/22 1632   Clinical Encounter Type   Visited With Family; Health care provider  (Daughter, adult grandchildren, hospice RN)   Continue Visiting No   Crisis Visit Death   Patient Spiritual Encounters   Dignified Life Closure Offered grief support   F

## 2022-01-24 NOTE — ED QUICK NOTES
Transport has arrived, confirmed with floor that patient's family is allowed to go with patient. Pt is resting comfortably at this time.

## 2022-01-24 NOTE — PLAN OF CARE
Pt with no spontaneous respirations, no apical heartbeat auscultated x 60 seconds, no pupillary response. Family at bedside. Time of death 1600. Verified with charge KODI SWIFT

## 2022-01-24 NOTE — PROGRESS NOTES
Chart reviewed. Patient has been admitted into [facility] with perforated viscus, A-fib with RVR, and sepsis. Patient has been admitted into in-patient hospice today. No needs for NCM to address at this time. Will follow-up.

## 2022-01-25 NOTE — DISCHARGE PLANNING
Note from [facility] at 4:22PM \"Pt with no spontaneous respirations, no apical heartbeat auscultated x 60 seconds, no pupillary response. Family at bedside. Time of death 1600. Verified with charge KODI SWIFT \"    Barajas catheter removed.  Patient prepared

## 2022-01-25 NOTE — H&P
DILCIA HOSPITALIST  History and Physical     Santino Head Patient Status:  Inpatient    7/3/1934 MRN [id number]   [facility] 3NE-A Attending Mariela Narayan MD   Hosp Day # 0 PCP Dorene Cason MD     Chief Complaint: hospice care w/ forceps biopsy   • COLOSTOMY      and reversal   • LYSIS OF ADHESIONS  08/20/2017    expl lap   • PAULINO LOCALIZATION WIRE 1 SITE RIGHT ([id number]=[id number]) Right 2006    benign.    • OTHER Bilateral     bilateral breast implants   • OTHER  07/11/2017    Kyphoplasty medications on file prior to encounter. Review of Systems:   A comprehensive 14 point review of systems was completed. Pertinent positives and negatives noted in the HPI. Physical Exam:    LMP  (LMP Unknown)   General: No acute distress.   HEENT: Viscus  #Leukocytosis  #Afib with RVR  #NANCY on CKD3  #hyperglycemia  #COPD  #HTN  #HLD  #dementia  #chronic anemia    Continued comfort focused measures,  Started on a morphine gtt, family at bedside, all questions answered    Murtaza Prater MD  1/24/2022

## 2022-04-16 RX ORDER — DILTIAZEM HYDROCHLORIDE 300 MG/1
CAPSULE, COATED, EXTENDED RELEASE ORAL
Qty: 90 CAPSULE | Refills: 1 | OUTPATIENT
Start: 2022-04-16

## 2022-06-22 NOTE — TELEPHONE ENCOUNTER
CPE labs ordered per protocol. Rx Refill Note  Requested Prescriptions     Pending Prescriptions Disp Refills   • metFORMIN (GLUCOPHAGE) 1000 MG tablet [Pharmacy Med Name: metFORMIN HCL 1,000 MG TABLET] 180 tablet 0     Sig: TAKE ONE TABLET BY MOUTH TWICE A DAY WITH MEALS      Last office visit with prescribing clinician: 10/8/2021      Next office visit with prescribing clinician: 9/9/2022   {TIP  Encounters:     Antihyperglycemics Protocol Failed 06/22/2022 02:03 PM   Protocol Details  HgA1c in past 6 months    Recent or future visit with authorizing provider            {TIP  Please add Last Relevant Lab Date if appropriate   {TIP  Is Refill Pharmacy correct? yes  Tristin Joyce LPN  06/22/22, 14:29 CDT

## 2022-07-11 NOTE — PLAN OF CARE
Pt is A&O. On room air. Has productive cough with thin, clear sputum. Chloraseptic spray given for sore throat. Tele and  monitoring maintained. Potassium replaced per protocol. Lopressor given as needed for increased BPs. Pt remains NPO with ice chips. Partial Purse String (Intermediate) Text: Given the location of the defect and the characteristics of the surrounding skin an intermediate purse string closure was deemed most appropriate.  Undermining was performed circumfirentially around the surgical defect.  A purse string suture was then placed and tightened. Wound tension only allowed a partial closure of the circular defect.

## 2022-09-09 ENCOUNTER — TELEPHONE (OUTPATIENT)
Dept: CARDIOLOGY | Age: 87
End: 2022-09-09

## 2022-09-09 ENCOUNTER — DOCUMENTATION (OUTPATIENT)
Dept: CARDIOLOGY | Age: 87
End: 2022-09-09

## 2022-09-14 ENCOUNTER — APPOINTMENT (OUTPATIENT)
Dept: CARDIOLOGY | Age: 87
End: 2022-09-14

## 2022-12-14 NOTE — PATIENT INSTRUCTIONS
Restart Flonase nasal spray as we discussed.   Follow up if voice not returning to normal.
(1) Other Diagnosis

## 2023-08-18 NOTE — PROGRESS NOTES
Called ED, spoke with  PA, Darrian Thomas, taking care of her in ED to have patient evaluated for suicide risk            August 17, 2023  [address]  to Deanna Luong           5:21 PM  Hi it's me Thressa Donna I am in a living nightmare when it comes to my medical condition and the fact that I can't get any help and to be honest I am very afraid and feeling a lot like just giving up on life. I'm so miserable and in pain and overwhelmed with my health care providers they have zero compassion and I feel I'm being judged and mistreated because of my situation. I'm definitely thinking about suicide so I this painfil nightmare will be over.  I need Neurosurgery   Lumbar Follow up      [address] Josias Angelo is a 80year old s/p kyphoplasty of T9 and T10 with Dr. Marin Yuen (7/11/17). Patient presents for continued back pain.  Per Dr. Marzena Prakash last note, if pain continued, would consider Iliopsoas  Hamstrings   Quads    D-flexion P-flexion   Right       5         5       5         5 5   Left       5         5       5         5 5     SKIN: Warm, dry.       DIAGNOSTIC DATA: None      IMAGING:  No recent imaging      ASSESSMENT:  (M54.6,  G89 [id number]  [phone]  1/9/2018 2:30 PM

## 2024-03-10 NOTE — PROGRESS NOTES
Quick Note:    BUN mildly high- stay well hydrated  Other labs ok  ______
Quick Note:    Vit riccardo jerez  ______
Luz Marina Sim RN

## 2024-08-23 NOTE — TELEPHONE ENCOUNTER
I recommend Dr. Valentine Nurse
Lm for pt ([address] [address] per HIPAA) to inform, per TB, recommended ENT-  Dr. Pan Code Dr Knox [address] , [address]  [phone]  To call back at the office if any further questions.
Pt stated she feels like she is not able to hear as well as she used to and would like to know what ENT TB recommends. Please advise. I offered an appt pt declined.
Leaving Home is Contraindicated...

## 2024-11-21 NOTE — ED QUICK NOTES
CERTIFICATE OF WORK       November 21, 2024      Re: Pietro Whitfield  [address] [address] [place] [address]      This is to certify that Pietro Whitfield has been under my care from 11/21/2024 and is excused from work on 11/22/2024 to 11/24/2024.                  SIGNATURE:___________________________________________          STAS Le  [facility], [address] [address]  Phone: [phone]  Fax: [phone]   Pt's daughter and son are at cartside, discussing with Dr. Beatrice Roberts.

## (undated) DEVICE — CHLORAPREP 26ML APPLICATOR

## (undated) DEVICE — BANDAID COVERLET 1X3

## (undated) DEVICE — SUTURE VICRYL 0

## (undated) DEVICE — GLOVE SURG SENSICARE SZ 7

## (undated) DEVICE — PROXIMATE SKIN STAPLERS (35 WIDE) CONTAINS 35 STAINLESS STEEL STAPLES (FIXED HEAD): Brand: PROXIMATE

## (undated) DEVICE — PLASTC TOOMEY SYRNG DISP

## (undated) DEVICE — BLADE ELECTRODE: Brand: EDGE

## (undated) DEVICE — SOL  .9 1000ML BTL

## (undated) DEVICE — STERILE POLYISOPRENE POWDER-FREE SURGICAL GLOVES: Brand: PROTEXIS

## (undated) DEVICE — VIOLET BRAIDED (POLYGLACTIN 910), SYNTHETIC ABSORBABLE SUTURE: Brand: COATED VICRYL

## (undated) DEVICE — 3M(TM) MICROPORE TAPE DISPENSER 1535-2: Brand: 3M™ MICROPORE™

## (undated) DEVICE — MEDI-VAC TUBING CONNECTOR 5-IN-1 STRAIGHT: Brand: CARDINAL HEALTH

## (undated) DEVICE — SPONGE RAYTEC 4X4 RF DETECT

## (undated) DEVICE — POOLE SUCTION HANDLE: Brand: CARDINAL HEALTH

## (undated) DEVICE — TOWEL: OR BLU 80/CS: Brand: MEDICAL ACTION INDUSTRIES

## (undated) DEVICE — TUBING CYSTO TUR DUAL

## (undated) DEVICE — SUTURE VICRYL 4-0 RB-1

## (undated) DEVICE — KENDALL SCD EXPRESS SLEEVES, KNEE LENGTH, MEDIUM: Brand: KENDALL SCD

## (undated) DEVICE — LAPAROTOMY CDS: Brand: MEDLINE INDUSTRIES, INC.

## (undated) DEVICE — ABSORBABLE HEMOSTAT (OXIDIZED REGENERATED CELLULOSE, U.S.P.): Brand: SURGICEL

## (undated) DEVICE — REMOVER DURAPREP 3M

## (undated) DEVICE — GLOVE SURG SENSICARE SZ 7-1/2

## (undated) DEVICE — BAG URINE LEG W/VELCRO

## (undated) DEVICE — NEEDLE SPINAL 22X3-1/2 BLK

## (undated) DEVICE — LAPAROTOMY SPONGE - RF AND X-RAY DETECTABLE PRE-WASHED: Brand: SITUATE

## (undated) DEVICE — SOL  .9 3000ML

## (undated) DEVICE — FLOSEAL SEALENT STERILE 10ML

## (undated) DEVICE — SUTURE PDS II 1 TP-1

## (undated) DEVICE — SUTURE VICRYL 2-0

## (undated) NOTE — MR AVS SNAPSHOT
[facility], [address], [address] [place], [place] [address]               Thank you for choosing us for your health care visit with WAYNE Barger.   We are glad to serve you and happy to provide you with this Oct 04, 2017 11:40 AM   FOLLOW UP with Adam Clinton MD   [facility] PULMONARY MEDICINE ([place] at [address] [place] [address])    [address]  [place] [phone]              Today's Orders     MRI SPINE THORACIC ([id number]) Thoracic compression fracture, closed, initial encounter    -  Primary    Age-related osteoporosis with current pathological fracture, initial encounter        DDD (degenerative disc disease), lumbosacral          Instructions and Information about Your H approval may take 3-10 days. It is highly recommended patients contact their insurance carrier directly to determine coverage. If test is done without insurance authorization, patient may be responsible for the entire amount billed.       Precertification Take 1 tablet by mouth daily. Donepezil HCl 5 MG Tabs   Take 1 tablet (5 mg total) by mouth nightly.    Commonly known as:  ARICEPT           ferrous sulfate 325 (65 FE) MG Tbec   Take 325 mg by mouth daily with breakfast.           hydrochlorothi office, you can view your past visit information in [employer] by going to Visits < Visit Summaries. [employer] questions? Call [phone] for help. [employer] is NOT to be used for urgent needs. For medical emergencies, dial 911.            Visit [facility]WARD[facility]EL

## (undated) NOTE — Clinical Note
QUE, TCM call made, see notes. SLVAA attempted to schedule a TCM HFU at [facility], patient declines she would rather follow up with her PCP.  SLAVA attempted to schedule a TCM HFU with Dr. Maribel Crowell, due to limited availability SLAVA sent message to MD's office

## (undated) NOTE — Clinical Note
FYI, HFU call made, see notes. [facility] confirmed HFU non-TCM (pt is currently in a TCM episode) appointment on 4/29/19.

## (undated) NOTE — LETTER
[address]                June 11, 2021    Ingris Bellamy Bilthuis  [address] [address]      Dear Ingris Bellamy:   It was a pleasure speaking with you ov

## (undated) NOTE — LETTER
Lauren Ochoa 182 195 [facility], 209 [facility]     PICC LINE INSERTION CONSENT     I agree to have a Peripherally Inserted Central Catheter (PICC) placed in my arm.    1. The PICC insertion procedure, care, maintenance, risks, benefits, and c goals; and potential problems that might occur during recuperation.  They also discussed reasonable alternatives to the PICC, including risks, benefits, and side effects related to the alternatives and risks related to not receiving this procedure      ____

## (undated) NOTE — ED AVS SNAPSHOT
Adrian Anderson   MRN: [id number]    Department:  THE [facility] Emergency Department in [place]   Date of Visit:  9/5/2019           Disclosure     Insurance plans vary and the physician(s) referred by the ER may not be covered by your plan.  Please contac tell this physician (or your personal doctor if your instructions are to return to your personal doctor) about any new or lasting problems. The primary care or specialist physician will see patients referred from the [facility] Emergency Department.  Ashlyn Rushing

## (undated) NOTE — MR AVS SNAPSHOT
[facility] [address] [address] [phone]               Thank you for choosing us for your health care visit with WAYNE Vazquez.   We are glad to serve you and happy to provide you with this summar BP Pulse Temp Height Weight BMI    124/56 mmHg 72 97.9 °F (36.6 °C) (Oral) 63\" 121 lb 21.44 kg/m2         Current Medications          This list is accurate as of: 6/7/17  3:57 PM.  Always use your most recent med list.                Albuterol Sulfate H [address] [phone], [address] [place] [address], [address]     Phone:  [phone]    - predniSONE 20 MG Tabs            MyChart     Visit MyChart  You can access your MyChart to more actively manage your health care and view more detai

## (undated) NOTE — LETTER
[facility]  Jeovanykristin [address] 0418 [facility], [address] [place] [address]    Consent for Operation    Date: __________________    Time: _______________    1.  I authorize the performance upon [place] the following operation:    Procedure(s):  Exploratory laparot procedure has been videotaped, the surgeon will obtain the original videotape. The hospital will not be responsible for storage or maintenance of this tape.     6. For the purpose of advancing medical education, I consent to the admittance of observers to t STATEMENTS REQUIRING INSERTION OR COMPLETION WERE FILLED IN.     Signature of Patient:   ___________________________    When the patient is a minor or mentally incompetent to give consent:  Signature of person authorized to consent for patient: ____________ drugs/illegal medications). Failure to inform my anesthesiologist about these medicines may increase my risk of anesthetic complications. · If I am allergic to anything or have had a reaction to anesthesia before.     3. I understand how the anesthesia med I have discussed the procedure and information above with the patient (or patient’s representative) and answered their questions. The patient or their representative has agreed to have anesthesia services.     _______________________________________________

## (undated) NOTE — MR AVS SNAPSHOT
[facility]  [address] [address] [phone]               Thank you for choosing us for your health care visit with Julio Trevino MD.  We are glad to serve you and happy to provide you with this summary of yo Today's Vital Signs     BP Pulse Temp Height Weight BMI    147/68 mmHg 88 97.8 °F (36.6 °C) (Oral) 65\" 120 lb 19.97 kg/m2         Current Medications          This list is accurate as of: 2/7/17  4:08 PM.  Always use your most recent med list. Visits < Visit Summaries. MyChart questions? Call [phone] for help. Like[employer] is NOT to be used for urgent needs. For medical emergencies, dial 911.            Visit [facility] online at  [email or web address]

## (undated) NOTE — LETTER
07/26/18    Dear Aric Lee MD,    I am seeing Adrian Anderson in the office today after recent hospitalization for small bowel obstruction.          Assessment   Hx of small bowel obstruction  (primary encounter diagnosis)      Plan   Currently the pa

## (undated) NOTE — LETTER
10/18/17    Patient: Chauncy Boast  : 7/3/1934 Visit date: 10/18/2017    Dear  Dr. Zackary Evans MD,    Thank you for referring Chauncy Boast to my practice. Please find my assessment and plan below.                Assessment   SBO (small bowel obstru

## (undated) NOTE — LETTER
[facility] [address], [address] [facility]    Consent for Anesthesia   1.    IBrianna agree to be cared for by an anesthesiologist, who is specially trained to monitor me and give me medicine to put me to sleep or keep me comfort vision, nerves, or muscles and in extremely rare instances death. 5. My doctor has explained to me other choices available to me for my care (alternatives).   6. Pregnant Patients (“epidural”):  I understand that the risks of having an epidural (medicine g

## (undated) NOTE — IP AVS SNAPSHOT
[facility] [place] [address] [place], [address] ~ [phone]                Discharge Summary   6/10/2017    Ms. Nikita Aguilars           Admission Information        Provider Department    6/10/2017 Luis Santana MD  4nw-A Take 1 tablet by mouth daily. Donepezil HCl 5 MG Tabs   Commonly known as:  ARICEPT        Take 1 tablet (5 mg total) by mouth nightly.     Shelley Edwards                           ferrous sulfate 325 (65 FE) MG Tbec        Take 325 m Follow up with Camacho Sexton MD. Schedule an appointment as soon as possible for a visit in 1 week.     Specialty:  Internal Medicine    Contact information:    [address] Appointments ALT Bilirubin,Total Total Protein Albumin Sodium Potassium Chloride    (06/10/17)  29 (06/10/17)  0.5 (06/10/17)  6.7 (06/10/17)  3.5 (06/11/17)  140 (06/11/17)  3.3 (L) (06/11/17)  106      Pending Labs     Order Current Status    URINALYSIS WITH CULTURE As your caregivers, we want you to be aware of the medications you are prescribed to take and their potential SIDE EFFECTS. Your nurse will review your medications with you before you are discharged, and can provide you with additional printed information. Most common side effects: Pain, fever, rash, fatigue, joint pain, blood clots, high blood pressure   What to report to your healthcare team: Pain, swelling, rash, fever           Narcotic Medications     TraMADol HCl 50 MG Oral Tab       Use:  Treat pain Most common side effects:  Increased blood sugar, high blood pressure, fluid retention, mood changes, stomach upset, headache, dizziness   What to report to your healthcare provider: Increase in blood sugar, high blood pressure, fluid retention, mood change

## (undated) NOTE — LETTER
07/01/21        [facility] [employer] [address]      Dear Talat Santana,    [facility] records indicate that you have outstanding lab work and or testing that was ordered for you and has not yet been completed:  Orders Placed This Encounte

## (undated) NOTE — MR AVS SNAPSHOT
[address] [address]               Thank you for choosing us for your health care visit with Ines Santos MD.  We are glad to serve you and happy to provide you with this summary EEG (At [facility])    Complete by: Mar 20, 2017 (Approximate)    Assoc Dx: Memory deficit [R41.3]           C-Reactive Protein    Complete by: Mar 20, 2017 (Approximate)    Assoc Dx:   Memory deficit [R41.3]           Sed Daisy Alvarez ([employer] ? Refills are not addressed on weekends; covering physicians do not authorize routine medications on weekends. ? No narcotics or controlled substances are refilled after noon on Fridays or by on call physicians.   ? By law, narcotics cannot be faxed or binu the procedure/test has been pre-certified. You are strongly encouraged to contact your insurance carrier to verify that your procedure/test has been approved and is a COVERED benefit.   Although the [facility] staff does its due diligence, the insurance carrier g 140/60 mmHg 78 64\" 124 lb 21.27 kg/m2         Current Medications          This list is accurate as of: 3/20/17  3:25 PM.  Always use your most recent med list.                Alendronate Sodium 70 MG Tabs   TAKE ONE TABLET BY MOUTH ONCE A WEEK   Commonl discharge instructions in [employer] by going to Visits < Admission Summaries. If you've been to the Emergency Department or your doctor's office, you can view your past visit information in [employer] by going to Visits < Visit Summaries. [employer] questions?

## (undated) NOTE — LETTER
08/17/21        [address] [address]      Dear Cadence Menjivar,    15[address] [facility] records indicate that you have outstanding lab work and or testing that was ordered for you and has not yet been completed:  Orders Placed This Encounte

## (undated) NOTE — LETTER
[facility] Mag[facility] Testing Department  Phone: [phone]  Right Fax: [phone]  [address] By:  Madison Kohli RN Date: 17    Patient Name: [facility] Date:     CSN: [id number]  Medical Record: [id number]   : 7

## (undated) NOTE — Clinical Note
I had the pleasure of seeing Ms. Duane Silva in my office today. Please see my attached note.       Lata Elizabeth

## (undated) NOTE — Clinical Note
Pt currently does not have her HFU appt scheduled. Pt was offered an appt however she declined stating she would like to see Dr. Ike Esparza first. Pt stated she is doing fine since d/c. She denies abdominal pain and n/v. Pt has been having soft BM's.  Pt denies

## (undated) NOTE — MR AVS SNAPSHOT
[facility] [place] [address] [phone]               Thank you for choosing us for your health care visit with Amanda Kaur MD.  We are glad to serve you and happy to provide you with this summar fibrillation) ([facility] 75.) [I48.0]           Comp Metabolic Panel (14) [E]    Complete by: Mar 06, 2017 (Approximate)    Assoc Dx:  Essential hypertension [I10]           Lipid Panel [E]    Complete by:   Mar 06, 2017 (Approximate)    Assoc Dx:  Pure hypercholest TAKE 1 CAPSULE BY MOUTH EVERY MORNING. Commonly known as:  MICROZIDE           MULTIVITAMIN OR   Take 1 tablet by mouth daily. simvastatin 10 MG Tabs   TAKE 1 TABLET BY MOUTH ONCE DAILY.    Commonly known as:  ZOCOR           VITAMIN B COMPLEX O

## (undated) NOTE — MR AVS SNAPSHOT
[facility]5 [address] [address] [phone]               Thank you for choosing us for your health care visit with Reyes Wang NP.   We are glad to serve you and happy to provide you with this summary Take 650 mg by mouth every 6 (six) hours as needed for Pain or Fever.    Commonly known as:  TYLENOL           Alendronate Sodium 70 MG Tabs   TAKE ONE TABLET BY MOUTH ONCE A WEEK   Commonly known as:  FOSAMAX           alprazolam 0.25 MG Tabs   Take 1 tabl Assoc Dx:  Chest discomfort [R07.89], Palpitations [R00.2]                 MyChart     Visit MyChart  You can access your MyChart to more actively manage your health care and view more details from this visit by going to [email or web address].   If

## (undated) NOTE — LETTER
10/12/17    Patient: Zoe Apple  : 7/3/1934 Visit date: 10/12/2017    Dear  Dr. Maribel Crowell MD,    Thank you for referring Zoe Olmedoqiankristal to my practice. Please find my assessment and plan below.                  Assessment   SBO (small bowel obst

## (undated) NOTE — MR AVS SNAPSHOT
[facility] [address] [place] [address] [phone]               Thank you for choosing us for your health care visit with WAYNE Olivo.   We are glad to serve you and happy to provide you with this summar Today's Vital Signs     BP Pulse Temp Height Weight BMI    120/56 mmHg 84 97.6 °F (36.4 °C) (Oral) 65\" 120 lb 19.97 kg/m2         Current Medications          This list is accurate as of: 2/7/17 11:38 AM.  Always use your most recent med list. office, you can view your past visit information in whistleBox by going to Visits < Visit Summaries. whistleBox questions? Call [phone] for help. whistleBox is NOT to be used for urgent needs. For medical emergencies, dial 911.            Visit EDWARD-EL

## (undated) NOTE — LETTER
Patient Name: Milagro Khan  YOB: 1934          MRN number:  [id number]  Date:  11/14/2018  Referring Physician:  Megan Hernandez    Discharge Summary  Initial Functional Outcome Score 63/100  Final Functional Outcome Score 63/100  Number of Vis 30 sec sit<>stand: 1 8                              Fall Risk: no  [Below average score indicates high risk for falls; norms by age > or = to. ..               63-61 y/o Men: 15, Women: 15               65-69 y/o Men: 15, Women: 8               76-70 y/o M

## (undated) NOTE — MR AVS SNAPSHOT
[facility], [address], [address] [place] [address] [phone]               Thank you for choosing us for your health care visit with PRESLEY Bowen.   We are glad to serve you and happy to provide you with [facility] Emergency Department Alendronate Sodium 70 MG Tabs   TAKE ONE TABLET BY MOUTH ONCE A WEEK   Commonly known as:  FOSAMAX           aspirin 81 MG Tabs   Take 81 mg by mouth daily. CALCIUM + D OR   Take 1 tablet by mouth daily.            CARTIA  MG Cp24   Generic For medical emergencies, dial 911.            Visit [facility] online at  [email or web address]

## (undated) NOTE — LETTER
[facility] [address], [address] [place] [address]    Consent for Anesthesia   1.    I, Terry Wilkerson agree to be cared for by an anesthesiologist, who is specially trained to monitor me and give me medicine to put me to sleep or keep me comfort vision, nerves, or muscles and in extremely rare instances death. 5. My doctor has explained to me other choices available to me for my care (alternatives).   6. Pregnant Patients (“epidural”):  I understand that the risks of having an epidural (medicine g

## (undated) NOTE — ED AVS SNAPSHOT
Ilene Hauser   MRN: [id number]    Department:  [facility] Emergency Department   Date of Visit:  8/20/2019           Disclosure     Insurance plans vary and the physician(s) referred by the ER may not be covered by your plan.  Please contact yo tell this physician (or your personal doctor if your instructions are to return to your personal doctor) about any new or lasting problems. The primary care or specialist physician will see patients referred from the [facility] Emergency Department.  Kami Barlow

## (undated) NOTE — LETTER
[facility]  Jeovany[address]ristin [address] 0[address] [facility], [address] [place] [address]    Consent for Operation    Date: __________________    Time: _______________    1.  I authorize the performance upon [place] the following operation:    Procedure(s):  Exploratory laparot procedure has been videotaped, the surgeon will obtain the original videotape. The hospital will not be responsible for storage or maintenance of this tape.     6. For the purpose of advancing medical education, I consent to the admittance of observers to t STATEMENTS REQUIRING INSERTION OR COMPLETION WERE FILLED IN.     Signature of Patient:   ___________________________    When the patient is a minor or mentally incompetent to give consent:  Signature of person authorized to consent for patient: ____________ drugs/illegal medications). Failure to inform my anesthesiologist about these medicines may increase my risk of anesthetic complications. · If I am allergic to anything or have had a reaction to anesthesia before.     3. I understand how the anesthesia med I have discussed the procedure and information above with the patient (or patient’s representative) and answered their questions. The patient or their representative has agreed to have anesthesia services.     _______________________________________________

## (undated) NOTE — LETTER
19    Patient: Neda Barker  : 7/3/1934 Visit date: 2019    Dear  Dr. Tabitha Steven MD,    Thank you for referring Neda Barker to my practice. Please find my assessment and plan below.              Assessment   SBO (small bowel obstruct

## (undated) NOTE — Clinical Note
I had the pleasure of seeing Ms. Billy Jackson in my office today. Please see my attached note.       Brad Fitzpatrick

## (undated) NOTE — MR AVS SNAPSHOT
[facility] [address], [address] [place] [address]                    After Visit Summary   1/13/2017    Edward Shafer    MRN: [id number]           Visit Information        Provider Department Dept Phone    1/13 CRANB[facility]Y OR Take 1 tablet by mouth daily. Vitamin C (VITAMIN C) 500 MG/5ML Oral Syrup Take 500 mg by mouth daily. acetaminophen (TYLENOL) 325 MG Oral Tab Take 650 mg by mouth every 6 (six) hours as needed for Pain or Fever.     Cholecalciferol (VI For medical emergencies, dial 911.

## (undated) NOTE — IP AVS SNAPSHOT
[address]            (For Outpatient Use Only) Initial Admit Date: 12/6/2021   Inpt/Obs Admit Date: Inpt: 12/8/21 / Obs: 12/06/21   Discharge Date:    Alison Captain:  [de-identified]   MRN: [de-identified]   CSN: [id number]   CEID: [id number] INSURANCE   Payor:  Plan:    Group Number:  Insurance Type:    Subscriber Name:  Subscriber :    Subscriber ID:  Pt Rel to Subscriber:    Hospital Account Financial Class: Medicare    2021

## (undated) NOTE — MR AVS SNAPSHOT
[facility]  [address] [address] [phone]               Thank you for choosing us for your health care visit with Manisha Ruelas MD.  We are glad to serve you and happy to provide you with this summary of yo exam. This allows you to be more comfortable during the exam.  There are no eating or activity restrictions for this exam.              Allergies as of Feb 10, 2017     No Known Allergies                 Today's Vital Signs     BP Pulse Height Weight BMI Summaries. If you've been to the Emergency Department or your doctor's office, you can view your past visit information in [facility] by going to Visits < Visit Summaries. [facility] questions? Call [phone] for help.   [facility] is NOT to be used for urge

## (undated) NOTE — MR AVS SNAPSHOT
[facility] [address] [address] [phone]               Thank you for choosing us for your health care visit with WAYNE Rodriguez.   We are glad to serve you and happy to provide you with this summar Today's Vital Signs     BP Pulse Temp Height Weight BMI    132/80 mmHg 78 98.7 °F (37.1 °C) (Oral) 63.75\" 123 lb 8 oz 21.37 kg/m2         Current Medications          This list is accurate as of: 5/24/17  1:36 PM.  Always use your most recent med list. [facility] [phone], [address] [place] [address], [address]     Phone:  [phone]    - methylPREDNISolone 4 MG Tbpk            MyChart     Call the [facility] for assistance with your inactive [employer] account    If you have questions, you

## (undated) NOTE — MR AVS SNAPSHOT
[facility] [address] [address] [phone]               Thank you for choosing us for your health care visit with WAYNE Patrick.   We are glad to serve you and happy to provide you with this summar Assoc Dx: Fall, initial encounter [W19. XXXA], Other chest pain [R07.89]                 Follow-up Instructions     Return if symptoms worsen or fail to improve.       Scheduling Instructions     Friday April 14, 2017     Imaging:  XR RIBS WITH CHEST (3 VI MULTIVITAMIN OR   Take 1 tablet by mouth daily. simvastatin 10 MG Tabs   TAKE 1 TABLET BY MOUTH ONCE DAILY. Commonly known as:  ZOCOR           umeclidinium-vilanterol 62.5-25 MCG/INH Aepb   Inhale 1 puff into the lungs daily.    Commonly known

## (undated) NOTE — LETTER
Patient Name: Henrik Espinoza  YOB: 1934          MRN number:  [id number]  Date:  11/16/2017  Referring Physician:  [address] [place] [address]    Referring Physician: Dr. Rashid Murphy  Diagnosis: Unsteadiness     Date of Service: 11/16 OBJECTIVE:   Physical Exam:  Posture/Observation: rounded shoulders, sway back posture. Cervical spine AROM: WNL    LE Strength: hips: 4/5; knees: 4+/5; ankle: 4+/5  LE Flexibility: gastrocs:  Moderate restrictions               Postural Control:  4-Stage Grading: Mcmahon Prost the lowest category that applies. (3)  Normal: Able to smoothly change walking speed without loss of balance or gait deviation. Shows a significant difference in walking speed between normal, fast and slow speeds.    (2)  Mild Impairment: Is Today's treatment: Evaluation followed by patient education provided on fall risk and home safety for fall prevention.  Patient was instructed in and issued a HEP for see handouts: PT Tyshawn Low Complexity, Nreed 1      Total Timed Treatment: 15 min     Total Electronically signed by therapist: Eddi Muro PT    Physician's certification required: Yes  I certify the need for these services furnished under this plan of treatment and while under my care.     X___________________________________________________

## (undated) NOTE — IP AVS SNAPSHOT
Patient Demographics     Address  [address] [place] [address] [place] [address] Phone  [phone] [facility])  [phone] (Work)  [phone] (Mobile) *Preferred* E-mail Address  [email or web address]      Emergency Contact(s)     Name Relation Home Work Mo Ellipta 62.5-25 MCG/INH Aepb  Generic drug: umeclidinium-vilanterol  Next dose due: 12/8/21      Inhale 1 puff into the lungs daily. WAYNE Rose         aspirin 81 MG Tabs  Next dose due: 12/9/21      Take 81 mg by mouth daily.           [address] MG Tabs  Commonly known as: ZOLOFT  Next dose due: 12/9/21      TAKE 1 TABLET BY MOUTH EVERY DAY   ALVINA Guzmán         simvastatin 10 MG Tabs  Commonly known as: ZOCOR  Next dose due: 12/9/21      TAKE 1 TABLET BY MOUTH EVERY DAY   Antonia Sims MD 9216 Given            LEFT DELTOID     Order ID Medication Name Action Time Action Reason Comments    [id number] influenza vaccine (PF) (FLUZONE HD) high dose for 65 yrs & older inj 0.7ml 12/08/21 1545 Given              Recent Vital Signs       Most Recent [id number]   Location [address] [place] [address] Attending Janis Aguilar MD   Hosp Day # 0 PCP Soha Salazar MD     Chief Complaint: weakness    History of Present Illness:  Rigoberto Feliz is a 80year old female with a past medical history x2, in 2012 w/ forceps biopsy   • COLOSTOMY      and reversal   • LYSIS OF ADHESIONS  08/20/2017    expl lap   • PAULINO LOCALIZATION WIRE 1 SITE RIGHT (CPT=19281) Right 2006    benign.    • OTHER Bilateral     bilateral breast implants   • OTHER  07/11/2017 PROPIONATE 50 MCG/ACT Nasal Suspension, SPRAY 2 SPRAYS INTO EACH NOSTRIL EVERY DAY, Disp: 48 mL, Rfl: 1  LOSARTAN 50 MG Oral Tab, TAKE 1 TABLET BY MOUTH EVERY DAY, Disp: 90 tablet, Rfl: 1  MEMANTINE HCL 10 MG Oral Tab, TAKE 1 TABLET BY MOUTH TWICE A DAY, D Acidophilus/Pectin Oral Cap, Take 1 capsule by mouth daily. , Disp: , Rfl:   aspirin 81 MG Oral Tab, Take 81 mg by mouth daily. , Disp: , Rfl:   CRANBERRY OR, Take 1 tablet by mouth daily. , Disp: , Rfl:   CALCIUM + D OR, Take 1 tablet by mouth daily. Alesia Magana in [place]. ASSESSMENT / PLAN:     1. A. fib with RVR  a. She has a documented history of this though the patient and patient's family deny knowing of this  b. Continue Cardizem drip and wean as able  c. Resume home oral Cardizem  d.  Adjust rate controll persistent and progressive weakness. Has been going on for the past week or so. The patient does have a history of several falls. She suffered vertebral fractures which has been not able to be treated via kyphoplasty. She has had chronic pain as well. OTHER SURGICAL HISTORY  1974    Breast implants   • OTHER SURGICAL HISTORY  2006    Right breast lump- benign   • OTHER SURGICAL HISTORY  2006    Lnsfzcqcgb-etmasvpjuq-ksbmzumkwiyqnb   • OTHER SURGICAL HISTORY  2007    Removed cholostomy   • OTHER SURGICAL by mouth every 8 (eight) hours as needed for Pain., Disp: 90 tablet, Rfl: 1  NAPROXEN 500 MG Oral Tab, TAKE 1 TABLET BY MOUTH TWICE A DAY WITH MEALS, Disp: 60 tablet, Rfl: 2  HYDROCHLOROTHIAZIDE 12.5 MG Oral Tab, TAKE 1 TABLET BY MOUTH EVERY DAY (Patient n comprehensive 14 point review of systems was completed. Pertinent positives and negatives noted in the HPI.     Physical Exam:    /54   Pulse 112   Temp 98.3 °F (36.8 °C) (Temporal)   Resp 21   LMP  (LMP Unknown)   SpO2 97%   General: No acute dist anticoagulation though may be a candidate for watchman  f. Echo  g. TSH  2. Dehydration  a. Gentle fluid hydration  3. Leukocytosis  a. Chest x-ray unremarkable  b. UA pending  c.  We will send blood cultures and give a dose of Rocephin after urine has been herself denied any chest pain or shortness of breath. There were no palpitations. Patient has had several falls over the last few weeks. She has had no recent fevers, chills, or cough.     History:  Past Medical History:   Diagnosis Date   • Arthritis HISTORY  2010    intestinal   • OTHER SURGICAL HISTORY  1980    removal of mass near thyroid   • OTHER SURGICAL HISTORY  12/14/12    Dr. Jeremiah leblanc   • OTHER SURGICAL HISTORY  2015    intestinal blockage   • OTHER SURGICAL HISTORY  2015    hole in inte propionate (FLONASE) 50 MCG/ACT nasal spray 2 spray, 2 spray, Nasal, Daily  •  pravastatin (PRAVACHOL) tab 20 mg, 20 mg, Oral, Nightly  •  acetaminophen (TYLENOL) tab 650 mg, 650 mg, Oral, Q6H PRN  •  melatonin tab 3 mg, 3 mg, Oral, Nightly PRN  •  ondanse .0 339.0     Recent Labs   Lab 12/06/21  1943 12/07/21  0331   GLU 95 114*   BUN 68* 67*   CREATSERUM 1.45* 1.30*   GFRAA 37* 43*   GFRNAA 32* 37*   CA 9.2 9.2   ALB 2.9*  --     136   K 4.5 4.5    109   CO2 21.0 20.0*   ALKPHO 72  -- PT at 12/8/2021  1:18 PM  Version 1 of 1    Author: Vane Barton PT Service: Rehab Author Type: Physical Therapist    Filed: 12/8/2021  1:18 PM Date of Service: 12/8/2021  1:11 PM Status: Signed    : Vane Barton PT (Physical Therapist) unspecified(486)    • Sputum production    • Uncomfortable fullness after meals    • Unspecified essential hypertension    • Vertigo    • Wears glasses        Past Surgical History  Past Surgical History:   Procedure Laterality Date   • BOWEL RESECTION Fair -  Dynamic Standing: Poor +    ACTIVITY TOLERANCE                        O2 WALK       AM-PAC '6-Clicks' INPATIENT SHORT FORM - BASIC MOBILITY  How much difficulty does the patient currently have. ..   Patient Difficulty: Turning over in bed (including Sets   2     Patient End of Session: Up in chair; With [address] staff;Needs met;Call light within reach;RN aware of session/findings; All patient questions and concerns addressed    ASSESSMENT   Pt is currently at [facility] level with amb with RW on level surfaces for PHYSICAL THERAPY EVALUATION - INPATIENT     Room Number: 3594/6368-A  Evaluation Date: 12/7/2021  Type of Evaluation: Initial  Physician Order: PT Eval and Treat    Presenting Problem: Afib RVR  Co-Morbidities : non surgical vertebral fx L5 and sac degree of impairment in mobility. Research supports that patients with this level of impairment may benefit from JAY JAY. Anticipate possible improvement in activity tolerance once HR is managed. Pt amicable as long as she is home by [place].    Based on this awareness of need for safety    RANGE OF MOTION AND STRENGTH ASSESSMENT  Upper extremity ROM and strength are within functional limits     Lower extremity ROM is within functional limits     Lower extremity strength is within functional limits     BALANCE training    Patient End of Session: In bed;Needs met;Call light within reach;RN aware of session/findings; All patient questions and concerns addressed; Alarm set    Patient/Family PPE: Mask not worn                    Occupational Therapy Notes (last 72 nata Goal Priority Disciplines Outcome Interventions   Patient/Family Long Term Goal   (Resolved)     Interdisciplinary Completed    Description: Patient's Long Term Goal: go home    Interventions:  - follow MD's orders  -get tests  -take medications as directe

## (undated) NOTE — ED AVS SNAPSHOT
Christie Wu   MRN: [id number]    Department:  [facility] in [place]   Date of Visit:  9/17/2019           Disclosure     Insurance plans vary and the physician(s) referred by the ER may not be covered by your plan.  Please conta tell this physician (or your personal doctor if your instructions are to return to your personal doctor) about any new or lasting problems. The primary care or specialist physician will see patients referred from the [facility] Emergency Department.  Ashlyn Rushing

## (undated) NOTE — Clinical Note
I had the pleasure of seeing Ms. Garrett Wylie in my office today. Please see my attached note.       Berta Clinton

## (undated) NOTE — LETTER
Date & Time: 9/7/2021, 4:01 PM  Patient: Cindy Taveras  Encounter Provider(s):    MD Margie Price PA-C       To Whom It May Concern: Singh Vazquez was seen and treated in our department on 9/7/2021.  She may return to [facility]

## (undated) NOTE — LETTER
09/21/21        [facility] [address] [facility] [address]      Dear Dea Lord,    [facility] records indicate that you have outstanding lab work and or testing that was ordered for you and has not yet been completed:  Orders Placed This Encounte

## (undated) NOTE — MR AVS SNAPSHOT
[facility] [address] [address] [phone]               Thank you for choosing us for your health care visit with Reyes Wang NP.   We are glad to serve you and happy to provide you with this summary Phone:  [phone]   Fax:  [phone]         Referral Orders      Normal Orders This Visit    NEUROSURGERY - INTERNAL [[id number] CUSTOM]  Order #:  [id number]                  **REFERRAL REQUEST**    Your physician has referred you to a specialist.  Cornelio Snyder Take 1 tablet by mouth daily. CARTIA  MG Cp24   Generic drug:  DilTIAZem HCl ER Coated Beads   TAKE 1 CAPSULE BY MOUTH DAILY. CRANBERRY OR   Take 1 tablet by mouth daily.            Donepezil HCl 5 MG Tabs   Take 1 tablet (5 mg tot office, you can view your past visit information in [employer] by going to Visits < Visit Summaries. [facility] questions? Call [phone] for help. [employer] is NOT to be used for urgent needs. For medical emergencies, dial 911.            Visit [facility]

## (undated) NOTE — LETTER
October 30, 2019    [place] [address]    Dear Loretta Medina: It was a pleasure speaking with you over the phone recently.  To follow up, I wanted to send you some contact information to utilize when you have a question a

## (undated) NOTE — LETTER
Lauren Ochoa [address] [facility] [address], [address] [facility]     PICC LINE INSERTION CONSENT     I agree to have a Peripherally Inserted Central Catheter (PICC) placed in my arm.    1. The PICC insertion procedure, care, maintenance, risks, benefits, and c also discussed reasonable alternatives to the PICC, including risks, benefits, and side effects related to the alternatives and risks related to not receiving this procedure      _____________________ ___________ ____________________________________   Date

## (undated) NOTE — LETTER
Patient Name: Zoe Apple  YOB: 1934          MRN number:  [id number]  Date:  12/14/2017  Referring Physician:  Lauren Mchugh     Discharge Summary    Pt has attended 8, cancelled 0, and no shown 0 visits in Physical Therapy.  Niger reports Patient/Family/Caregiver was advised of these findings, precautions, and treatment options and has agreed to actively participate in planning and for this course of care.     Thank you for your referral. If you have any questions, please contact me at Dept: